# Patient Record
Sex: FEMALE | Race: WHITE | NOT HISPANIC OR LATINO | Employment: OTHER | URBAN - METROPOLITAN AREA
[De-identification: names, ages, dates, MRNs, and addresses within clinical notes are randomized per-mention and may not be internally consistent; named-entity substitution may affect disease eponyms.]

---

## 2023-08-26 LAB
ALANINE AMINOTRANSFERASE (SGPT) (U/L) IN SER/PLAS: 9 U/L (ref 7–45)
ALBUMIN (G/DL) IN SER/PLAS: 3.7 G/DL (ref 3.4–5)
ALKALINE PHOSPHATASE (U/L) IN SER/PLAS: 63 U/L (ref 33–136)
ANION GAP IN SER/PLAS: 14 MMOL/L (ref 10–20)
ASPARTATE AMINOTRANSFERASE (SGOT) (U/L) IN SER/PLAS: 11 U/L (ref 9–39)
BASOPHILS (10*3/UL) IN BLOOD BY AUTOMATED COUNT: 0.04 X10E9/L (ref 0–0.1)
BASOPHILS/100 LEUKOCYTES IN BLOOD BY AUTOMATED COUNT: 0.6 % (ref 0–2)
BILIRUBIN TOTAL (MG/DL) IN SER/PLAS: 0.4 MG/DL (ref 0–1.2)
CALCIUM (MG/DL) IN SER/PLAS: 9.1 MG/DL (ref 8.6–10.3)
CARBON DIOXIDE, TOTAL (MMOL/L) IN SER/PLAS: 26 MMOL/L (ref 21–32)
CHLORIDE (MMOL/L) IN SER/PLAS: 101 MMOL/L (ref 98–107)
CHOLESTEROL (MG/DL) IN SER/PLAS: 194 MG/DL (ref 0–199)
CHOLESTEROL IN HDL (MG/DL) IN SER/PLAS: 41.8 MG/DL
CHOLESTEROL/HDL RATIO: 4.6
CREATININE (MG/DL) IN SER/PLAS: 0.92 MG/DL (ref 0.5–1.05)
EOSINOPHILS (10*3/UL) IN BLOOD BY AUTOMATED COUNT: 0.11 X10E9/L (ref 0–0.4)
EOSINOPHILS/100 LEUKOCYTES IN BLOOD BY AUTOMATED COUNT: 1.5 % (ref 0–6)
ERYTHROCYTE DISTRIBUTION WIDTH (RATIO) BY AUTOMATED COUNT: 15.5 % (ref 11.5–14.5)
ERYTHROCYTE MEAN CORPUSCULAR HEMOGLOBIN CONCENTRATION (G/DL) BY AUTOMATED: 29.9 G/DL (ref 32–36)
ERYTHROCYTE MEAN CORPUSCULAR VOLUME (FL) BY AUTOMATED COUNT: 87 FL (ref 80–100)
ERYTHROCYTES (10*6/UL) IN BLOOD BY AUTOMATED COUNT: 4.28 X10E12/L (ref 4–5.2)
GFR FEMALE: 60 ML/MIN/1.73M2
GLUCOSE (MG/DL) IN SER/PLAS: 83 MG/DL (ref 74–99)
HEMATOCRIT (%) IN BLOOD BY AUTOMATED COUNT: 37.4 % (ref 36–46)
HEMOGLOBIN (G/DL) IN BLOOD: 11.2 G/DL (ref 12–16)
IMMATURE GRANULOCYTES/100 LEUKOCYTES IN BLOOD BY AUTOMATED COUNT: 0.6 % (ref 0–0.9)
LDL: 103 MG/DL (ref 0–99)
LEUKOCYTES (10*3/UL) IN BLOOD BY AUTOMATED COUNT: 7.2 X10E9/L (ref 4.4–11.3)
LYMPHOCYTES (10*3/UL) IN BLOOD BY AUTOMATED COUNT: 2.59 X10E9/L (ref 0.8–3)
LYMPHOCYTES/100 LEUKOCYTES IN BLOOD BY AUTOMATED COUNT: 36.1 % (ref 13–44)
MONOCYTES (10*3/UL) IN BLOOD BY AUTOMATED COUNT: 0.56 X10E9/L (ref 0.05–0.8)
MONOCYTES/100 LEUKOCYTES IN BLOOD BY AUTOMATED COUNT: 7.8 % (ref 2–10)
NEUTROPHILS (10*3/UL) IN BLOOD BY AUTOMATED COUNT: 3.84 X10E9/L (ref 1.6–5.5)
NEUTROPHILS/100 LEUKOCYTES IN BLOOD BY AUTOMATED COUNT: 53.4 % (ref 40–80)
NON HDL CHOLESTEROL: 152 MG/DL
NRBC (PER 100 WBCS) BY AUTOMATED COUNT: 0 /100 WBC (ref 0–0)
PLATELETS (10*3/UL) IN BLOOD AUTOMATED COUNT: 252 X10E9/L (ref 150–450)
POTASSIUM (MMOL/L) IN SER/PLAS: 3.7 MMOL/L (ref 3.5–5.3)
PROTEIN TOTAL: 6.9 G/DL (ref 6.4–8.2)
SODIUM (MMOL/L) IN SER/PLAS: 137 MMOL/L (ref 136–145)
THYROTROPIN (MIU/L) IN SER/PLAS BY DETECTION LIMIT <= 0.05 MIU/L: 2.47 MIU/L (ref 0.44–3.98)
TRIGLYCERIDE (MG/DL) IN SER/PLAS: 248 MG/DL (ref 0–149)
UREA NITROGEN (MG/DL) IN SER/PLAS: 27 MG/DL (ref 6–23)
VLDL: 50 MG/DL (ref 0–40)

## 2024-02-23 ENCOUNTER — TELEPHONE (OUTPATIENT)
Dept: ADMISSION | Facility: HOSPITAL | Age: 89
End: 2024-02-23
Payer: MEDICARE

## 2024-02-23 DIAGNOSIS — C50.919 MALIGNANT NEOPLASM OF FEMALE BREAST, UNSPECIFIED ESTROGEN RECEPTOR STATUS, UNSPECIFIED LATERALITY, UNSPECIFIED SITE OF BREAST (MULTI): ICD-10-CM

## 2024-02-23 NOTE — TELEPHONE ENCOUNTER
Patient calling in to report that she believes the anastrozole is causing her joint pain and fatigue. She states she discussed this with her PCP at a recent visit, and was told to call to change the prescription.   Patient does not have any appts or orders placed, and is unsure about follow up schedule.

## 2024-02-23 NOTE — TELEPHONE ENCOUNTER
Called patient and explained she will need an appt to discuss changes in her medication and plan of care. Patient states she prefers the [place] location and will make an appt with a provider there.   Patient transferred to the scheduling dept. No further questions or concerns at this time.

## 2024-04-22 ENCOUNTER — APPOINTMENT (OUTPATIENT)
Dept: HEMATOLOGY/ONCOLOGY | Facility: CLINIC | Age: 89
End: 2024-04-22
Payer: MEDICARE

## 2025-01-10 ENCOUNTER — APPOINTMENT (OUTPATIENT)
Dept: CARDIOLOGY | Facility: HOSPITAL | Age: 89
DRG: 374 | End: 2025-01-10
Payer: MEDICARE

## 2025-01-10 ENCOUNTER — HOSPITAL ENCOUNTER (INPATIENT)
Facility: HOSPITAL | Age: 89
End: 2025-01-10
Attending: EMERGENCY MEDICINE | Admitting: INTERNAL MEDICINE
Payer: MEDICARE

## 2025-01-10 DIAGNOSIS — D64.9 ANEMIA, UNSPECIFIED TYPE: ICD-10-CM

## 2025-01-10 DIAGNOSIS — R53.1 GENERALIZED WEAKNESS: Primary | ICD-10-CM

## 2025-01-10 DIAGNOSIS — E16.2 HYPOGLYCEMIA: ICD-10-CM

## 2025-01-10 LAB
ALBUMIN SERPL BCP-MCNC: 3.5 G/DL (ref 3.4–5)
ALP SERPL-CCNC: 55 U/L (ref 33–136)
ALT SERPL W P-5'-P-CCNC: 6 U/L (ref 7–45)
ANION GAP SERPL CALC-SCNC: 15 MMOL/L (ref 10–20)
APPEARANCE UR: CLEAR
AST SERPL W P-5'-P-CCNC: 8 U/L (ref 9–39)
BACTERIA #/AREA URNS AUTO: ABNORMAL /HPF
BASOPHILS # BLD AUTO: 0.03 X10*3/UL (ref 0–0.1)
BASOPHILS NFR BLD AUTO: 0.3 %
BILIRUB SERPL-MCNC: 0.4 MG/DL (ref 0–1.2)
BILIRUB UR STRIP.AUTO-MCNC: NEGATIVE MG/DL
BUN SERPL-MCNC: 23 MG/DL (ref 6–23)
CALCIUM SERPL-MCNC: 9.2 MG/DL (ref 8.6–10.3)
CARDIAC TROPONIN I PNL SERPL HS: 168 NG/L (ref 0–13)
CARDIAC TROPONIN I PNL SERPL HS: 214 NG/L (ref 0–13)
CARDIAC TROPONIN I PNL SERPL HS: 98 NG/L (ref 0–13)
CHLORIDE SERPL-SCNC: 102 MMOL/L (ref 98–107)
CK SERPL-CCNC: 68 U/L (ref 0–215)
CO2 SERPL-SCNC: 25 MMOL/L (ref 21–32)
COLOR UR: ABNORMAL
CREAT SERPL-MCNC: 0.67 MG/DL (ref 0.5–1.05)
EGFRCR SERPLBLD CKD-EPI 2021: 84 ML/MIN/1.73M*2
EOSINOPHIL # BLD AUTO: 0.02 X10*3/UL (ref 0–0.4)
EOSINOPHIL NFR BLD AUTO: 0.2 %
ERYTHROCYTE [DISTWIDTH] IN BLOOD BY AUTOMATED COUNT: 18.1 % (ref 11.5–14.5)
FLUAV RNA RESP QL NAA+PROBE: NOT DETECTED
FLUBV RNA RESP QL NAA+PROBE: NOT DETECTED
GLUCOSE BLD MANUAL STRIP-MCNC: 53 MG/DL (ref 74–99)
GLUCOSE BLD MANUAL STRIP-MCNC: 84 MG/DL (ref 74–99)
GLUCOSE SERPL-MCNC: 67 MG/DL (ref 74–99)
GLUCOSE UR STRIP.AUTO-MCNC: NORMAL MG/DL
HCT VFR BLD AUTO: 31.9 % (ref 36–46)
HGB BLD-MCNC: 9.5 G/DL (ref 12–16)
IMM GRANULOCYTES # BLD AUTO: 0.06 X10*3/UL (ref 0–0.5)
IMM GRANULOCYTES NFR BLD AUTO: 0.6 % (ref 0–0.9)
KETONES UR STRIP.AUTO-MCNC: NEGATIVE MG/DL
LACTATE SERPL-SCNC: 1.4 MMOL/L (ref 0.4–2)
LEUKOCYTE ESTERASE UR QL STRIP.AUTO: NEGATIVE
LYMPHOCYTES # BLD AUTO: 0.84 X10*3/UL (ref 0.8–3)
LYMPHOCYTES NFR BLD AUTO: 8.2 %
MCH RBC QN AUTO: 22.8 PG (ref 26–34)
MCHC RBC AUTO-ENTMCNC: 29.8 G/DL (ref 32–36)
MCV RBC AUTO: 77 FL (ref 80–100)
MONOCYTES # BLD AUTO: 0.44 X10*3/UL (ref 0.05–0.8)
MONOCYTES NFR BLD AUTO: 4.3 %
MUCOUS THREADS #/AREA URNS AUTO: ABNORMAL /LPF
NEUTROPHILS # BLD AUTO: 8.83 X10*3/UL (ref 1.6–5.5)
NEUTROPHILS NFR BLD AUTO: 86.4 %
NITRITE UR QL STRIP.AUTO: ABNORMAL
NRBC BLD-RTO: 0 /100 WBCS (ref 0–0)
PH UR STRIP.AUTO: 5.5 [PH]
PLATELET # BLD AUTO: 280 X10*3/UL (ref 150–450)
POTASSIUM SERPL-SCNC: 3.7 MMOL/L (ref 3.5–5.3)
PROT SERPL-MCNC: 7.2 G/DL (ref 6.4–8.2)
PROT UR STRIP.AUTO-MCNC: NEGATIVE MG/DL
RBC # BLD AUTO: 4.16 X10*6/UL (ref 4–5.2)
RBC # UR STRIP.AUTO: NEGATIVE /UL
RBC #/AREA URNS AUTO: ABNORMAL /HPF
SARS-COV-2 RNA RESP QL NAA+PROBE: NOT DETECTED
SODIUM SERPL-SCNC: 138 MMOL/L (ref 136–145)
SP GR UR STRIP.AUTO: 1.01
UROBILINOGEN UR STRIP.AUTO-MCNC: NORMAL MG/DL
WBC # BLD AUTO: 10.2 X10*3/UL (ref 4.4–11.3)
WBC #/AREA URNS AUTO: ABNORMAL /HPF

## 2025-01-10 PROCEDURE — 87086 URINE CULTURE/COLONY COUNT: CPT | Mod: PARLAB | Performed by: EMERGENCY MEDICINE

## 2025-01-10 PROCEDURE — G0378 HOSPITAL OBSERVATION PER HR: HCPCS

## 2025-01-10 PROCEDURE — 83036 HEMOGLOBIN GLYCOSYLATED A1C: CPT

## 2025-01-10 PROCEDURE — 83605 ASSAY OF LACTIC ACID: CPT | Performed by: EMERGENCY MEDICINE

## 2025-01-10 PROCEDURE — 96360 HYDRATION IV INFUSION INIT: CPT

## 2025-01-10 PROCEDURE — 84484 ASSAY OF TROPONIN QUANT: CPT

## 2025-01-10 PROCEDURE — 82947 ASSAY GLUCOSE BLOOD QUANT: CPT

## 2025-01-10 PROCEDURE — 84484 ASSAY OF TROPONIN QUANT: CPT | Performed by: EMERGENCY MEDICINE

## 2025-01-10 PROCEDURE — 82550 ASSAY OF CK (CPK): CPT

## 2025-01-10 PROCEDURE — 36415 COLL VENOUS BLD VENIPUNCTURE: CPT | Performed by: EMERGENCY MEDICINE

## 2025-01-10 PROCEDURE — 82550 ASSAY OF CK (CPK): CPT | Performed by: EMERGENCY MEDICINE

## 2025-01-10 PROCEDURE — 87636 SARSCOV2 & INF A&B AMP PRB: CPT | Performed by: EMERGENCY MEDICINE

## 2025-01-10 PROCEDURE — 80053 COMPREHEN METABOLIC PANEL: CPT | Performed by: EMERGENCY MEDICINE

## 2025-01-10 PROCEDURE — 85025 COMPLETE CBC W/AUTO DIFF WBC: CPT | Performed by: EMERGENCY MEDICINE

## 2025-01-10 PROCEDURE — 99285 EMERGENCY DEPT VISIT HI MDM: CPT | Performed by: EMERGENCY MEDICINE

## 2025-01-10 PROCEDURE — 99223 1ST HOSP IP/OBS HIGH 75: CPT

## 2025-01-10 PROCEDURE — 96361 HYDRATE IV INFUSION ADD-ON: CPT

## 2025-01-10 PROCEDURE — 2500000004 HC RX 250 GENERAL PHARMACY W/ HCPCS (ALT 636 FOR OP/ED): Performed by: EMERGENCY MEDICINE

## 2025-01-10 PROCEDURE — 81001 URINALYSIS AUTO W/SCOPE: CPT | Performed by: EMERGENCY MEDICINE

## 2025-01-10 PROCEDURE — 93005 ELECTROCARDIOGRAM TRACING: CPT

## 2025-01-10 RX ORDER — ACETAMINOPHEN 325 MG/1
650 TABLET ORAL EVERY 4 HOURS PRN
Status: DISPENSED | OUTPATIENT
Start: 2025-01-10

## 2025-01-10 RX ORDER — ONDANSETRON 4 MG/1
4 TABLET, FILM COATED ORAL EVERY 8 HOURS PRN
Status: ACTIVE | OUTPATIENT
Start: 2025-01-10

## 2025-01-10 RX ORDER — ENOXAPARIN SODIUM 100 MG/ML
40 INJECTION SUBCUTANEOUS EVERY 24 HOURS
Status: DISPENSED | OUTPATIENT
Start: 2025-01-10

## 2025-01-10 RX ORDER — ACETAMINOPHEN 160 MG/5ML
650 SOLUTION ORAL EVERY 4 HOURS PRN
Status: ACTIVE | OUTPATIENT
Start: 2025-01-10

## 2025-01-10 RX ORDER — ONDANSETRON HYDROCHLORIDE 2 MG/ML
4 INJECTION, SOLUTION INTRAVENOUS EVERY 8 HOURS PRN
Status: DISPENSED | OUTPATIENT
Start: 2025-01-10

## 2025-01-10 RX ORDER — ACETAMINOPHEN 650 MG/1
650 SUPPOSITORY RECTAL EVERY 4 HOURS PRN
Status: ACTIVE | OUTPATIENT
Start: 2025-01-10

## 2025-01-10 RX ORDER — DEXTROSE MONOHYDRATE 100 MG/ML
75 INJECTION, SOLUTION INTRAVENOUS CONTINUOUS
Status: ACTIVE | OUTPATIENT
Start: 2025-01-10 | End: 2025-01-11

## 2025-01-10 RX ADMIN — DEXTROSE MONOHYDRATE 75 ML/HR: 10 INJECTION, SOLUTION INTRAVENOUS at 21:02

## 2025-01-10 ASSESSMENT — LIFESTYLE VARIABLES
EVER FELT BAD OR GUILTY ABOUT YOUR DRINKING: NO
HAVE PEOPLE ANNOYED YOU BY CRITICIZING YOUR DRINKING: NO
EVER HAD A DRINK FIRST THING IN THE MORNING TO STEADY YOUR NERVES TO GET RID OF A HANGOVER: NO
TOTAL SCORE: 0
HAVE YOU EVER FELT YOU SHOULD CUT DOWN ON YOUR DRINKING: NO

## 2025-01-10 NOTE — ED TRIAGE NOTES
Pt from home when she had weakness in her legs that caused her to slide onto the ground. No LOC on ground for 5 hours till her son got home. No major injuries.

## 2025-01-10 NOTE — ED PROVIDER NOTES
"Limitations to History: None     HPI:      Melissa Tse is a 89 y.o. who presents to the ED with  A fall from standing.  The patient lowered herself to the ground making cereal around noon and then was on the floor until her son came home around 530.  She said her legs felt weak yesterday but her son was able to get her something to eat in the morning and she felt slightly better.  She then developed weakness again while making cereal this morning.  She said her legs just do not feel that they can hold her up.  She denies pain, chest pain shortness of breath syncope hitting her head or any pain after the fall.  She does think she soiled herself while in the ground..     Additional History Obtained from:Son, whom she lives with    ------------------------------------------------------------------------------------------------------------------------------------------    VS: /60 (BP Location: Right arm)   Pulse 95   Temp 36.5 °C (97.7 °F) (Temporal)   Resp 18   Ht 1.626 m (5' 4\")   Wt 83.9 kg (185 lb)   SpO2 95%   BMI 31.76 kg/m²     Physical Exam:  Gen: Alert, NAD  Head/Neck: NCAT, neck w/ FROM  Eyes: EOMI, PERRL, anicteric sclerae, noninjected conjunctivae  Mouth:  MMM, no OP lesions noted  Heart: RRR no MRG  Lungs: CTA b/l no RRW, no increased work of breathing  Abdomen: soft, NT, ND, no HSM, no palpable masses  Musculoskeletal: no joint swelling noted  Extremities: WWP, no c/c/e, cap refill <2sec  Neurologic: Alert, symmetrical facies, phonates clearly, moves all extremities equally, responsive to touch, 5/5 strength in BLE   Skin: no rashes noted  Psychological: calm, no SI/HI      ------------------------------------------------------------------------------------------------------------------------------------------    Medical Decision Making: Patient presents to the emergency department with concern for weakness.  She is unable to stand up at home.  She laid on the ground for 6 hours.  No " rhabdomyolysis, no traumatic injury from this fall.  The patient does have some right thigh pain but it is soft my concern for compartment syndrome given she weight on it for a few hours is very low.  She has good pulses in that lower extremity.  She is mildly hypoglycemic and after eating is persistently hypoglycemic so was placed on D10 will be admitted for her weakness and persistent hypoglycemia of unknown origin.  Could be because she did not eat today.    Diagnoses as of 01/10/25 2244   Generalized weakness   Hypoglycemia       Medications   dextrose 10 % in water (D10W) infusion (75 mL/hr intravenous New Bag 1/10/25 2102)   enoxaparin (Lovenox) syringe 40 mg (has no administration in time range)   psyllium (Metamucil) 3.4 gram packet 1 packet (has no administration in time range)   ondansetron (Zofran) tablet 4 mg (has no administration in time range)     Or   ondansetron (Zofran) injection 4 mg (has no administration in time range)   acetaminophen (Tylenol) tablet 650 mg (has no administration in time range)     Or   acetaminophen (Tylenol) oral liquid 650 mg (has no administration in time range)     Or   acetaminophen (Tylenol) suppository 650 mg (has no administration in time range)       Chronic Medical Conditions Significantly Affecting Care: DM    Discussion of Management with Other Providers:   I discussed the patient/results with: admitting team        Nahum Bello MD  01/10/25 2646

## 2025-01-11 LAB
ALBUMIN SERPL BCP-MCNC: 3.3 G/DL (ref 3.4–5)
ALP SERPL-CCNC: 53 U/L (ref 33–136)
ALT SERPL W P-5'-P-CCNC: 5 U/L (ref 7–45)
ANION GAP SERPL CALC-SCNC: 14 MMOL/L (ref 10–20)
AST SERPL W P-5'-P-CCNC: 10 U/L (ref 9–39)
BASOPHILS # BLD AUTO: 0.03 X10*3/UL (ref 0–0.1)
BASOPHILS NFR BLD AUTO: 0.3 %
BILIRUB SERPL-MCNC: 0.4 MG/DL (ref 0–1.2)
BUN SERPL-MCNC: 17 MG/DL (ref 6–23)
CALCIUM SERPL-MCNC: 8.7 MG/DL (ref 8.6–10.3)
CARDIAC TROPONIN I PNL SERPL HS: 222 NG/L (ref 0–13)
CHLORIDE SERPL-SCNC: 98 MMOL/L (ref 98–107)
CK SERPL-CCNC: 65 U/L (ref 0–215)
CO2 SERPL-SCNC: 27 MMOL/L (ref 21–32)
CREAT SERPL-MCNC: 0.76 MG/DL (ref 0.5–1.05)
EGFRCR SERPLBLD CKD-EPI 2021: 75 ML/MIN/1.73M*2
EOSINOPHIL # BLD AUTO: 0.03 X10*3/UL (ref 0–0.4)
EOSINOPHIL NFR BLD AUTO: 0.3 %
ERYTHROCYTE [DISTWIDTH] IN BLOOD BY AUTOMATED COUNT: 18.2 % (ref 11.5–14.5)
GLUCOSE BLD MANUAL STRIP-MCNC: 112 MG/DL (ref 74–99)
GLUCOSE BLD MANUAL STRIP-MCNC: 155 MG/DL (ref 74–99)
GLUCOSE BLD MANUAL STRIP-MCNC: 192 MG/DL (ref 74–99)
GLUCOSE BLD MANUAL STRIP-MCNC: 232 MG/DL (ref 74–99)
GLUCOSE BLD MANUAL STRIP-MCNC: 84 MG/DL (ref 74–99)
GLUCOSE SERPL-MCNC: 203 MG/DL (ref 74–99)
HCT VFR BLD AUTO: 30.4 % (ref 36–46)
HGB BLD-MCNC: 9 G/DL (ref 12–16)
HOLD SPECIMEN: NORMAL
IMM GRANULOCYTES # BLD AUTO: 0.05 X10*3/UL (ref 0–0.5)
IMM GRANULOCYTES NFR BLD AUTO: 0.5 % (ref 0–0.9)
IRON SATN MFR SERPL: 9 % (ref 25–45)
IRON SERPL-MCNC: 27 UG/DL (ref 35–150)
LYMPHOCYTES # BLD AUTO: 0.87 X10*3/UL (ref 0.8–3)
LYMPHOCYTES NFR BLD AUTO: 9.4 %
MCH RBC QN AUTO: 22.7 PG (ref 26–34)
MCHC RBC AUTO-ENTMCNC: 29.6 G/DL (ref 32–36)
MCV RBC AUTO: 77 FL (ref 80–100)
MONOCYTES # BLD AUTO: 0.52 X10*3/UL (ref 0.05–0.8)
MONOCYTES NFR BLD AUTO: 5.6 %
NEUTROPHILS # BLD AUTO: 7.77 X10*3/UL (ref 1.6–5.5)
NEUTROPHILS NFR BLD AUTO: 83.9 %
NRBC BLD-RTO: 0 /100 WBCS (ref 0–0)
PLATELET # BLD AUTO: 274 X10*3/UL (ref 150–450)
POTASSIUM SERPL-SCNC: 4.1 MMOL/L (ref 3.5–5.3)
PROT SERPL-MCNC: 6.7 G/DL (ref 6.4–8.2)
RBC # BLD AUTO: 3.97 X10*6/UL (ref 4–5.2)
SODIUM SERPL-SCNC: 135 MMOL/L (ref 136–145)
TIBC SERPL-MCNC: 303 UG/DL (ref 240–445)
UIBC SERPL-MCNC: 276 UG/DL (ref 110–370)
WBC # BLD AUTO: 9.3 X10*3/UL (ref 4.4–11.3)

## 2025-01-11 PROCEDURE — 96372 THER/PROPH/DIAG INJ SC/IM: CPT

## 2025-01-11 PROCEDURE — 83540 ASSAY OF IRON: CPT | Performed by: INTERNAL MEDICINE

## 2025-01-11 PROCEDURE — 2500000004 HC RX 250 GENERAL PHARMACY W/ HCPCS (ALT 636 FOR OP/ED): Performed by: INTERNAL MEDICINE

## 2025-01-11 PROCEDURE — 82947 ASSAY GLUCOSE BLOOD QUANT: CPT

## 2025-01-11 PROCEDURE — 83550 IRON BINDING TEST: CPT | Performed by: INTERNAL MEDICINE

## 2025-01-11 PROCEDURE — 36415 COLL VENOUS BLD VENIPUNCTURE: CPT

## 2025-01-11 PROCEDURE — G0378 HOSPITAL OBSERVATION PER HR: HCPCS

## 2025-01-11 PROCEDURE — 84484 ASSAY OF TROPONIN QUANT: CPT

## 2025-01-11 PROCEDURE — 2500000001 HC RX 250 WO HCPCS SELF ADMINISTERED DRUGS (ALT 637 FOR MEDICARE OP)

## 2025-01-11 PROCEDURE — 85025 COMPLETE CBC W/AUTO DIFF WBC: CPT | Performed by: INTERNAL MEDICINE

## 2025-01-11 PROCEDURE — 2500000004 HC RX 250 GENERAL PHARMACY W/ HCPCS (ALT 636 FOR OP/ED): Performed by: EMERGENCY MEDICINE

## 2025-01-11 PROCEDURE — 80053 COMPREHEN METABOLIC PANEL: CPT | Performed by: INTERNAL MEDICINE

## 2025-01-11 PROCEDURE — 36415 COLL VENOUS BLD VENIPUNCTURE: CPT | Performed by: INTERNAL MEDICINE

## 2025-01-11 PROCEDURE — 2500000004 HC RX 250 GENERAL PHARMACY W/ HCPCS (ALT 636 FOR OP/ED)

## 2025-01-11 PROCEDURE — 2500000002 HC RX 250 W HCPCS SELF ADMINISTERED DRUGS (ALT 637 FOR MEDICARE OP, ALT 636 FOR OP/ED): Performed by: PHYSICIAN ASSISTANT

## 2025-01-11 RX ORDER — LISINOPRIL 10 MG/1
10 TABLET ORAL DAILY
Status: DISPENSED | OUTPATIENT
Start: 2025-01-11

## 2025-01-11 RX ORDER — METFORMIN HYDROCHLORIDE 500 MG/1
500 TABLET ORAL
Status: ON HOLD | COMMUNITY

## 2025-01-11 RX ORDER — AMLODIPINE BESYLATE 5 MG/1
2.5 TABLET ORAL DAILY
Status: DISPENSED | OUTPATIENT
Start: 2025-01-11

## 2025-01-11 RX ORDER — INSULIN GLARGINE 100 [IU]/ML
68 INJECTION, SOLUTION SUBCUTANEOUS NIGHTLY
Status: ON HOLD | COMMUNITY

## 2025-01-11 RX ORDER — INSULIN LISPRO 100 [IU]/ML
0-5 INJECTION, SOLUTION INTRAVENOUS; SUBCUTANEOUS
Status: DISPENSED | OUTPATIENT
Start: 2025-01-11

## 2025-01-11 RX ORDER — LISINOPRIL 10 MG/1
10 TABLET ORAL DAILY
Status: ON HOLD | COMMUNITY

## 2025-01-11 RX ORDER — DEXTROSE 50 % IN WATER (D50W) INTRAVENOUS SYRINGE
25
Status: ACTIVE | OUTPATIENT
Start: 2025-01-11

## 2025-01-11 RX ORDER — CEFTRIAXONE 1 G/50ML
1 INJECTION, SOLUTION INTRAVENOUS EVERY 24 HOURS
Status: DISPENSED | OUTPATIENT
Start: 2025-01-11

## 2025-01-11 RX ORDER — DEXTROSE 50 % IN WATER (D50W) INTRAVENOUS SYRINGE
12.5
Status: ACTIVE | OUTPATIENT
Start: 2025-01-11

## 2025-01-11 RX ORDER — AMLODIPINE BESYLATE 2.5 MG/1
2.5 TABLET ORAL DAILY
Status: ON HOLD | COMMUNITY

## 2025-01-11 RX ADMIN — ONDANSETRON 4 MG: 2 INJECTION INTRAMUSCULAR; INTRAVENOUS at 13:23

## 2025-01-11 RX ADMIN — ENOXAPARIN SODIUM 40 MG: 40 INJECTION SUBCUTANEOUS at 22:03

## 2025-01-11 RX ADMIN — PSYLLIUM HUSK 1 PACKET: 3.4 POWDER ORAL at 11:38

## 2025-01-11 RX ADMIN — INSULIN LISPRO 1 UNITS: 100 INJECTION, SOLUTION INTRAVENOUS; SUBCUTANEOUS at 17:56

## 2025-01-11 RX ADMIN — ENOXAPARIN SODIUM 40 MG: 40 INJECTION SUBCUTANEOUS at 01:42

## 2025-01-11 RX ADMIN — DEXTROSE MONOHYDRATE 75 ML/HR: 10 INJECTION, SOLUTION INTRAVENOUS at 11:38

## 2025-01-11 RX ADMIN — CEFTRIAXONE SODIUM 1 G: 1 INJECTION, SOLUTION INTRAVENOUS at 11:38

## 2025-01-11 RX ADMIN — ACETAMINOPHEN 650 MG: 325 TABLET, FILM COATED ORAL at 22:07

## 2025-01-11 SDOH — SOCIAL STABILITY: SOCIAL INSECURITY: HAVE YOU HAD THOUGHTS OF HARMING ANYONE ELSE?: NO

## 2025-01-11 SDOH — ECONOMIC STABILITY: FOOD INSECURITY: WITHIN THE PAST 12 MONTHS, YOU WORRIED THAT YOUR FOOD WOULD RUN OUT BEFORE YOU GOT THE MONEY TO BUY MORE.: NEVER TRUE

## 2025-01-11 SDOH — SOCIAL STABILITY: SOCIAL INSECURITY: ABUSE: ADULT

## 2025-01-11 SDOH — SOCIAL STABILITY: SOCIAL INSECURITY: DO YOU FEEL UNSAFE GOING BACK TO THE PLACE WHERE YOU ARE LIVING?: NO

## 2025-01-11 SDOH — SOCIAL STABILITY: SOCIAL NETWORK
DO YOU BELONG TO ANY CLUBS OR ORGANIZATIONS SUCH AS CHURCH GROUPS, UNIONS, FRATERNAL OR ATHLETIC GROUPS, OR SCHOOL GROUPS?: NO

## 2025-01-11 SDOH — SOCIAL STABILITY: SOCIAL INSECURITY: WITHIN THE LAST YEAR, HAVE YOU BEEN HUMILIATED OR EMOTIONALLY ABUSED IN OTHER WAYS BY YOUR PARTNER OR EX-PARTNER?: NO

## 2025-01-11 SDOH — HEALTH STABILITY: PHYSICAL HEALTH
HOW OFTEN DO YOU NEED TO HAVE SOMEONE HELP YOU WHEN YOU READ INSTRUCTIONS, PAMPHLETS, OR OTHER WRITTEN MATERIAL FROM YOUR DOCTOR OR PHARMACY?: NEVER

## 2025-01-11 SDOH — SOCIAL STABILITY: SOCIAL INSECURITY: ARE YOU MARRIED, WIDOWED, DIVORCED, SEPARATED, NEVER MARRIED, OR LIVING WITH A PARTNER?: WIDOWED

## 2025-01-11 SDOH — SOCIAL STABILITY: SOCIAL INSECURITY: ARE THERE ANY APPARENT SIGNS OF INJURIES/BEHAVIORS THAT COULD BE RELATED TO ABUSE/NEGLECT?: NO

## 2025-01-11 SDOH — SOCIAL STABILITY: SOCIAL INSECURITY
WITHIN THE LAST YEAR, HAVE YOU BEEN RAPED OR FORCED TO HAVE ANY KIND OF SEXUAL ACTIVITY BY YOUR PARTNER OR EX-PARTNER?: NO

## 2025-01-11 SDOH — SOCIAL STABILITY: SOCIAL INSECURITY: DOES ANYONE TRY TO KEEP YOU FROM HAVING/CONTACTING OTHER FRIENDS OR DOING THINGS OUTSIDE YOUR HOME?: NO

## 2025-01-11 SDOH — ECONOMIC STABILITY: FOOD INSECURITY: WITHIN THE PAST 12 MONTHS, THE FOOD YOU BOUGHT JUST DIDN'T LAST AND YOU DIDN'T HAVE MONEY TO GET MORE.: NEVER TRUE

## 2025-01-11 SDOH — SOCIAL STABILITY: SOCIAL INSECURITY: WITHIN THE LAST YEAR, HAVE YOU BEEN AFRAID OF YOUR PARTNER OR EX-PARTNER?: NO

## 2025-01-11 SDOH — SOCIAL STABILITY: SOCIAL INSECURITY: HAVE YOU HAD ANY THOUGHTS OF HARMING ANYONE ELSE?: NO

## 2025-01-11 SDOH — ECONOMIC STABILITY: INCOME INSECURITY: IN THE PAST 12 MONTHS HAS THE ELECTRIC, GAS, OIL, OR WATER COMPANY THREATENED TO SHUT OFF SERVICES IN YOUR HOME?: NO

## 2025-01-11 SDOH — SOCIAL STABILITY: SOCIAL INSECURITY: ARE YOU OR HAVE YOU BEEN THREATENED OR ABUSED PHYSICALLY, EMOTIONALLY, OR SEXUALLY BY ANYONE?: NO

## 2025-01-11 SDOH — SOCIAL STABILITY: SOCIAL NETWORK
IN A TYPICAL WEEK, HOW MANY TIMES DO YOU TALK ON THE PHONE WITH FAMILY, FRIENDS, OR NEIGHBORS?: MORE THAN THREE TIMES A WEEK

## 2025-01-11 SDOH — SOCIAL STABILITY: SOCIAL INSECURITY: DO YOU FEEL ANYONE HAS EXPLOITED OR TAKEN ADVANTAGE OF YOU FINANCIALLY OR OF YOUR PERSONAL PROPERTY?: NO

## 2025-01-11 SDOH — SOCIAL STABILITY: SOCIAL INSECURITY
WITHIN THE LAST YEAR, HAVE YOU BEEN KICKED, HIT, SLAPPED, OR OTHERWISE PHYSICALLY HURT BY YOUR PARTNER OR EX-PARTNER?: NO

## 2025-01-11 SDOH — SOCIAL STABILITY: SOCIAL INSECURITY: HAS ANYONE EVER THREATENED TO HURT YOUR FAMILY OR YOUR PETS?: NO

## 2025-01-11 SDOH — SOCIAL STABILITY: SOCIAL NETWORK: HOW OFTEN DO YOU ATTEND MEETINGS OF THE CLUBS OR ORGANIZATIONS YOU BELONG TO?: NEVER

## 2025-01-11 SDOH — SOCIAL STABILITY: SOCIAL INSECURITY: WERE YOU ABLE TO COMPLETE ALL THE BEHAVIORAL HEALTH SCREENINGS?: YES

## 2025-01-11 SDOH — SOCIAL STABILITY: SOCIAL NETWORK: HOW OFTEN DO YOU GET TOGETHER WITH FRIENDS OR RELATIVES?: MORE THAN THREE TIMES A WEEK

## 2025-01-11 SDOH — SOCIAL STABILITY: SOCIAL NETWORK: HOW OFTEN DO YOU ATTEND CHURCH OR RELIGIOUS SERVICES?: 1 TO 4 TIMES PER YEAR

## 2025-01-11 ASSESSMENT — PAIN - FUNCTIONAL ASSESSMENT
PAIN_FUNCTIONAL_ASSESSMENT: 0-10

## 2025-01-11 ASSESSMENT — COGNITIVE AND FUNCTIONAL STATUS - GENERAL
WALKING IN HOSPITAL ROOM: A LOT
DRESSING REGULAR UPPER BODY CLOTHING: A LITTLE
MOBILITY SCORE: 14
STANDING UP FROM CHAIR USING ARMS: A LOT
EATING MEALS: A LITTLE
DRESSING REGULAR LOWER BODY CLOTHING: A LITTLE
TOILETING: A LITTLE
MOVING TO AND FROM BED TO CHAIR: A LOT
MOBILITY SCORE: 16
PERSONAL GROOMING: A LITTLE
MOVING FROM LYING ON BACK TO SITTING ON SIDE OF FLAT BED WITH BEDRAILS: A LITTLE
TOILETING: A LOT
HELP NEEDED FOR BATHING: A LITTLE
TURNING FROM BACK TO SIDE WHILE IN FLAT BAD: A LITTLE
STANDING UP FROM CHAIR USING ARMS: A LOT
PERSONAL GROOMING: A LITTLE
CLIMB 3 TO 5 STEPS WITH RAILING: A LOT
HELP NEEDED FOR BATHING: A LITTLE
MOVING FROM LYING ON BACK TO SITTING ON SIDE OF FLAT BED WITH BEDRAILS: A LITTLE
EATING MEALS: A LITTLE
DAILY ACTIVITIY SCORE: 18
WALKING IN HOSPITAL ROOM: A LOT
DRESSING REGULAR UPPER BODY CLOTHING: A LITTLE
PERSONAL GROOMING: A LITTLE
DAILY ACTIVITIY SCORE: 18
CLIMB 3 TO 5 STEPS WITH RAILING: A LOT
MOBILITY SCORE: 14
DRESSING REGULAR LOWER BODY CLOTHING: A LITTLE
CLIMB 3 TO 5 STEPS WITH RAILING: A LOT
MOVING TO AND FROM BED TO CHAIR: A LOT
DAILY ACTIVITIY SCORE: 18
WALKING IN HOSPITAL ROOM: A LOT
TURNING FROM BACK TO SIDE WHILE IN FLAT BAD: A LITTLE
DRESSING REGULAR LOWER BODY CLOTHING: A LITTLE
STANDING UP FROM CHAIR USING ARMS: A LOT
MOVING TO AND FROM BED TO CHAIR: A LOT
PATIENT BASELINE BEDBOUND: NO
TOILETING: A LITTLE
HELP NEEDED FOR BATHING: A LITTLE
DRESSING REGULAR UPPER BODY CLOTHING: A LITTLE

## 2025-01-11 ASSESSMENT — ACTIVITIES OF DAILY LIVING (ADL)
ADEQUATE_TO_COMPLETE_ADL: YES
TOILETING: NEEDS ASSISTANCE
WALKS IN HOME: NEEDS ASSISTANCE
PATIENT'S MEMORY ADEQUATE TO SAFELY COMPLETE DAILY ACTIVITIES?: YES
BATHING: NEEDS ASSISTANCE
LACK_OF_TRANSPORTATION: NO
DRESSING YOURSELF: NEEDS ASSISTANCE
ASSISTIVE_DEVICE: CANE;WALKER
JUDGMENT_ADEQUATE_SAFELY_COMPLETE_DAILY_ACTIVITIES: YES
GROOMING: NEEDS ASSISTANCE
FEEDING YOURSELF: INDEPENDENT
HEARING - RIGHT EAR: FUNCTIONAL
HEARING - LEFT EAR: FUNCTIONAL

## 2025-01-11 ASSESSMENT — PATIENT HEALTH QUESTIONNAIRE - PHQ9
1. LITTLE INTEREST OR PLEASURE IN DOING THINGS: NOT AT ALL
2. FEELING DOWN, DEPRESSED OR HOPELESS: NOT AT ALL
SUM OF ALL RESPONSES TO PHQ9 QUESTIONS 1 & 2: 0

## 2025-01-11 ASSESSMENT — LIFESTYLE VARIABLES
AUDIT-C TOTAL SCORE: 0
PRESCIPTION_ABUSE_PAST_12_MONTHS: NO
HOW OFTEN DO YOU HAVE 6 OR MORE DRINKS ON ONE OCCASION: NEVER
AUDIT-C TOTAL SCORE: 0
SKIP TO QUESTIONS 9-10: 1
HOW OFTEN DO YOU HAVE A DRINK CONTAINING ALCOHOL: NEVER
SUBSTANCE_ABUSE_PAST_12_MONTHS: NO
HOW MANY STANDARD DRINKS CONTAINING ALCOHOL DO YOU HAVE ON A TYPICAL DAY: PATIENT DOES NOT DRINK

## 2025-01-11 ASSESSMENT — PAIN SCALES - GENERAL
PAINLEVEL_OUTOF10: 0 - NO PAIN
PAINLEVEL_OUTOF10: 0 - NO PAIN
PAINLEVEL_OUTOF10: 3

## 2025-01-11 ASSESSMENT — PAIN DESCRIPTION - DESCRIPTORS: DESCRIPTORS: CRAMPING

## 2025-01-11 NOTE — CARE PLAN
The patient's goals for the shift include  sleep, comfort and safety    The clinical goals for the shift include Keep patient safe and free from injury or falls for entire shift.

## 2025-01-11 NOTE — CARE PLAN
The patient's goals for the shift include  safety and increased strength.    The clinical goals for the shift include patient will remain safe during shift.    Over the shift, the patient did not make progress toward the following goals. Barriers to progression include falls, weakness. Recommendations to address these barriers include PT OT and cardiology consult.

## 2025-01-11 NOTE — H&P
History Of Present Illness  Melissa Tse is a 89 y.o. female with medical history of T2DM on insulin, breast cancer on current estrogen therapy who presents to the ED with a fall from standing position.  The patient lowered herself to the ground making cereal around noon and then was on the floor until her son came home around 530.  She said her legs felt weak yesterday but her son was able to get her something to eat in the morning and she felt slightly better.  She then developed weakness again while making cereal this morning.  She said her legs just do not feel that they can hold her up.  She denies pain, chest discomfort, shortness of breath, syncope, head strike or any pain post fall.  States her glucose averages in the high 80s, admits to being a picky eater.  Denies upper respiratory/systemic viral symptoms.  Denies suprapubic pain, urinary frequency/urgency, discomfort with urination.  Denies history of low back pain/trauma, saddle anesthesia, peripheral neuropathy, incontinence.      ED course: Vitals stable.  Vitals notable for hypoglycemia of 67, depressed LFTs, elevated troponin of 98, hemoglobin of 9.5.  Patient treated with D10 for hypoglycemia.     Past Medical History  History reviewed. No pertinent past medical history.    Surgical History  Past Surgical History:   Procedure Laterality Date    OTHER SURGICAL HISTORY  02/25/2022    Hip replacement        Social History  She reports that she has never smoked. She has never been exposed to tobacco smoke. She has never used smokeless tobacco. She reports that she does not drink alcohol and does not use drugs.    Family History  No family history on file.     Allergies  Patient has no known allergies.    Review of Systems  10 point ROS negative except as specified in HPI    Physical Exam  HENT:      Head: Normocephalic and atraumatic.      Nose: Nose normal.   Eyes:      Conjunctiva/sclera: Conjunctivae normal.   Cardiovascular:      Rate and Rhythm:  "Normal rate and regular rhythm.      Pulses: Normal pulses.      Heart sounds: Normal heart sounds.   Pulmonary:      Effort: Pulmonary effort is normal.      Breath sounds: Normal breath sounds.   Abdominal:      General: Abdomen is flat. There is no distension.      Palpations: Abdomen is soft.   Musculoskeletal:         General: No swelling or deformity.   Skin:     General: Skin is warm and dry.   Neurological:      Mental Status: She is alert. Mental status is at baseline.   Psychiatric:         Behavior: Behavior normal.          Last Recorded Vitals  Blood pressure 124/60, pulse 96, temperature 36.2 °C (97.2 °F), temperature source Temporal, resp. rate 22, height 1.626 m (5' 4.02\"), weight 83.9 kg (185 lb), SpO2 94%.    Relevant Results    Results for orders placed or performed during the hospital encounter of 01/10/25 (from the past 24 hours)   CBC and Auto Differential   Result Value Ref Range    WBC 10.2 4.4 - 11.3 x10*3/uL    nRBC 0.0 0.0 - 0.0 /100 WBCs    RBC 4.16 4.00 - 5.20 x10*6/uL    Hemoglobin 9.5 (L) 12.0 - 16.0 g/dL    Hematocrit 31.9 (L) 36.0 - 46.0 %    MCV 77 (L) 80 - 100 fL    MCH 22.8 (L) 26.0 - 34.0 pg    MCHC 29.8 (L) 32.0 - 36.0 g/dL    RDW 18.1 (H) 11.5 - 14.5 %    Platelets 280 150 - 450 x10*3/uL    Neutrophils % 86.4 40.0 - 80.0 %    Immature Granulocytes %, Automated 0.6 0.0 - 0.9 %    Lymphocytes % 8.2 13.0 - 44.0 %    Monocytes % 4.3 2.0 - 10.0 %    Eosinophils % 0.2 0.0 - 6.0 %    Basophils % 0.3 0.0 - 2.0 %    Neutrophils Absolute 8.83 (H) 1.60 - 5.50 x10*3/uL    Immature Granulocytes Absolute, Automated 0.06 0.00 - 0.50 x10*3/uL    Lymphocytes Absolute 0.84 0.80 - 3.00 x10*3/uL    Monocytes Absolute 0.44 0.05 - 0.80 x10*3/uL    Eosinophils Absolute 0.02 0.00 - 0.40 x10*3/uL    Basophils Absolute 0.03 0.00 - 0.10 x10*3/uL   Comprehensive metabolic panel   Result Value Ref Range    Glucose 67 (L) 74 - 99 mg/dL    Sodium 138 136 - 145 mmol/L    Potassium 3.7 3.5 - 5.3 mmol/L    " Chloride 102 98 - 107 mmol/L    Bicarbonate 25 21 - 32 mmol/L    Anion Gap 15 10 - 20 mmol/L    Urea Nitrogen 23 6 - 23 mg/dL    Creatinine 0.67 0.50 - 1.05 mg/dL    eGFR 84 >60 mL/min/1.73m*2    Calcium 9.2 8.6 - 10.3 mg/dL    Albumin 3.5 3.4 - 5.0 g/dL    Alkaline Phosphatase 55 33 - 136 U/L    Total Protein 7.2 6.4 - 8.2 g/dL    AST 8 (L) 9 - 39 U/L    Bilirubin, Total 0.4 0.0 - 1.2 mg/dL    ALT 6 (L) 7 - 45 U/L   Creatine Kinase   Result Value Ref Range    Creatine Kinase 68 0 - 215 U/L   Troponin I, High Sensitivity   Result Value Ref Range    Troponin I, High Sensitivity 98 (HH) 0 - 13 ng/L   Lactate   Result Value Ref Range    Lactate 1.4 0.4 - 2.0 mmol/L   POCT GLUCOSE   Result Value Ref Range    POCT Glucose 53 (L) 74 - 99 mg/dL   Troponin I, High Sensitivity   Result Value Ref Range    Troponin I, High Sensitivity 168 (HH) 0 - 13 ng/L   Urinalysis with Reflex Culture and Microscopic   Result Value Ref Range    Color, Urine Light-Yellow Light-Yellow, Yellow, Dark-Yellow    Appearance, Urine Clear Clear    Specific Gravity, Urine 1.014 1.005 - 1.035    pH, Urine 5.5 5.0, 5.5, 6.0, 6.5, 7.0, 7.5, 8.0    Protein, Urine NEGATIVE NEGATIVE, 10 (TRACE), 20 (TRACE) mg/dL    Glucose, Urine Normal Normal mg/dL    Blood, Urine NEGATIVE NEGATIVE    Ketones, Urine NEGATIVE NEGATIVE mg/dL    Bilirubin, Urine NEGATIVE NEGATIVE    Urobilinogen, Urine Normal Normal mg/dL    Nitrite, Urine 2+ (A) NEGATIVE    Leukocyte Esterase, Urine NEGATIVE NEGATIVE   Extra Urine Gray Tube   Result Value Ref Range    Extra Tube Hold for add-ons.    Microscopic Only, Urine   Result Value Ref Range    WBC, Urine 1-5 1-5, NONE /HPF    RBC, Urine NONE NONE, 1-2, 3-5 /HPF    Bacteria, Urine 1+ (A) NONE SEEN /HPF    Mucus, Urine FEW Reference range not established. /LPF   Sars-CoV-2 and Influenza A/B PCR   Result Value Ref Range    Flu A Result Not Detected Not Detected    Flu B Result Not Detected Not Detected    Coronavirus 2019, PCR Not  Detected Not Detected   POCT GLUCOSE   Result Value Ref Range    POCT Glucose 84 74 - 99 mg/dL   Troponin I, High Sensitivity   Result Value Ref Range    Troponin I, High Sensitivity 214 (HH) 0 - 13 ng/L   Creatine Kinase   Result Value Ref Range    Creatine Kinase 65 0 - 215 U/L   POCT GLUCOSE   Result Value Ref Range    POCT Glucose 84 74 - 99 mg/dL   POCT GLUCOSE   Result Value Ref Range    POCT Glucose 112 (H) 74 - 99 mg/dL   Troponin I, High Sensitivity   Result Value Ref Range    Troponin I, High Sensitivity 222 (HH) 0 - 13 ng/L     No results found.       Assessment/Plan   Assessment & Plan  Generalized weakness    89-year-old female with history of T2DM on insulin presented to the ED with a fall from standing position, lowering himself to the ground and remaining on the floor until family got home.  Patient found to be hypoglycemic and treated with D10.  Patient will be admitted to observation under Dr. Lugo for further evaluation and care.    #Generalized weakness  #Hypoglycemia  #Elevated troponin, 98 with repeat of 168  - Continue D10 started in ED  - Defer consults to attending  - Vitals every 4  hours, telemetry  - Encourage oral hydration  - CK normal, check in a.m.  - Follow UA  - Follow A1c  - Follow COVID/flu test  - Trend troponin, elevation likely due to type II MI, follow a.m CK level. Denies chest pain, shortness of breath.  - Corrective insulin sliding scale, hypoglycemia protocol, carb consistent diet  - PT/OT/social work    Chronic conditions  #T2DM  #Hypertension  - Continue home meds when reconciled  - Carb consistent diet, corrective insulin sliding scale       Patient fully evaluated    for    Problem List Items Addressed This Visit       * (Principal) Generalized weakness - Primary     Other Visit Diagnoses       Hypoglycemia              Patient seen resting in bed with head of bed elevated, no s/s or c/o acute difficulties at this time.  Vital signs for last 24 hours Temp:  [35.5 °C  (95.9 °F)-37 °C (98.6 °F)] 36.2 °C (97.2 °F)  Heart Rate:  [85-98] 96  Resp:  [16-22] 22  BP: (115-171)/(56-76) 124/60    No intake/output data recorded.  Patient still requiring frequent cardiac and SPO2 monitoring. Continue aggressive pulmonary hygiene and oral hygiene. Off loading as tolerated for skin integrity. Medications and labs reviewed-   Results for orders placed or performed during the hospital encounter of 01/10/25 (from the past 24 hours)   CBC and Auto Differential   Result Value Ref Range    WBC 10.2 4.4 - 11.3 x10*3/uL    nRBC 0.0 0.0 - 0.0 /100 WBCs    RBC 4.16 4.00 - 5.20 x10*6/uL    Hemoglobin 9.5 (L) 12.0 - 16.0 g/dL    Hematocrit 31.9 (L) 36.0 - 46.0 %    MCV 77 (L) 80 - 100 fL    MCH 22.8 (L) 26.0 - 34.0 pg    MCHC 29.8 (L) 32.0 - 36.0 g/dL    RDW 18.1 (H) 11.5 - 14.5 %    Platelets 280 150 - 450 x10*3/uL    Neutrophils % 86.4 40.0 - 80.0 %    Immature Granulocytes %, Automated 0.6 0.0 - 0.9 %    Lymphocytes % 8.2 13.0 - 44.0 %    Monocytes % 4.3 2.0 - 10.0 %    Eosinophils % 0.2 0.0 - 6.0 %    Basophils % 0.3 0.0 - 2.0 %    Neutrophils Absolute 8.83 (H) 1.60 - 5.50 x10*3/uL    Immature Granulocytes Absolute, Automated 0.06 0.00 - 0.50 x10*3/uL    Lymphocytes Absolute 0.84 0.80 - 3.00 x10*3/uL    Monocytes Absolute 0.44 0.05 - 0.80 x10*3/uL    Eosinophils Absolute 0.02 0.00 - 0.40 x10*3/uL    Basophils Absolute 0.03 0.00 - 0.10 x10*3/uL   Comprehensive metabolic panel   Result Value Ref Range    Glucose 67 (L) 74 - 99 mg/dL    Sodium 138 136 - 145 mmol/L    Potassium 3.7 3.5 - 5.3 mmol/L    Chloride 102 98 - 107 mmol/L    Bicarbonate 25 21 - 32 mmol/L    Anion Gap 15 10 - 20 mmol/L    Urea Nitrogen 23 6 - 23 mg/dL    Creatinine 0.67 0.50 - 1.05 mg/dL    eGFR 84 >60 mL/min/1.73m*2    Calcium 9.2 8.6 - 10.3 mg/dL    Albumin 3.5 3.4 - 5.0 g/dL    Alkaline Phosphatase 55 33 - 136 U/L    Total Protein 7.2 6.4 - 8.2 g/dL    AST 8 (L) 9 - 39 U/L    Bilirubin, Total 0.4 0.0 - 1.2 mg/dL    ALT 6 (L) 7  - 45 U/L   Creatine Kinase   Result Value Ref Range    Creatine Kinase 68 0 - 215 U/L   Troponin I, High Sensitivity   Result Value Ref Range    Troponin I, High Sensitivity 98 (HH) 0 - 13 ng/L   Lactate   Result Value Ref Range    Lactate 1.4 0.4 - 2.0 mmol/L   POCT GLUCOSE   Result Value Ref Range    POCT Glucose 53 (L) 74 - 99 mg/dL   Troponin I, High Sensitivity   Result Value Ref Range    Troponin I, High Sensitivity 168 (HH) 0 - 13 ng/L   Urinalysis with Reflex Culture and Microscopic   Result Value Ref Range    Color, Urine Light-Yellow Light-Yellow, Yellow, Dark-Yellow    Appearance, Urine Clear Clear    Specific Gravity, Urine 1.014 1.005 - 1.035    pH, Urine 5.5 5.0, 5.5, 6.0, 6.5, 7.0, 7.5, 8.0    Protein, Urine NEGATIVE NEGATIVE, 10 (TRACE), 20 (TRACE) mg/dL    Glucose, Urine Normal Normal mg/dL    Blood, Urine NEGATIVE NEGATIVE    Ketones, Urine NEGATIVE NEGATIVE mg/dL    Bilirubin, Urine NEGATIVE NEGATIVE    Urobilinogen, Urine Normal Normal mg/dL    Nitrite, Urine 2+ (A) NEGATIVE    Leukocyte Esterase, Urine NEGATIVE NEGATIVE   Extra Urine Gray Tube   Result Value Ref Range    Extra Tube Hold for add-ons.    Microscopic Only, Urine   Result Value Ref Range    WBC, Urine 1-5 1-5, NONE /HPF    RBC, Urine NONE NONE, 1-2, 3-5 /HPF    Bacteria, Urine 1+ (A) NONE SEEN /HPF    Mucus, Urine FEW Reference range not established. /LPF   Sars-CoV-2 and Influenza A/B PCR   Result Value Ref Range    Flu A Result Not Detected Not Detected    Flu B Result Not Detected Not Detected    Coronavirus 2019, PCR Not Detected Not Detected   POCT GLUCOSE   Result Value Ref Range    POCT Glucose 84 74 - 99 mg/dL   Troponin I, High Sensitivity   Result Value Ref Range    Troponin I, High Sensitivity 214 (HH) 0 - 13 ng/L   Creatine Kinase   Result Value Ref Range    Creatine Kinase 65 0 - 215 U/L   POCT GLUCOSE   Result Value Ref Range    POCT Glucose 84 74 - 99 mg/dL   POCT GLUCOSE   Result Value Ref Range    POCT Glucose 112  (H) 74 - 99 mg/dL   Troponin I, High Sensitivity   Result Value Ref Range    Troponin I, High Sensitivity 222 (HH) 0 - 13 ng/L      Patient recently received an antibiotic (last 12 hours)       None           Plan discussed with interdisciplinary team, will continue Rocephin IV for urine positive for 2+ nitrites. WBC 10.2, will repeat labs. Hemoglobin 9.5, iron levels ordered. Strict intake and output. No acute events overnight, continue current and repeat labs in the AM. Therapy evaluations ordered. Home medication reconciliation needed.     Discharge planning discussed with patient and care team.  Anticipate C/SNF at discharge. Patient aware and agreeable to current plan, continue plan as above.     I spent a total of 50 minutes on the date of the service which included preparing to see the patient, face-to-face patient care, completing clinical documentation, obtaining and/or reviewing separately obtained history, performing a medically appropriate examination, counseling and educating the patient/family/caregiver, ordering medications, tests, or procedures, communicating with other HCPs (not separately reported), independently interpreting results (not separately reported), communicating results to the patient/family/caregiver, and care coordination (not separately reported).   I spent 60 minutes in the professional and overall care of this patient.

## 2025-01-11 NOTE — PROGRESS NOTES
01/11/25 1432   Discharge Planning   Living Arrangements Children  (Lives with son, who is , gone Sun-Wed.)   Support Systems Children;Family members;Friends/neighbors   Assistance Needed independent with self care, can't shower so does sponge bath with cloths.  Ambulates with walker in home and cane when out.  Able to do light cooking, grandson does shopping, also has help from granddtr.   Type of Residence Private residence   Number of Stairs to Enter Residence 6   Number of Stairs Within Residence   (keeps to first floor)   Do you have animals or pets at home? Yes   Type of Animals or Pets cat   Home or Post Acute Services Post acute facilities (Rehab/SNF/etc)  (pending therapy notes)   Expected Discharge Disposition SNF  (pending therapy notes)   Does the patient need discharge transport arranged? Yes   RoundTrip coordination needed? Yes   Has discharge transport been arranged? No     This TCC met with patient and grandson at bedside, introduced self and explained role.  Demographic information and insurance verified.  Patient is from home with adult son, who is .  Son is gone from Sun-Wed, but has help from daughter, grandchildren.  Ambulates with walker around home and cane when out.  No longer drives.  Denies SW needs at this time.  Patient anticipates needing therapy at discharge, and possible recommendations for HHC v SNF.  Disposition is pending hospital course and therapy notes.  Transportation needs pending disposition, does have transport home provided by family.  Care Transitions will continue to follow.

## 2025-01-11 NOTE — H&P
History Of Present Illness  Melissa Tse is a 89 y.o. female with medical history of T2DM on insulin, breast cancer on current estrogen therapy who presents to the ED with a fall from standing position.  The patient lowered herself to the ground making cereal around noon and then was on the floor until her son came home around 530.  She said her legs felt weak yesterday but her son was able to get her something to eat in the morning and she felt slightly better.  She then developed weakness again while making cereal this morning.  She said her legs just do not feel that they can hold her up.  She denies pain, chest discomfort, shortness of breath, syncope, head strike or any pain post fall.  States her glucose averages in the high 80s, admits to being a picky eater.  Denies upper respiratory/systemic viral symptoms.  Denies suprapubic pain, urinary frequency/urgency, discomfort with urination.  Denies history of low back pain/trauma, saddle anesthesia, peripheral neuropathy, incontinence.      ED course: Vitals stable.  Vitals notable for hypoglycemia of 67, depressed LFTs, elevated troponin of 98, hemoglobin of 9.5.  Patient treated with D10 for hypoglycemia.     Past Medical History  History reviewed. No pertinent past medical history.    Surgical History  Past Surgical History:   Procedure Laterality Date    OTHER SURGICAL HISTORY  02/25/2022    Hip replacement        Social History  She has no history on file for tobacco use, alcohol use, and drug use.    Family History  No family history on file.     Allergies  Patient has no known allergies.    Review of Systems  10 point ROS negative except as specified in HPI    Physical Exam  HENT:      Head: Normocephalic and atraumatic.      Nose: Nose normal.   Eyes:      Conjunctiva/sclera: Conjunctivae normal.   Cardiovascular:      Rate and Rhythm: Normal rate and regular rhythm.      Pulses: Normal pulses.      Heart sounds: Normal heart sounds.   Pulmonary:       Effort: Pulmonary effort is normal.      Breath sounds: Normal breath sounds.   Abdominal:      General: Abdomen is flat. There is no distension.      Palpations: Abdomen is soft.   Musculoskeletal:         General: No swelling or deformity.   Skin:     General: Skin is warm and dry.   Neurological:      Mental Status: She is alert. Mental status is at baseline.   Psychiatric:         Behavior: Behavior normal.          Last Recorded Vitals  Blood pressure 121/66, pulse 93, temperature 37 °C (98.6 °F), resp. rate 16, SpO2 94%.    Relevant Results    Results for orders placed or performed during the hospital encounter of 01/10/25 (from the past 24 hours)   CBC and Auto Differential   Result Value Ref Range    WBC 10.2 4.4 - 11.3 x10*3/uL    nRBC 0.0 0.0 - 0.0 /100 WBCs    RBC 4.16 4.00 - 5.20 x10*6/uL    Hemoglobin 9.5 (L) 12.0 - 16.0 g/dL    Hematocrit 31.9 (L) 36.0 - 46.0 %    MCV 77 (L) 80 - 100 fL    MCH 22.8 (L) 26.0 - 34.0 pg    MCHC 29.8 (L) 32.0 - 36.0 g/dL    RDW 18.1 (H) 11.5 - 14.5 %    Platelets 280 150 - 450 x10*3/uL    Neutrophils % 86.4 40.0 - 80.0 %    Immature Granulocytes %, Automated 0.6 0.0 - 0.9 %    Lymphocytes % 8.2 13.0 - 44.0 %    Monocytes % 4.3 2.0 - 10.0 %    Eosinophils % 0.2 0.0 - 6.0 %    Basophils % 0.3 0.0 - 2.0 %    Neutrophils Absolute 8.83 (H) 1.60 - 5.50 x10*3/uL    Immature Granulocytes Absolute, Automated 0.06 0.00 - 0.50 x10*3/uL    Lymphocytes Absolute 0.84 0.80 - 3.00 x10*3/uL    Monocytes Absolute 0.44 0.05 - 0.80 x10*3/uL    Eosinophils Absolute 0.02 0.00 - 0.40 x10*3/uL    Basophils Absolute 0.03 0.00 - 0.10 x10*3/uL   Comprehensive metabolic panel   Result Value Ref Range    Glucose 67 (L) 74 - 99 mg/dL    Sodium 138 136 - 145 mmol/L    Potassium 3.7 3.5 - 5.3 mmol/L    Chloride 102 98 - 107 mmol/L    Bicarbonate 25 21 - 32 mmol/L    Anion Gap 15 10 - 20 mmol/L    Urea Nitrogen 23 6 - 23 mg/dL    Creatinine 0.67 0.50 - 1.05 mg/dL    eGFR 84 >60 mL/min/1.73m*2    Calcium  9.2 8.6 - 10.3 mg/dL    Albumin 3.5 3.4 - 5.0 g/dL    Alkaline Phosphatase 55 33 - 136 U/L    Total Protein 7.2 6.4 - 8.2 g/dL    AST 8 (L) 9 - 39 U/L    Bilirubin, Total 0.4 0.0 - 1.2 mg/dL    ALT 6 (L) 7 - 45 U/L   Creatine Kinase   Result Value Ref Range    Creatine Kinase 68 0 - 215 U/L   Troponin I, High Sensitivity   Result Value Ref Range    Troponin I, High Sensitivity 98 (HH) 0 - 13 ng/L   Lactate   Result Value Ref Range    Lactate 1.4 0.4 - 2.0 mmol/L   POCT GLUCOSE   Result Value Ref Range    POCT Glucose 53 (L) 74 - 99 mg/dL   Troponin I, High Sensitivity   Result Value Ref Range    Troponin I, High Sensitivity 168 (HH) 0 - 13 ng/L   Urinalysis with Reflex Culture and Microscopic   Result Value Ref Range    Color, Urine Light-Yellow Light-Yellow, Yellow, Dark-Yellow    Appearance, Urine Clear Clear    Specific Gravity, Urine 1.014 1.005 - 1.035    pH, Urine 5.5 5.0, 5.5, 6.0, 6.5, 7.0, 7.5, 8.0    Protein, Urine NEGATIVE NEGATIVE, 10 (TRACE), 20 (TRACE) mg/dL    Glucose, Urine Normal Normal mg/dL    Blood, Urine NEGATIVE NEGATIVE    Ketones, Urine NEGATIVE NEGATIVE mg/dL    Bilirubin, Urine NEGATIVE NEGATIVE    Urobilinogen, Urine Normal Normal mg/dL    Nitrite, Urine 2+ (A) NEGATIVE    Leukocyte Esterase, Urine NEGATIVE NEGATIVE   Microscopic Only, Urine   Result Value Ref Range    WBC, Urine 1-5 1-5, NONE /HPF    RBC, Urine NONE NONE, 1-2, 3-5 /HPF    Bacteria, Urine 1+ (A) NONE SEEN /HPF    Mucus, Urine FEW Reference range not established. /LPF     No results found.       Assessment/Plan   Assessment & Plan  Generalized weakness    89-year-old female with history of T2DM on insulin presented to the ED with a fall from standing position, lowering himself to the ground and remaining on the floor until family got home.  Patient found to be hypoglycemic and treated with D10.  Patient will be admitted to observation under Dr. Lugo for further evaluation and care.    #Generalized  weakness  #Hypoglycemia  #Elevated troponin, 98 with repeat of 168  - Continue D10 started in ED  - Defer consults to attending  - Vitals every 4  hours, telemetry  - Encourage oral hydration  - CK normal, check in a.m.  - Follow UA  - Follow A1c  - Follow COVID/flu test  - Trend troponin, elevation likely due to type II MI, follow a.m CK level. Denies chest pain, shortness of breath.  - Corrective insulin sliding scale, hypoglycemia protocol, carb consistent diet  - PT/OT/social work    Chronic conditions  #T2DM  #Hypertension  - Continue home meds when reconciled  - Carb consistent diet, corrective insulin sliding scale       I spent 60 minutes in the professional and overall care of this patient.      ELLY MckeonC

## 2025-01-12 VITALS
SYSTOLIC BLOOD PRESSURE: 119 MMHG | DIASTOLIC BLOOD PRESSURE: 50 MMHG | TEMPERATURE: 100 F | BODY MASS INDEX: 31.58 KG/M2 | WEIGHT: 185 LBS | HEART RATE: 98 BPM | HEIGHT: 64 IN | RESPIRATION RATE: 20 BRPM | OXYGEN SATURATION: 92 %

## 2025-01-12 LAB
BACTERIA UR CULT: ABNORMAL
BASOPHILS # BLD AUTO: 0.05 X10*3/UL (ref 0–0.1)
BASOPHILS NFR BLD AUTO: 0.5 %
EOSINOPHIL # BLD AUTO: 0.08 X10*3/UL (ref 0–0.4)
EOSINOPHIL NFR BLD AUTO: 0.9 %
ERYTHROCYTE [DISTWIDTH] IN BLOOD BY AUTOMATED COUNT: 18.2 % (ref 11.5–14.5)
EST. AVERAGE GLUCOSE BLD GHB EST-MCNC: 123 MG/DL
GLUCOSE BLD MANUAL STRIP-MCNC: 131 MG/DL (ref 74–99)
GLUCOSE BLD MANUAL STRIP-MCNC: 197 MG/DL (ref 74–99)
GLUCOSE BLD MANUAL STRIP-MCNC: 202 MG/DL (ref 74–99)
GLUCOSE BLD MANUAL STRIP-MCNC: 250 MG/DL (ref 74–99)
HBA1C MFR BLD: 5.9 %
HCT VFR BLD AUTO: 30.8 % (ref 36–46)
HGB BLD-MCNC: 9 G/DL (ref 12–16)
HOLD SPECIMEN: NORMAL
IMM GRANULOCYTES # BLD AUTO: 0.05 X10*3/UL (ref 0–0.5)
IMM GRANULOCYTES NFR BLD AUTO: 0.5 % (ref 0–0.9)
LYMPHOCYTES # BLD AUTO: 1.22 X10*3/UL (ref 0.8–3)
LYMPHOCYTES NFR BLD AUTO: 13 %
MCH RBC QN AUTO: 22.6 PG (ref 26–34)
MCHC RBC AUTO-ENTMCNC: 29.2 G/DL (ref 32–36)
MCV RBC AUTO: 77 FL (ref 80–100)
MONOCYTES # BLD AUTO: 0.64 X10*3/UL (ref 0.05–0.8)
MONOCYTES NFR BLD AUTO: 6.8 %
NEUTROPHILS # BLD AUTO: 7.32 X10*3/UL (ref 1.6–5.5)
NEUTROPHILS NFR BLD AUTO: 78.3 %
NRBC BLD-RTO: 0 /100 WBCS (ref 0–0)
PLATELET # BLD AUTO: 295 X10*3/UL (ref 150–450)
RBC # BLD AUTO: 3.98 X10*6/UL (ref 4–5.2)
VIT B12 SERPL-MCNC: >2000 PG/ML (ref 211–911)
WBC # BLD AUTO: 9.4 X10*3/UL (ref 4.4–11.3)

## 2025-01-12 PROCEDURE — 82607 VITAMIN B-12: CPT | Mod: PARLAB | Performed by: INTERNAL MEDICINE

## 2025-01-12 PROCEDURE — 97165 OT EVAL LOW COMPLEX 30 MIN: CPT | Mod: GO

## 2025-01-12 PROCEDURE — 2500000001 HC RX 250 WO HCPCS SELF ADMINISTERED DRUGS (ALT 637 FOR MEDICARE OP): Performed by: PHYSICIAN ASSISTANT

## 2025-01-12 PROCEDURE — 82947 ASSAY GLUCOSE BLOOD QUANT: CPT

## 2025-01-12 PROCEDURE — 2500000001 HC RX 250 WO HCPCS SELF ADMINISTERED DRUGS (ALT 637 FOR MEDICARE OP): Performed by: INTERNAL MEDICINE

## 2025-01-12 PROCEDURE — 2500000002 HC RX 250 W HCPCS SELF ADMINISTERED DRUGS (ALT 637 FOR MEDICARE OP, ALT 636 FOR OP/ED): Performed by: INTERNAL MEDICINE

## 2025-01-12 PROCEDURE — 2500000004 HC RX 250 GENERAL PHARMACY W/ HCPCS (ALT 636 FOR OP/ED)

## 2025-01-12 PROCEDURE — 36415 COLL VENOUS BLD VENIPUNCTURE: CPT | Performed by: INTERNAL MEDICINE

## 2025-01-12 PROCEDURE — 85025 COMPLETE CBC W/AUTO DIFF WBC: CPT | Performed by: INTERNAL MEDICINE

## 2025-01-12 PROCEDURE — 2500000002 HC RX 250 W HCPCS SELF ADMINISTERED DRUGS (ALT 637 FOR MEDICARE OP, ALT 636 FOR OP/ED): Performed by: PHYSICIAN ASSISTANT

## 2025-01-12 PROCEDURE — 1200000002 HC GENERAL ROOM WITH TELEMETRY DAILY

## 2025-01-12 PROCEDURE — 97161 PT EVAL LOW COMPLEX 20 MIN: CPT | Mod: GP

## 2025-01-12 PROCEDURE — 2500000004 HC RX 250 GENERAL PHARMACY W/ HCPCS (ALT 636 FOR OP/ED): Performed by: INTERNAL MEDICINE

## 2025-01-12 RX ORDER — ESTRADIOL 1 MG/1
1 TABLET ORAL DAILY
Status: DISPENSED | OUTPATIENT
Start: 2025-01-12

## 2025-01-12 RX ORDER — INSULIN GLARGINE 100 [IU]/ML
68 INJECTION, SOLUTION SUBCUTANEOUS EVERY 24 HOURS
Status: DISPENSED | OUTPATIENT
Start: 2025-01-12

## 2025-01-12 RX ADMIN — AMLODIPINE BESYLATE 2.5 MG: 5 TABLET ORAL at 09:09

## 2025-01-12 RX ADMIN — INSULIN LISPRO 1 UNITS: 100 INJECTION, SOLUTION INTRAVENOUS; SUBCUTANEOUS at 17:47

## 2025-01-12 RX ADMIN — CEFTRIAXONE SODIUM 1 G: 1 INJECTION, SOLUTION INTRAVENOUS at 12:46

## 2025-01-12 RX ADMIN — ESTRADIOL 1 MG: 1 TABLET ORAL at 17:47

## 2025-01-12 RX ADMIN — INSULIN LISPRO 2 UNITS: 100 INJECTION, SOLUTION INTRAVENOUS; SUBCUTANEOUS at 12:46

## 2025-01-12 RX ADMIN — INSULIN GLARGINE 68 UNITS: 100 INJECTION, SOLUTION SUBCUTANEOUS at 21:02

## 2025-01-12 RX ADMIN — IRON SUCROSE 300 MG: 20 INJECTION, SOLUTION INTRAVENOUS at 14:20

## 2025-01-12 RX ADMIN — LISINOPRIL 10 MG: 10 TABLET ORAL at 09:10

## 2025-01-12 RX ADMIN — ENOXAPARIN SODIUM 40 MG: 40 INJECTION SUBCUTANEOUS at 21:02

## 2025-01-12 ASSESSMENT — COGNITIVE AND FUNCTIONAL STATUS - GENERAL
MOVING FROM LYING ON BACK TO SITTING ON SIDE OF FLAT BED WITH BEDRAILS: A LITTLE
EATING MEALS: A LITTLE
TOILETING: A LOT
MOVING TO AND FROM BED TO CHAIR: A LOT
DAILY ACTIVITIY SCORE: 16
HELP NEEDED FOR BATHING: A LOT
TOILETING: A LOT
CLIMB 3 TO 5 STEPS WITH RAILING: TOTAL
MOVING FROM LYING ON BACK TO SITTING ON SIDE OF FLAT BED WITH BEDRAILS: A LITTLE
DRESSING REGULAR LOWER BODY CLOTHING: TOTAL
DRESSING REGULAR LOWER BODY CLOTHING: A LOT
WALKING IN HOSPITAL ROOM: A LOT
STANDING UP FROM CHAIR USING ARMS: A LOT
DRESSING REGULAR UPPER BODY CLOTHING: A LOT
DRESSING REGULAR UPPER BODY CLOTHING: A LITTLE
PERSONAL GROOMING: A LITTLE
MOBILITY SCORE: 12
DRESSING REGULAR LOWER BODY CLOTHING: A LITTLE
DAILY ACTIVITIY SCORE: 15
TURNING FROM BACK TO SIDE WHILE IN FLAT BAD: A LOT
WALKING IN HOSPITAL ROOM: A LOT
MOBILITY SCORE: 10
STANDING UP FROM CHAIR USING ARMS: A LOT
MOVING FROM LYING ON BACK TO SITTING ON SIDE OF FLAT BED WITH BEDRAILS: A LOT
DAILY ACTIVITIY SCORE: 13
HELP NEEDED FOR BATHING: A LOT
EATING MEALS: A LITTLE
MOVING TO AND FROM BED TO CHAIR: A LOT
TURNING FROM BACK TO SIDE WHILE IN FLAT BAD: A LOT
PERSONAL GROOMING: A LOT
MOVING TO AND FROM BED TO CHAIR: A LOT
TOILETING: A LOT
STANDING UP FROM CHAIR USING ARMS: A LOT
TURNING FROM BACK TO SIDE WHILE IN FLAT BAD: A LOT
MOBILITY SCORE: 12
WALKING IN HOSPITAL ROOM: TOTAL
CLIMB 3 TO 5 STEPS WITH RAILING: TOTAL
DRESSING REGULAR UPPER BODY CLOTHING: A LITTLE
CLIMB 3 TO 5 STEPS WITH RAILING: TOTAL
HELP NEEDED FOR BATHING: A LOT
PERSONAL GROOMING: A LITTLE

## 2025-01-12 ASSESSMENT — PAIN SCALES - GENERAL
PAINLEVEL_OUTOF10: 0 - NO PAIN

## 2025-01-12 ASSESSMENT — PAIN - FUNCTIONAL ASSESSMENT
PAIN_FUNCTIONAL_ASSESSMENT: 0-10

## 2025-01-12 NOTE — CARE PLAN
The patient's goals for the shift include increased strength.     The clinical goals for the shift include patient will have decrased weakness during shift.    Over the shift, the patient did not make progress toward the following goals. Barriers to progression include fall and weakness. Recommendations to address these barriers include PT OT and cardiology.

## 2025-01-12 NOTE — PROGRESS NOTES
Melissa Tse is a 89 y.o. female on day 0 of admission presenting with Generalized weakness.      Subjective   Patient fully evaluated  01/12  for    Problem List Items Addressed This Visit       * (Principal) Generalized weakness - Primary     Other Visit Diagnoses       Hypoglycemia              Patient seen resting in bed with head of bed elevated, no s/s or c/o acute difficulties at this time.  Vital signs for last 24 hours Temp:  [35.9 °C (96.6 °F)-36.7 °C (98.1 °F)] 36.7 °C (98.1 °F)  Heart Rate:  [] 96  Resp:  [16-23] 22  BP: (106-122)/(51-58) 109/53    No intake/output data recorded.  Patient still requiring frequent cardiac and SPO2 monitoring. Continue aggressive pulmonary hygiene and oral hygiene. Off loading as tolerated for skin integrity. Medications and labs reviewed-   Results for orders placed or performed during the hospital encounter of 01/10/25 (from the past 24 hours)   POCT GLUCOSE   Result Value Ref Range    POCT Glucose 155 (H) 74 - 99 mg/dL   Iron and TIBC   Result Value Ref Range    Iron 27 (L) 35 - 150 ug/dL    UIBC 276 110 - 370 ug/dL    TIBC 303 240 - 445 ug/dL    % Saturation 9 (L) 25 - 45 %   CBC and Auto Differential   Result Value Ref Range    WBC 9.3 4.4 - 11.3 x10*3/uL    nRBC 0.0 0.0 - 0.0 /100 WBCs    RBC 3.97 (L) 4.00 - 5.20 x10*6/uL    Hemoglobin 9.0 (L) 12.0 - 16.0 g/dL    Hematocrit 30.4 (L) 36.0 - 46.0 %    MCV 77 (L) 80 - 100 fL    MCH 22.7 (L) 26.0 - 34.0 pg    MCHC 29.6 (L) 32.0 - 36.0 g/dL    RDW 18.2 (H) 11.5 - 14.5 %    Platelets 274 150 - 450 x10*3/uL    Neutrophils % 83.9 40.0 - 80.0 %    Immature Granulocytes %, Automated 0.5 0.0 - 0.9 %    Lymphocytes % 9.4 13.0 - 44.0 %    Monocytes % 5.6 2.0 - 10.0 %    Eosinophils % 0.3 0.0 - 6.0 %    Basophils % 0.3 0.0 - 2.0 %    Neutrophils Absolute 7.77 (H) 1.60 - 5.50 x10*3/uL    Immature Granulocytes Absolute, Automated 0.05 0.00 - 0.50 x10*3/uL    Lymphocytes Absolute 0.87 0.80 - 3.00 x10*3/uL    Monocytes  Absolute 0.52 0.05 - 0.80 x10*3/uL    Eosinophils Absolute 0.03 0.00 - 0.40 x10*3/uL    Basophils Absolute 0.03 0.00 - 0.10 x10*3/uL   Comprehensive Metabolic Panel   Result Value Ref Range    Glucose 203 (H) 74 - 99 mg/dL    Sodium 135 (L) 136 - 145 mmol/L    Potassium 4.1 3.5 - 5.3 mmol/L    Chloride 98 98 - 107 mmol/L    Bicarbonate 27 21 - 32 mmol/L    Anion Gap 14 10 - 20 mmol/L    Urea Nitrogen 17 6 - 23 mg/dL    Creatinine 0.76 0.50 - 1.05 mg/dL    eGFR 75 >60 mL/min/1.73m*2    Calcium 8.7 8.6 - 10.3 mg/dL    Albumin 3.3 (L) 3.4 - 5.0 g/dL    Alkaline Phosphatase 53 33 - 136 U/L    Total Protein 6.7 6.4 - 8.2 g/dL    AST 10 9 - 39 U/L    Bilirubin, Total 0.4 0.0 - 1.2 mg/dL    ALT 5 (L) 7 - 45 U/L   POCT GLUCOSE   Result Value Ref Range    POCT Glucose 192 (H) 74 - 99 mg/dL   POCT GLUCOSE   Result Value Ref Range    POCT Glucose 232 (H) 74 - 99 mg/dL   POCT GLUCOSE   Result Value Ref Range    POCT Glucose 131 (H) 74 - 99 mg/dL   CBC and Auto Differential   Result Value Ref Range    WBC 9.4 4.4 - 11.3 x10*3/uL    nRBC 0.0 0.0 - 0.0 /100 WBCs    RBC 3.98 (L) 4.00 - 5.20 x10*6/uL    Hemoglobin 9.0 (L) 12.0 - 16.0 g/dL    Hematocrit 30.8 (L) 36.0 - 46.0 %    MCV 77 (L) 80 - 100 fL    MCH 22.6 (L) 26.0 - 34.0 pg    MCHC 29.2 (L) 32.0 - 36.0 g/dL    RDW 18.2 (H) 11.5 - 14.5 %    Platelets 295 150 - 450 x10*3/uL    Neutrophils % 78.3 40.0 - 80.0 %    Immature Granulocytes %, Automated 0.5 0.0 - 0.9 %    Lymphocytes % 13.0 13.0 - 44.0 %    Monocytes % 6.8 2.0 - 10.0 %    Eosinophils % 0.9 0.0 - 6.0 %    Basophils % 0.5 0.0 - 2.0 %    Neutrophils Absolute 7.32 (H) 1.60 - 5.50 x10*3/uL    Immature Granulocytes Absolute, Automated 0.05 0.00 - 0.50 x10*3/uL    Lymphocytes Absolute 1.22 0.80 - 3.00 x10*3/uL    Monocytes Absolute 0.64 0.05 - 0.80 x10*3/uL    Eosinophils Absolute 0.08 0.00 - 0.40 x10*3/uL    Basophils Absolute 0.05 0.00 - 0.10 x10*3/uL   PST Top   Result Value Ref Range    Extra Tube Hold for add-ons.     POCT GLUCOSE   Result Value Ref Range    POCT Glucose 250 (H) 74 - 99 mg/dL      Patient recently received an antibiotic (last 12 hours)       None           Patient c/o of weakness,  b12 levels and nocturnal pulse oximetry ordered. Plan discussed with interdisciplinary team, iron level 27, venofer 300mg IV ordered, weaning oxygen as tolerated, repeat chest xray   No acute events overnight, patient denies chest pain, worsening SOB at this time. continue current and repeat labs in the AM.     Discharge planning discussed with patient and care team. Therapy evaluations ordered. [facility]-   , anticipate HHC/SNF at discharge. Patient aware and agreeable to current plan, continue plan as above.     I spent a total of 50 minutes on the date of the service which included preparing to see the patient, face-to-face patient care, completing clinical documentation, obtaining and/or reviewing separately obtained history, performing a medically appropriate examination, counseling and educating the patient/family/caregiver, ordering medications, tests, or procedures, communicating with other HCPs (not separately reported), independently interpreting results (not separately reported), communicating results to the patient/family/caregiver, and care coordination (not separately reported).        Objective     Last Recorded Vitals  /53   Pulse 96   Temp 36.7 °C (98.1 °F)   Resp 22   Wt 83.9 kg (185 lb)   SpO2 (!) 86%   Intake/Output last 3 Shifts:    Intake/Output Summary (Last 24 hours) at 1/12/2025 1155  Last data filed at 1/11/2025 2200  Gross per 24 hour   Intake 200 ml   Output --   Net 200 ml       Admission Weight  Weight: 83.9 kg (185 lb) (01/10/25 2242)    Daily Weight  01/11/25 : 83.9 kg (185 lb)    Image Results  US limited breast  MRN: [id number]  Patient Name: ZENIA TORRES     STUDY:  9/29/2022 9:53 am; 9/29/2022 10:42 am     ACCESSION NUMBER(S):  [id number]; [id number]     ORDERING CLINICIAN:  CARLOS DANGELO      INDICATION:  Recently diagnosed right breast carcinoma-declined surgery-on  anastrozole since March 2020     COMPARISON:  Right 03/07/2022, bilateral 02/19/2022, no other studies for  comparison.     FINDINGS:  The breast parenchyma is scattered in density.     Examination was performed using 2D digital and 3D digital  tomosynthesis imaging.     Patient has known biopsy proven right breast carcinoma. Patient  underwent ultrasound-guided core biopsy of a mass in the 11 o'clock  position right breast, 10 cm from the nipple. Pathology was invasive  lobular carcinoma. There is a twirl shaped clip at this site.  Focused ultrasound was performed. In the 11 o'clock position, 10 cm  from the nipple, there is a 2.3 x 1.8 x 1.2 cm spiculated mass.  Previously, this measured 2.1 x 1.7 x 1.2 cm.     Patient also underwent ultrasound-guided core biopsy of a mass in the  12 o'clock position of the right breast, 4 cm from the nipple.  Pathology was invasive lobular carcinoma. There is a [place] shaped  clip at this site.  Focused ultrasound was performed. In the 12 o'clock position of the  right breast, 4 cm from the nipple, there is a 1.3 x 1.3 x 0.9 cm  spiculated mass. Previously, this measured 2.1 x 1.4 x 1.3 cm.  Overall, there has been marked improvement. There is an overall  decreased density of this likely continuous area of tumor in the 11  o'clock/12 o'clock position of the right breast. This involves the  right breast anterior depth, middle depth and posterior depth. The  length of this is unchanged. Previously this measured 8.6 cm in AP  dimension and currently measures 8.2 cm in AP dimension.     Focused ultrasound of the right axilla was performed. There is a 0.7  x 0.7 x 0.7 cm lymph node with an echogenic center and asymmetrically  thickened cortex. There is an adjacent HydroMARK clip. This is known  pathologically proven metastatic carcinoma. Prior to treatment on the  study from 02/25/2022, this lymph node  measured 3.2 x 2.4 x 1.2 cm.  There has been a noticeable decrease in size indicating response to  treatment.     There are benign vascular calcifications.  There are benign secretory calcifications.  There are benign coarse calcifications.  There are scattered punctate benign-appearing microcalcifications.     This study was interpreted with CAD.     IMPRESSION:  1. Known biopsy proven right breast carcinoma. Patient had biopsy of  a mass in the 11 o'clock position of the right breast, 10 cm from the  nipple. Pathology was invasive lobular carcinoma. She also underwent  biopsy of a mass in the 12 o'clock position right breast 4 cm from  the nipple pathology was invasive lobular carcinoma.  2. Overall response to treatment with anastrozole with a decrease in  the reticular markings in the 11 o'clock position/12 o'clock position  of the right breast.  3. Sonographically, the spiculated mass in the 11 o'clock position of  the right breast, 10 cm from the nipple is unchanged.  Sonographically, the spiculated mass in the 12 o'clock position of  the right breast, 4 cm from the nipple is smaller.  4. Noticeable decrease in size of a right axillary lymph node. This  was biopsy proven positive metastatic carcinoma.  5. No malignancy left breast.     BI-RADS CATEGORY:     Category: 6 - Known Biopsy-Proven Malignancy.  Recommendation: Immediate Follow-up.  Patient letter sent [facility] digital DIAG mamm  MRN: [id number]  Patient Name: ZENIA TORRES     STUDY:  9/29/2022 9:53 am; 9/29/2022 10:42 am     ACCESSION NUMBER(S):  [id number]; [id number]     ORDERING CLINICIAN:  CARLOS DANGELO     INDICATION:  Recently diagnosed right breast carcinoma-declined surgery-on  anastrozole since March 2020     COMPARISON:  Right 03/07/2022, bilateral 02/19/2022, no other studies for  comparison.     FINDINGS:  The breast parenchyma is scattered in density.     Examination was performed using 2D digital and 3D digital  tomosynthesis imaging.      Patient has known biopsy proven right breast carcinoma. Patient  underwent ultrasound-guided core biopsy of a mass in the 11 o'clock  position right breast, 10 cm from the nipple. Pathology was invasive  lobular carcinoma. There is a twirl shaped clip at this site.  Focused ultrasound was performed. In the 11 o'clock position, 10 cm  from the nipple, there is a 2.3 x 1.8 x 1.2 cm spiculated mass.  Previously, this measured 2.1 x 1.7 x 1.2 cm.     Patient also underwent ultrasound-guided core biopsy of a mass in the  12 o'clock position of the right breast, 4 cm from the nipple.  Pathology was invasive lobular carcinoma. There is a [place] shaped  clip at this site.  Focused ultrasound was performed. In the 12 o'clock position of the  right breast, 4 cm from the nipple, there is a 1.3 x 1.3 x 0.9 cm  spiculated mass. Previously, this measured 2.1 x 1.4 x 1.3 cm.  Overall, there has been marked improvement. There is an overall  decreased density of this likely continuous area of tumor in the 11  o'clock/12 o'clock position of the right breast. This involves the  right breast anterior depth, middle depth and posterior depth. The  length of this is unchanged. Previously this measured 8.6 cm in AP  dimension and currently measures 8.2 cm in AP dimension.     Focused ultrasound of the right axilla was performed. There is a 0.7  x 0.7 x 0.7 cm lymph node with an echogenic center and asymmetrically  thickened cortex. There is an adjacent HydroMARK clip. This is known  pathologically proven metastatic carcinoma. Prior to treatment on the  study from 02/25/2022, this lymph node measured 3.2 x 2.4 x 1.2 cm.  There has been a noticeable decrease in size indicating response to  treatment.     There are benign vascular calcifications.  There are benign secretory calcifications.  There are benign coarse calcifications.  There are scattered punctate benign-appearing microcalcifications.     This study was interpreted with CAD.      IMPRESSION:  1. Known biopsy proven right breast carcinoma. Patient had biopsy of  a mass in the 11 o'clock position of the right breast, 10 cm from the  nipple. Pathology was invasive lobular carcinoma. She also underwent  biopsy of a mass in the 12 o'clock position right breast 4 cm from  the nipple pathology was invasive lobular carcinoma.  2. Overall response to treatment with anastrozole with a decrease in  the reticular markings in the 11 o'clock position/12 o'clock position  of the right breast.  3. Sonographically, the spiculated mass in the 11 o'clock position of  the right breast, 10 cm from the nipple is unchanged.  Sonographically, the spiculated mass in the 12 o'clock position of  the right breast, 4 cm from the nipple is smaller.  4. Noticeable decrease in size of a right axillary lymph node. This  was biopsy proven positive metastatic carcinoma.  5. No malignancy left breast.     BI-RADS CATEGORY:     Category: 6 - Known Biopsy-Proven Malignancy.  Recommendation: Immediate Follow-up.  Patient letter sent [facility]      Physical Exam    Relevant Results               Assessment/Plan                  Assessment & Plan  Generalized weakness          Tio Lugo MD   Physician  Internal Medicine     H&P      Signed     Date of Service: 1/11/2025 11:21 AM     Associated Documents  Telemetry Monitoring - Scan on 1/11/2025 1:49 AM   Signed       Expand All Collapse All    History Of Present Illness  Melissa Tse is a 89 y.o. female with medical history of T2DM on insulin, breast cancer on current estrogen therapy who presents to the ED with a fall from standing position.  The patient lowered herself to the ground making cereal around noon and then was on the floor until her son came home around 530.  She said her legs felt weak yesterday but her son was able to get her something to eat in the morning and she felt slightly better.  She then developed weakness again while making cereal this morning.   She said her legs just do not feel that they can hold her up.  She denies pain, chest discomfort, shortness of breath, syncope, head strike or any pain post fall.  States her glucose averages in the high 80s, admits to being a picky eater.  Denies upper respiratory/systemic viral symptoms.  Denies suprapubic pain, urinary frequency/urgency, discomfort with urination.  Denies history of low back pain/trauma, saddle anesthesia, peripheral neuropathy, incontinence.       ED course: Vitals stable.  Vitals notable for hypoglycemia of 67, depressed LFTs, elevated troponin of 98, hemoglobin of 9.5.  Patient treated with D10 for hypoglycemia.     Past Medical History  Medical History   History reviewed. No pertinent past medical history.        Surgical History  Surgical History         Past Surgical History:   Procedure Laterality Date    OTHER SURGICAL HISTORY   02/25/2022     Hip replacement            Social History  She reports that she has never smoked. She has never been exposed to tobacco smoke. She has never used smokeless tobacco. She reports that she does not drink alcohol and does not use drugs.     Family History  Family History   No family history on file.        Allergies  Patient has no known allergies.     Review of Systems  10 point ROS negative except as specified in HPI     Physical Exam  HENT:      Head: Normocephalic and atraumatic.      Nose: Nose normal.   Eyes:      Conjunctiva/sclera: Conjunctivae normal.   Cardiovascular:      Rate and Rhythm: Normal rate and regular rhythm.      Pulses: Normal pulses.      Heart sounds: Normal heart sounds.   Pulmonary:      Effort: Pulmonary effort is normal.      Breath sounds: Normal breath sounds.   Abdominal:      General: Abdomen is flat. There is no distension.      Palpations: Abdomen is soft.   Musculoskeletal:         General: No swelling or deformity.   Skin:     General: Skin is warm and dry.   Neurological:      Mental Status: She is alert. Mental  "status is at baseline.   Psychiatric:         Behavior: Behavior normal.            Last Recorded Vitals  Blood pressure 124/60, pulse 96, temperature 36.2 °C (97.2 °F), temperature source Temporal, resp. rate 22, height 1.626 m (5' 4.02\"), weight 83.9 kg (185 lb), SpO2 94%.     Relevant Results           Results for orders placed or performed during the hospital encounter of 01/10/25 (from the past 24 hours)   CBC and Auto Differential   Result Value Ref Range     WBC 10.2 4.4 - 11.3 x10*3/uL     nRBC 0.0 0.0 - 0.0 /100 WBCs     RBC 4.16 4.00 - 5.20 x10*6/uL     Hemoglobin 9.5 (L) 12.0 - 16.0 g/dL     Hematocrit 31.9 (L) 36.0 - 46.0 %     MCV 77 (L) 80 - 100 fL     MCH 22.8 (L) 26.0 - 34.0 pg     MCHC 29.8 (L) 32.0 - 36.0 g/dL     RDW 18.1 (H) 11.5 - 14.5 %     Platelets 280 150 - 450 x10*3/uL     Neutrophils % 86.4 40.0 - 80.0 %     Immature Granulocytes %, Automated 0.6 0.0 - 0.9 %     Lymphocytes % 8.2 13.0 - 44.0 %     Monocytes % 4.3 2.0 - 10.0 %     Eosinophils % 0.2 0.0 - 6.0 %     Basophils % 0.3 0.0 - 2.0 %     Neutrophils Absolute 8.83 (H) 1.60 - 5.50 x10*3/uL     Immature Granulocytes Absolute, Automated 0.06 0.00 - 0.50 x10*3/uL     Lymphocytes Absolute 0.84 0.80 - 3.00 x10*3/uL     Monocytes Absolute 0.44 0.05 - 0.80 x10*3/uL     Eosinophils Absolute 0.02 0.00 - 0.40 x10*3/uL     Basophils Absolute 0.03 0.00 - 0.10 x10*3/uL   Comprehensive metabolic panel   Result Value Ref Range     Glucose 67 (L) 74 - 99 mg/dL     Sodium 138 136 - 145 mmol/L     Potassium 3.7 3.5 - 5.3 mmol/L     Chloride 102 98 - 107 mmol/L     Bicarbonate 25 21 - 32 mmol/L     Anion Gap 15 10 - 20 mmol/L     Urea Nitrogen 23 6 - 23 mg/dL     Creatinine 0.67 0.50 - 1.05 mg/dL     eGFR 84 >60 mL/min/1.73m*2     Calcium 9.2 8.6 - 10.3 mg/dL     Albumin 3.5 3.4 - 5.0 g/dL     Alkaline Phosphatase 55 33 - 136 U/L     Total Protein 7.2 6.4 - 8.2 g/dL     AST 8 (L) 9 - 39 U/L     Bilirubin, Total 0.4 0.0 - 1.2 mg/dL     ALT 6 (L) 7 - 45 " U/L   Creatine Kinase   Result Value Ref Range     Creatine Kinase 68 0 - 215 U/L   Troponin I, High Sensitivity   Result Value Ref Range     Troponin I, High Sensitivity 98 (HH) 0 - 13 ng/L   Lactate   Result Value Ref Range     Lactate 1.4 0.4 - 2.0 mmol/L   POCT GLUCOSE   Result Value Ref Range     POCT Glucose 53 (L) 74 - 99 mg/dL   Troponin I, High Sensitivity   Result Value Ref Range     Troponin I, High Sensitivity 168 (HH) 0 - 13 ng/L   Urinalysis with Reflex Culture and Microscopic   Result Value Ref Range     Color, Urine Light-Yellow Light-Yellow, Yellow, Dark-Yellow     Appearance, Urine Clear Clear     Specific Gravity, Urine 1.014 1.005 - 1.035     pH, Urine 5.5 5.0, 5.5, 6.0, 6.5, 7.0, 7.5, 8.0     Protein, Urine NEGATIVE NEGATIVE, 10 (TRACE), 20 (TRACE) mg/dL     Glucose, Urine Normal Normal mg/dL     Blood, Urine NEGATIVE NEGATIVE     Ketones, Urine NEGATIVE NEGATIVE mg/dL     Bilirubin, Urine NEGATIVE NEGATIVE     Urobilinogen, Urine Normal Normal mg/dL     Nitrite, Urine 2+ (A) NEGATIVE     Leukocyte Esterase, Urine NEGATIVE NEGATIVE   Extra Urine Gray Tube   Result Value Ref Range     Extra Tube Hold for add-ons.     Microscopic Only, Urine   Result Value Ref Range     WBC, Urine 1-5 1-5, NONE /HPF     RBC, Urine NONE NONE, 1-2, 3-5 /HPF     Bacteria, Urine 1+ (A) NONE SEEN /HPF     Mucus, Urine FEW Reference range not established. /LPF   Sars-CoV-2 and Influenza A/B PCR   Result Value Ref Range     Flu A Result Not Detected Not Detected     Flu B Result Not Detected Not Detected     Coronavirus 2019, PCR Not Detected Not Detected   POCT GLUCOSE   Result Value Ref Range     POCT Glucose 84 74 - 99 mg/dL   Troponin I, High Sensitivity   Result Value Ref Range     Troponin I, High Sensitivity 214 (HH) 0 - 13 ng/L   Creatine Kinase   Result Value Ref Range     Creatine Kinase 65 0 - 215 U/L   POCT GLUCOSE   Result Value Ref Range     POCT Glucose 84 74 - 99 mg/dL   POCT GLUCOSE   Result Value Ref  Range     POCT Glucose 112 (H) 74 - 99 mg/dL   Troponin I, High Sensitivity   Result Value Ref Range     Troponin I, High Sensitivity 222 (HH) 0 - 13 ng/L      No results found.           Assessment/Plan     Assessment & Plan  Generalized weakness     89-year-old female with history of T2DM on insulin presented to the ED with a fall from standing position, lowering himself to the ground and remaining on the floor until family got home.  Patient found to be hypoglycemic and treated with D10.  Patient will be admitted to observation under Dr. Lugo for further evaluation and care.     #Generalized weakness  #Hypoglycemia  #Elevated troponin, 98 with repeat of 168  - Continue D10 started in ED  - Defer consults to attending  - Vitals every 4  hours, telemetry  - Encourage oral hydration  - CK normal, check in a.m.  - Follow UA  - Follow A1c  - Follow COVID/flu test  - Trend troponin, elevation likely due to type II MI, follow a.m CK level. Denies chest pain, shortness of breath.  - Corrective insulin sliding scale, hypoglycemia protocol, carb consistent diet  - PT/OT/social work     Chronic conditions  #T2DM  #Hypertension  - Continue home meds when reconciled  - Carb consistent diet, corrective insulin sliding scale     Patient fully evaluated    for    Problem List Items Addressed This Visit         * (Principal) Generalized weakness - Primary      Other Visit Diagnoses         Hypoglycemia                 Patient seen resting in bed with head of bed elevated, no s/s or c/o acute difficulties at this time.  Vital signs for last 24 hours Temp:  [35.5 °C (95.9 °F)-37 °C (98.6 °F)] 36.2 °C (97.2 °F)  Heart Rate:  [85-98] 96  Resp:  [16-22] 22  BP: (115-171)/(56-76) 124/60    No intake/output data recorded.  Patient still requiring frequent cardiac and SPO2 monitoring. Continue aggressive pulmonary hygiene and oral hygiene. Off loading as tolerated for skin integrity. Medications and labs reviewed-         Results for  orders placed or performed during the hospital encounter of 01/10/25 (from the past 24 hours)   CBC and Auto Differential   Result Value Ref Range     WBC 10.2 4.4 - 11.3 x10*3/uL     nRBC 0.0 0.0 - 0.0 /100 WBCs     RBC 4.16 4.00 - 5.20 x10*6/uL     Hemoglobin 9.5 (L) 12.0 - 16.0 g/dL     Hematocrit 31.9 (L) 36.0 - 46.0 %     MCV 77 (L) 80 - 100 fL     MCH 22.8 (L) 26.0 - 34.0 pg     MCHC 29.8 (L) 32.0 - 36.0 g/dL     RDW 18.1 (H) 11.5 - 14.5 %     Platelets 280 150 - 450 x10*3/uL     Neutrophils % 86.4 40.0 - 80.0 %     Immature Granulocytes %, Automated 0.6 0.0 - 0.9 %     Lymphocytes % 8.2 13.0 - 44.0 %     Monocytes % 4.3 2.0 - 10.0 %     Eosinophils % 0.2 0.0 - 6.0 %     Basophils % 0.3 0.0 - 2.0 %     Neutrophils Absolute 8.83 (H) 1.60 - 5.50 x10*3/uL     Immature Granulocytes Absolute, Automated 0.06 0.00 - 0.50 x10*3/uL     Lymphocytes Absolute 0.84 0.80 - 3.00 x10*3/uL     Monocytes Absolute 0.44 0.05 - 0.80 x10*3/uL     Eosinophils Absolute 0.02 0.00 - 0.40 x10*3/uL     Basophils Absolute 0.03 0.00 - 0.10 x10*3/uL   Comprehensive metabolic panel   Result Value Ref Range     Glucose 67 (L) 74 - 99 mg/dL     Sodium 138 136 - 145 mmol/L     Potassium 3.7 3.5 - 5.3 mmol/L     Chloride 102 98 - 107 mmol/L     Bicarbonate 25 21 - 32 mmol/L     Anion Gap 15 10 - 20 mmol/L     Urea Nitrogen 23 6 - 23 mg/dL     Creatinine 0.67 0.50 - 1.05 mg/dL     eGFR 84 >60 mL/min/1.73m*2     Calcium 9.2 8.6 - 10.3 mg/dL     Albumin 3.5 3.4 - 5.0 g/dL     Alkaline Phosphatase 55 33 - 136 U/L     Total Protein 7.2 6.4 - 8.2 g/dL     AST 8 (L) 9 - 39 U/L     Bilirubin, Total 0.4 0.0 - 1.2 mg/dL     ALT 6 (L) 7 - 45 U/L   Creatine Kinase   Result Value Ref Range     Creatine Kinase 68 0 - 215 U/L   Troponin I, High Sensitivity   Result Value Ref Range     Troponin I, High Sensitivity 98 (HH) 0 - 13 ng/L   Lactate   Result Value Ref Range     Lactate 1.4 0.4 - 2.0 mmol/L   POCT GLUCOSE   Result Value Ref Range     POCT Glucose 53  (L) 74 - 99 mg/dL   Troponin I, High Sensitivity   Result Value Ref Range     Troponin I, High Sensitivity 168 (HH) 0 - 13 ng/L   Urinalysis with Reflex Culture and Microscopic   Result Value Ref Range     Color, Urine Light-Yellow Light-Yellow, Yellow, Dark-Yellow     Appearance, Urine Clear Clear     Specific Gravity, Urine 1.014 1.005 - 1.035     pH, Urine 5.5 5.0, 5.5, 6.0, 6.5, 7.0, 7.5, 8.0     Protein, Urine NEGATIVE NEGATIVE, 10 (TRACE), 20 (TRACE) mg/dL     Glucose, Urine Normal Normal mg/dL     Blood, Urine NEGATIVE NEGATIVE     Ketones, Urine NEGATIVE NEGATIVE mg/dL     Bilirubin, Urine NEGATIVE NEGATIVE     Urobilinogen, Urine Normal Normal mg/dL     Nitrite, Urine 2+ (A) NEGATIVE     Leukocyte Esterase, Urine NEGATIVE NEGATIVE   Extra Urine Gray Tube   Result Value Ref Range     Extra Tube Hold for add-ons.     Microscopic Only, Urine   Result Value Ref Range     WBC, Urine 1-5 1-5, NONE /HPF     RBC, Urine NONE NONE, 1-2, 3-5 /HPF     Bacteria, Urine 1+ (A) NONE SEEN /HPF     Mucus, Urine FEW Reference range not established. /LPF   Sars-CoV-2 and Influenza A/B PCR   Result Value Ref Range     Flu A Result Not Detected Not Detected     Flu B Result Not Detected Not Detected     Coronavirus 2019, PCR Not Detected Not Detected   POCT GLUCOSE   Result Value Ref Range     POCT Glucose 84 74 - 99 mg/dL   Troponin I, High Sensitivity   Result Value Ref Range     Troponin I, High Sensitivity 214 (HH) 0 - 13 ng/L   Creatine Kinase   Result Value Ref Range     Creatine Kinase 65 0 - 215 U/L   POCT GLUCOSE   Result Value Ref Range     POCT Glucose 84 74 - 99 mg/dL   POCT GLUCOSE   Result Value Ref Range     POCT Glucose 112 (H) 74 - 99 mg/dL   Troponin I, High Sensitivity   Result Value Ref Range     Troponin I, High Sensitivity 222 (HH) 0 - 13 ng/L      Patient recently received an antibiotic (last 12 hours)         None             Plan discussed with interdisciplinary team, will continue Rocephin IV for urine  positive for 2+ nitrites. WBC 10.2, will repeat labs. Hemoglobin 9.5, iron levels ordered. Strict intake and output. No acute events overnight, continue current and repeat labs in the AM. Therapy evaluations ordered. Home medication reconciliation needed.      Discharge planning discussed with patient and care team.  Anticipate HHC/SNF at discharge. Patient aware and agreeable to current plan, continue plan as above.      I spent a total of 50 minutes on the date of the service which included preparing to see the patient, face-to-face patient care, completing clinical documentation, obtaining and/or reviewing separately obtained history, performing a medically appropriate examination, counseling and educating the patient/family/caregiver, ordering medications, tests, or procedures, communicating with other HCPs (not separately reported), independently interpreting results (not separately reported), communicating results to the patient/family/caregiver, and care coordination (not separately reported).   I spent 60 minutes in the professional and overall care of this patient.                          Revision History                   Jo Dominique

## 2025-01-12 NOTE — CARE PLAN
The patient's goals for the shift include      The clinical goals for the shift include pt will not fall for the remainder of the shift    Over the shift, the patient did not make progress toward the following goals. Barriers to progression include BLE weakness. Recommendations to address these barriers include bed alarm on.

## 2025-01-12 NOTE — PROGRESS NOTES
Occupational Therapy    Evaluation    Patient Name: Melissa Tse  MRN: [id number]  Department: [facility] 3  Room: [address] [place] [address]  Today's Date: 1/12/2025  Time Calculation  Start Time: 1121  Stop Time: 1149  Time Calculation (min): 28 min    Assessment  IP OT Assessment  OT Assessment: Pt demonstrates a decline in adl/functional mobility. recommend  mod intensity OT tx intervention/Good prognosis for goal attainment  End of Session Communication: Bedside nurse  End of Session Patient Position: Bed, 2 rail up  Plan:  Treatment Interventions: ADL retraining, Functional transfer training  OT Frequency: 3 times per week  OT Discharge Recommendations: Moderate intensity level of continued care  OT - OK to Discharge:  (okay to discharge to next level of care pending medical team clearance)    Subjective   Current Problem:  1. Generalized weakness        2. Hypoglycemia          General:  General  Reason for Referral: OT eval and treat  Referred By: Brittney  Past Medical History Relevant to Rehab: DM, breast Ca  Co-Treatment: PT  Co-Treatment Reason: possible two person assist, maximize functional mobility  Prior to Session Communication: Bedside nurse  Patient Position Received: Bed, 2 rail up  General Comment: Pt presents after a fall, was on the ground 6hrs until her son found her. Elevated troponins. Pt cleared for therapy by nursing. Pt supine, agreeable.  Precautions:  Precautions Comment: falls           Pain:  Pain Assessment  Pain Assessment: 0-10  0-10 (Numeric) Pain Score: 0 - No pain  Pain Interventions: Repositioned    Objective   Cognition:  Overall Cognitive Status: Within Functional Limits           Home Living:  Home Living Comments: Pt lives /c her son in 2 story home, but stays on main level. 6STE /c rail. Son is a  and is gone for days at a time. Also relies on granddtr and grandson for support.   Prior Function:  Prior Function Comments: Indep /c dressing and sponge bathing. Light IADLs, family for  others. Use of WW in the home, cane in community. H/o falls and decreased standing tolerance recently.  IADL History:   Pta indep  with simple home mgnt  ADL:   Indep PTA owns [employer]  Activity Tolerance:  Endurance: Decreased tolerance for upright activites  Bed Mobility/Transfers: Bed Mobility  Bed Mobility: Yes  Bed Mobility 1  Bed Mobility Comments 1: Supine<>Sit /c modAx2    Transfers  Transfer: Yes  Transfer 1  Trials/Comments 1: Sit<>Stand /c modAx2, bed elevated      Functional Mobility: functional mobility mod a x 2 with wheeled walker to hob  (commode distances)     wfl     IADL's:      Vision:    Sensation:  Sensation Comment: n/t BLE  Strength:  Strength Comments: strength below elbows wfl  Perception:   wfl  Coordination:    wfl  Hand Function:  Hand Function  Gross Grasp: Functional  Extremities: RUE   RUE : 90 deg shoulder flexion/below elbow wfl/ LUE 90 deg shoulder flexion/below elbow wfl      Outcome Measures: [facility] Daily Activity  Putting on and taking off regular lower body clothing: Total (leans heavility towards right)  Bathing (including washing, rinsing, drying): A lot  Putting on and taking off regular upper body clothing: A lot  Toileting, which includes using toilet, bedpan or urinal: A lot  Taking care of personal grooming such as brushing teeth: A lot (sink level)  Eating Meals: None  Daily Activity - Total Score: 13      Education Documentation  Mobility Training, taught by Brittnee Davila OT at 1/12/2025  4:08 PM.  Learner: Patient  Readiness: Acceptance  Method: Demonstration  Response: Demonstrated Understanding, Needs Reinforcement    Education Comments  No comments found.      Goals:   Encounter Problems       Encounter Problems (Active)       impaired functional daily living skills       Pt will increase Grooming to sba/cga   Upper Body Bathing to sba/cga    Lower Body Bathing  to cga/min a x 1-2    Increase Upper Body Dressing  to sba/cga     LE Dressing to cga/min a  x  1-2  with/ without adaptive equipment as needed       Start:  01/12/25    Expected End:  01/26/25            Pt will increase Functional Transfers Bed Mobility to cga/min a x 1-2  Sit to Stand  to cga/elaine  x 1-2  Functional Mobility  with a device to cga/min a x 1-2  chair/toliet/room to increase indep/safety in patients discharge environment       Start:  01/12/25    Expected End:  01/26/25               impaired functional daily living skills       Pt will increase  energy conservation /work simplification/diaphragmatic breathing performance to sba assistance to increase independence and safety  within  the patient's discharge environment        Start:  01/12/25    Expected End:  01/26/25

## 2025-01-12 NOTE — PROGRESS NOTES
Physical Therapy    Physical Therapy Evaluation    Patient Name: Melissa Tse  MRN: [id number]  Today's Date: 1/12/2025   Time Calculation  Start Time: 1122  Stop Time: 1147  Time Calculation (min): 25 min  314/314-A    Assessment/Plan   PT Assessment  PT Assessment Results: Decreased strength, Decreased endurance, Impaired balance, Decreased mobility, Pain, Impaired sensation  Rehab Prognosis: Good  Evaluation/Treatment Tolerance: Patient limited by fatigue, Patient limited by pain  Medical Staff Made Aware: Yes  End of Session Communication: Bedside nurse  Assessment Comment: Pt presents /c above impairments and decline from functional baseline 2nd acute medical conditions. Pt will benefit from continued PT services at mod intensity to address above and maximize functional mobility.  End of Session Patient Position: Bed, 2 rail up  IP OR SWING BED PT PLAN  Inpatient or Swing Bed: Inpatient  PT Plan  Treatment/Interventions: Bed mobility, Transfer training, Gait training, Balance training, Neuromuscular re-education, Strengthening, Endurance training, Therapeutic exercise, Therapeutic activity  PT Plan: Ongoing PT  PT Frequency: 4 times per week  PT Discharge Recommendations: Moderate intensity level of continued care  PT - OK to Discharge: Yes    Subjective     Current Problem:  1. Generalized weakness        2. Hypoglycemia          Patient Active Problem List   Diagnosis    Generalized weakness       General Visit Information:  General  Reason for Referral: PT eval and treat  Referred By: Brittney  Past Medical History Relevant to Rehab: DM, breast Ca  Co-Treatment: OT  Co-Treatment Reason: possible two person assist, maximize functional mobility  Prior to Session Communication: Bedside nurse  Patient Position Received: Bed, 2 rail up  General Comment: Pt presents after a fall, was on the ground 6hrs until her son found her. Elevated troponins, hypoglycemia. Pt cleared for therapy by nursing. Pt supine,  agreeable.    Home Living:  Home Living  Home Living Comments: Pt lives /c her son in 2 story home, but stays on main level. 6STE /c rail. Son is a  and is gone for days at a time. Also relies on granddtr and grandson for support.    Prior Level of Function:  Prior Function Per Pt/Caregiver Report  Prior Function Comments: Indep /c dressing and sponge bathing. Light IADLs, family for others. Use of WW in the home, cane in community. H/o falls and decreased standing tolerance recently.    Precautions:  Precautions  Precautions Comment: falls       Objective     Pain:  Pain Assessment  Pain Assessment: 0-10 (pt initially only c/o upset stomach, then reports intermittent BLE foot discomfort/n/t)  Pain Interventions: Repositioned, Emotional support    Cognition:  Cognition  Overall Cognitive Status: Within Functional Limits (A&Ox3)    General Assessments:  General Observation  General Observation: activity orders: OOB /c A lines: 2L NC O2, piv, purewick   Activity Tolerance  Endurance: Decreased tolerance for upright activites  Sensation  Sensation Comment: n/t BLE  Strength  Strength Comments: RLE 4-/5, LLE 3+/5, pain limited        Postural Control  Posture Comment: Tactile cues/Finn EOB for erect posture as pt /c R lateral lean as she fatigues  Dynamic Sitting Balance  Dynamic Sitting-Comments: F  Dynamic Standing Balance  Dynamic Standing-Comments: F-    Functional Assessments:     Bed Mobility  Bed Mobility: Yes  Bed Mobility 1  Bed Mobility Comments 1: Supine<>Sit /c modAx2  Transfers  Transfer: Yes  Transfer 1  Trials/Comments 1: Sit<>Stand /c modAx2, bed elevated  Ambulation/Gait Training  Ambulation/Gait Training Performed:  (Pt ambulates 3' bedside /c WW, modAx2. Flexed posture, decreased foot clearance jr, discontinuous steps.)          Outcome Measures:     [facility] Basic Mobility  Turning from your back to your side while in a flat bed without using bedrails: A lot  Moving from lying on your back  to sitting on the side of a flat bed without using bedrails: A lot  Moving to and from bed to chair (including a wheelchair): A lot  Standing up from a chair using your arms (e.g. wheelchair or bedside chair): A lot  To walk in hospital room: Total  Climbing 3-5 steps with railing: Total  Basic Mobility - Total Score: 10                   Goals:  Encounter Problems       Encounter Problems (Active)       PT Problem       STG - Pt will transition supine <> sitting with CGA (Progressing)       Start:  01/12/25    Expected End:  01/26/25            STG - Pt will transfer STS with CGA (Progressing)       Start:  01/12/25    Expected End:  01/26/25            STG - Pt will amb >/=40' using WW with CGA (Progressing)       Start:  01/12/25    Expected End:  01/26/25            STG - Pt will maintain F+ standing balance to decrease risk of falls  (Progressing)       Start:  01/12/25    Expected End:  01/26/25                       Education Documentation  Mobility Training, taught by Rivka Cordero, PT at 1/12/2025  3:01 PM.  Learner: Patient  Readiness: Acceptance  Method: Explanation  Response: Verbalizes Understanding, Needs Reinforcement    Education Comments  No comments found.

## 2025-01-12 NOTE — PROGRESS NOTES
1/12/2025  Waiting for therapy.  Ct Team will follow for needs upon discharge.   Doreen Dickey RN [facility]

## 2025-01-13 LAB
ABO GROUP (TYPE) IN BLOOD: NORMAL
ABO GROUP (TYPE) IN BLOOD: NORMAL
ALBUMIN SERPL BCP-MCNC: 3 G/DL (ref 3.4–5)
ALP SERPL-CCNC: 50 U/L (ref 33–136)
ALT SERPL W P-5'-P-CCNC: 5 U/L (ref 7–45)
ANION GAP SERPL CALC-SCNC: 10 MMOL/L (ref 10–20)
ANTIBODY SCREEN: NORMAL
AST SERPL W P-5'-P-CCNC: 8 U/L (ref 9–39)
ATRIAL RATE: 97 BPM
BACTERIA UR CULT: ABNORMAL
BASOPHILS # BLD AUTO: 0.03 X10*3/UL (ref 0–0.1)
BASOPHILS NFR BLD AUTO: 0.3 %
BILIRUB SERPL-MCNC: 0.4 MG/DL (ref 0–1.2)
BUN SERPL-MCNC: 18 MG/DL (ref 6–23)
CALCIUM SERPL-MCNC: 8.7 MG/DL (ref 8.6–10.3)
CHLORIDE SERPL-SCNC: 99 MMOL/L (ref 98–107)
CO2 SERPL-SCNC: 30 MMOL/L (ref 21–32)
CREAT SERPL-MCNC: 0.93 MG/DL (ref 0.5–1.05)
EGFRCR SERPLBLD CKD-EPI 2021: 59 ML/MIN/1.73M*2
EOSINOPHIL # BLD AUTO: 0.06 X10*3/UL (ref 0–0.4)
EOSINOPHIL NFR BLD AUTO: 0.7 %
ERYTHROCYTE [DISTWIDTH] IN BLOOD BY AUTOMATED COUNT: 18.2 % (ref 11.5–14.5)
GLUCOSE BLD MANUAL STRIP-MCNC: 101 MG/DL (ref 74–99)
GLUCOSE BLD MANUAL STRIP-MCNC: 115 MG/DL (ref 74–99)
GLUCOSE BLD MANUAL STRIP-MCNC: 152 MG/DL (ref 74–99)
GLUCOSE BLD MANUAL STRIP-MCNC: 68 MG/DL (ref 74–99)
GLUCOSE SERPL-MCNC: 65 MG/DL (ref 74–99)
HCT VFR BLD AUTO: 27.7 % (ref 36–46)
HGB BLD-MCNC: 8 G/DL (ref 12–16)
IMM GRANULOCYTES # BLD AUTO: 0.07 X10*3/UL (ref 0–0.5)
IMM GRANULOCYTES NFR BLD AUTO: 0.8 % (ref 0–0.9)
IRON SATN MFR SERPL: 12 % (ref 25–45)
IRON SERPL-MCNC: 28 UG/DL (ref 35–150)
LYMPHOCYTES # BLD AUTO: 1.14 X10*3/UL (ref 0.8–3)
LYMPHOCYTES NFR BLD AUTO: 12.9 %
MCH RBC QN AUTO: 22.5 PG (ref 26–34)
MCHC RBC AUTO-ENTMCNC: 28.9 G/DL (ref 32–36)
MCV RBC AUTO: 78 FL (ref 80–100)
MONOCYTES # BLD AUTO: 0.78 X10*3/UL (ref 0.05–0.8)
MONOCYTES NFR BLD AUTO: 8.8 %
NEUTROPHILS # BLD AUTO: 6.78 X10*3/UL (ref 1.6–5.5)
NEUTROPHILS NFR BLD AUTO: 76.5 %
NRBC BLD-RTO: 0 /100 WBCS (ref 0–0)
P AXIS: 23 DEGREES
P OFFSET: 187 MS
P ONSET: 128 MS
PLATELET # BLD AUTO: 257 X10*3/UL (ref 150–450)
POTASSIUM SERPL-SCNC: 3.8 MMOL/L (ref 3.5–5.3)
PR INTERVAL: 170 MS
PROT SERPL-MCNC: 6.3 G/DL (ref 6.4–8.2)
Q ONSET: 213 MS
QRS COUNT: 16 BEATS
QRS DURATION: 84 MS
QT INTERVAL: 372 MS
QTC CALCULATION(BAZETT): 472 MS
QTC FREDERICIA: 436 MS
R AXIS: 33 DEGREES
RBC # BLD AUTO: 3.55 X10*6/UL (ref 4–5.2)
RH FACTOR (ANTIGEN D): NORMAL
RH FACTOR (ANTIGEN D): NORMAL
SODIUM SERPL-SCNC: 135 MMOL/L (ref 136–145)
T AXIS: 115 DEGREES
T OFFSET: 399 MS
TIBC SERPL-MCNC: 240 UG/DL (ref 240–445)
UIBC SERPL-MCNC: 212 UG/DL (ref 110–370)
VENTRICULAR RATE: 97 BPM
WBC # BLD AUTO: 8.9 X10*3/UL (ref 4.4–11.3)

## 2025-01-13 PROCEDURE — 2500000001 HC RX 250 WO HCPCS SELF ADMINISTERED DRUGS (ALT 637 FOR MEDICARE OP)

## 2025-01-13 PROCEDURE — 2500000002 HC RX 250 W HCPCS SELF ADMINISTERED DRUGS (ALT 637 FOR MEDICARE OP, ALT 636 FOR OP/ED): Performed by: INTERNAL MEDICINE

## 2025-01-13 PROCEDURE — 2500000001 HC RX 250 WO HCPCS SELF ADMINISTERED DRUGS (ALT 637 FOR MEDICARE OP): Performed by: INTERNAL MEDICINE

## 2025-01-13 PROCEDURE — 82947 ASSAY GLUCOSE BLOOD QUANT: CPT

## 2025-01-13 PROCEDURE — 36415 COLL VENOUS BLD VENIPUNCTURE: CPT | Performed by: INTERNAL MEDICINE

## 2025-01-13 PROCEDURE — 83540 ASSAY OF IRON: CPT

## 2025-01-13 PROCEDURE — 36415 COLL VENOUS BLD VENIPUNCTURE: CPT

## 2025-01-13 PROCEDURE — 94762 N-INVAS EAR/PLS OXIMTRY CONT: CPT

## 2025-01-13 PROCEDURE — 2500000001 HC RX 250 WO HCPCS SELF ADMINISTERED DRUGS (ALT 637 FOR MEDICARE OP): Performed by: PHYSICIAN ASSISTANT

## 2025-01-13 PROCEDURE — 86850 RBC ANTIBODY SCREEN: CPT

## 2025-01-13 PROCEDURE — 99222 1ST HOSP IP/OBS MODERATE 55: CPT

## 2025-01-13 PROCEDURE — 1200000002 HC GENERAL ROOM WITH TELEMETRY DAILY

## 2025-01-13 PROCEDURE — 80053 COMPREHEN METABOLIC PANEL: CPT | Performed by: INTERNAL MEDICINE

## 2025-01-13 PROCEDURE — 2500000004 HC RX 250 GENERAL PHARMACY W/ HCPCS (ALT 636 FOR OP/ED): Performed by: INTERNAL MEDICINE

## 2025-01-13 PROCEDURE — 85025 COMPLETE CBC W/AUTO DIFF WBC: CPT | Performed by: INTERNAL MEDICINE

## 2025-01-13 PROCEDURE — 2500000004 HC RX 250 GENERAL PHARMACY W/ HCPCS (ALT 636 FOR OP/ED)

## 2025-01-13 RX ORDER — INSULIN GLARGINE 100 [IU]/ML
32 INJECTION, SOLUTION SUBCUTANEOUS EVERY 24 HOURS
Status: DISCONTINUED | OUTPATIENT
Start: 2025-01-13 | End: 2025-01-18 | Stop reason: HOSPADM

## 2025-01-13 RX ORDER — CEFUROXIME AXETIL 250 MG/1
250 TABLET ORAL 2 TIMES DAILY
Status: DISCONTINUED | OUTPATIENT
Start: 2025-01-13 | End: 2025-01-18 | Stop reason: HOSPADM

## 2025-01-13 RX ADMIN — ENOXAPARIN SODIUM 40 MG: 40 INJECTION SUBCUTANEOUS at 21:03

## 2025-01-13 RX ADMIN — ACETAMINOPHEN 650 MG: 325 TABLET, FILM COATED ORAL at 23:52

## 2025-01-13 RX ADMIN — INSULIN GLARGINE 32 UNITS: 100 INJECTION, SOLUTION SUBCUTANEOUS at 20:57

## 2025-01-13 RX ADMIN — AMLODIPINE BESYLATE 2.5 MG: 5 TABLET ORAL at 10:31

## 2025-01-13 RX ADMIN — CEFUROXIME AXETIL 250 MG: 250 TABLET, FILM COATED ORAL at 20:29

## 2025-01-13 RX ADMIN — ESTRADIOL 1 MG: 1 TABLET ORAL at 10:32

## 2025-01-13 RX ADMIN — IRON SUCROSE 300 MG: 20 INJECTION, SOLUTION INTRAVENOUS at 18:16

## 2025-01-13 RX ADMIN — LISINOPRIL 10 MG: 10 TABLET ORAL at 10:32

## 2025-01-13 ASSESSMENT — COGNITIVE AND FUNCTIONAL STATUS - GENERAL
MOVING FROM LYING ON BACK TO SITTING ON SIDE OF FLAT BED WITH BEDRAILS: A LITTLE
HELP NEEDED FOR BATHING: A LOT
DRESSING REGULAR UPPER BODY CLOTHING: A LITTLE
TURNING FROM BACK TO SIDE WHILE IN FLAT BAD: A LOT
DRESSING REGULAR LOWER BODY CLOTHING: A LITTLE
HELP NEEDED FOR BATHING: A LOT
MOVING FROM LYING ON BACK TO SITTING ON SIDE OF FLAT BED WITH BEDRAILS: A LITTLE
PERSONAL GROOMING: A LITTLE
MOBILITY SCORE: 12
CLIMB 3 TO 5 STEPS WITH RAILING: TOTAL
MOVING TO AND FROM BED TO CHAIR: A LOT
MOBILITY SCORE: 12
PERSONAL GROOMING: A LITTLE
EATING MEALS: A LITTLE
STANDING UP FROM CHAIR USING ARMS: A LOT
DRESSING REGULAR LOWER BODY CLOTHING: A LITTLE
DAILY ACTIVITIY SCORE: 16
DRESSING REGULAR UPPER BODY CLOTHING: A LITTLE
CLIMB 3 TO 5 STEPS WITH RAILING: TOTAL
TOILETING: A LOT
TURNING FROM BACK TO SIDE WHILE IN FLAT BAD: A LOT
MOVING TO AND FROM BED TO CHAIR: A LOT
DAILY ACTIVITIY SCORE: 16
WALKING IN HOSPITAL ROOM: A LOT
STANDING UP FROM CHAIR USING ARMS: A LOT
WALKING IN HOSPITAL ROOM: A LOT
EATING MEALS: A LITTLE
TOILETING: A LOT

## 2025-01-13 ASSESSMENT — PAIN DESCRIPTION - LOCATION: LOCATION: GENERALIZED

## 2025-01-13 ASSESSMENT — PAIN SCALES - GENERAL
PAINLEVEL_OUTOF10: 0 - NO PAIN
PAINLEVEL_OUTOF10: 3
PAINLEVEL_OUTOF10: 0 - NO PAIN

## 2025-01-13 ASSESSMENT — PAIN - FUNCTIONAL ASSESSMENT
PAIN_FUNCTIONAL_ASSESSMENT: 0-10
PAIN_FUNCTIONAL_ASSESSMENT: 0-10

## 2025-01-13 ASSESSMENT — PAIN SCALES - PAIN ASSESSMENT IN ADVANCED DEMENTIA (PAINAD): TOTALSCORE: MEDICATION (SEE MAR)

## 2025-01-13 NOTE — CONSULTS
"Nutrition Initial Assessment:   Nutrition Assessment    Reason for Assessment: Admission nursing screening    Patient is a 89 y.o. female presenting with fall      Nutrition History:  Food and Nutrient History: Pt is taking about 25% of her meals per records.  She was in the bathroom at time of visit.  She said that her appetite has been poor over the last few months.  Records indicate that her weight has been and remains in the 180's (lbs), since February of 2022.  Food Allergies/Intolerances:  None  GI Symptoms: None  Oral Problems: None       Anthropometrics:  Height: 162.6 cm (5' 4.02\")   Weight: 83.9 kg (185 lb)   BMI (Calculated): 31.74  IBW/kg (Dietitian Calculated): 54 kg  Percent of IBW: 154 %       Weight History:     Weight Change %:  Weight History / % Weight Change: Records indicate that the patient's wt has been in the 180's since February of 2022.  Significant Weight Loss: No    Nutrition Focused Physical Exam Findings:    Subcutaneous Fat Loss:   Orbital Fat Pads:  (pt was not available)  Muscle Wasting:     Edema:  Edema: +1 trace  Physical Findings:       Nutrition Significant Labs:  BMP Trend:   Results from last 7 days   Lab Units 01/13/25  0632 01/11/25  1358 01/10/25  1900   GLUCOSE mg/dL 65* 203* 67*   CALCIUM mg/dL 8.7 8.7 9.2   SODIUM mmol/L 135* 135* 138   POTASSIUM mmol/L 3.8 4.1 3.7   CO2 mmol/L 30 27 25   CHLORIDE mmol/L 99 98 102   BUN mg/dL 18 17 23   CREATININE mg/dL 0.93 0.76 0.67        Nutrition Specific Medications:  Norvasc; lovenox; lantus; metamucil; zofran    I/O:   Last BM Date: 01/11/25; Stool Appearance: Formed (01/12/25 0700)    Dietary Orders (From admission, onward)       Start     Ordered    01/14/25 0001  NPO Diet Except: Other (specify); Additional Details: Clear liquids until 0500. May have clears up to 2 hours prior to procedure if exact time is known.; Effective midnight  Diet effective midnight        Question Answer Comment   Except: Other (specify)    Additional " Details Clear liquids until 0500. May have clears up to 2 hours prior to procedure if exact time is known.        01/13/25 1307    01/11/25 0122  May Participate in Room Service With Assistance  ( ROOM SERVICE MAY PARTICIPATE WITH ASSISTANCE)  Once        Question:  .  Answer:  Yes    01/11/25 0121    01/10/25 2236  Adult diet Regular, Consistent Carb; CCD 60 gm/meal  Diet effective now        Question Answer Comment   Diet type Regular    Diet type Consistent Carb    Carb diet selection: CCD 60 gm/meal        01/10/25 2235                     Estimated Needs:      Method for Estimating Needs: 2543-7727  26-30 ham kg of IBW     Method for Estimating Needs: 54-65  1-1.2 gm kg  of IBW     Method for Estimating Needs: 5546-2181  20-30 ml kg of IBW as medically indicated        Nutrition Diagnosis        Nutrition Diagnosis  Patient has Nutrition Diagnosis: Yes  Diagnosis Status (1): New  Nutrition Diagnosis 1: Inadequate oral intake  Related to (1): limited food acceptance  As Evidenced by (1): Pt is taking 25% of her meals at this time       Nutrition Interventions/Recommendations         Nutrition Prescription:  Individualized Nutrition Prescription Provided for : Continue 60 gm carb consistent diet,  sending glucerna X 2 to help meet nutritional needs        Nutrition Interventions:   Interventions: Meals and snacks, Medical food supplement  Meals and Snacks: Carbohydrate-modified diet  Goal: >75% of meals  Medical Food Supplement: Commercial beverage  Goal: 100% of supplement    Collaboration and Referral of Nutrition Care: Collaboration by nutrition professional with other providers    Nutrition Education:      Not at this time (pt's A1C was 5.9)    Nutrition Monitoring and Evaluation   Food/Nutrient Related History Monitoring  Monitoring and Evaluation Plan: Energy intake, Fluid intake, Amount of food  Criteria: >75% of energy needs  Criteria: adequate fluid intake without fluid overload  Criteria: >75% of  meals    Body Composition/Growth/Weight History  Monitoring and Evaluation Plan: Weight    Biochemical Data, Medical Tests and Procedures  Monitoring and Evaluation Plan: Electrolyte/renal panel, Glucose/endocrine profile  Criteria: improved sodium and stable BMP  Criteria: glucose within desired rangre              Time Spent (min): 45 minutes

## 2025-01-13 NOTE — PROGRESS NOTES
Melissa Tse is a 89 y.o. female on day 1 of admission presenting with Generalized weakness.      Subjective   Patient fully evaluated  01/12  for    Problem List Items Addressed This Visit          Symptoms and Signs    * (Principal) Generalized weakness - Primary     Other Visit Diagnoses       Hypoglycemia        Anemia, unspecified type        Relevant Orders    Esophagogastroduodenoscopy (EGD)          Patient seen resting in bed with head of bed elevated, no s/s or c/o acute difficulties at this time.  Vital signs for last 24 hours Temp:  [36.4 °C (97.5 °F)-37.8 °C (100 °F)] 36.4 °C (97.5 °F)  Heart Rate:  [82-98] 88  Resp:  [18-20] 18  BP: (108-133)/(50-59) 118/57    No intake/output data recorded.  Patient still requiring frequent cardiac and SPO2 monitoring. Continue aggressive pulmonary hygiene and oral hygiene. Off loading as tolerated for skin integrity. Medications and labs reviewed-   Results for orders placed or performed during the hospital encounter of 01/10/25 (from the past 24 hours)   POCT GLUCOSE   Result Value Ref Range    POCT Glucose 197 (H) 74 - 99 mg/dL   POCT GLUCOSE   Result Value Ref Range    POCT Glucose 202 (H) 74 - 99 mg/dL   CBC and Auto Differential   Result Value Ref Range    WBC 8.9 4.4 - 11.3 x10*3/uL    nRBC 0.0 0.0 - 0.0 /100 WBCs    RBC 3.55 (L) 4.00 - 5.20 x10*6/uL    Hemoglobin 8.0 (L) 12.0 - 16.0 g/dL    Hematocrit 27.7 (L) 36.0 - 46.0 %    MCV 78 (L) 80 - 100 fL    MCH 22.5 (L) 26.0 - 34.0 pg    MCHC 28.9 (L) 32.0 - 36.0 g/dL    RDW 18.2 (H) 11.5 - 14.5 %    Platelets 257 150 - 450 x10*3/uL    Neutrophils % 76.5 40.0 - 80.0 %    Immature Granulocytes %, Automated 0.8 0.0 - 0.9 %    Lymphocytes % 12.9 13.0 - 44.0 %    Monocytes % 8.8 2.0 - 10.0 %    Eosinophils % 0.7 0.0 - 6.0 %    Basophils % 0.3 0.0 - 2.0 %    Neutrophils Absolute 6.78 (H) 1.60 - 5.50 x10*3/uL    Immature Granulocytes Absolute, Automated 0.07 0.00 - 0.50 x10*3/uL    Lymphocytes Absolute 1.14 0.80 - 3.00  x10*3/uL    Monocytes Absolute 0.78 0.05 - 0.80 x10*3/uL    Eosinophils Absolute 0.06 0.00 - 0.40 x10*3/uL    Basophils Absolute 0.03 0.00 - 0.10 x10*3/uL   Comprehensive Metabolic Panel   Result Value Ref Range    Glucose 65 (L) 74 - 99 mg/dL    Sodium 135 (L) 136 - 145 mmol/L    Potassium 3.8 3.5 - 5.3 mmol/L    Chloride 99 98 - 107 mmol/L    Bicarbonate 30 21 - 32 mmol/L    Anion Gap 10 10 - 20 mmol/L    Urea Nitrogen 18 6 - 23 mg/dL    Creatinine 0.93 0.50 - 1.05 mg/dL    eGFR 59 (L) >60 mL/min/1.73m*2    Calcium 8.7 8.6 - 10.3 mg/dL    Albumin 3.0 (L) 3.4 - 5.0 g/dL    Alkaline Phosphatase 50 33 - 136 U/L    Total Protein 6.3 (L) 6.4 - 8.2 g/dL    AST 8 (L) 9 - 39 U/L    Bilirubin, Total 0.4 0.0 - 1.2 mg/dL    ALT 5 (L) 7 - 45 U/L   POCT GLUCOSE   Result Value Ref Range    POCT Glucose 68 (L) 74 - 99 mg/dL   Iron and TIBC   Result Value Ref Range    Iron 28 (L) 35 - 150 ug/dL    UIBC 212 110 - 370 ug/dL    TIBC 240 240 - 445 ug/dL    % Saturation 12 (L) 25 - 45 %   Type and Screen   Result Value Ref Range    ABO TYPE A     Rh TYPE POS     ANTIBODY SCREEN NEG    VERIFY ABO/Rh Group Test   Result Value Ref Range    ABO TYPE A     Rh TYPE POS    POCT GLUCOSE   Result Value Ref Range    POCT Glucose 101 (H) 74 - 99 mg/dL      Patient recently received an antibiotic (last 12 hours)       None           Patient c/o of weakness,  b12 levels and nocturnal pulse oximetry ordered. Plan discussed with interdisciplinary team, iron level 27, venofer 300mg IV ordered, weaning oxygen as tolerated, repeat chest xray   No acute events overnight, patient denies chest pain, worsening SOB at this time. continue current and repeat labs in the AM.     Discharge planning discussed with patient and care team. Therapy evaluations ordered. [facility]- 12  , anticipate HHC/SNF at discharge. Patient aware and agreeable to current plan, continue plan as above.     I spent a total of 50 minutes on the date of the service which included preparing  to see the patient, face-to-face patient care, completing clinical documentation, obtaining and/or reviewing separately obtained history, performing a medically appropriate examination, counseling and educating the patient/family/caregiver, ordering medications, tests, or procedures, communicating with other HCPs (not separately reported), independently interpreting results (not separately reported), communicating results to the patient/family/caregiver, and care coordination (not separately reported).        Objective     Last Recorded Vitals  /57 (BP Location: Left arm, Patient Position: Lying)   Pulse 88   Temp 36.4 °C (97.5 °F) (Temporal)   Resp 18   Wt 83.9 kg (185 lb)   SpO2 98%   Intake/Output last 3 Shifts:    Intake/Output Summary (Last 24 hours) at 1/13/2025 1444  Last data filed at 1/13/2025 0553  Gross per 24 hour   Intake 812 ml   Output 350 ml   Net 462 ml       Admission Weight  Weight: 83.9 kg (185 lb) (01/10/25 2242)    Daily Weight  01/11/25 : 83.9 kg (185 lb)    Image Results  ECG 12 lead  Normal sinus rhythm  Low voltage QRS  Cannot rule out Inferior infarct , age undetermined  Possible Anterolateral infarct , age undetermined  Abnormal ECG  No previous ECGs available      Physical Exam    Relevant Results               Assessment/Plan                  Assessment & Plan  Generalized weakness          Tio Lugo MD   Physician  Internal Medicine     H&P      Signed     Date of Service: 1/11/2025 11:21 AM     Associated Documents  Telemetry Monitoring - Scan on 1/11/2025 1:49 AM   Signed       Expand All Collapse All    History Of Present Illness  Melissa Tse is a 89 y.o. female with medical history of T2DM on insulin, breast cancer on current estrogen therapy who presents to the ED with a fall from standing position.  The patient lowered herself to the ground making cereal around noon and then was on the floor until her son came home around 530.  She said her legs felt weak  yesterday but her son was able to get her something to eat in the morning and she felt slightly better.  She then developed weakness again while making cereal this morning.  She said her legs just do not feel that they can hold her up.  She denies pain, chest discomfort, shortness of breath, syncope, head strike or any pain post fall.  States her glucose averages in the high 80s, admits to being a picky eater.  Denies upper respiratory/systemic viral symptoms.  Denies suprapubic pain, urinary frequency/urgency, discomfort with urination.  Denies history of low back pain/trauma, saddle anesthesia, peripheral neuropathy, incontinence.       ED course: Vitals stable.  Vitals notable for hypoglycemia of 67, depressed LFTs, elevated troponin of 98, hemoglobin of 9.5.  Patient treated with D10 for hypoglycemia.     Past Medical History  Medical History   History reviewed. No pertinent past medical history.        Surgical History  Surgical History         Past Surgical History:   Procedure Laterality Date    OTHER SURGICAL HISTORY   02/25/2022     Hip replacement            Social History  She reports that she has never smoked. She has never been exposed to tobacco smoke. She has never used smokeless tobacco. She reports that she does not drink alcohol and does not use drugs.     Family History  Family History   No family history on file.        Allergies  Patient has no known allergies.     Review of Systems  10 point ROS negative except as specified in HPI     Physical Exam  HENT:      Head: Normocephalic and atraumatic.      Nose: Nose normal.   Eyes:      Conjunctiva/sclera: Conjunctivae normal.   Cardiovascular:      Rate and Rhythm: Normal rate and regular rhythm.      Pulses: Normal pulses.      Heart sounds: Normal heart sounds.   Pulmonary:      Effort: Pulmonary effort is normal.      Breath sounds: Normal breath sounds.   Abdominal:      General: Abdomen is flat. There is no distension.      Palpations: Abdomen  "is soft.   Musculoskeletal:         General: No swelling or deformity.   Skin:     General: Skin is warm and dry.   Neurological:      Mental Status: She is alert. Mental status is at baseline.   Psychiatric:         Behavior: Behavior normal.            Last Recorded Vitals  Blood pressure 124/60, pulse 96, temperature 36.2 °C (97.2 °F), temperature source Temporal, resp. rate 22, height 1.626 m (5' 4.02\"), weight 83.9 kg (185 lb), SpO2 94%.     Relevant Results           Results for orders placed or performed during the hospital encounter of 01/10/25 (from the past 24 hours)   CBC and Auto Differential   Result Value Ref Range     WBC 10.2 4.4 - 11.3 x10*3/uL     nRBC 0.0 0.0 - 0.0 /100 WBCs     RBC 4.16 4.00 - 5.20 x10*6/uL     Hemoglobin 9.5 (L) 12.0 - 16.0 g/dL     Hematocrit 31.9 (L) 36.0 - 46.0 %     MCV 77 (L) 80 - 100 fL     MCH 22.8 (L) 26.0 - 34.0 pg     MCHC 29.8 (L) 32.0 - 36.0 g/dL     RDW 18.1 (H) 11.5 - 14.5 %     Platelets 280 150 - 450 x10*3/uL     Neutrophils % 86.4 40.0 - 80.0 %     Immature Granulocytes %, Automated 0.6 0.0 - 0.9 %     Lymphocytes % 8.2 13.0 - 44.0 %     Monocytes % 4.3 2.0 - 10.0 %     Eosinophils % 0.2 0.0 - 6.0 %     Basophils % 0.3 0.0 - 2.0 %     Neutrophils Absolute 8.83 (H) 1.60 - 5.50 x10*3/uL     Immature Granulocytes Absolute, Automated 0.06 0.00 - 0.50 x10*3/uL     Lymphocytes Absolute 0.84 0.80 - 3.00 x10*3/uL     Monocytes Absolute 0.44 0.05 - 0.80 x10*3/uL     Eosinophils Absolute 0.02 0.00 - 0.40 x10*3/uL     Basophils Absolute 0.03 0.00 - 0.10 x10*3/uL   Comprehensive metabolic panel   Result Value Ref Range     Glucose 67 (L) 74 - 99 mg/dL     Sodium 138 136 - 145 mmol/L     Potassium 3.7 3.5 - 5.3 mmol/L     Chloride 102 98 - 107 mmol/L     Bicarbonate 25 21 - 32 mmol/L     Anion Gap 15 10 - 20 mmol/L     Urea Nitrogen 23 6 - 23 mg/dL     Creatinine 0.67 0.50 - 1.05 mg/dL     eGFR 84 >60 mL/min/1.73m*2     Calcium 9.2 8.6 - 10.3 mg/dL     Albumin 3.5 3.4 - 5.0 " g/dL     Alkaline Phosphatase 55 33 - 136 U/L     Total Protein 7.2 6.4 - 8.2 g/dL     AST 8 (L) 9 - 39 U/L     Bilirubin, Total 0.4 0.0 - 1.2 mg/dL     ALT 6 (L) 7 - 45 U/L   Creatine Kinase   Result Value Ref Range     Creatine Kinase 68 0 - 215 U/L   Troponin I, High Sensitivity   Result Value Ref Range     Troponin I, High Sensitivity 98 (HH) 0 - 13 ng/L   Lactate   Result Value Ref Range     Lactate 1.4 0.4 - 2.0 mmol/L   POCT GLUCOSE   Result Value Ref Range     POCT Glucose 53 (L) 74 - 99 mg/dL   Troponin I, High Sensitivity   Result Value Ref Range     Troponin I, High Sensitivity 168 (HH) 0 - 13 ng/L   Urinalysis with Reflex Culture and Microscopic   Result Value Ref Range     Color, Urine Light-Yellow Light-Yellow, Yellow, Dark-Yellow     Appearance, Urine Clear Clear     Specific Gravity, Urine 1.014 1.005 - 1.035     pH, Urine 5.5 5.0, 5.5, 6.0, 6.5, 7.0, 7.5, 8.0     Protein, Urine NEGATIVE NEGATIVE, 10 (TRACE), 20 (TRACE) mg/dL     Glucose, Urine Normal Normal mg/dL     Blood, Urine NEGATIVE NEGATIVE     Ketones, Urine NEGATIVE NEGATIVE mg/dL     Bilirubin, Urine NEGATIVE NEGATIVE     Urobilinogen, Urine Normal Normal mg/dL     Nitrite, Urine 2+ (A) NEGATIVE     Leukocyte Esterase, Urine NEGATIVE NEGATIVE   Extra Urine Gray Tube   Result Value Ref Range     Extra Tube Hold for add-ons.     Microscopic Only, Urine   Result Value Ref Range     WBC, Urine 1-5 1-5, NONE /HPF     RBC, Urine NONE NONE, 1-2, 3-5 /HPF     Bacteria, Urine 1+ (A) NONE SEEN /HPF     Mucus, Urine FEW Reference range not established. /LPF   Sars-CoV-2 and Influenza A/B PCR   Result Value Ref Range     Flu A Result Not Detected Not Detected     Flu B Result Not Detected Not Detected     Coronavirus 2019, PCR Not Detected Not Detected   POCT GLUCOSE   Result Value Ref Range     POCT Glucose 84 74 - 99 mg/dL   Troponin I, High Sensitivity   Result Value Ref Range     Troponin I, High Sensitivity 214 (HH) 0 - 13 ng/L   Creatine Kinase    Result Value Ref Range     Creatine Kinase 65 0 - 215 U/L   POCT GLUCOSE   Result Value Ref Range     POCT Glucose 84 74 - 99 mg/dL   POCT GLUCOSE   Result Value Ref Range     POCT Glucose 112 (H) 74 - 99 mg/dL   Troponin I, High Sensitivity   Result Value Ref Range     Troponin I, High Sensitivity 222 (HH) 0 - 13 ng/L      No results found.           Assessment/Plan     Assessment & Plan  Generalized weakness     89-year-old female with history of T2DM on insulin presented to the ED with a fall from standing position, lowering himself to the ground and remaining on the floor until family got home.  Patient found to be hypoglycemic and treated with D10.  Patient will be admitted to observation under Dr. Lugo for further evaluation and care.     #Generalized weakness  #Hypoglycemia  #Elevated troponin, 98 with repeat of 168  - Continue D10 started in ED  - Defer consults to attending  - Vitals every 4  hours, telemetry  - Encourage oral hydration  - CK normal, check in a.m.  - Follow UA  - Follow A1c  - Follow COVID/flu test  - Trend troponin, elevation likely due to type II MI, follow a.m CK level. Denies chest pain, shortness of breath.  - Corrective insulin sliding scale, hypoglycemia protocol, carb consistent diet  - PT/OT/social work     Chronic conditions  #T2DM  #Hypertension  - Continue home meds when reconciled  - Carb consistent diet, corrective insulin sliding scale     Patient fully evaluated    for    Problem List Items Addressed This Visit         * (Principal) Generalized weakness - Primary      Other Visit Diagnoses         Hypoglycemia                 Patient seen resting in bed with head of bed elevated, no s/s or c/o acute difficulties at this time.  Vital signs for last 24 hours Temp:  [35.5 °C (95.9 °F)-37 °C (98.6 °F)] 36.2 °C (97.2 °F)  Heart Rate:  [85-98] 96  Resp:  [16-22] 22  BP: (115-171)/(56-76) 124/60    No intake/output data recorded.  Patient still requiring frequent cardiac and  SPO2 monitoring. Continue aggressive pulmonary hygiene and oral hygiene. Off loading as tolerated for skin integrity. Medications and labs reviewed-         Results for orders placed or performed during the hospital encounter of 01/10/25 (from the past 24 hours)   CBC and Auto Differential   Result Value Ref Range     WBC 10.2 4.4 - 11.3 x10*3/uL     nRBC 0.0 0.0 - 0.0 /100 WBCs     RBC 4.16 4.00 - 5.20 x10*6/uL     Hemoglobin 9.5 (L) 12.0 - 16.0 g/dL     Hematocrit 31.9 (L) 36.0 - 46.0 %     MCV 77 (L) 80 - 100 fL     MCH 22.8 (L) 26.0 - 34.0 pg     MCHC 29.8 (L) 32.0 - 36.0 g/dL     RDW 18.1 (H) 11.5 - 14.5 %     Platelets 280 150 - 450 x10*3/uL     Neutrophils % 86.4 40.0 - 80.0 %     Immature Granulocytes %, Automated 0.6 0.0 - 0.9 %     Lymphocytes % 8.2 13.0 - 44.0 %     Monocytes % 4.3 2.0 - 10.0 %     Eosinophils % 0.2 0.0 - 6.0 %     Basophils % 0.3 0.0 - 2.0 %     Neutrophils Absolute 8.83 (H) 1.60 - 5.50 x10*3/uL     Immature Granulocytes Absolute, Automated 0.06 0.00 - 0.50 x10*3/uL     Lymphocytes Absolute 0.84 0.80 - 3.00 x10*3/uL     Monocytes Absolute 0.44 0.05 - 0.80 x10*3/uL     Eosinophils Absolute 0.02 0.00 - 0.40 x10*3/uL     Basophils Absolute 0.03 0.00 - 0.10 x10*3/uL   Comprehensive metabolic panel   Result Value Ref Range     Glucose 67 (L) 74 - 99 mg/dL     Sodium 138 136 - 145 mmol/L     Potassium 3.7 3.5 - 5.3 mmol/L     Chloride 102 98 - 107 mmol/L     Bicarbonate 25 21 - 32 mmol/L     Anion Gap 15 10 - 20 mmol/L     Urea Nitrogen 23 6 - 23 mg/dL     Creatinine 0.67 0.50 - 1.05 mg/dL     eGFR 84 >60 mL/min/1.73m*2     Calcium 9.2 8.6 - 10.3 mg/dL     Albumin 3.5 3.4 - 5.0 g/dL     Alkaline Phosphatase 55 33 - 136 U/L     Total Protein 7.2 6.4 - 8.2 g/dL     AST 8 (L) 9 - 39 U/L     Bilirubin, Total 0.4 0.0 - 1.2 mg/dL     ALT 6 (L) 7 - 45 U/L   Creatine Kinase   Result Value Ref Range     Creatine Kinase 68 0 - 215 U/L   Troponin I, High Sensitivity   Result Value Ref Range     Troponin  I, High Sensitivity 98 (HH) 0 - 13 ng/L   Lactate   Result Value Ref Range     Lactate 1.4 0.4 - 2.0 mmol/L   POCT GLUCOSE   Result Value Ref Range     POCT Glucose 53 (L) 74 - 99 mg/dL   Troponin I, High Sensitivity   Result Value Ref Range     Troponin I, High Sensitivity 168 (HH) 0 - 13 ng/L   Urinalysis with Reflex Culture and Microscopic   Result Value Ref Range     Color, Urine Light-Yellow Light-Yellow, Yellow, Dark-Yellow     Appearance, Urine Clear Clear     Specific Gravity, Urine 1.014 1.005 - 1.035     pH, Urine 5.5 5.0, 5.5, 6.0, 6.5, 7.0, 7.5, 8.0     Protein, Urine NEGATIVE NEGATIVE, 10 (TRACE), 20 (TRACE) mg/dL     Glucose, Urine Normal Normal mg/dL     Blood, Urine NEGATIVE NEGATIVE     Ketones, Urine NEGATIVE NEGATIVE mg/dL     Bilirubin, Urine NEGATIVE NEGATIVE     Urobilinogen, Urine Normal Normal mg/dL     Nitrite, Urine 2+ (A) NEGATIVE     Leukocyte Esterase, Urine NEGATIVE NEGATIVE   Extra Urine Gray Tube   Result Value Ref Range     Extra Tube Hold for add-ons.     Microscopic Only, Urine   Result Value Ref Range     WBC, Urine 1-5 1-5, NONE /HPF     RBC, Urine NONE NONE, 1-2, 3-5 /HPF     Bacteria, Urine 1+ (A) NONE SEEN /HPF     Mucus, Urine FEW Reference range not established. /LPF   Sars-CoV-2 and Influenza A/B PCR   Result Value Ref Range     Flu A Result Not Detected Not Detected     Flu B Result Not Detected Not Detected     Coronavirus 2019, PCR Not Detected Not Detected   POCT GLUCOSE   Result Value Ref Range     POCT Glucose 84 74 - 99 mg/dL   Troponin I, High Sensitivity   Result Value Ref Range     Troponin I, High Sensitivity 214 (HH) 0 - 13 ng/L   Creatine Kinase   Result Value Ref Range     Creatine Kinase 65 0 - 215 U/L   POCT GLUCOSE   Result Value Ref Range     POCT Glucose 84 74 - 99 mg/dL   POCT GLUCOSE   Result Value Ref Range     POCT Glucose 112 (H) 74 - 99 mg/dL   Troponin I, High Sensitivity   Result Value Ref Range     Troponin I, High Sensitivity 222 (HH) 0 - 13  ng/L      Patient recently received an antibiotic (last 12 hours)         None             Plan discussed with interdisciplinary team, will continue Rocephin IV for urine positive for 2+ nitrites. WBC 10.2, will repeat labs. Hemoglobin 9.5, iron levels ordered. Strict intake and output. No acute events overnight, continue current and repeat labs in the AM. Therapy evaluations ordered. Home medication reconciliation needed.      Discharge planning discussed with patient and care team.  Anticipate HHC/SNF at discharge. Patient aware and agreeable to current plan, continue plan as above.      I spent a total of 50 minutes on the date of the service which included preparing to see the patient, face-to-face patient care, completing clinical documentation, obtaining and/or reviewing separately obtained history, performing a medically appropriate examination, counseling and educating the patient/family/caregiver, ordering medications, tests, or procedures, communicating with other HCPs (not separately reported), independently interpreting results (not separately reported), communicating results to the patient/family/caregiver, and care coordination (not separately reported).   I spent 60 minutes in the professional and overall care of this patient.                          Revision History          Patient fully evaluated 1/13  for    Problem List Items Addressed This Visit          Symptoms and Signs    * (Principal) Generalized weakness - Primary     Other Visit Diagnoses       Hypoglycemia        Anemia, unspecified type        Relevant Orders    Esophagogastroduodenoscopy (EGD)          Patient seen resting in bed with head of bed elevated, no s/s or c/o acute difficulties at this time.  Vital signs for last 24 hours Temp:  [36.4 °C (97.5 °F)-37.8 °C (100 °F)] 36.4 °C (97.5 °F)  Heart Rate:  [82-98] 88  Resp:  [18-20] 18  BP: (108-133)/(50-59) 118/57    No intake/output data recorded.  Patient still requiring frequent  cardiac and SPO2 monitoring. Continue aggressive pulmonary hygiene and oral hygiene. Off loading as tolerated for skin integrity. Medications and labs reviewed-   Results for orders placed or performed during the hospital encounter of 01/10/25 (from the past 24 hours)   POCT GLUCOSE   Result Value Ref Range    POCT Glucose 197 (H) 74 - 99 mg/dL   POCT GLUCOSE   Result Value Ref Range    POCT Glucose 202 (H) 74 - 99 mg/dL   CBC and Auto Differential   Result Value Ref Range    WBC 8.9 4.4 - 11.3 x10*3/uL    nRBC 0.0 0.0 - 0.0 /100 WBCs    RBC 3.55 (L) 4.00 - 5.20 x10*6/uL    Hemoglobin 8.0 (L) 12.0 - 16.0 g/dL    Hematocrit 27.7 (L) 36.0 - 46.0 %    MCV 78 (L) 80 - 100 fL    MCH 22.5 (L) 26.0 - 34.0 pg    MCHC 28.9 (L) 32.0 - 36.0 g/dL    RDW 18.2 (H) 11.5 - 14.5 %    Platelets 257 150 - 450 x10*3/uL    Neutrophils % 76.5 40.0 - 80.0 %    Immature Granulocytes %, Automated 0.8 0.0 - 0.9 %    Lymphocytes % 12.9 13.0 - 44.0 %    Monocytes % 8.8 2.0 - 10.0 %    Eosinophils % 0.7 0.0 - 6.0 %    Basophils % 0.3 0.0 - 2.0 %    Neutrophils Absolute 6.78 (H) 1.60 - 5.50 x10*3/uL    Immature Granulocytes Absolute, Automated 0.07 0.00 - 0.50 x10*3/uL    Lymphocytes Absolute 1.14 0.80 - 3.00 x10*3/uL    Monocytes Absolute 0.78 0.05 - 0.80 x10*3/uL    Eosinophils Absolute 0.06 0.00 - 0.40 x10*3/uL    Basophils Absolute 0.03 0.00 - 0.10 x10*3/uL   Comprehensive Metabolic Panel   Result Value Ref Range    Glucose 65 (L) 74 - 99 mg/dL    Sodium 135 (L) 136 - 145 mmol/L    Potassium 3.8 3.5 - 5.3 mmol/L    Chloride 99 98 - 107 mmol/L    Bicarbonate 30 21 - 32 mmol/L    Anion Gap 10 10 - 20 mmol/L    Urea Nitrogen 18 6 - 23 mg/dL    Creatinine 0.93 0.50 - 1.05 mg/dL    eGFR 59 (L) >60 mL/min/1.73m*2    Calcium 8.7 8.6 - 10.3 mg/dL    Albumin 3.0 (L) 3.4 - 5.0 g/dL    Alkaline Phosphatase 50 33 - 136 U/L    Total Protein 6.3 (L) 6.4 - 8.2 g/dL    AST 8 (L) 9 - 39 U/L    Bilirubin, Total 0.4 0.0 - 1.2 mg/dL    ALT 5 (L) 7 - 45 U/L    POCT GLUCOSE   Result Value Ref Range    POCT Glucose 68 (L) 74 - 99 mg/dL   Iron and TIBC   Result Value Ref Range    Iron 28 (L) 35 - 150 ug/dL    UIBC 212 110 - 370 ug/dL    TIBC 240 240 - 445 ug/dL    % Saturation 12 (L) 25 - 45 %   Type and Screen   Result Value Ref Range    ABO TYPE A     Rh TYPE POS     ANTIBODY SCREEN NEG    VERIFY ABO/Rh Group Test   Result Value Ref Range    ABO TYPE A     Rh TYPE POS    POCT GLUCOSE   Result Value Ref Range    POCT Glucose 101 (H) 74 - 99 mg/dL      Patient recently received an antibiotic (last 12 hours)       None           Plan discussed with interdisciplinary team, per PT, patient experiencing decreased strength, endurance, mobility, sensation, impaired balance, and pain with good rehab prognosis. Per GI, plan is to scope patient tomorrow. Occult blood of the stool pending. Will give patient another dose of IV Venofer 300 mg for HARINI. Discontinued IV Rocephin and started patient on oral Ceftin for UTI. Will continue current and repeat labs in the AM, especially monitoring hemoglobin.    Discharge planning discussed with patient and care team. Therapy evaluations ordered. [facility]-12, anticipate HHC/SNF at discharge. Patient aware and agreeable to current plan, continue plan as above.     I spent a total of 50 minutes on the date of the service which included preparing to see the patient, face-to-face patient care, completing clinical documentation, obtaining and/or reviewing separately obtained history, performing a medically appropriate examination, counseling and educating the patient/family/caregiver, ordering medications, tests, or procedures, communicating with other HCPs (not separately reported), independently interpreting results (not separately reported), communicating results to the patient/family/caregiver, and care coordination (not separately reported).          ZACHARIAH ANDINO

## 2025-01-13 NOTE — CONSULTS
Melissa Tse is a 89 y.o. female on day 1 of admission presenting with Generalized weakness.      Assessment / Plan        Reason for consult: HARINI    Melissa Tse is a 89 y.o. female with medical history of T2DM on insulin, breast cancer on current estrogen therapy who presents to the ED on 1/10 with a fall from standing position.  The patient lowered herself to the ground making cereal around noon and then was on the floor until her son came home around 530.  She said her legs felt weak yesterday but her son was able to get her something to eat in the morning and she felt slightly better.  She then developed weakness again while making cereal this morning.  She said her legs just do not feel that they can hold her up.  She denies pain, chest discomfort, shortness of breath, syncope, head strike or any pain post fall.  States her glucose averages in the high 80s, admits to being a picky eater.  Denies upper respiratory/systemic viral symptoms.  Denies suprapubic pain, urinary frequency/urgency, discomfort with urination.  Denies history of low back pain/trauma, saddle anesthesia, peripheral neuropathy, incontinence.       ED course: Vitals stable.  Vitals notable for hypoglycemia of 67, depressed LFTs, elevated troponin of 98, hemoglobin of 9.5.  Patient treated with D10 for hypoglycemia.    #New iron deficiency anemia  #Poor appetite  -Patient had hemoglobin of 9.5 in ED. Hb at 8 this morning  -Prior hemoglobin levels were significantly higher at around 11-12  -low % saturation suggests HARINI  -Patient admits to poor appetite for the past several months.  Cannot say if she lost weight definitively as she has not been weighing herself  Plan:  -Plan for EGD tomorrow  -Will perform colonoscopy if EGD does not reveal etiology of HARINI  -N.p.o. at midnight  -Occult blood stool pending  -Active type and screen  -Transfuse if hemoglobin less than 7         Gian Alas DO   PGY-1, Internal Medicine  This is a  "preliminary note, please await attending attestation for final A/P    Subjective     Patient seen and examined. No acute overnight events.  Patient admits to poor appetite for the past several months.  She does not know whether she lost weight or not as she does not weigh herself.  Patient denies abdominal pain, nausea, vomiting.      Objective       Physical Exam:  General:  Pleasant and cooperative. No apparent distress.  HEENT:  Normocephalic, atraumatic  Chest:  Clear to auscultation bilaterally. No wheezes, rales, or rhonchi.  CV:  Regular rate and rhythm. No murmurs    Abdomen: Abdomen is soft, non-tender, non-distended. BS +   Extremities:  No lower extremity edema or cyanosis.   Neurological:  AAOx3. No focal deficits.  Skin:  Warm and dry.    Last Recorded Vitals  Blood pressure 110/53, pulse 82, temperature 36.4 °C (97.5 °F), temperature source Temporal, resp. rate 18, height 1.626 m (5' 4.02\"), weight 83.9 kg (185 lb), SpO2 96%.  Intake/Output last 3 Shifts:  I/O last 3 completed shifts:  In: 1012 (12.1 mL/kg) [P.O.:812; I.V.:200 (2.4 mL/kg)]  Out: 350 (4.2 mL/kg) [Urine:350 (0.1 mL/kg/hr)]  Weight: 83.9 kg     Last CBC & BMP  Lab Results   Component Value Date    GLUCOSE 65 (L) 01/13/2025    CALCIUM 8.7 01/13/2025     (L) 01/13/2025    K 3.8 01/13/2025    CO2 30 01/13/2025    CL 99 01/13/2025    BUN 18 01/13/2025    CREATININE 0.93 01/13/2025     Lab Results   Component Value Date    WBC 8.9 01/13/2025    HGB 8.0 (L) 01/13/2025    HCT 27.7 (L) 01/13/2025    MCV 78 (L) 01/13/2025     01/13/2025          "

## 2025-01-13 NOTE — CARE PLAN
The patient's goals for the shift include  safety and increased strength.    The clinical goals for the shift include patient will remain safe during shift.    Over the shift, the patient did not make progress toward the following goals. Barriers to progression include falls, confusion and forgetfulness, weakness. Recommendations to address these barriers include PT OT, consults.

## 2025-01-13 NOTE — PROGRESS NOTES
25 1239   Discharge Planning   Living Arrangements Children   Support Systems Children;Family members;Friends/neighbors   Assistance Needed Independent with ADLs, uses cleansing clothes for sponge bathing. Ambulates with walker or cane. Able to do light cooking, grandson does shopping. Family assists with  transportation.   Type of Residence Private residence   Number of Stairs to Enter Residence 6   Do you have animals or pets at home? Yes   Who is requesting discharge planning? Provider   Home or Post Acute Services Post acute facilities (Rehab/SNF/etc)   Expected Discharge Disposition SNF   Does the patient need discharge transport arranged? Yes   RoundTrip coordination needed? Yes     TCC Note: Met with pt. at bedside, introduced self and role on the Transition of Care Team.  Verified name, , demographics, insurance, PCP has retired and they want to ask Dr. Lugo if he will accept pt.  Emergency contact is daughterShania Phone: [phone] or SonJuan, phone: [phone]. Pt. lives with sonVikas who is a  and on the road most of the week.  Home has  with 6 KATHI and has a first floor bed/bath. Preferred pharmacy is  [facility] on [address].  No problems affording or obtaining medications. Pt. Will be going SNF at time of Discharge. Provided SNF list per [facility], which includes facilities that are within  Post- Acute Quality network as well as meeting patient's medical needs and are in-network for patient's insurance while also in discharge geographic are patient/family prefers. List identifies each facilities CMS star rating. Patient/family to reviewed and gave me choices of [facility] and [facility]. Will watch for facility acceptance and start precert.  Transitions of Care will continue to follow until discharge. America Fulton, MSN, RN, TCC.

## 2025-01-14 ENCOUNTER — ANESTHESIA (OUTPATIENT)
Dept: GASTROENTEROLOGY | Facility: HOSPITAL | Age: 89
End: 2025-01-14
Payer: MEDICARE

## 2025-01-14 ENCOUNTER — APPOINTMENT (OUTPATIENT)
Dept: GASTROENTEROLOGY | Facility: HOSPITAL | Age: 89
DRG: 374 | End: 2025-01-14
Payer: MEDICARE

## 2025-01-14 ENCOUNTER — ANESTHESIA EVENT (OUTPATIENT)
Dept: GASTROENTEROLOGY | Facility: HOSPITAL | Age: 89
End: 2025-01-14
Payer: MEDICARE

## 2025-01-14 LAB
ALBUMIN SERPL BCP-MCNC: 2.9 G/DL (ref 3.4–5)
ALP SERPL-CCNC: 54 U/L (ref 33–136)
ALT SERPL W P-5'-P-CCNC: 5 U/L (ref 7–45)
ANION GAP SERPL CALC-SCNC: 13 MMOL/L (ref 10–20)
AST SERPL W P-5'-P-CCNC: 8 U/L (ref 9–39)
BASOPHILS # BLD AUTO: 0.03 X10*3/UL (ref 0–0.1)
BASOPHILS NFR BLD AUTO: 0.4 %
BILIRUB SERPL-MCNC: 0.4 MG/DL (ref 0–1.2)
BUN SERPL-MCNC: 19 MG/DL (ref 6–23)
CALCIUM SERPL-MCNC: 8.5 MG/DL (ref 8.6–10.3)
CHLORIDE SERPL-SCNC: 102 MMOL/L (ref 98–107)
CO2 SERPL-SCNC: 26 MMOL/L (ref 21–32)
CREAT SERPL-MCNC: 0.76 MG/DL (ref 0.5–1.05)
EGFRCR SERPLBLD CKD-EPI 2021: 75 ML/MIN/1.73M*2
EOSINOPHIL # BLD AUTO: 0.11 X10*3/UL (ref 0–0.4)
EOSINOPHIL NFR BLD AUTO: 1.3 %
ERYTHROCYTE [DISTWIDTH] IN BLOOD BY AUTOMATED COUNT: 18.2 % (ref 11.5–14.5)
GLUCOSE BLD MANUAL STRIP-MCNC: 147 MG/DL (ref 74–99)
GLUCOSE BLD MANUAL STRIP-MCNC: 199 MG/DL (ref 74–99)
GLUCOSE BLD MANUAL STRIP-MCNC: 70 MG/DL (ref 74–99)
GLUCOSE BLD MANUAL STRIP-MCNC: 82 MG/DL (ref 74–99)
GLUCOSE SERPL-MCNC: 79 MG/DL (ref 74–99)
HCT VFR BLD AUTO: 26.8 % (ref 36–46)
HGB BLD-MCNC: 7.9 G/DL (ref 12–16)
IMM GRANULOCYTES # BLD AUTO: 0.05 X10*3/UL (ref 0–0.5)
IMM GRANULOCYTES NFR BLD AUTO: 0.6 % (ref 0–0.9)
LYMPHOCYTES # BLD AUTO: 1.22 X10*3/UL (ref 0.8–3)
LYMPHOCYTES NFR BLD AUTO: 14.7 %
MCH RBC QN AUTO: 23 PG (ref 26–34)
MCHC RBC AUTO-ENTMCNC: 29.5 G/DL (ref 32–36)
MCV RBC AUTO: 78 FL (ref 80–100)
MONOCYTES # BLD AUTO: 0.69 X10*3/UL (ref 0.05–0.8)
MONOCYTES NFR BLD AUTO: 8.3 %
NEUTROPHILS # BLD AUTO: 6.2 X10*3/UL (ref 1.6–5.5)
NEUTROPHILS NFR BLD AUTO: 74.7 %
NRBC BLD-RTO: 0 /100 WBCS (ref 0–0)
PLATELET # BLD AUTO: 240 X10*3/UL (ref 150–450)
POTASSIUM SERPL-SCNC: 4.3 MMOL/L (ref 3.5–5.3)
PROT SERPL-MCNC: 6.3 G/DL (ref 6.4–8.2)
RBC # BLD AUTO: 3.44 X10*6/UL (ref 4–5.2)
SODIUM SERPL-SCNC: 137 MMOL/L (ref 136–145)
WBC # BLD AUTO: 8.3 X10*3/UL (ref 4.4–11.3)

## 2025-01-14 PROCEDURE — 0753T DGTZ GLS MCRSCP SLD LEVEL IV: CPT | Mod: TC,PARLAB | Performed by: INTERNAL MEDICINE

## 2025-01-14 PROCEDURE — 7100000002 HC RECOVERY ROOM TIME - EACH INCREMENTAL 1 MINUTE

## 2025-01-14 PROCEDURE — 2500000004 HC RX 250 GENERAL PHARMACY W/ HCPCS (ALT 636 FOR OP/ED)

## 2025-01-14 PROCEDURE — 36415 COLL VENOUS BLD VENIPUNCTURE: CPT | Performed by: INTERNAL MEDICINE

## 2025-01-14 PROCEDURE — A43239 PR EDG TRANSORAL BIOPSY SINGLE/MULTIPLE: Performed by: ANESTHESIOLOGIST ASSISTANT

## 2025-01-14 PROCEDURE — 0DB78ZX EXCISION OF STOMACH, PYLORUS, VIA NATURAL OR ARTIFICIAL OPENING ENDOSCOPIC, DIAGNOSTIC: ICD-10-PCS | Performed by: NURSE PRACTITIONER

## 2025-01-14 PROCEDURE — 3700000001 HC GENERAL ANESTHESIA TIME - INITIAL BASE CHARGE

## 2025-01-14 PROCEDURE — 2500000002 HC RX 250 W HCPCS SELF ADMINISTERED DRUGS (ALT 637 FOR MEDICARE OP, ALT 636 FOR OP/ED): Performed by: INTERNAL MEDICINE

## 2025-01-14 PROCEDURE — 85025 COMPLETE CBC W/AUTO DIFF WBC: CPT | Performed by: INTERNAL MEDICINE

## 2025-01-14 PROCEDURE — A43239 PR EDG TRANSORAL BIOPSY SINGLE/MULTIPLE: Performed by: ANESTHESIOLOGY

## 2025-01-14 PROCEDURE — 82947 ASSAY GLUCOSE BLOOD QUANT: CPT

## 2025-01-14 PROCEDURE — 2500000004 HC RX 250 GENERAL PHARMACY W/ HCPCS (ALT 636 FOR OP/ED): Performed by: ANESTHESIOLOGIST ASSISTANT

## 2025-01-14 PROCEDURE — 80053 COMPREHEN METABOLIC PANEL: CPT | Performed by: INTERNAL MEDICINE

## 2025-01-14 PROCEDURE — 3700000002 HC GENERAL ANESTHESIA TIME - EACH INCREMENTAL 1 MINUTE

## 2025-01-14 PROCEDURE — 43239 EGD BIOPSY SINGLE/MULTIPLE: CPT | Performed by: INTERNAL MEDICINE

## 2025-01-14 PROCEDURE — 7100000001 HC RECOVERY ROOM TIME - INITIAL BASE CHARGE

## 2025-01-14 PROCEDURE — 99232 SBSQ HOSP IP/OBS MODERATE 35: CPT

## 2025-01-14 PROCEDURE — 2500000001 HC RX 250 WO HCPCS SELF ADMINISTERED DRUGS (ALT 637 FOR MEDICARE OP): Performed by: INTERNAL MEDICINE

## 2025-01-14 PROCEDURE — 99100 ANES PT EXTEME AGE<1 YR&>70: CPT | Performed by: ANESTHESIOLOGY

## 2025-01-14 PROCEDURE — 1200000002 HC GENERAL ROOM WITH TELEMETRY DAILY

## 2025-01-14 RX ORDER — PHENYLEPHRINE HCL IN 0.9% NACL 1 MG/10 ML
SYRINGE (ML) INTRAVENOUS AS NEEDED
Status: DISCONTINUED | OUTPATIENT
Start: 2025-01-14 | End: 2025-01-14

## 2025-01-14 RX ORDER — SODIUM CHLORIDE 0.9 % (FLUSH) 0.9 %
SYRINGE (ML) INJECTION AS NEEDED
Status: DISCONTINUED | OUTPATIENT
Start: 2025-01-14 | End: 2025-01-14

## 2025-01-14 RX ORDER — LIDOCAINE HCL/PF 100 MG/5ML
SYRINGE (ML) INTRAVENOUS AS NEEDED
Status: DISCONTINUED | OUTPATIENT
Start: 2025-01-14 | End: 2025-01-14

## 2025-01-14 RX ORDER — PROPOFOL 10 MG/ML
INJECTION, EMULSION INTRAVENOUS AS NEEDED
Status: DISCONTINUED | OUTPATIENT
Start: 2025-01-14 | End: 2025-01-14

## 2025-01-14 RX ADMIN — Medication 300 MCG: at 09:17

## 2025-01-14 RX ADMIN — ESTRADIOL 1 MG: 1 TABLET ORAL at 10:15

## 2025-01-14 RX ADMIN — INSULIN GLARGINE 32 UNITS: 100 INJECTION, SOLUTION SUBCUTANEOUS at 20:12

## 2025-01-14 RX ADMIN — ENOXAPARIN SODIUM 40 MG: 40 INJECTION SUBCUTANEOUS at 20:12

## 2025-01-14 RX ADMIN — Medication 10 ML: at 09:15

## 2025-01-14 RX ADMIN — CEFUROXIME AXETIL 250 MG: 250 TABLET, FILM COATED ORAL at 11:28

## 2025-01-14 RX ADMIN — PROPOFOL 20 MG: 10 INJECTION, EMULSION INTRAVENOUS at 09:13

## 2025-01-14 RX ADMIN — PROPOFOL 50 MG: 10 INJECTION, EMULSION INTRAVENOUS at 09:11

## 2025-01-14 RX ADMIN — PROPOFOL 30 MG: 10 INJECTION, EMULSION INTRAVENOUS at 09:15

## 2025-01-14 RX ADMIN — LIDOCAINE HYDROCHLORIDE 100 MG: 20 INJECTION, SOLUTION INTRAVENOUS at 09:11

## 2025-01-14 RX ADMIN — CEFUROXIME AXETIL 250 MG: 250 TABLET, FILM COATED ORAL at 20:12

## 2025-01-14 SDOH — HEALTH STABILITY: MENTAL HEALTH: CURRENT SMOKER: 0

## 2025-01-14 ASSESSMENT — PAIN SCALES - GENERAL
PAINLEVEL_OUTOF10: 0 - NO PAIN
PAIN_LEVEL: 0
PAINLEVEL_OUTOF10: 0 - NO PAIN

## 2025-01-14 ASSESSMENT — PAIN - FUNCTIONAL ASSESSMENT
PAIN_FUNCTIONAL_ASSESSMENT: 0-10
PAIN_FUNCTIONAL_ASSESSMENT: 0-10

## 2025-01-14 NOTE — CONSULTS
Melissa Tse is a 89 y.o. female on day 2 of admission presenting with Generalized weakness.      Assessment / Plan        Reason for consult: HARINI    Melissa Tse is a 89 y.o. female with medical history of T2DM on insulin, breast cancer on current estrogen therapy who presents to the ED on 1/10 with a fall from standing position.  The patient lowered herself to the ground making cereal around noon and then was on the floor until her son came home around 530.  She said her legs felt weak yesterday but her son was able to get her something to eat in the morning and she felt slightly better.  She then developed weakness again while making cereal this morning.  She said her legs just do not feel that they can hold her up.  She denies pain, chest discomfort, shortness of breath, syncope, head strike or any pain post fall.  States her glucose averages in the high 80s, admits to being a picky eater.  Denies upper respiratory/systemic viral symptoms.  Denies suprapubic pain, urinary frequency/urgency, discomfort with urination.  Denies history of low back pain/trauma, saddle anesthesia, peripheral neuropathy, incontinence.       ED course: Vitals stable.  Vitals notable for hypoglycemia of 67, depressed LFTs, elevated troponin of 98, hemoglobin of 9.5.  Patient treated with D10 for hypoglycemia.    #New iron deficiency anemia  #Poor appetite  -Patient had hemoglobin of 9.5 in ED. Hb at 8 this morning  -Prior hemoglobin levels were significantly higher at around 11-12  -low % saturation suggests HARINI  -Patient admits to poor appetite for the past several months.  Cannot say if she lost weight definitively as she has not been weighing herself  Plan:  -Patient underwent EGD today which was largely unrevealing  -Recommend outpatient colonoscopy for further evaluation of anemia  -Regular diet  -Occult blood stool pending  -Active type and screen  -Transfuse if hemoglobin less than 7         Gian Alas DO   PGY-1,  "Internal Medicine  This is a preliminary note, please await attending attestation for final A/P    Subjective     Patient seen and examined. No acute overnight events.  Patient has no new complaints today.  Patient is agreeable for colonoscopy at later time.      Objective       Physical Exam:  General:  Pleasant and cooperative. No apparent distress.  HEENT:  Normocephalic, atraumatic  Chest:  Clear to auscultation bilaterally. No wheezes, rales, or rhonchi.  CV:  Regular rate and rhythm. No murmurs    Abdomen: Abdomen is soft, non-tender, non-distended. BS +   Extremities:  No lower extremity edema or cyanosis.   Neurological:  AAOx3. No focal deficits.  Skin:  Warm and dry.    Last Recorded Vitals  Blood pressure 100/50, pulse 72, temperature 36 °C (96.8 °F), temperature source Temporal, resp. rate 18, height 1.626 m (5' 4.02\"), weight 83.9 kg (185 lb), SpO2 98%.  Intake/Output last 3 Shifts:  I/O last 3 completed shifts:  In: 812 (9.7 mL/kg) [P.O.:812]  Out: 1250 (14.9 mL/kg) [Urine:1250 (0.4 mL/kg/hr)]  Weight: 83.9 kg     Last CBC & BMP  Lab Results   Component Value Date    GLUCOSE 79 01/14/2025    CALCIUM 8.5 (L) 01/14/2025     01/14/2025    K 4.3 01/14/2025    CO2 26 01/14/2025     01/14/2025    BUN 19 01/14/2025    CREATININE 0.76 01/14/2025     Lab Results   Component Value Date    WBC 8.3 01/14/2025    HGB 7.9 (L) 01/14/2025    HCT 26.8 (L) 01/14/2025    MCV 78 (L) 01/14/2025     01/14/2025          "

## 2025-01-14 NOTE — CARE PLAN
Discussed with pt to change code status from DNR-comfort care to full code cade-procedurally. Pt agrees.     Valdez Valdivia MD

## 2025-01-14 NOTE — ANESTHESIA POSTPROCEDURE EVALUATION
Patient: Melissa Tse    Procedure Summary       Date: 01/14/25 Room / Location: [facility]    Anesthesia Start: 0908 Anesthesia Stop: 0922    Procedure: EGD Diagnosis: Anemia, unspecified type    Scheduled Providers: Valdez Valdivia MD; Enrique Monge MD Responsible Provider: Enrique Monge MD    Anesthesia Type: MAC ASA Status: 3            Anesthesia Type: MAC    Vitals Value Taken Time   /50 01/14/25 0934   Temp 36.1 01/14/25 0939   Pulse 66 01/14/25 0938   Resp 28 01/14/25 0938   SpO2 100 % 01/14/25 0938   Vitals shown include unfiled device data.    Anesthesia Post Evaluation    Patient location during evaluation: PACU  Patient participation: complete - patient cannot participate  Level of consciousness: sleepy but conscious  Pain score: 0  Pain management: adequate  Airway patency: patent  Cardiovascular status: acceptable and blood pressure returned to baseline  Respiratory status: acceptable, spontaneous ventilation and face mask  Hydration status: acceptable  Postoperative Nausea and Vomiting: none        No notable events documented.

## 2025-01-14 NOTE — ANESTHESIA PREPROCEDURE EVALUATION
Patient: Melissa Tse    Procedure Information       Date/Time: 01/14/25 0900    Scheduled providers: Valdez Valdivia MD; Enrique Monge MD    Procedure: EGD    Location: [facility]            Relevant Problems   Anesthesia (within normal limits)       Clinical information reviewed:   Tobacco  Allergies  Meds   Med Hx  Surg Hx  OB Status  Fam Hx  Soc   Hx        NPO Detail:  NPO/Void Status  Date of Last Liquid: 01/14/25  Time of Last Liquid: 0700 (sip of water)  Date of Last Solid: 01/13/25  Time of Last Solid: 1900         Physical Exam    Airway  Mallampati: II  TM distance: >3 FB  Neck ROM: full     Cardiovascular   Rhythm: regular  Rate: normal     Dental   (+) lower dentures, upper dentures     Pulmonary    Abdominal        Anesthesia Plan    History of general anesthesia?: yes  History of complications of general anesthesia?: no    ASA 3     MAC     The patient is not a current smoker.  Patient was not previously instructed to abstain from smoking on day of procedure.  Patient did not smoke on day of procedure.    intravenous induction   Anesthetic plan and risks discussed with patient.  Use of blood products discussed with patient who.    Plan discussed with CAA.

## 2025-01-14 NOTE — PROGRESS NOTES
Melissa Tse is a 89 y.o. female on day 2 of admission presenting with Generalized weakness.      Subjective   Patient fully evaluated  01/14  for    Problem List Items Addressed This Visit       * (Principal) Generalized weakness - Primary     Other Visit Diagnoses       Hypoglycemia        Anemia, unspecified type        Relevant Orders    Esophagogastroduodenoscopy (EGD)          Patient seen resting in bed with head of bed elevated, no s/s or c/o acute difficulties at this time.  Vital signs for last 24 hours Temp:  [36.3 °C (97.3 °F)-36.4 °C (97.5 °F)] 36.4 °C (97.5 °F)  Heart Rate:  [82-88] 87  Resp:  [16-18] 16  BP: ()/(51-57) 99/51    No intake/output data recorded.  Patient still requiring frequent cardiac and SPO2 monitoring. Continue aggressive pulmonary hygiene and oral hygiene. Off loading as tolerated for skin integrity. Medications and labs reviewed-   Results for orders placed or performed during the hospital encounter of 01/10/25 (from the past 24 hours)   CBC and Auto Differential   Result Value Ref Range    WBC 8.9 4.4 - 11.3 x10*3/uL    nRBC 0.0 0.0 - 0.0 /100 WBCs    RBC 3.55 (L) 4.00 - 5.20 x10*6/uL    Hemoglobin 8.0 (L) 12.0 - 16.0 g/dL    Hematocrit 27.7 (L) 36.0 - 46.0 %    MCV 78 (L) 80 - 100 fL    MCH 22.5 (L) 26.0 - 34.0 pg    MCHC 28.9 (L) 32.0 - 36.0 g/dL    RDW 18.2 (H) 11.5 - 14.5 %    Platelets 257 150 - 450 x10*3/uL    Neutrophils % 76.5 40.0 - 80.0 %    Immature Granulocytes %, Automated 0.8 0.0 - 0.9 %    Lymphocytes % 12.9 13.0 - 44.0 %    Monocytes % 8.8 2.0 - 10.0 %    Eosinophils % 0.7 0.0 - 6.0 %    Basophils % 0.3 0.0 - 2.0 %    Neutrophils Absolute 6.78 (H) 1.60 - 5.50 x10*3/uL    Immature Granulocytes Absolute, Automated 0.07 0.00 - 0.50 x10*3/uL    Lymphocytes Absolute 1.14 0.80 - 3.00 x10*3/uL    Monocytes Absolute 0.78 0.05 - 0.80 x10*3/uL    Eosinophils Absolute 0.06 0.00 - 0.40 x10*3/uL    Basophils Absolute 0.03 0.00 - 0.10 x10*3/uL   Comprehensive Metabolic  Panel   Result Value Ref Range    Glucose 65 (L) 74 - 99 mg/dL    Sodium 135 (L) 136 - 145 mmol/L    Potassium 3.8 3.5 - 5.3 mmol/L    Chloride 99 98 - 107 mmol/L    Bicarbonate 30 21 - 32 mmol/L    Anion Gap 10 10 - 20 mmol/L    Urea Nitrogen 18 6 - 23 mg/dL    Creatinine 0.93 0.50 - 1.05 mg/dL    eGFR 59 (L) >60 mL/min/1.73m*2    Calcium 8.7 8.6 - 10.3 mg/dL    Albumin 3.0 (L) 3.4 - 5.0 g/dL    Alkaline Phosphatase 50 33 - 136 U/L    Total Protein 6.3 (L) 6.4 - 8.2 g/dL    AST 8 (L) 9 - 39 U/L    Bilirubin, Total 0.4 0.0 - 1.2 mg/dL    ALT 5 (L) 7 - 45 U/L   POCT GLUCOSE   Result Value Ref Range    POCT Glucose 68 (L) 74 - 99 mg/dL   Iron and TIBC   Result Value Ref Range    Iron 28 (L) 35 - 150 ug/dL    UIBC 212 110 - 370 ug/dL    TIBC 240 240 - 445 ug/dL    % Saturation 12 (L) 25 - 45 %   Type and Screen   Result Value Ref Range    ABO TYPE A     Rh TYPE POS     ANTIBODY SCREEN NEG    VERIFY ABO/Rh Group Test   Result Value Ref Range    ABO TYPE A     Rh TYPE POS    POCT GLUCOSE   Result Value Ref Range    POCT Glucose 101 (H) 74 - 99 mg/dL   POCT GLUCOSE   Result Value Ref Range    POCT Glucose 115 (H) 74 - 99 mg/dL   POCT GLUCOSE   Result Value Ref Range    POCT Glucose 152 (H) 74 - 99 mg/dL      Patient recently received an antibiotic (last 12 hours)       Date/Time Action Medication Dose    01/13/25 2029 Given    cefuroxime (Ceftin) tablet 250 mg 250 mg           Plan discussed with interdisciplinary team, continue current and repeat labs in the AM.     Discharge planning discussed with patient and care team. [facility] at discharge. Patient aware and agreeable to current plan, continue plan as above.     I spent a total of 50 minutes on the date of the service which included preparing to see the patient, face-to-face patient care, completing clinical documentation, obtaining and/or reviewing separately obtained history, performing a medically appropriate  examination, counseling and educating the patient/family/caregiver, ordering medications, tests, or procedures, communicating with other HCPs (not separately reported), independently interpreting results (not separately reported), communicating results to the patient/family/caregiver, and care coordination (not separately reported).        Objective     Last Recorded Vitals  BP 99/51   Pulse 87   Temp 36.4 °C (97.5 °F)   Resp 16   Wt 83.9 kg (185 lb)   SpO2 92%   Intake/Output last 3 Shifts:    Intake/Output Summary (Last 24 hours) at 1/14/2025 0543  Last data filed at 1/13/2025 1700  Gross per 24 hour   Intake 812 ml   Output 850 ml   Net -38 ml       Admission Weight  Weight: 83.9 kg (185 lb) (01/10/25 2242)    Daily Weight  01/11/25 : 83.9 kg (185 lb)    Image Results  ECG 12 lead  Normal sinus rhythm  Low voltage QRS  Cannot rule out Inferior infarct , age undetermined  Possible Anterolateral infarct , age undetermined  Abnormal ECG  No previous ECGs available      Physical Exam    Relevant Results               Assessment/Plan                  Assessment & Plan  Generalized weakness            Tio Lugo MD   Physician  Internal Medicine     Progress Notes      Signed     Date of Service: 1/13/2025  2:44 PM     Signed       Expand All Collapse All    Melissa Tse is a 89 y.o. female on day 1 of admission presenting with Generalized weakness.           Subjective  Patient fully evaluated  01/12  for    Problem List Items Addressed This Visit                  Symptoms and Signs     * (Principal) Generalized weakness - Primary      Other Visit Diagnoses         Hypoglycemia         Anemia, unspecified type         Relevant Orders     Esophagogastroduodenoscopy (EGD)             Patient seen resting in bed with head of bed elevated, no s/s or c/o acute difficulties at this time.  Vital signs for last 24 hours Temp:  [36.4 °C (97.5 °F)-37.8 °C (100 °F)] 36.4 °C (97.5 °F)  Heart Rate:  [82-98] 88  Resp:   [18-20] 18  BP: (108-133)/(50-59) 118/57    No intake/output data recorded.  Patient still requiring frequent cardiac and SPO2 monitoring. Continue aggressive pulmonary hygiene and oral hygiene. Off loading as tolerated for skin integrity. Medications and labs reviewed-         Results for orders placed or performed during the hospital encounter of 01/10/25 (from the past 24 hours)   POCT GLUCOSE   Result Value Ref Range     POCT Glucose 197 (H) 74 - 99 mg/dL   POCT GLUCOSE   Result Value Ref Range     POCT Glucose 202 (H) 74 - 99 mg/dL   CBC and Auto Differential   Result Value Ref Range     WBC 8.9 4.4 - 11.3 x10*3/uL     nRBC 0.0 0.0 - 0.0 /100 WBCs     RBC 3.55 (L) 4.00 - 5.20 x10*6/uL     Hemoglobin 8.0 (L) 12.0 - 16.0 g/dL     Hematocrit 27.7 (L) 36.0 - 46.0 %     MCV 78 (L) 80 - 100 fL     MCH 22.5 (L) 26.0 - 34.0 pg     MCHC 28.9 (L) 32.0 - 36.0 g/dL     RDW 18.2 (H) 11.5 - 14.5 %     Platelets 257 150 - 450 x10*3/uL     Neutrophils % 76.5 40.0 - 80.0 %     Immature Granulocytes %, Automated 0.8 0.0 - 0.9 %     Lymphocytes % 12.9 13.0 - 44.0 %     Monocytes % 8.8 2.0 - 10.0 %     Eosinophils % 0.7 0.0 - 6.0 %     Basophils % 0.3 0.0 - 2.0 %     Neutrophils Absolute 6.78 (H) 1.60 - 5.50 x10*3/uL     Immature Granulocytes Absolute, Automated 0.07 0.00 - 0.50 x10*3/uL     Lymphocytes Absolute 1.14 0.80 - 3.00 x10*3/uL     Monocytes Absolute 0.78 0.05 - 0.80 x10*3/uL     Eosinophils Absolute 0.06 0.00 - 0.40 x10*3/uL     Basophils Absolute 0.03 0.00 - 0.10 x10*3/uL   Comprehensive Metabolic Panel   Result Value Ref Range     Glucose 65 (L) 74 - 99 mg/dL     Sodium 135 (L) 136 - 145 mmol/L     Potassium 3.8 3.5 - 5.3 mmol/L     Chloride 99 98 - 107 mmol/L     Bicarbonate 30 21 - 32 mmol/L     Anion Gap 10 10 - 20 mmol/L     Urea Nitrogen 18 6 - 23 mg/dL     Creatinine 0.93 0.50 - 1.05 mg/dL     eGFR 59 (L) >60 mL/min/1.73m*2     Calcium 8.7 8.6 - 10.3 mg/dL     Albumin 3.0 (L) 3.4 - 5.0 g/dL     Alkaline  Phosphatase 50 33 - 136 U/L     Total Protein 6.3 (L) 6.4 - 8.2 g/dL     AST 8 (L) 9 - 39 U/L     Bilirubin, Total 0.4 0.0 - 1.2 mg/dL     ALT 5 (L) 7 - 45 U/L   POCT GLUCOSE   Result Value Ref Range     POCT Glucose 68 (L) 74 - 99 mg/dL   Iron and TIBC   Result Value Ref Range     Iron 28 (L) 35 - 150 ug/dL     UIBC 212 110 - 370 ug/dL     TIBC 240 240 - 445 ug/dL     % Saturation 12 (L) 25 - 45 %   Type and Screen   Result Value Ref Range     ABO TYPE A       Rh TYPE POS       ANTIBODY SCREEN NEG     VERIFY ABO/Rh Group Test   Result Value Ref Range     ABO TYPE A       Rh TYPE POS     POCT GLUCOSE   Result Value Ref Range     POCT Glucose 101 (H) 74 - 99 mg/dL      Patient recently received an antibiotic (last 12 hours)         None             Patient c/o of weakness,  b12 levels and nocturnal pulse oximetry ordered. Plan discussed with interdisciplinary team, iron level 27, venofer 300mg IV ordered, weaning oxygen as tolerated, repeat chest xray   No acute events overnight, patient denies chest pain, worsening SOB at this time. continue current and repeat labs in the AM.      Discharge planning discussed with patient and care team. Therapy evaluations ordered. [facility]- 12  , anticipate HHC/SNF at discharge. Patient aware and agreeable to current plan, continue plan as above.      I spent a total of 50 minutes on the date of the service which included preparing to see the patient, face-to-face patient care, completing clinical documentation, obtaining and/or reviewing separately obtained history, performing a medically appropriate examination, counseling and educating the patient/family/caregiver, ordering medications, tests, or procedures, communicating with other HCPs (not separately reported), independently interpreting results (not separately reported), communicating results to the patient/family/caregiver, and care coordination (not separately reported).               Objective  Last Recorded Vitals  BP  118/57 (BP Location: Left arm, Patient Position: Lying)   Pulse 88   Temp 36.4 °C (97.5 °F) (Temporal)   Resp 18   Wt 83.9 kg (185 lb)   SpO2 98%   Intake/Output last 3 Shifts:     Intake/Output Summary (Last 24 hours) at 1/13/2025 1444  Last data filed at 1/13/2025 0553      Gross per 24 hour   Intake 812 ml   Output 350 ml   Net 462 ml         Admission Weight  Weight: 83.9 kg (185 lb) (01/10/25 2242)     Daily Weight  01/11/25 : 83.9 kg (185 lb)     Image Results  ECG 12 lead  Normal sinus rhythm  Low voltage QRS  Cannot rule out Inferior infarct , age undetermined  Possible Anterolateral infarct , age undetermined  Abnormal ECG  No previous ECGs available        Physical Exam     Relevant Results                       Assessment/Plan                       Assessment & Plan  Generalized weakness           Tio Lugo MD   Physician  Internal Medicine     H&P      Signed     Date of Service: 1/11/2025 11:21 AM      Associated Documents  Telemetry Monitoring - Scan on 1/11/2025 1:49 AM   Signed        Expand All Collapse All    History Of Present Illness  Melissa Tse is a 89 y.o. female with medical history of T2DM on insulin, breast cancer on current estrogen therapy who presents to the ED with a fall from standing position.  The patient lowered herself to the ground making cereal around noon and then was on the floor until her son came home around 530.  She said her legs felt weak yesterday but her son was able to get her something to eat in the morning and she felt slightly better.  She then developed weakness again while making cereal this morning.  She said her legs just do not feel that they can hold her up.  She denies pain, chest discomfort, shortness of breath, syncope, head strike or any pain post fall.  States her glucose averages in the high 80s, admits to being a picky eater.  Denies upper respiratory/systemic viral symptoms.  Denies suprapubic pain, urinary frequency/urgency, discomfort  "with urination.  Denies history of low back pain/trauma, saddle anesthesia, peripheral neuropathy, incontinence.       ED course: Vitals stable.  Vitals notable for hypoglycemia of 67, depressed LFTs, elevated troponin of 98, hemoglobin of 9.5.  Patient treated with D10 for hypoglycemia.     Past Medical History  Medical History   History reviewed. No pertinent past medical history.         Surgical History  Surgical History             Past Surgical History:   Procedure Laterality Date    OTHER SURGICAL HISTORY   02/25/2022     Hip replacement            Social History  She reports that she has never smoked. She has never been exposed to tobacco smoke. She has never used smokeless tobacco. She reports that she does not drink alcohol and does not use drugs.     Family History  Family History   No family history on file.         Allergies  Patient has no known allergies.     Review of Systems  10 point ROS negative except as specified in HPI     Physical Exam  HENT:      Head: Normocephalic and atraumatic.      Nose: Nose normal.   Eyes:      Conjunctiva/sclera: Conjunctivae normal.   Cardiovascular:      Rate and Rhythm: Normal rate and regular rhythm.      Pulses: Normal pulses.      Heart sounds: Normal heart sounds.   Pulmonary:      Effort: Pulmonary effort is normal.      Breath sounds: Normal breath sounds.   Abdominal:      General: Abdomen is flat. There is no distension.      Palpations: Abdomen is soft.   Musculoskeletal:         General: No swelling or deformity.   Skin:     General: Skin is warm and dry.   Neurological:      Mental Status: She is alert. Mental status is at baseline.   Psychiatric:         Behavior: Behavior normal.            Last Recorded Vitals  Blood pressure 124/60, pulse 96, temperature 36.2 °C (97.2 °F), temperature source Temporal, resp. rate 22, height 1.626 m (5' 4.02\"), weight 83.9 kg (185 lb), SpO2 94%.     Relevant Results               Results for orders placed or performed " during the hospital encounter of 01/10/25 (from the past 24 hours)   CBC and Auto Differential   Result Value Ref Range     WBC 10.2 4.4 - 11.3 x10*3/uL     nRBC 0.0 0.0 - 0.0 /100 WBCs     RBC 4.16 4.00 - 5.20 x10*6/uL     Hemoglobin 9.5 (L) 12.0 - 16.0 g/dL     Hematocrit 31.9 (L) 36.0 - 46.0 %     MCV 77 (L) 80 - 100 fL     MCH 22.8 (L) 26.0 - 34.0 pg     MCHC 29.8 (L) 32.0 - 36.0 g/dL     RDW 18.1 (H) 11.5 - 14.5 %     Platelets 280 150 - 450 x10*3/uL     Neutrophils % 86.4 40.0 - 80.0 %     Immature Granulocytes %, Automated 0.6 0.0 - 0.9 %     Lymphocytes % 8.2 13.0 - 44.0 %     Monocytes % 4.3 2.0 - 10.0 %     Eosinophils % 0.2 0.0 - 6.0 %     Basophils % 0.3 0.0 - 2.0 %     Neutrophils Absolute 8.83 (H) 1.60 - 5.50 x10*3/uL     Immature Granulocytes Absolute, Automated 0.06 0.00 - 0.50 x10*3/uL     Lymphocytes Absolute 0.84 0.80 - 3.00 x10*3/uL     Monocytes Absolute 0.44 0.05 - 0.80 x10*3/uL     Eosinophils Absolute 0.02 0.00 - 0.40 x10*3/uL     Basophils Absolute 0.03 0.00 - 0.10 x10*3/uL   Comprehensive metabolic panel   Result Value Ref Range     Glucose 67 (L) 74 - 99 mg/dL     Sodium 138 136 - 145 mmol/L     Potassium 3.7 3.5 - 5.3 mmol/L     Chloride 102 98 - 107 mmol/L     Bicarbonate 25 21 - 32 mmol/L     Anion Gap 15 10 - 20 mmol/L     Urea Nitrogen 23 6 - 23 mg/dL     Creatinine 0.67 0.50 - 1.05 mg/dL     eGFR 84 >60 mL/min/1.73m*2     Calcium 9.2 8.6 - 10.3 mg/dL     Albumin 3.5 3.4 - 5.0 g/dL     Alkaline Phosphatase 55 33 - 136 U/L     Total Protein 7.2 6.4 - 8.2 g/dL     AST 8 (L) 9 - 39 U/L     Bilirubin, Total 0.4 0.0 - 1.2 mg/dL     ALT 6 (L) 7 - 45 U/L   Creatine Kinase   Result Value Ref Range     Creatine Kinase 68 0 - 215 U/L   Troponin I, High Sensitivity   Result Value Ref Range     Troponin I, High Sensitivity 98 (HH) 0 - 13 ng/L   Lactate   Result Value Ref Range     Lactate 1.4 0.4 - 2.0 mmol/L   POCT GLUCOSE   Result Value Ref Range     POCT Glucose 53 (L) 74 - 99 mg/dL   Troponin  I, High Sensitivity   Result Value Ref Range     Troponin I, High Sensitivity 168 (HH) 0 - 13 ng/L   Urinalysis with Reflex Culture and Microscopic   Result Value Ref Range     Color, Urine Light-Yellow Light-Yellow, Yellow, Dark-Yellow     Appearance, Urine Clear Clear     Specific Gravity, Urine 1.014 1.005 - 1.035     pH, Urine 5.5 5.0, 5.5, 6.0, 6.5, 7.0, 7.5, 8.0     Protein, Urine NEGATIVE NEGATIVE, 10 (TRACE), 20 (TRACE) mg/dL     Glucose, Urine Normal Normal mg/dL     Blood, Urine NEGATIVE NEGATIVE     Ketones, Urine NEGATIVE NEGATIVE mg/dL     Bilirubin, Urine NEGATIVE NEGATIVE     Urobilinogen, Urine Normal Normal mg/dL     Nitrite, Urine 2+ (A) NEGATIVE     Leukocyte Esterase, Urine NEGATIVE NEGATIVE   Extra Urine Gray Tube   Result Value Ref Range     Extra Tube Hold for add-ons.     Microscopic Only, Urine   Result Value Ref Range     WBC, Urine 1-5 1-5, NONE /HPF     RBC, Urine NONE NONE, 1-2, 3-5 /HPF     Bacteria, Urine 1+ (A) NONE SEEN /HPF     Mucus, Urine FEW Reference range not established. /LPF   Sars-CoV-2 and Influenza A/B PCR   Result Value Ref Range     Flu A Result Not Detected Not Detected     Flu B Result Not Detected Not Detected     Coronavirus 2019, PCR Not Detected Not Detected   POCT GLUCOSE   Result Value Ref Range     POCT Glucose 84 74 - 99 mg/dL   Troponin I, High Sensitivity   Result Value Ref Range     Troponin I, High Sensitivity 214 (HH) 0 - 13 ng/L   Creatine Kinase   Result Value Ref Range     Creatine Kinase 65 0 - 215 U/L   POCT GLUCOSE   Result Value Ref Range     POCT Glucose 84 74 - 99 mg/dL   POCT GLUCOSE   Result Value Ref Range     POCT Glucose 112 (H) 74 - 99 mg/dL   Troponin I, High Sensitivity   Result Value Ref Range     Troponin I, High Sensitivity 222 (HH) 0 - 13 ng/L      No results found.           Assessment/Plan     Assessment & Plan  Generalized weakness     89-year-old female with history of T2DM on insulin presented to the ED with a fall from standing  position, lowering himself to the ground and remaining on the floor until family got home.  Patient found to be hypoglycemic and treated with D10.  Patient will be admitted to observation under Dr. Lugo for further evaluation and care.     #Generalized weakness  #Hypoglycemia  #Elevated troponin, 98 with repeat of 168  - Continue D10 started in ED  - Defer consults to attending  - Vitals every 4  hours, telemetry  - Encourage oral hydration  - CK normal, check in a.m.  - Follow UA  - Follow A1c  - Follow COVID/flu test  - Trend troponin, elevation likely due to type II MI, follow a.m CK level. Denies chest pain, shortness of breath.  - Corrective insulin sliding scale, hypoglycemia protocol, carb consistent diet  - PT/OT/social work     Chronic conditions  #T2DM  #Hypertension  - Continue home meds when reconciled  - Carb consistent diet, corrective insulin sliding scale     Patient fully evaluated    for    Problem List Items Addressed This Visit         * (Principal) Generalized weakness - Primary      Other Visit Diagnoses         Hypoglycemia                 Patient seen resting in bed with head of bed elevated, no s/s or c/o acute difficulties at this time.  Vital signs for last 24 hours Temp:  [35.5 °C (95.9 °F)-37 °C (98.6 °F)] 36.2 °C (97.2 °F)  Heart Rate:  [85-98] 96  Resp:  [16-22] 22  BP: (115-171)/(56-76) 124/60    No intake/output data recorded.  Patient still requiring frequent cardiac and SPO2 monitoring. Continue aggressive pulmonary hygiene and oral hygiene. Off loading as tolerated for skin integrity. Medications and labs reviewed-             Results for orders placed or performed during the hospital encounter of 01/10/25 (from the past 24 hours)   CBC and Auto Differential   Result Value Ref Range     WBC 10.2 4.4 - 11.3 x10*3/uL     nRBC 0.0 0.0 - 0.0 /100 WBCs     RBC 4.16 4.00 - 5.20 x10*6/uL     Hemoglobin 9.5 (L) 12.0 - 16.0 g/dL     Hematocrit 31.9 (L) 36.0 - 46.0 %     MCV 77 (L) 80 -  100 fL     MCH 22.8 (L) 26.0 - 34.0 pg     MCHC 29.8 (L) 32.0 - 36.0 g/dL     RDW 18.1 (H) 11.5 - 14.5 %     Platelets 280 150 - 450 x10*3/uL     Neutrophils % 86.4 40.0 - 80.0 %     Immature Granulocytes %, Automated 0.6 0.0 - 0.9 %     Lymphocytes % 8.2 13.0 - 44.0 %     Monocytes % 4.3 2.0 - 10.0 %     Eosinophils % 0.2 0.0 - 6.0 %     Basophils % 0.3 0.0 - 2.0 %     Neutrophils Absolute 8.83 (H) 1.60 - 5.50 x10*3/uL     Immature Granulocytes Absolute, Automated 0.06 0.00 - 0.50 x10*3/uL     Lymphocytes Absolute 0.84 0.80 - 3.00 x10*3/uL     Monocytes Absolute 0.44 0.05 - 0.80 x10*3/uL     Eosinophils Absolute 0.02 0.00 - 0.40 x10*3/uL     Basophils Absolute 0.03 0.00 - 0.10 x10*3/uL   Comprehensive metabolic panel   Result Value Ref Range     Glucose 67 (L) 74 - 99 mg/dL     Sodium 138 136 - 145 mmol/L     Potassium 3.7 3.5 - 5.3 mmol/L     Chloride 102 98 - 107 mmol/L     Bicarbonate 25 21 - 32 mmol/L     Anion Gap 15 10 - 20 mmol/L     Urea Nitrogen 23 6 - 23 mg/dL     Creatinine 0.67 0.50 - 1.05 mg/dL     eGFR 84 >60 mL/min/1.73m*2     Calcium 9.2 8.6 - 10.3 mg/dL     Albumin 3.5 3.4 - 5.0 g/dL     Alkaline Phosphatase 55 33 - 136 U/L     Total Protein 7.2 6.4 - 8.2 g/dL     AST 8 (L) 9 - 39 U/L     Bilirubin, Total 0.4 0.0 - 1.2 mg/dL     ALT 6 (L) 7 - 45 U/L   Creatine Kinase   Result Value Ref Range     Creatine Kinase 68 0 - 215 U/L   Troponin I, High Sensitivity   Result Value Ref Range     Troponin I, High Sensitivity 98 (HH) 0 - 13 ng/L   Lactate   Result Value Ref Range     Lactate 1.4 0.4 - 2.0 mmol/L   POCT GLUCOSE   Result Value Ref Range     POCT Glucose 53 (L) 74 - 99 mg/dL   Troponin I, High Sensitivity   Result Value Ref Range     Troponin I, High Sensitivity 168 (HH) 0 - 13 ng/L   Urinalysis with Reflex Culture and Microscopic   Result Value Ref Range     Color, Urine Light-Yellow Light-Yellow, Yellow, Dark-Yellow     Appearance, Urine Clear Clear     Specific Gravity, Urine 1.014 1.005 -  1.035     pH, Urine 5.5 5.0, 5.5, 6.0, 6.5, 7.0, 7.5, 8.0     Protein, Urine NEGATIVE NEGATIVE, 10 (TRACE), 20 (TRACE) mg/dL     Glucose, Urine Normal Normal mg/dL     Blood, Urine NEGATIVE NEGATIVE     Ketones, Urine NEGATIVE NEGATIVE mg/dL     Bilirubin, Urine NEGATIVE NEGATIVE     Urobilinogen, Urine Normal Normal mg/dL     Nitrite, Urine 2+ (A) NEGATIVE     Leukocyte Esterase, Urine NEGATIVE NEGATIVE   Extra Urine Gray Tube   Result Value Ref Range     Extra Tube Hold for add-ons.     Microscopic Only, Urine   Result Value Ref Range     WBC, Urine 1-5 1-5, NONE /HPF     RBC, Urine NONE NONE, 1-2, 3-5 /HPF     Bacteria, Urine 1+ (A) NONE SEEN /HPF     Mucus, Urine FEW Reference range not established. /LPF   Sars-CoV-2 and Influenza A/B PCR   Result Value Ref Range     Flu A Result Not Detected Not Detected     Flu B Result Not Detected Not Detected     Coronavirus 2019, PCR Not Detected Not Detected   POCT GLUCOSE   Result Value Ref Range     POCT Glucose 84 74 - 99 mg/dL   Troponin I, High Sensitivity   Result Value Ref Range     Troponin I, High Sensitivity 214 (HH) 0 - 13 ng/L   Creatine Kinase   Result Value Ref Range     Creatine Kinase 65 0 - 215 U/L   POCT GLUCOSE   Result Value Ref Range     POCT Glucose 84 74 - 99 mg/dL   POCT GLUCOSE   Result Value Ref Range     POCT Glucose 112 (H) 74 - 99 mg/dL   Troponin I, High Sensitivity   Result Value Ref Range     Troponin I, High Sensitivity 222 (HH) 0 - 13 ng/L      Patient recently received an antibiotic (last 12 hours)         None             Plan discussed with interdisciplinary team, will continue Rocephin IV for urine positive for 2+ nitrites. WBC 10.2, will repeat labs. Hemoglobin 9.5, iron levels ordered. Strict intake and output. No acute events overnight, continue current and repeat labs in the AM. Therapy evaluations ordered. Home medication reconciliation needed.      Discharge planning discussed with patient and care team.  Anticipate [facility]/SNF at  discharge. Patient aware and agreeable to current plan, continue plan as above.      I spent a total of 50 minutes on the date of the service which included preparing to see the patient, face-to-face patient care, completing clinical documentation, obtaining and/or reviewing separately obtained history, performing a medically appropriate examination, counseling and educating the patient/family/caregiver, ordering medications, tests, or procedures, communicating with other HCPs (not separately reported), independently interpreting results (not separately reported), communicating results to the patient/family/caregiver, and care coordination (not separately reported).   I spent 60 minutes in the professional and overall care of this patient.                           Revision History           Patient fully evaluated 1/13  for    Problem List Items Addressed This Visit                  Symptoms and Signs     * (Principal) Generalized weakness - Primary      Other Visit Diagnoses         Hypoglycemia         Anemia, unspecified type         Relevant Orders     Esophagogastroduodenoscopy (EGD)             Patient seen resting in bed with head of bed elevated, no s/s or c/o acute difficulties at this time.  Vital signs for last 24 hours Temp:  [36.4 °C (97.5 °F)-37.8 °C (100 °F)] 36.4 °C (97.5 °F)  Heart Rate:  [82-98] 88  Resp:  [18-20] 18  BP: (108-133)/(50-59) 118/57    No intake/output data recorded.  Patient still requiring frequent cardiac and SPO2 monitoring. Continue aggressive pulmonary hygiene and oral hygiene. Off loading as tolerated for skin integrity. Medications and labs reviewed-         Results for orders placed or performed during the hospital encounter of 01/10/25 (from the past 24 hours)   POCT GLUCOSE   Result Value Ref Range     POCT Glucose 197 (H) 74 - 99 mg/dL   POCT GLUCOSE   Result Value Ref Range     POCT Glucose 202 (H) 74 - 99 mg/dL   CBC and Auto Differential   Result Value Ref Range      WBC 8.9 4.4 - 11.3 x10*3/uL     nRBC 0.0 0.0 - 0.0 /100 WBCs     RBC 3.55 (L) 4.00 - 5.20 x10*6/uL     Hemoglobin 8.0 (L) 12.0 - 16.0 g/dL     Hematocrit 27.7 (L) 36.0 - 46.0 %     MCV 78 (L) 80 - 100 fL     MCH 22.5 (L) 26.0 - 34.0 pg     MCHC 28.9 (L) 32.0 - 36.0 g/dL     RDW 18.2 (H) 11.5 - 14.5 %     Platelets 257 150 - 450 x10*3/uL     Neutrophils % 76.5 40.0 - 80.0 %     Immature Granulocytes %, Automated 0.8 0.0 - 0.9 %     Lymphocytes % 12.9 13.0 - 44.0 %     Monocytes % 8.8 2.0 - 10.0 %     Eosinophils % 0.7 0.0 - 6.0 %     Basophils % 0.3 0.0 - 2.0 %     Neutrophils Absolute 6.78 (H) 1.60 - 5.50 x10*3/uL     Immature Granulocytes Absolute, Automated 0.07 0.00 - 0.50 x10*3/uL     Lymphocytes Absolute 1.14 0.80 - 3.00 x10*3/uL     Monocytes Absolute 0.78 0.05 - 0.80 x10*3/uL     Eosinophils Absolute 0.06 0.00 - 0.40 x10*3/uL     Basophils Absolute 0.03 0.00 - 0.10 x10*3/uL   Comprehensive Metabolic Panel   Result Value Ref Range     Glucose 65 (L) 74 - 99 mg/dL     Sodium 135 (L) 136 - 145 mmol/L     Potassium 3.8 3.5 - 5.3 mmol/L     Chloride 99 98 - 107 mmol/L     Bicarbonate 30 21 - 32 mmol/L     Anion Gap 10 10 - 20 mmol/L     Urea Nitrogen 18 6 - 23 mg/dL     Creatinine 0.93 0.50 - 1.05 mg/dL     eGFR 59 (L) >60 mL/min/1.73m*2     Calcium 8.7 8.6 - 10.3 mg/dL     Albumin 3.0 (L) 3.4 - 5.0 g/dL     Alkaline Phosphatase 50 33 - 136 U/L     Total Protein 6.3 (L) 6.4 - 8.2 g/dL     AST 8 (L) 9 - 39 U/L     Bilirubin, Total 0.4 0.0 - 1.2 mg/dL     ALT 5 (L) 7 - 45 U/L   POCT GLUCOSE   Result Value Ref Range     POCT Glucose 68 (L) 74 - 99 mg/dL   Iron and TIBC   Result Value Ref Range     Iron 28 (L) 35 - 150 ug/dL     UIBC 212 110 - 370 ug/dL     TIBC 240 240 - 445 ug/dL     % Saturation 12 (L) 25 - 45 %   Type and Screen   Result Value Ref Range     ABO TYPE A       Rh TYPE POS       ANTIBODY SCREEN NEG     VERIFY ABO/Rh Group Test   Result Value Ref Range     ABO TYPE A       Rh TYPE POS     POCT GLUCOSE    Result Value Ref Range     POCT Glucose 101 (H) 74 - 99 mg/dL      Patient recently received an antibiotic (last 12 hours)         None             Plan discussed with interdisciplinary team, per PT, patient experiencing decreased strength, endurance, mobility, sensation, impaired balance, and pain with good rehab prognosis. Per GI, plan is to scope patient tomorrow. Occult blood of the stool pending. Will give patient another dose of IV Venofer 300 mg for HARINI. Discontinued IV Rocephin and started patient on oral Ceftin for UTI. Will continue current and repeat labs in the AM, especially monitoring hemoglobin.     Discharge planning discussed with patient and care team. Therapy evaluations ordered. [facility]-12, anticipate HHC/SNF at discharge. Patient aware and agreeable to current plan, continue plan as above.      I spent a total of 50 minutes on the date of the service which included preparing to see the patient, face-to-face patient care, completing clinical documentation, obtaining and/or reviewing separately obtained history, performing a medically appropriate examination, counseling and educating the patient/family/caregiver, ordering medications, tests, or procedures, communicating with other HCPs (not separately reported), independently interpreting results (not separately reported), communicating results to the patient/family/caregiver, and care coordination (not separately reported).         ZACHARIAH ANDINO                      Revision History     Patient fully evaluated    for    Problem List Items Addressed This Visit       * (Principal) Generalized weakness - Primary     Other Visit Diagnoses       Hypoglycemia        Anemia, unspecified type        Relevant Orders    Esophagogastroduodenoscopy (EGD)          Patient seen resting in bed with head of bed elevated, no s/s or c/o acute difficulties at this time.  Vital signs for last 24 hours Temp:  [36.3 °C (97.3 °F)-36.4 °C (97.5 °F)] 36.4 °C (97.5  °F)  Heart Rate:  [82-88] 87  Resp:  [16-18] 16  BP: ()/(51-57) 99/51    No intake/output data recorded.  Patient still requiring frequent cardiac and SPO2 monitoring. Continue aggressive pulmonary hygiene and oral hygiene. Off loading as tolerated for skin integrity. Medications and labs reviewed-   Results for orders placed or performed during the hospital encounter of 01/10/25 (from the past 24 hours)   CBC and Auto Differential   Result Value Ref Range    WBC 8.9 4.4 - 11.3 x10*3/uL    nRBC 0.0 0.0 - 0.0 /100 WBCs    RBC 3.55 (L) 4.00 - 5.20 x10*6/uL    Hemoglobin 8.0 (L) 12.0 - 16.0 g/dL    Hematocrit 27.7 (L) 36.0 - 46.0 %    MCV 78 (L) 80 - 100 fL    MCH 22.5 (L) 26.0 - 34.0 pg    MCHC 28.9 (L) 32.0 - 36.0 g/dL    RDW 18.2 (H) 11.5 - 14.5 %    Platelets 257 150 - 450 x10*3/uL    Neutrophils % 76.5 40.0 - 80.0 %    Immature Granulocytes %, Automated 0.8 0.0 - 0.9 %    Lymphocytes % 12.9 13.0 - 44.0 %    Monocytes % 8.8 2.0 - 10.0 %    Eosinophils % 0.7 0.0 - 6.0 %    Basophils % 0.3 0.0 - 2.0 %    Neutrophils Absolute 6.78 (H) 1.60 - 5.50 x10*3/uL    Immature Granulocytes Absolute, Automated 0.07 0.00 - 0.50 x10*3/uL    Lymphocytes Absolute 1.14 0.80 - 3.00 x10*3/uL    Monocytes Absolute 0.78 0.05 - 0.80 x10*3/uL    Eosinophils Absolute 0.06 0.00 - 0.40 x10*3/uL    Basophils Absolute 0.03 0.00 - 0.10 x10*3/uL   Comprehensive Metabolic Panel   Result Value Ref Range    Glucose 65 (L) 74 - 99 mg/dL    Sodium 135 (L) 136 - 145 mmol/L    Potassium 3.8 3.5 - 5.3 mmol/L    Chloride 99 98 - 107 mmol/L    Bicarbonate 30 21 - 32 mmol/L    Anion Gap 10 10 - 20 mmol/L    Urea Nitrogen 18 6 - 23 mg/dL    Creatinine 0.93 0.50 - 1.05 mg/dL    eGFR 59 (L) >60 mL/min/1.73m*2    Calcium 8.7 8.6 - 10.3 mg/dL    Albumin 3.0 (L) 3.4 - 5.0 g/dL    Alkaline Phosphatase 50 33 - 136 U/L    Total Protein 6.3 (L) 6.4 - 8.2 g/dL    AST 8 (L) 9 - 39 U/L    Bilirubin, Total 0.4 0.0 - 1.2 mg/dL    ALT 5 (L) 7 - 45 U/L   POCT GLUCOSE    Result Value Ref Range    POCT Glucose 68 (L) 74 - 99 mg/dL   Iron and TIBC   Result Value Ref Range    Iron 28 (L) 35 - 150 ug/dL    UIBC 212 110 - 370 ug/dL    TIBC 240 240 - 445 ug/dL    % Saturation 12 (L) 25 - 45 %   Type and Screen   Result Value Ref Range    ABO TYPE A     Rh TYPE POS     ANTIBODY SCREEN NEG    VERIFY ABO/Rh Group Test   Result Value Ref Range    ABO TYPE A     Rh TYPE POS    POCT GLUCOSE   Result Value Ref Range    POCT Glucose 101 (H) 74 - 99 mg/dL   POCT GLUCOSE   Result Value Ref Range    POCT Glucose 115 (H) 74 - 99 mg/dL   POCT GLUCOSE   Result Value Ref Range    POCT Glucose 152 (H) 74 - 99 mg/dL      Patient recently received an antibiotic (last 12 hours)       Date/Time Action Medication Dose    01/13/25 2029 Given    cefuroxime (Ceftin) tablet 250 mg 250 mg           Plan discussed with interdisciplinary team, per [facility]. Will watch for facility acceptance and start precert. continue current and repeat labs in the AM.     Discharge planning discussed with patient and care team. Therapy evaluations ordered. [facility] -10  , anticipate HHC/SNF at discharge.  Per [facility]. Will watch for facility acceptance and start precert. Patient aware and agreeable to current plan, continue plan as above.     I spent a total of 50 minutes on the date of the service which included preparing to see the patient, face-to-face patient care, completing clinical documentation, obtaining and/or reviewing separately obtained history, performing a medically appropriate examination, counseling and educating the patient/family/caregiver, ordering medications, tests, or procedures, communicating with other HCPs (not separately reported), independently interpreting results (not separately reported), communicating results to the patient/family/caregiver, and care coordination (not separately reported).      No acute  events overnight, patient denie. Seen by jannet Ramey chest pain, worsening SOB at this time.       Jo Dominique

## 2025-01-14 NOTE — CONSULTS
Gastroenterology  Chart Update    Consult note completed by Dr Alas and Dr Mckenzie, 1/13/14, please see their note for details    Yecenia Marino PA-C    Inpatient consult to Gastroenterology  Consult performed by: Yecenia Marino PA-C  Consult ordered by: Tio Lugo MD

## 2025-01-14 NOTE — CARE PLAN
The patient's goals for the shift include Pt. Will remain safe.    The clinical goals for the shift include Pt. Will remain safe and free from injury during shift.

## 2025-01-15 LAB
ALBUMIN SERPL BCP-MCNC: 2.8 G/DL (ref 3.4–5)
ALP SERPL-CCNC: 52 U/L (ref 33–136)
ALT SERPL W P-5'-P-CCNC: 5 U/L (ref 7–45)
ANION GAP SERPL CALC-SCNC: 13 MMOL/L (ref 10–20)
AST SERPL W P-5'-P-CCNC: 8 U/L (ref 9–39)
BASOPHILS # BLD AUTO: 0.03 X10*3/UL (ref 0–0.1)
BASOPHILS NFR BLD AUTO: 0.4 %
BILIRUB SERPL-MCNC: 0.4 MG/DL (ref 0–1.2)
BUN SERPL-MCNC: 16 MG/DL (ref 6–23)
CALCIUM SERPL-MCNC: 8.3 MG/DL (ref 8.6–10.3)
CHLORIDE SERPL-SCNC: 101 MMOL/L (ref 98–107)
CO2 SERPL-SCNC: 25 MMOL/L (ref 21–32)
CREAT SERPL-MCNC: 0.67 MG/DL (ref 0.5–1.05)
EGFRCR SERPLBLD CKD-EPI 2021: 84 ML/MIN/1.73M*2
EOSINOPHIL # BLD AUTO: 0.14 X10*3/UL (ref 0–0.4)
EOSINOPHIL NFR BLD AUTO: 1.8 %
ERYTHROCYTE [DISTWIDTH] IN BLOOD BY AUTOMATED COUNT: 18.7 % (ref 11.5–14.5)
GLUCOSE BLD MANUAL STRIP-MCNC: 100 MG/DL (ref 74–99)
GLUCOSE BLD MANUAL STRIP-MCNC: 163 MG/DL (ref 74–99)
GLUCOSE BLD MANUAL STRIP-MCNC: 170 MG/DL (ref 74–99)
GLUCOSE BLD MANUAL STRIP-MCNC: 190 MG/DL (ref 74–99)
GLUCOSE BLD MANUAL STRIP-MCNC: 223 MG/DL (ref 74–99)
GLUCOSE BLD MANUAL STRIP-MCNC: 57 MG/DL (ref 74–99)
GLUCOSE SERPL-MCNC: 119 MG/DL (ref 74–99)
HCT VFR BLD AUTO: 27.5 % (ref 36–46)
HGB BLD-MCNC: 7.8 G/DL (ref 12–16)
IMM GRANULOCYTES # BLD AUTO: 0.05 X10*3/UL (ref 0–0.5)
IMM GRANULOCYTES NFR BLD AUTO: 0.6 % (ref 0–0.9)
LYMPHOCYTES # BLD AUTO: 0.94 X10*3/UL (ref 0.8–3)
LYMPHOCYTES NFR BLD AUTO: 12 %
MCH RBC QN AUTO: 22.4 PG (ref 26–34)
MCHC RBC AUTO-ENTMCNC: 28.4 G/DL (ref 32–36)
MCV RBC AUTO: 79 FL (ref 80–100)
MONOCYTES # BLD AUTO: 0.63 X10*3/UL (ref 0.05–0.8)
MONOCYTES NFR BLD AUTO: 8 %
NEUTROPHILS # BLD AUTO: 6.06 X10*3/UL (ref 1.6–5.5)
NEUTROPHILS NFR BLD AUTO: 77.2 %
NRBC BLD-RTO: 0 /100 WBCS (ref 0–0)
PLATELET # BLD AUTO: 273 X10*3/UL (ref 150–450)
POTASSIUM SERPL-SCNC: 4.2 MMOL/L (ref 3.5–5.3)
PROT SERPL-MCNC: 6 G/DL (ref 6.4–8.2)
RBC # BLD AUTO: 3.48 X10*6/UL (ref 4–5.2)
SODIUM SERPL-SCNC: 135 MMOL/L (ref 136–145)
WBC # BLD AUTO: 7.9 X10*3/UL (ref 4.4–11.3)

## 2025-01-15 PROCEDURE — 2500000004 HC RX 250 GENERAL PHARMACY W/ HCPCS (ALT 636 FOR OP/ED)

## 2025-01-15 PROCEDURE — 85025 COMPLETE CBC W/AUTO DIFF WBC: CPT | Performed by: INTERNAL MEDICINE

## 2025-01-15 PROCEDURE — 36415 COLL VENOUS BLD VENIPUNCTURE: CPT | Performed by: INTERNAL MEDICINE

## 2025-01-15 PROCEDURE — 1200000002 HC GENERAL ROOM WITH TELEMETRY DAILY

## 2025-01-15 PROCEDURE — 2500000005 HC RX 250 GENERAL PHARMACY W/O HCPCS: Performed by: PHYSICIAN ASSISTANT

## 2025-01-15 PROCEDURE — 2500000001 HC RX 250 WO HCPCS SELF ADMINISTERED DRUGS (ALT 637 FOR MEDICARE OP): Performed by: PHYSICIAN ASSISTANT

## 2025-01-15 PROCEDURE — 2500000001 HC RX 250 WO HCPCS SELF ADMINISTERED DRUGS (ALT 637 FOR MEDICARE OP): Performed by: NURSE PRACTITIONER

## 2025-01-15 PROCEDURE — 2500000001 HC RX 250 WO HCPCS SELF ADMINISTERED DRUGS (ALT 637 FOR MEDICARE OP): Performed by: INTERNAL MEDICINE

## 2025-01-15 PROCEDURE — 80053 COMPREHEN METABOLIC PANEL: CPT | Performed by: INTERNAL MEDICINE

## 2025-01-15 PROCEDURE — 82947 ASSAY GLUCOSE BLOOD QUANT: CPT

## 2025-01-15 PROCEDURE — 2500000002 HC RX 250 W HCPCS SELF ADMINISTERED DRUGS (ALT 637 FOR MEDICARE OP, ALT 636 FOR OP/ED): Performed by: PHYSICIAN ASSISTANT

## 2025-01-15 RX ORDER — POLYETHYLENE GLYCOL 3350, SODIUM CHLORIDE, SODIUM BICARBONATE, POTASSIUM CHLORIDE 420; 11.2; 5.72; 1.48 G/4L; G/4L; G/4L; G/4L
4000 POWDER, FOR SOLUTION ORAL ONCE
Status: COMPLETED | OUTPATIENT
Start: 2025-01-15 | End: 2025-01-15

## 2025-01-15 RX ADMIN — DEXTROSE MONOHYDRATE 12.5 G: 25 INJECTION, SOLUTION INTRAVENOUS at 06:39

## 2025-01-15 RX ADMIN — LISINOPRIL 10 MG: 10 TABLET ORAL at 09:04

## 2025-01-15 RX ADMIN — INSULIN LISPRO 1 UNITS: 100 INJECTION, SOLUTION INTRAVENOUS; SUBCUTANEOUS at 13:14

## 2025-01-15 RX ADMIN — ESTRADIOL 1 MG: 1 TABLET ORAL at 09:04

## 2025-01-15 RX ADMIN — ENOXAPARIN SODIUM 40 MG: 40 INJECTION SUBCUTANEOUS at 21:48

## 2025-01-15 RX ADMIN — CEFUROXIME AXETIL 250 MG: 250 TABLET, FILM COATED ORAL at 09:04

## 2025-01-15 RX ADMIN — CEFUROXIME AXETIL 250 MG: 250 TABLET, FILM COATED ORAL at 21:49

## 2025-01-15 RX ADMIN — AMLODIPINE BESYLATE 2.5 MG: 5 TABLET ORAL at 09:04

## 2025-01-15 RX ADMIN — INSULIN LISPRO 1 UNITS: 100 INJECTION, SOLUTION INTRAVENOUS; SUBCUTANEOUS at 18:53

## 2025-01-15 RX ADMIN — POLYETHYLENE GLYCOL-3350, SODIUM CHLORIDE, POTASSIUM CHLORIDE AND SODIUM BICARBONATE 4000 ML: 420; 11.2; 5.72; 1.48 POWDER, FOR SOLUTION ORAL at 18:53

## 2025-01-15 ASSESSMENT — COGNITIVE AND FUNCTIONAL STATUS - GENERAL
TURNING FROM BACK TO SIDE WHILE IN FLAT BAD: A LOT
MOVING TO AND FROM BED TO CHAIR: A LOT
EATING MEALS: A LITTLE
DRESSING REGULAR LOWER BODY CLOTHING: A LITTLE
HELP NEEDED FOR BATHING: A LOT
MOBILITY SCORE: 12
CLIMB 3 TO 5 STEPS WITH RAILING: TOTAL
DRESSING REGULAR LOWER BODY CLOTHING: A LITTLE
DRESSING REGULAR UPPER BODY CLOTHING: A LITTLE
WALKING IN HOSPITAL ROOM: A LOT
STANDING UP FROM CHAIR USING ARMS: A LOT
MOBILITY SCORE: 12
MOVING FROM LYING ON BACK TO SITTING ON SIDE OF FLAT BED WITH BEDRAILS: A LOT
MOBILITY SCORE: 12
DRESSING REGULAR UPPER BODY CLOTHING: A LITTLE
TOILETING: A LITTLE
TOILETING: A LITTLE
CLIMB 3 TO 5 STEPS WITH RAILING: A LOT
TURNING FROM BACK TO SIDE WHILE IN FLAT BAD: A LOT
CLIMB 3 TO 5 STEPS WITH RAILING: A LOT
MOVING FROM LYING ON BACK TO SITTING ON SIDE OF FLAT BED WITH BEDRAILS: A LOT
TOILETING: A LITTLE
DRESSING REGULAR LOWER BODY CLOTHING: A LITTLE
HELP NEEDED FOR BATHING: A LITTLE
WALKING IN HOSPITAL ROOM: A LOT
MOVING FROM LYING ON BACK TO SITTING ON SIDE OF FLAT BED WITH BEDRAILS: A LITTLE
MOVING TO AND FROM BED TO CHAIR: A LOT
PERSONAL GROOMING: A LITTLE
DAILY ACTIVITIY SCORE: 16
DAILY ACTIVITIY SCORE: 19
WALKING IN HOSPITAL ROOM: A LOT
HELP NEEDED FOR BATHING: A LITTLE
STANDING UP FROM CHAIR USING ARMS: A LOT
STANDING UP FROM CHAIR USING ARMS: A LOT
DAILY ACTIVITIY SCORE: 19
PERSONAL GROOMING: A LOT
DRESSING REGULAR UPPER BODY CLOTHING: A LITTLE
TURNING FROM BACK TO SIDE WHILE IN FLAT BAD: A LOT
PERSONAL GROOMING: A LITTLE
MOVING TO AND FROM BED TO CHAIR: A LOT

## 2025-01-15 ASSESSMENT — PAIN SCALES - GENERAL
PAINLEVEL_OUTOF10: 0 - NO PAIN

## 2025-01-15 NOTE — CARE PLAN
Problem: Fall/Injury  Goal: Not fall by end of shift  Outcome: Progressing  Goal: Be free from injury by end of the shift  Outcome: Progressing  Goal: Verbalize understanding of personal risk factors for fall in the hospital  Outcome: Progressing  Goal: Verbalize understanding of risk factor reduction measures to prevent injury from fall in the home  Outcome: Progressing  Goal: Use assistive devices by end of the shift  Outcome: Progressing  Goal: Pace activities to prevent fatigue by end of the shift  Outcome: Progressing     Problem: Skin  Goal: Participates in plan/prevention/treatment measures  Outcome: Progressing     Problem: Skin  Goal: Prevent/manage excess moisture  Outcome: Progressing     Problem: Pain - Adult  Goal: Verbalizes/displays adequate comfort level or baseline comfort level  Outcome: Progressing     Problem: Safety - Adult  Goal: Free from fall injury  Outcome: Progressing     Problem: Discharge Planning  Goal: Discharge to home or other facility with appropriate resources  Outcome: Progressing

## 2025-01-15 NOTE — CARE PLAN
The patient's goals for the shift include  sleep comfort and safety    The clinical goals for the shift include patient will remain safe and free from injury for entire shift.

## 2025-01-15 NOTE — PROGRESS NOTES
Melissa Tse is a 89 y.o. female on day 3 of admission presenting with Generalized weakness.      Subjective   Patient fully evaluated  01/14  for    Problem List Items Addressed This Visit          Symptoms and Signs    * (Principal) Generalized weakness - Primary    Relevant Orders    Surgical Pathology Exam     Other Visit Diagnoses       Hypoglycemia        Relevant Orders    Surgical Pathology Exam    Anemia, unspecified type        Relevant Orders    Esophagogastroduodenoscopy (EGD) (Completed)    Surgical Pathology Exam          Patient seen resting in bed with head of bed elevated, no s/s or c/o acute difficulties at this time.  Vital signs for last 24 hours Temp:  [36.1 °C (97 °F)-37 °C (98.6 °F)] 36.1 °C (97 °F)  Heart Rate:  [76-91] 84  Resp:  [16-18] 18  BP: (108-136)/(55-63) 120/60    No intake/output data recorded.  Patient still requiring frequent cardiac and SPO2 monitoring. Continue aggressive pulmonary hygiene and oral hygiene. Off loading as tolerated for skin integrity. Medications and labs reviewed-   Results for orders placed or performed during the hospital encounter of 01/10/25 (from the past 24 hours)   POCT GLUCOSE   Result Value Ref Range    POCT Glucose 147 (H) 74 - 99 mg/dL   POCT GLUCOSE   Result Value Ref Range    POCT Glucose 199 (H) 74 - 99 mg/dL   POCT GLUCOSE   Result Value Ref Range    POCT Glucose 100 (H) 74 - 99 mg/dL   POCT GLUCOSE   Result Value Ref Range    POCT Glucose 57 (L) 74 - 99 mg/dL   POCT GLUCOSE   Result Value Ref Range    POCT Glucose 223 (H) 74 - 99 mg/dL   CBC and Auto Differential   Result Value Ref Range    WBC 7.9 4.4 - 11.3 x10*3/uL    nRBC 0.0 0.0 - 0.0 /100 WBCs    RBC 3.48 (L) 4.00 - 5.20 x10*6/uL    Hemoglobin 7.8 (L) 12.0 - 16.0 g/dL    Hematocrit 27.5 (L) 36.0 - 46.0 %    MCV 79 (L) 80 - 100 fL    MCH 22.4 (L) 26.0 - 34.0 pg    MCHC 28.4 (L) 32.0 - 36.0 g/dL    RDW 18.7 (H) 11.5 - 14.5 %    Platelets 273 150 - 450 x10*3/uL    Neutrophils % 77.2 40.0 -  80.0 %    Immature Granulocytes %, Automated 0.6 0.0 - 0.9 %    Lymphocytes % 12.0 13.0 - 44.0 %    Monocytes % 8.0 2.0 - 10.0 %    Eosinophils % 1.8 0.0 - 6.0 %    Basophils % 0.4 0.0 - 2.0 %    Neutrophils Absolute 6.06 (H) 1.60 - 5.50 x10*3/uL    Immature Granulocytes Absolute, Automated 0.05 0.00 - 0.50 x10*3/uL    Lymphocytes Absolute 0.94 0.80 - 3.00 x10*3/uL    Monocytes Absolute 0.63 0.05 - 0.80 x10*3/uL    Eosinophils Absolute 0.14 0.00 - 0.40 x10*3/uL    Basophils Absolute 0.03 0.00 - 0.10 x10*3/uL   Comprehensive Metabolic Panel   Result Value Ref Range    Glucose 119 (H) 74 - 99 mg/dL    Sodium 135 (L) 136 - 145 mmol/L    Potassium 4.2 3.5 - 5.3 mmol/L    Chloride 101 98 - 107 mmol/L    Bicarbonate 25 21 - 32 mmol/L    Anion Gap 13 10 - 20 mmol/L    Urea Nitrogen 16 6 - 23 mg/dL    Creatinine 0.67 0.50 - 1.05 mg/dL    eGFR 84 >60 mL/min/1.73m*2    Calcium 8.3 (L) 8.6 - 10.3 mg/dL    Albumin 2.8 (L) 3.4 - 5.0 g/dL    Alkaline Phosphatase 52 33 - 136 U/L    Total Protein 6.0 (L) 6.4 - 8.2 g/dL    AST 8 (L) 9 - 39 U/L    Bilirubin, Total 0.4 0.0 - 1.2 mg/dL    ALT 5 (L) 7 - 45 U/L   POCT GLUCOSE   Result Value Ref Range    POCT Glucose 163 (H) 74 - 99 mg/dL      Patient recently received an antibiotic (last 12 hours)       Date/Time Action Medication Dose    01/13/25 2029 Given    cefuroxime (Ceftin) tablet 250 mg 250 mg           Plan discussed with interdisciplinary team, continue current and repeat labs in the AM.     Discharge planning discussed with patient and care team. Therapy evaluations ordered. [facility]-  , anticipate HHC/SNF at discharge. Patient aware and agreeable to current plan, continue plan as above.     I spent a total of 50 minutes on the date of the service which included preparing to see the patient, face-to-face patient care, completing clinical documentation, obtaining and/or reviewing separately obtained history, performing a medically appropriate examination, counseling and educating  the patient/family/caregiver, ordering medications, tests, or procedures, communicating with other HCPs (not separately reported), independently interpreting results (not separately reported), communicating results to the patient/family/caregiver, and care coordination (not separately reported).        Objective     Last Recorded Vitals  /60 (BP Location: Left arm, Patient Position: Sitting)   Pulse 84   Temp 36.1 °C (97 °F) (Temporal)   Resp 18   Wt 83.9 kg (185 lb)   SpO2 98%   Intake/Output last 3 Shifts:    Intake/Output Summary (Last 24 hours) at 1/15/2025 1555  Last data filed at 1/15/2025 0514  Gross per 24 hour   Intake 590 ml   Output 600 ml   Net -10 ml       Admission Weight  Weight: 83.9 kg (185 lb) (01/10/25 2242)    Daily Weight  01/14/25 : 83.9 kg (185 lb)    Image Results  Esophagogastroduodenoscopy (EGD)  Table formatting from the original result was not included.  Impression  The cricopharynx, upper third of the esophagus, middle third of the   esophagus, lower third of the esophagus, GE junction and Z-line appeared   normal. Electronic chromoendoscopy was used.  Mild erythematous mucosa in the body of the stomach; electronic   chromoendoscopy (NBI) was used; performed cold forceps biopsy to rule out   H. pylori  The cardia, fundus of the stomach, incisura, antrum, prepyloric region and   pylorus appeared normal.  The duodenal bulb, 1st part of the duodenum and 2nd part of the duodenum   appeared normal.    Findings  The cricopharynx, upper third of the esophagus, middle third of the   esophagus, lower third of the esophagus, GE junction and Z-line appeared   normal. Z-line is 40 cm from the incisors. Electronic chromoendoscopy was   used.  Mild, localized erythematous mucosa that did not appear granular in the   body of the stomach; electronic chromoendoscopy (NBI) was used; performed   cold forceps biopsy to rule out H. pylori  The cardia, fundus of the stomach, incisura, antrum,  prepyloric region and   pylorus appeared normal.  The duodenal bulb, 1st part of the duodenum and 2nd part of the duodenum   appeared normal.    Recommendation  Await pathology results   Follow with inpatient GI team        Indication  Anemia, unspecified type    Staff  Staff Role   Valdez Valdivia MD Proceduralist     Medications  See Anesthesia Record.     Preprocedure  A history and physical has been performed, and patient medication   allergies have been reviewed. The patient's tolerance of previous   anesthesia has been reviewed. The risks and benefits of the procedure and   the sedation options and risks were discussed with the patient. All   questions were answered and informed consent obtained.    Details of the Procedure  The patient underwent monitored anesthesia care, which was administered by   an anesthesia professional. The patient's blood pressure, ECG, ETCO2,   heart rate, level of consciousness, oxygen and respirations were monitored   throughout the procedure. The scope was introduced through the mouth and   advanced to the second part of the duodenum. Retroflexion was performed in   the cardia. The patient experienced no blood loss. The procedure was not   difficult. There were no apparent adverse events.     Events  Procedure Events   Event Event Time   ENDO SCOPE IN TIME 1/14/2025  9:14 AM   ENDO SCOPE OUT TIME 1/14/2025  9:20 AM     Specimens  ID Type Source Tests Collected by Time   1 : GASTRIC BX'S, COLD BX Tissue STOMACH ANTRUM BIOPSY SURGICAL PATHOLOGY   EXAM Valdez Valdivia MD 1/14/2025 0918     Procedure Location  [facility] [address]  [address] [facility] [address]  [phone]    Referring Provider  Johanna Penn, APRN-CNP    Procedure Provider  Valdez Valdivia MD      Physical Exam    Relevant Results               Assessment/Plan                  Assessment & Plan  Generalized weakness            Tio Lugo MD   Physician  Internal  Medicine     Progress Notes      Signed     Date of Service: 1/13/2025  2:44 PM     Signed       Expand All Collapse All    Melissa Tse is a 89 y.o. female on day 1 of admission presenting with Generalized weakness.           Subjective  Patient fully evaluated  01/12  for    Problem List Items Addressed This Visit                  Symptoms and Signs     * (Principal) Generalized weakness - Primary      Other Visit Diagnoses         Hypoglycemia         Anemia, unspecified type         Relevant Orders     Esophagogastroduodenoscopy (EGD)             Patient seen resting in bed with head of bed elevated, no s/s or c/o acute difficulties at this time.  Vital signs for last 24 hours Temp:  [36.4 °C (97.5 °F)-37.8 °C (100 °F)] 36.4 °C (97.5 °F)  Heart Rate:  [82-98] 88  Resp:  [18-20] 18  BP: (108-133)/(50-59) 118/57    No intake/output data recorded.  Patient still requiring frequent cardiac and SPO2 monitoring. Continue aggressive pulmonary hygiene and oral hygiene. Off loading as tolerated for skin integrity. Medications and labs reviewed-         Results for orders placed or performed during the hospital encounter of 01/10/25 (from the past 24 hours)   POCT GLUCOSE   Result Value Ref Range     POCT Glucose 197 (H) 74 - 99 mg/dL   POCT GLUCOSE   Result Value Ref Range     POCT Glucose 202 (H) 74 - 99 mg/dL   CBC and Auto Differential   Result Value Ref Range     WBC 8.9 4.4 - 11.3 x10*3/uL     nRBC 0.0 0.0 - 0.0 /100 WBCs     RBC 3.55 (L) 4.00 - 5.20 x10*6/uL     Hemoglobin 8.0 (L) 12.0 - 16.0 g/dL     Hematocrit 27.7 (L) 36.0 - 46.0 %     MCV 78 (L) 80 - 100 fL     MCH 22.5 (L) 26.0 - 34.0 pg     MCHC 28.9 (L) 32.0 - 36.0 g/dL     RDW 18.2 (H) 11.5 - 14.5 %     Platelets 257 150 - 450 x10*3/uL     Neutrophils % 76.5 40.0 - 80.0 %     Immature Granulocytes %, Automated 0.8 0.0 - 0.9 %     Lymphocytes % 12.9 13.0 - 44.0 %     Monocytes % 8.8 2.0 - 10.0 %     Eosinophils % 0.7 0.0 - 6.0 %     Basophils % 0.3 0.0 -  2.0 %     Neutrophils Absolute 6.78 (H) 1.60 - 5.50 x10*3/uL     Immature Granulocytes Absolute, Automated 0.07 0.00 - 0.50 x10*3/uL     Lymphocytes Absolute 1.14 0.80 - 3.00 x10*3/uL     Monocytes Absolute 0.78 0.05 - 0.80 x10*3/uL     Eosinophils Absolute 0.06 0.00 - 0.40 x10*3/uL     Basophils Absolute 0.03 0.00 - 0.10 x10*3/uL   Comprehensive Metabolic Panel   Result Value Ref Range     Glucose 65 (L) 74 - 99 mg/dL     Sodium 135 (L) 136 - 145 mmol/L     Potassium 3.8 3.5 - 5.3 mmol/L     Chloride 99 98 - 107 mmol/L     Bicarbonate 30 21 - 32 mmol/L     Anion Gap 10 10 - 20 mmol/L     Urea Nitrogen 18 6 - 23 mg/dL     Creatinine 0.93 0.50 - 1.05 mg/dL     eGFR 59 (L) >60 mL/min/1.73m*2     Calcium 8.7 8.6 - 10.3 mg/dL     Albumin 3.0 (L) 3.4 - 5.0 g/dL     Alkaline Phosphatase 50 33 - 136 U/L     Total Protein 6.3 (L) 6.4 - 8.2 g/dL     AST 8 (L) 9 - 39 U/L     Bilirubin, Total 0.4 0.0 - 1.2 mg/dL     ALT 5 (L) 7 - 45 U/L   POCT GLUCOSE   Result Value Ref Range     POCT Glucose 68 (L) 74 - 99 mg/dL   Iron and TIBC   Result Value Ref Range     Iron 28 (L) 35 - 150 ug/dL     UIBC 212 110 - 370 ug/dL     TIBC 240 240 - 445 ug/dL     % Saturation 12 (L) 25 - 45 %   Type and Screen   Result Value Ref Range     ABO TYPE A       Rh TYPE POS       ANTIBODY SCREEN NEG     VERIFY ABO/Rh Group Test   Result Value Ref Range     ABO TYPE A       Rh TYPE POS     POCT GLUCOSE   Result Value Ref Range     POCT Glucose 101 (H) 74 - 99 mg/dL      Patient recently received an antibiotic (last 12 hours)         None             Patient c/o of weakness,  b12 levels and nocturnal pulse oximetry ordered. Plan discussed with interdisciplinary team, iron level 27, venofer 300mg IV ordered, weaning oxygen as tolerated, repeat chest xray   No acute events overnight, patient denies chest pain, worsening SOB at this time. continue current and repeat labs in the AM.      Discharge planning discussed with patient and care team. Therapy  evaluations ordered. [facility]- 12  , anticipate HHC/SNF at discharge. Patient aware and agreeable to current plan, continue plan as above.      I spent a total of 50 minutes on the date of the service which included preparing to see the patient, face-to-face patient care, completing clinical documentation, obtaining and/or reviewing separately obtained history, performing a medically appropriate examination, counseling and educating the patient/family/caregiver, ordering medications, tests, or procedures, communicating with other HCPs (not separately reported), independently interpreting results (not separately reported), communicating results to the patient/family/caregiver, and care coordination (not separately reported).               Objective  Last Recorded Vitals  /57 (BP Location: Left arm, Patient Position: Lying)   Pulse 88   Temp 36.4 °C (97.5 °F) (Temporal)   Resp 18   Wt 83.9 kg (185 lb)   SpO2 98%   Intake/Output last 3 Shifts:     Intake/Output Summary (Last 24 hours) at 1/13/2025 1444  Last data filed at 1/13/2025 0553      Gross per 24 hour   Intake 812 ml   Output 350 ml   Net 462 ml         Admission Weight  Weight: 83.9 kg (185 lb) (01/10/25 2242)     Daily Weight  01/11/25 : 83.9 kg (185 lb)     Image Results  ECG 12 lead  Normal sinus rhythm  Low voltage QRS  Cannot rule out Inferior infarct , age undetermined  Possible Anterolateral infarct , age undetermined  Abnormal ECG  No previous ECGs available        Physical Exam     Relevant Results                       Assessment/Plan                       Assessment & Plan  Generalized weakness           Tio Lugo MD   Physician  Internal Medicine     H&P      Signed     Date of Service: 1/11/2025 11:21 AM      Associated Documents  Telemetry Monitoring - Scan on 1/11/2025 1:49 AM   Signed        Expand All Collapse All    History Of Present Illness  Melissa Tse is a 89 y.o. female with medical history of T2DM on insulin, breast  cancer on current estrogen therapy who presents to the ED with a fall from standing position.  The patient lowered herself to the ground making cereal around noon and then was on the floor until her son came home around 530.  She said her legs felt weak yesterday but her son was able to get her something to eat in the morning and she felt slightly better.  She then developed weakness again while making cereal this morning.  She said her legs just do not feel that they can hold her up.  She denies pain, chest discomfort, shortness of breath, syncope, head strike or any pain post fall.  States her glucose averages in the high 80s, admits to being a picky eater.  Denies upper respiratory/systemic viral symptoms.  Denies suprapubic pain, urinary frequency/urgency, discomfort with urination.  Denies history of low back pain/trauma, saddle anesthesia, peripheral neuropathy, incontinence.       ED course: Vitals stable.  Vitals notable for hypoglycemia of 67, depressed LFTs, elevated troponin of 98, hemoglobin of 9.5.  Patient treated with D10 for hypoglycemia.     Past Medical History  Medical History   History reviewed. No pertinent past medical history.         Surgical History  Surgical History             Past Surgical History:   Procedure Laterality Date    OTHER SURGICAL HISTORY   02/25/2022     Hip replacement            Social History  She reports that she has never smoked. She has never been exposed to tobacco smoke. She has never used smokeless tobacco. She reports that she does not drink alcohol and does not use drugs.     Family History  Family History   No family history on file.         Allergies  Patient has no known allergies.     Review of Systems  10 point ROS negative except as specified in HPI     Physical Exam  HENT:      Head: Normocephalic and atraumatic.      Nose: Nose normal.   Eyes:      Conjunctiva/sclera: Conjunctivae normal.   Cardiovascular:      Rate and Rhythm: Normal rate and regular  "rhythm.      Pulses: Normal pulses.      Heart sounds: Normal heart sounds.   Pulmonary:      Effort: Pulmonary effort is normal.      Breath sounds: Normal breath sounds.   Abdominal:      General: Abdomen is flat. There is no distension.      Palpations: Abdomen is soft.   Musculoskeletal:         General: No swelling or deformity.   Skin:     General: Skin is warm and dry.   Neurological:      Mental Status: She is alert. Mental status is at baseline.   Psychiatric:         Behavior: Behavior normal.            Last Recorded Vitals  Blood pressure 124/60, pulse 96, temperature 36.2 °C (97.2 °F), temperature source Temporal, resp. rate 22, height 1.626 m (5' 4.02\"), weight 83.9 kg (185 lb), SpO2 94%.     Relevant Results               Results for orders placed or performed during the hospital encounter of 01/10/25 (from the past 24 hours)   CBC and Auto Differential   Result Value Ref Range     WBC 10.2 4.4 - 11.3 x10*3/uL     nRBC 0.0 0.0 - 0.0 /100 WBCs     RBC 4.16 4.00 - 5.20 x10*6/uL     Hemoglobin 9.5 (L) 12.0 - 16.0 g/dL     Hematocrit 31.9 (L) 36.0 - 46.0 %     MCV 77 (L) 80 - 100 fL     MCH 22.8 (L) 26.0 - 34.0 pg     MCHC 29.8 (L) 32.0 - 36.0 g/dL     RDW 18.1 (H) 11.5 - 14.5 %     Platelets 280 150 - 450 x10*3/uL     Neutrophils % 86.4 40.0 - 80.0 %     Immature Granulocytes %, Automated 0.6 0.0 - 0.9 %     Lymphocytes % 8.2 13.0 - 44.0 %     Monocytes % 4.3 2.0 - 10.0 %     Eosinophils % 0.2 0.0 - 6.0 %     Basophils % 0.3 0.0 - 2.0 %     Neutrophils Absolute 8.83 (H) 1.60 - 5.50 x10*3/uL     Immature Granulocytes Absolute, Automated 0.06 0.00 - 0.50 x10*3/uL     Lymphocytes Absolute 0.84 0.80 - 3.00 x10*3/uL     Monocytes Absolute 0.44 0.05 - 0.80 x10*3/uL     Eosinophils Absolute 0.02 0.00 - 0.40 x10*3/uL     Basophils Absolute 0.03 0.00 - 0.10 x10*3/uL   Comprehensive metabolic panel   Result Value Ref Range     Glucose 67 (L) 74 - 99 mg/dL     Sodium 138 136 - 145 mmol/L     Potassium 3.7 3.5 - 5.3 " mmol/L     Chloride 102 98 - 107 mmol/L     Bicarbonate 25 21 - 32 mmol/L     Anion Gap 15 10 - 20 mmol/L     Urea Nitrogen 23 6 - 23 mg/dL     Creatinine 0.67 0.50 - 1.05 mg/dL     eGFR 84 >60 mL/min/1.73m*2     Calcium 9.2 8.6 - 10.3 mg/dL     Albumin 3.5 3.4 - 5.0 g/dL     Alkaline Phosphatase 55 33 - 136 U/L     Total Protein 7.2 6.4 - 8.2 g/dL     AST 8 (L) 9 - 39 U/L     Bilirubin, Total 0.4 0.0 - 1.2 mg/dL     ALT 6 (L) 7 - 45 U/L   Creatine Kinase   Result Value Ref Range     Creatine Kinase 68 0 - 215 U/L   Troponin I, High Sensitivity   Result Value Ref Range     Troponin I, High Sensitivity 98 (HH) 0 - 13 ng/L   Lactate   Result Value Ref Range     Lactate 1.4 0.4 - 2.0 mmol/L   POCT GLUCOSE   Result Value Ref Range     POCT Glucose 53 (L) 74 - 99 mg/dL   Troponin I, High Sensitivity   Result Value Ref Range     Troponin I, High Sensitivity 168 (HH) 0 - 13 ng/L   Urinalysis with Reflex Culture and Microscopic   Result Value Ref Range     Color, Urine Light-Yellow Light-Yellow, Yellow, Dark-Yellow     Appearance, Urine Clear Clear     Specific Gravity, Urine 1.014 1.005 - 1.035     pH, Urine 5.5 5.0, 5.5, 6.0, 6.5, 7.0, 7.5, 8.0     Protein, Urine NEGATIVE NEGATIVE, 10 (TRACE), 20 (TRACE) mg/dL     Glucose, Urine Normal Normal mg/dL     Blood, Urine NEGATIVE NEGATIVE     Ketones, Urine NEGATIVE NEGATIVE mg/dL     Bilirubin, Urine NEGATIVE NEGATIVE     Urobilinogen, Urine Normal Normal mg/dL     Nitrite, Urine 2+ (A) NEGATIVE     Leukocyte Esterase, Urine NEGATIVE NEGATIVE   Extra Urine Gray Tube   Result Value Ref Range     Extra Tube Hold for add-ons.     Microscopic Only, Urine   Result Value Ref Range     WBC, Urine 1-5 1-5, NONE /HPF     RBC, Urine NONE NONE, 1-2, 3-5 /HPF     Bacteria, Urine 1+ (A) NONE SEEN /HPF     Mucus, Urine FEW Reference range not established. /LPF   Sars-CoV-2 and Influenza A/B PCR   Result Value Ref Range     Flu A Result Not Detected Not Detected     Flu B Result Not Detected  Not Detected     Coronavirus 2019, PCR Not Detected Not Detected   POCT GLUCOSE   Result Value Ref Range     POCT Glucose 84 74 - 99 mg/dL   Troponin I, High Sensitivity   Result Value Ref Range     Troponin I, High Sensitivity 214 (HH) 0 - 13 ng/L   Creatine Kinase   Result Value Ref Range     Creatine Kinase 65 0 - 215 U/L   POCT GLUCOSE   Result Value Ref Range     POCT Glucose 84 74 - 99 mg/dL   POCT GLUCOSE   Result Value Ref Range     POCT Glucose 112 (H) 74 - 99 mg/dL   Troponin I, High Sensitivity   Result Value Ref Range     Troponin I, High Sensitivity 222 (HH) 0 - 13 ng/L      No results found.           Assessment/Plan     Assessment & Plan  Generalized weakness     89-year-old female with history of T2DM on insulin presented to the ED with a fall from standing position, lowering himself to the ground and remaining on the floor until family got home.  Patient found to be hypoglycemic and treated with D10.  Patient will be admitted to observation under Dr. Lugo for further evaluation and care.     #Generalized weakness  #Hypoglycemia  #Elevated troponin, 98 with repeat of 168  - Continue D10 started in ED  - Defer consults to attending  - Vitals every 4  hours, telemetry  - Encourage oral hydration  - CK normal, check in a.m.  - Follow UA  - Follow A1c  - Follow COVID/flu test  - Trend troponin, elevation likely due to type II MI, follow a.m CK level. Denies chest pain, shortness of breath.  - Corrective insulin sliding scale, hypoglycemia protocol, carb consistent diet  - PT/OT/social work     Chronic conditions  #T2DM  #Hypertension  - Continue home meds when reconciled  - Carb consistent diet, corrective insulin sliding scale     Patient fully evaluated    for    Problem List Items Addressed This Visit         * (Principal) Generalized weakness - Primary      Other Visit Diagnoses         Hypoglycemia                 Patient seen resting in bed with head of bed elevated, no s/s or c/o acute  difficulties at this time.  Vital signs for last 24 hours Temp:  [35.5 °C (95.9 °F)-37 °C (98.6 °F)] 36.2 °C (97.2 °F)  Heart Rate:  [85-98] 96  Resp:  [16-22] 22  BP: (115-171)/(56-76) 124/60    No intake/output data recorded.  Patient still requiring frequent cardiac and SPO2 monitoring. Continue aggressive pulmonary hygiene and oral hygiene. Off loading as tolerated for skin integrity. Medications and labs reviewed-             Results for orders placed or performed during the hospital encounter of 01/10/25 (from the past 24 hours)   CBC and Auto Differential   Result Value Ref Range     WBC 10.2 4.4 - 11.3 x10*3/uL     nRBC 0.0 0.0 - 0.0 /100 WBCs     RBC 4.16 4.00 - 5.20 x10*6/uL     Hemoglobin 9.5 (L) 12.0 - 16.0 g/dL     Hematocrit 31.9 (L) 36.0 - 46.0 %     MCV 77 (L) 80 - 100 fL     MCH 22.8 (L) 26.0 - 34.0 pg     MCHC 29.8 (L) 32.0 - 36.0 g/dL     RDW 18.1 (H) 11.5 - 14.5 %     Platelets 280 150 - 450 x10*3/uL     Neutrophils % 86.4 40.0 - 80.0 %     Immature Granulocytes %, Automated 0.6 0.0 - 0.9 %     Lymphocytes % 8.2 13.0 - 44.0 %     Monocytes % 4.3 2.0 - 10.0 %     Eosinophils % 0.2 0.0 - 6.0 %     Basophils % 0.3 0.0 - 2.0 %     Neutrophils Absolute 8.83 (H) 1.60 - 5.50 x10*3/uL     Immature Granulocytes Absolute, Automated 0.06 0.00 - 0.50 x10*3/uL     Lymphocytes Absolute 0.84 0.80 - 3.00 x10*3/uL     Monocytes Absolute 0.44 0.05 - 0.80 x10*3/uL     Eosinophils Absolute 0.02 0.00 - 0.40 x10*3/uL     Basophils Absolute 0.03 0.00 - 0.10 x10*3/uL   Comprehensive metabolic panel   Result Value Ref Range     Glucose 67 (L) 74 - 99 mg/dL     Sodium 138 136 - 145 mmol/L     Potassium 3.7 3.5 - 5.3 mmol/L     Chloride 102 98 - 107 mmol/L     Bicarbonate 25 21 - 32 mmol/L     Anion Gap 15 10 - 20 mmol/L     Urea Nitrogen 23 6 - 23 mg/dL     Creatinine 0.67 0.50 - 1.05 mg/dL     eGFR 84 >60 mL/min/1.73m*2     Calcium 9.2 8.6 - 10.3 mg/dL     Albumin 3.5 3.4 - 5.0 g/dL     Alkaline Phosphatase 55 33 - 136  U/L     Total Protein 7.2 6.4 - 8.2 g/dL     AST 8 (L) 9 - 39 U/L     Bilirubin, Total 0.4 0.0 - 1.2 mg/dL     ALT 6 (L) 7 - 45 U/L   Creatine Kinase   Result Value Ref Range     Creatine Kinase 68 0 - 215 U/L   Troponin I, High Sensitivity   Result Value Ref Range     Troponin I, High Sensitivity 98 (HH) 0 - 13 ng/L   Lactate   Result Value Ref Range     Lactate 1.4 0.4 - 2.0 mmol/L   POCT GLUCOSE   Result Value Ref Range     POCT Glucose 53 (L) 74 - 99 mg/dL   Troponin I, High Sensitivity   Result Value Ref Range     Troponin I, High Sensitivity 168 (HH) 0 - 13 ng/L   Urinalysis with Reflex Culture and Microscopic   Result Value Ref Range     Color, Urine Light-Yellow Light-Yellow, Yellow, Dark-Yellow     Appearance, Urine Clear Clear     Specific Gravity, Urine 1.014 1.005 - 1.035     pH, Urine 5.5 5.0, 5.5, 6.0, 6.5, 7.0, 7.5, 8.0     Protein, Urine NEGATIVE NEGATIVE, 10 (TRACE), 20 (TRACE) mg/dL     Glucose, Urine Normal Normal mg/dL     Blood, Urine NEGATIVE NEGATIVE     Ketones, Urine NEGATIVE NEGATIVE mg/dL     Bilirubin, Urine NEGATIVE NEGATIVE     Urobilinogen, Urine Normal Normal mg/dL     Nitrite, Urine 2+ (A) NEGATIVE     Leukocyte Esterase, Urine NEGATIVE NEGATIVE   Extra Urine Gray Tube   Result Value Ref Range     Extra Tube Hold for add-ons.     Microscopic Only, Urine   Result Value Ref Range     WBC, Urine 1-5 1-5, NONE /HPF     RBC, Urine NONE NONE, 1-2, 3-5 /HPF     Bacteria, Urine 1+ (A) NONE SEEN /HPF     Mucus, Urine FEW Reference range not established. /LPF   Sars-CoV-2 and Influenza A/B PCR   Result Value Ref Range     Flu A Result Not Detected Not Detected     Flu B Result Not Detected Not Detected     Coronavirus 2019, PCR Not Detected Not Detected   POCT GLUCOSE   Result Value Ref Range     POCT Glucose 84 74 - 99 mg/dL   Troponin I, High Sensitivity   Result Value Ref Range     Troponin I, High Sensitivity 214 (HH) 0 - 13 ng/L   Creatine Kinase   Result Value Ref Range     Creatine  Kinase 65 0 - 215 U/L   POCT GLUCOSE   Result Value Ref Range     POCT Glucose 84 74 - 99 mg/dL   POCT GLUCOSE   Result Value Ref Range     POCT Glucose 112 (H) 74 - 99 mg/dL   Troponin I, High Sensitivity   Result Value Ref Range     Troponin I, High Sensitivity 222 (HH) 0 - 13 ng/L      Patient recently received an antibiotic (last 12 hours)         None             Plan discussed with interdisciplinary team, will continue Rocephin IV for urine positive for 2+ nitrites. WBC 10.2, will repeat labs. Hemoglobin 9.5, iron levels ordered. Strict intake and output. No acute events overnight, continue current and repeat labs in the AM. Therapy evaluations ordered. Home medication reconciliation needed.      Discharge planning discussed with patient and care team.  Anticipate HHC/SNF at discharge. Patient aware and agreeable to current plan, continue plan as above.      I spent a total of 50 minutes on the date of the service which included preparing to see the patient, face-to-face patient care, completing clinical documentation, obtaining and/or reviewing separately obtained history, performing a medically appropriate examination, counseling and educating the patient/family/caregiver, ordering medications, tests, or procedures, communicating with other HCPs (not separately reported), independently interpreting results (not separately reported), communicating results to the patient/family/caregiver, and care coordination (not separately reported).   I spent 60 minutes in the professional and overall care of this patient.                           Revision History           Patient fully evaluated 1/13  for    Problem List Items Addressed This Visit                  Symptoms and Signs     * (Principal) Generalized weakness - Primary      Other Visit Diagnoses         Hypoglycemia         Anemia, unspecified type         Relevant Orders     Esophagogastroduodenoscopy (EGD)             Patient seen resting in bed with head  of bed elevated, no s/s or c/o acute difficulties at this time.  Vital signs for last 24 hours Temp:  [36.4 °C (97.5 °F)-37.8 °C (100 °F)] 36.4 °C (97.5 °F)  Heart Rate:  [82-98] 88  Resp:  [18-20] 18  BP: (108-133)/(50-59) 118/57    No intake/output data recorded.  Patient still requiring frequent cardiac and SPO2 monitoring. Continue aggressive pulmonary hygiene and oral hygiene. Off loading as tolerated for skin integrity. Medications and labs reviewed-         Results for orders placed or performed during the hospital encounter of 01/10/25 (from the past 24 hours)   POCT GLUCOSE   Result Value Ref Range     POCT Glucose 197 (H) 74 - 99 mg/dL   POCT GLUCOSE   Result Value Ref Range     POCT Glucose 202 (H) 74 - 99 mg/dL   CBC and Auto Differential   Result Value Ref Range     WBC 8.9 4.4 - 11.3 x10*3/uL     nRBC 0.0 0.0 - 0.0 /100 WBCs     RBC 3.55 (L) 4.00 - 5.20 x10*6/uL     Hemoglobin 8.0 (L) 12.0 - 16.0 g/dL     Hematocrit 27.7 (L) 36.0 - 46.0 %     MCV 78 (L) 80 - 100 fL     MCH 22.5 (L) 26.0 - 34.0 pg     MCHC 28.9 (L) 32.0 - 36.0 g/dL     RDW 18.2 (H) 11.5 - 14.5 %     Platelets 257 150 - 450 x10*3/uL     Neutrophils % 76.5 40.0 - 80.0 %     Immature Granulocytes %, Automated 0.8 0.0 - 0.9 %     Lymphocytes % 12.9 13.0 - 44.0 %     Monocytes % 8.8 2.0 - 10.0 %     Eosinophils % 0.7 0.0 - 6.0 %     Basophils % 0.3 0.0 - 2.0 %     Neutrophils Absolute 6.78 (H) 1.60 - 5.50 x10*3/uL     Immature Granulocytes Absolute, Automated 0.07 0.00 - 0.50 x10*3/uL     Lymphocytes Absolute 1.14 0.80 - 3.00 x10*3/uL     Monocytes Absolute 0.78 0.05 - 0.80 x10*3/uL     Eosinophils Absolute 0.06 0.00 - 0.40 x10*3/uL     Basophils Absolute 0.03 0.00 - 0.10 x10*3/uL   Comprehensive Metabolic Panel   Result Value Ref Range     Glucose 65 (L) 74 - 99 mg/dL     Sodium 135 (L) 136 - 145 mmol/L     Potassium 3.8 3.5 - 5.3 mmol/L     Chloride 99 98 - 107 mmol/L     Bicarbonate 30 21 - 32 mmol/L     Anion Gap 10 10 - 20 mmol/L      Urea Nitrogen 18 6 - 23 mg/dL     Creatinine 0.93 0.50 - 1.05 mg/dL     eGFR 59 (L) >60 mL/min/1.73m*2     Calcium 8.7 8.6 - 10.3 mg/dL     Albumin 3.0 (L) 3.4 - 5.0 g/dL     Alkaline Phosphatase 50 33 - 136 U/L     Total Protein 6.3 (L) 6.4 - 8.2 g/dL     AST 8 (L) 9 - 39 U/L     Bilirubin, Total 0.4 0.0 - 1.2 mg/dL     ALT 5 (L) 7 - 45 U/L   POCT GLUCOSE   Result Value Ref Range     POCT Glucose 68 (L) 74 - 99 mg/dL   Iron and TIBC   Result Value Ref Range     Iron 28 (L) 35 - 150 ug/dL     UIBC 212 110 - 370 ug/dL     TIBC 240 240 - 445 ug/dL     % Saturation 12 (L) 25 - 45 %   Type and Screen   Result Value Ref Range     ABO TYPE A       Rh TYPE POS       ANTIBODY SCREEN NEG     VERIFY ABO/Rh Group Test   Result Value Ref Range     ABO TYPE A       Rh TYPE POS     POCT GLUCOSE   Result Value Ref Range     POCT Glucose 101 (H) 74 - 99 mg/dL      Patient recently received an antibiotic (last 12 hours)         None             Plan discussed with interdisciplinary team, per PT, patient experiencing decreased strength, endurance, mobility, sensation, impaired balance, and pain with good rehab prognosis. Per GI, plan is to scope patient tomorrow. Occult blood of the stool pending. Will give patient another dose of IV Venofer 300 mg for HARINI. Discontinued IV Rocephin and started patient on oral Ceftin for UTI. Will continue current and repeat labs in the AM, especially monitoring hemoglobin.     Discharge planning discussed with patient and care team. Therapy evaluations ordered. [facility]-12, anticipate HHC/SNF at discharge. Patient aware and agreeable to current plan, continue plan as above.      I spent a total of 50 minutes on the date of the service which included preparing to see the patient, face-to-face patient care, completing clinical documentation, obtaining and/or reviewing separately obtained history, performing a medically appropriate examination, counseling and educating the patient/family/caregiver,  ordering medications, tests, or procedures, communicating with other HCPs (not separately reported), independently interpreting results (not separately reported), communicating results to the patient/family/caregiver, and care coordination (not separately reported).         ZACHARIAH SINA                      Revision History     Patient fully evaluated    for    Problem List Items Addressed This Visit          Symptoms and Signs    * (Principal) Generalized weakness - Primary    Relevant Orders    Surgical Pathology Exam     Other Visit Diagnoses       Hypoglycemia        Relevant Orders    Surgical Pathology Exam    Anemia, unspecified type        Relevant Orders    Esophagogastroduodenoscopy (EGD) (Completed)    Surgical Pathology Exam          Patient seen resting in bed with head of bed elevated, no s/s or c/o acute difficulties at this time.  Vital signs for last 24 hours Temp:  [36.1 °C (97 °F)-37 °C (98.6 °F)] 36.1 °C (97 °F)  Heart Rate:  [76-91] 84  Resp:  [16-18] 18  BP: (108-136)/(55-63) 120/60    No intake/output data recorded.  Patient still requiring frequent cardiac and SPO2 monitoring. Continue aggressive pulmonary hygiene and oral hygiene. Off loading as tolerated for skin integrity. Medications and labs reviewed-   Results for orders placed or performed during the hospital encounter of 01/10/25 (from the past 24 hours)   POCT GLUCOSE   Result Value Ref Range    POCT Glucose 147 (H) 74 - 99 mg/dL   POCT GLUCOSE   Result Value Ref Range    POCT Glucose 199 (H) 74 - 99 mg/dL   POCT GLUCOSE   Result Value Ref Range    POCT Glucose 100 (H) 74 - 99 mg/dL   POCT GLUCOSE   Result Value Ref Range    POCT Glucose 57 (L) 74 - 99 mg/dL   POCT GLUCOSE   Result Value Ref Range    POCT Glucose 223 (H) 74 - 99 mg/dL   CBC and Auto Differential   Result Value Ref Range    WBC 7.9 4.4 - 11.3 x10*3/uL    nRBC 0.0 0.0 - 0.0 /100 WBCs    RBC 3.48 (L) 4.00 - 5.20 x10*6/uL    Hemoglobin 7.8 (L) 12.0 - 16.0 g/dL     Hematocrit 27.5 (L) 36.0 - 46.0 %    MCV 79 (L) 80 - 100 fL    MCH 22.4 (L) 26.0 - 34.0 pg    MCHC 28.4 (L) 32.0 - 36.0 g/dL    RDW 18.7 (H) 11.5 - 14.5 %    Platelets 273 150 - 450 x10*3/uL    Neutrophils % 77.2 40.0 - 80.0 %    Immature Granulocytes %, Automated 0.6 0.0 - 0.9 %    Lymphocytes % 12.0 13.0 - 44.0 %    Monocytes % 8.0 2.0 - 10.0 %    Eosinophils % 1.8 0.0 - 6.0 %    Basophils % 0.4 0.0 - 2.0 %    Neutrophils Absolute 6.06 (H) 1.60 - 5.50 x10*3/uL    Immature Granulocytes Absolute, Automated 0.05 0.00 - 0.50 x10*3/uL    Lymphocytes Absolute 0.94 0.80 - 3.00 x10*3/uL    Monocytes Absolute 0.63 0.05 - 0.80 x10*3/uL    Eosinophils Absolute 0.14 0.00 - 0.40 x10*3/uL    Basophils Absolute 0.03 0.00 - 0.10 x10*3/uL   Comprehensive Metabolic Panel   Result Value Ref Range    Glucose 119 (H) 74 - 99 mg/dL    Sodium 135 (L) 136 - 145 mmol/L    Potassium 4.2 3.5 - 5.3 mmol/L    Chloride 101 98 - 107 mmol/L    Bicarbonate 25 21 - 32 mmol/L    Anion Gap 13 10 - 20 mmol/L    Urea Nitrogen 16 6 - 23 mg/dL    Creatinine 0.67 0.50 - 1.05 mg/dL    eGFR 84 >60 mL/min/1.73m*2    Calcium 8.3 (L) 8.6 - 10.3 mg/dL    Albumin 2.8 (L) 3.4 - 5.0 g/dL    Alkaline Phosphatase 52 33 - 136 U/L    Total Protein 6.0 (L) 6.4 - 8.2 g/dL    AST 8 (L) 9 - 39 U/L    Bilirubin, Total 0.4 0.0 - 1.2 mg/dL    ALT 5 (L) 7 - 45 U/L   POCT GLUCOSE   Result Value Ref Range    POCT Glucose 163 (H) 74 - 99 mg/dL      Patient recently received an antibiotic (last 12 hours)       Date/Time Action Medication Dose    01/13/25 2029 Given    cefuroxime (Ceftin) tablet 250 mg 250 mg           Plan discussed with interdisciplinary team, [facility]. Will watch for facility acceptance and start precert. continue current and repeat labs in the AM.     Discharge planning discussed with patient and care team. Therapy evaluations ordered. [facility] -10  , anticipate HHC/SNF at discharge.  Per [facility]. Will watch for facility acceptance and start precert. Patient aware and agreeable to current plan, continue plan as above.     I spent a total of 50 minutes on the date of the service which included preparing to see the patient, face-to-face patient care, completing clinical documentation, obtaining and/or reviewing separately obtained history, performing a medically appropriate examination, counseling and educating the patient/family/caregiver, ordering medications, tests, or procedures, communicating with other HCPs (not separately reported), independently interpreting results (not separately reported), communicating results to the patient/family/caregiver, and care coordination (not separately reported).      No acute events overnight, patient denie. Seen by jannet Ramey chest pain, worsening SOB at this time.       Patient fully evaluated 1/15  for    Problem List Items Addressed This Visit          Symptoms and Signs    * (Principal) Generalized weakness - Primary    Relevant Orders    Surgical Pathology Exam     Other Visit Diagnoses       Hypoglycemia        Relevant Orders    Surgical Pathology Exam    Anemia, unspecified type        Relevant Orders    Esophagogastroduodenoscopy (EGD) (Completed)    Surgical Pathology Exam          Patient seen resting in bed with head of bed elevated, no s/s or c/o acute difficulties at this time.  Vital signs for last 24 hours Temp:  [36.1 °C (97 °F)-37 °C (98.6 °F)] 36.1 °C (97 °F)  Heart Rate:  [76-91] 84  Resp:  [16-18] 18  BP: (108-136)/(55-63) 120/60    No intake/output data recorded.  Patient still requiring frequent cardiac and SPO2 monitoring. Continue aggressive pulmonary hygiene and oral hygiene. Off loading as tolerated for skin integrity. Medications and labs reviewed-   Results for orders placed or performed during the hospital encounter of 01/10/25 (from the past 24 hours)   POCT GLUCOSE   Result Value Ref Range    POCT  Glucose 147 (H) 74 - 99 mg/dL   POCT GLUCOSE   Result Value Ref Range    POCT Glucose 199 (H) 74 - 99 mg/dL   POCT GLUCOSE   Result Value Ref Range    POCT Glucose 100 (H) 74 - 99 mg/dL   POCT GLUCOSE   Result Value Ref Range    POCT Glucose 57 (L) 74 - 99 mg/dL   POCT GLUCOSE   Result Value Ref Range    POCT Glucose 223 (H) 74 - 99 mg/dL   CBC and Auto Differential   Result Value Ref Range    WBC 7.9 4.4 - 11.3 x10*3/uL    nRBC 0.0 0.0 - 0.0 /100 WBCs    RBC 3.48 (L) 4.00 - 5.20 x10*6/uL    Hemoglobin 7.8 (L) 12.0 - 16.0 g/dL    Hematocrit 27.5 (L) 36.0 - 46.0 %    MCV 79 (L) 80 - 100 fL    MCH 22.4 (L) 26.0 - 34.0 pg    MCHC 28.4 (L) 32.0 - 36.0 g/dL    RDW 18.7 (H) 11.5 - 14.5 %    Platelets 273 150 - 450 x10*3/uL    Neutrophils % 77.2 40.0 - 80.0 %    Immature Granulocytes %, Automated 0.6 0.0 - 0.9 %    Lymphocytes % 12.0 13.0 - 44.0 %    Monocytes % 8.0 2.0 - 10.0 %    Eosinophils % 1.8 0.0 - 6.0 %    Basophils % 0.4 0.0 - 2.0 %    Neutrophils Absolute 6.06 (H) 1.60 - 5.50 x10*3/uL    Immature Granulocytes Absolute, Automated 0.05 0.00 - 0.50 x10*3/uL    Lymphocytes Absolute 0.94 0.80 - 3.00 x10*3/uL    Monocytes Absolute 0.63 0.05 - 0.80 x10*3/uL    Eosinophils Absolute 0.14 0.00 - 0.40 x10*3/uL    Basophils Absolute 0.03 0.00 - 0.10 x10*3/uL   Comprehensive Metabolic Panel   Result Value Ref Range    Glucose 119 (H) 74 - 99 mg/dL    Sodium 135 (L) 136 - 145 mmol/L    Potassium 4.2 3.5 - 5.3 mmol/L    Chloride 101 98 - 107 mmol/L    Bicarbonate 25 21 - 32 mmol/L    Anion Gap 13 10 - 20 mmol/L    Urea Nitrogen 16 6 - 23 mg/dL    Creatinine 0.67 0.50 - 1.05 mg/dL    eGFR 84 >60 mL/min/1.73m*2    Calcium 8.3 (L) 8.6 - 10.3 mg/dL    Albumin 2.8 (L) 3.4 - 5.0 g/dL    Alkaline Phosphatase 52 33 - 136 U/L    Total Protein 6.0 (L) 6.4 - 8.2 g/dL    AST 8 (L) 9 - 39 U/L    Bilirubin, Total 0.4 0.0 - 1.2 mg/dL    ALT 5 (L) 7 - 45 U/L   POCT GLUCOSE   Result Value Ref Range    POCT Glucose 163 (H) 74 - 99 mg/dL       Patient recently received an antibiotic (last 12 hours)       Date/Time Action Medication Dose    01/15/25 0904 Given    cefuroxime (Ceftin) tablet 250 mg 250 mg           Plan discussed with interdisciplinary team, per Gastroenterology, EGD did not show any abnormalities to explain the anemia and colonoscopy is recommended. Discussed staying here for colonoscopy. Patient hesitant about using Golytely bowel prep but agrees to needing colonoscopy, would appreciate input from Gastroenterology on specific bowel prep. Patient will consider her options. Will continue current and repeat labs in the AM, especially monitoring her Iron deficiency anemia.     Discharge planning discussed with patient and care team. Therapy evaluations ordered. [facility]-12, anticipate C/SNF at discharge. Patient aware and agreeable to current plan, continue plan as above.     I spent a total of 50 minutes on the date of the service which included preparing to see the patient, face-to-face patient care, completing clinical documentation, obtaining and/or reviewing separately obtained history, performing a medically appropriate examination, counseling and educating the patient/family/caregiver, ordering medications, tests, or procedures, communicating with other HCPs (not separately reported), independently interpreting results (not separately reported), communicating results to the patient/family/caregiver, and care coordination (not separately reported).     ZACHARIAH ANDINO

## 2025-01-15 NOTE — PROGRESS NOTES
Melissa Tse is a 89 y.o. female on day 3 of admission presenting with Generalized weakness.      Assessment / Plan        Reason for consult: Anemia    Melissa Tse is a 89 y.o. female with medical history of T2DM on insulin, breast cancer on current estrogen therapy who presents to the ED on 1/10 with a fall from standing position.  The patient lowered herself to the ground making cereal around noon and then was on the floor until her son came home around 530.  She said her legs felt weak yesterday but her son was able to get her something to eat in the morning and she felt slightly better.  She then developed weakness again while making cereal this morning.  She said her legs just do not feel that they can hold her up.  She denies pain, chest discomfort, shortness of breath, syncope, head strike or any pain post fall.  States her glucose averages in the high 80s, admits to being a picky eater.  Denies upper respiratory/systemic viral symptoms.  Denies suprapubic pain, urinary frequency/urgency, discomfort with urination.  Denies history of low back pain/trauma, saddle anesthesia, peripheral neuropathy, incontinence.       ED course: Vitals stable.  Vitals notable for hypoglycemia of 67, depressed LFTs, elevated troponin of 98, hemoglobin of 9.5.  Patient treated with D10 for hypoglycemia.    #New microcytic anemia  #Poor appetite  -Patient had hemoglobin of 9.5 in ED  -Prior hemoglobin levels were significantly higher at around 11-12  -Patient admits to poor appetite for the past several months.  Cannot say if she lost weight definitively as she has not been weighing herself  Plan:  -Patient underwent EGD today which was largely unrevealing  -Patient willing to do inpatient colonoscopy for further evaluation of anemia, plan for colonoscopy tomorrow   -NPO midnight  -Active type and screen  -Transfuse if hemoglobin less than 7         Gian Alas DO   PGY-1, Internal Medicine  This is a preliminary note,  "please await attending attestation for final A/P    Subjective     Patient seen and examined. No acute overnight events.  Patient is agreeable for colonoscopy inpatient.      Objective       Physical Exam:  General:  Pleasant and cooperative. No apparent distress.  HEENT:  Normocephalic, atraumatic  Chest:  Clear to auscultation bilaterally. No wheezes, rales, or rhonchi.  CV:  Regular rate and rhythm. No murmurs    Abdomen: Abdomen is soft, non-tender, non-distended. BS +   Extremities:  No lower extremity edema or cyanosis.   Neurological:  AAOx3. No focal deficits.  Skin:  Warm and dry.    Last Recorded Vitals  Blood pressure 120/60, pulse 84, temperature 36.1 °C (97 °F), temperature source Temporal, resp. rate 18, height 1.626 m (5' 4.02\"), weight 83.9 kg (185 lb), SpO2 98%.  Intake/Output last 3 Shifts:  I/O last 3 completed shifts:  In: 590 (7 mL/kg) [P.O.:590]  Out: 1000 (11.9 mL/kg) [Urine:1000 (0.3 mL/kg/hr)]  Weight: 83.9 kg     Last CBC & BMP  Lab Results   Component Value Date    GLUCOSE 119 (H) 01/15/2025    CALCIUM 8.3 (L) 01/15/2025     (L) 01/15/2025    K 4.2 01/15/2025    CO2 25 01/15/2025     01/15/2025    BUN 16 01/15/2025    CREATININE 0.67 01/15/2025     Lab Results   Component Value Date    WBC 7.9 01/15/2025    HGB 7.8 (L) 01/15/2025    HCT 27.5 (L) 01/15/2025    MCV 79 (L) 01/15/2025     01/15/2025          "

## 2025-01-16 ENCOUNTER — ANESTHESIA EVENT (OUTPATIENT)
Dept: GASTROENTEROLOGY | Facility: HOSPITAL | Age: 89
End: 2025-01-16
Payer: MEDICARE

## 2025-01-16 ENCOUNTER — ANESTHESIA (OUTPATIENT)
Dept: GASTROENTEROLOGY | Facility: HOSPITAL | Age: 89
End: 2025-01-16
Payer: MEDICARE

## 2025-01-16 ENCOUNTER — APPOINTMENT (OUTPATIENT)
Dept: GASTROENTEROLOGY | Facility: HOSPITAL | Age: 89
DRG: 374 | End: 2025-01-16
Payer: MEDICARE

## 2025-01-16 PROBLEM — Z96.641 PRESENCE OF RIGHT ARTIFICIAL HIP JOINT: Status: ACTIVE | Noted: 2017-05-11

## 2025-01-16 PROBLEM — C50.911 INVASIVE LOBULAR CARCINOMA OF RIGHT BREAST IN FEMALE: Status: ACTIVE | Noted: 2025-01-16

## 2025-01-16 PROBLEM — E11.9 DIABETES MELLITUS (MULTI): Status: ACTIVE | Noted: 2025-01-16

## 2025-01-16 PROBLEM — D50.9 IRON DEFICIENCY ANEMIA, UNSPECIFIED: Status: ACTIVE | Noted: 2017-05-11

## 2025-01-16 PROBLEM — E11.42 TYPE 2 DIABETES MELLITUS WITH POLYNEUROPATHY: Status: ACTIVE | Noted: 2017-03-01

## 2025-01-16 LAB
ALBUMIN SERPL BCP-MCNC: 2.9 G/DL (ref 3.4–5)
ALP SERPL-CCNC: 53 U/L (ref 33–136)
ALT SERPL W P-5'-P-CCNC: 8 U/L (ref 7–45)
ANION GAP SERPL CALC-SCNC: 11 MMOL/L (ref 10–20)
AST SERPL W P-5'-P-CCNC: 9 U/L (ref 9–39)
BASOPHILS # BLD AUTO: 0.02 X10*3/UL (ref 0–0.1)
BASOPHILS NFR BLD AUTO: 0.3 %
BILIRUB SERPL-MCNC: 0.4 MG/DL (ref 0–1.2)
BUN SERPL-MCNC: 12 MG/DL (ref 6–23)
CALCIUM SERPL-MCNC: 8.3 MG/DL (ref 8.6–10.3)
CHLORIDE SERPL-SCNC: 101 MMOL/L (ref 98–107)
CO2 SERPL-SCNC: 27 MMOL/L (ref 21–32)
CREAT SERPL-MCNC: 0.67 MG/DL (ref 0.5–1.05)
EGFRCR SERPLBLD CKD-EPI 2021: 84 ML/MIN/1.73M*2
EOSINOPHIL # BLD AUTO: 0.13 X10*3/UL (ref 0–0.4)
EOSINOPHIL NFR BLD AUTO: 1.9 %
ERYTHROCYTE [DISTWIDTH] IN BLOOD BY AUTOMATED COUNT: 18.9 % (ref 11.5–14.5)
FERRITIN SERPL-MCNC: 570 NG/ML (ref 8–150)
GLUCOSE BLD MANUAL STRIP-MCNC: 111 MG/DL (ref 74–99)
GLUCOSE BLD MANUAL STRIP-MCNC: 129 MG/DL (ref 74–99)
GLUCOSE BLD MANUAL STRIP-MCNC: 143 MG/DL (ref 74–99)
GLUCOSE BLD MANUAL STRIP-MCNC: 145 MG/DL (ref 74–99)
GLUCOSE SERPL-MCNC: 132 MG/DL (ref 74–99)
HCT VFR BLD AUTO: 26.3 % (ref 36–46)
HGB BLD-MCNC: 7.5 G/DL (ref 12–16)
IMM GRANULOCYTES # BLD AUTO: 0.02 X10*3/UL (ref 0–0.5)
IMM GRANULOCYTES NFR BLD AUTO: 0.3 % (ref 0–0.9)
LYMPHOCYTES # BLD AUTO: 1.04 X10*3/UL (ref 0.8–3)
LYMPHOCYTES NFR BLD AUTO: 15.5 %
MCH RBC QN AUTO: 22.6 PG (ref 26–34)
MCHC RBC AUTO-ENTMCNC: 28.5 G/DL (ref 32–36)
MCV RBC AUTO: 79 FL (ref 80–100)
MONOCYTES # BLD AUTO: 0.54 X10*3/UL (ref 0.05–0.8)
MONOCYTES NFR BLD AUTO: 8 %
NEUTROPHILS # BLD AUTO: 4.98 X10*3/UL (ref 1.6–5.5)
NEUTROPHILS NFR BLD AUTO: 74 %
NRBC BLD-RTO: 0 /100 WBCS (ref 0–0)
PLATELET # BLD AUTO: 262 X10*3/UL (ref 150–450)
POTASSIUM SERPL-SCNC: 4.3 MMOL/L (ref 3.5–5.3)
PROT SERPL-MCNC: 6.1 G/DL (ref 6.4–8.2)
RBC # BLD AUTO: 3.32 X10*6/UL (ref 4–5.2)
SODIUM SERPL-SCNC: 135 MMOL/L (ref 136–145)
WBC # BLD AUTO: 6.7 X10*3/UL (ref 4.4–11.3)

## 2025-01-16 PROCEDURE — 0DBL8ZX EXCISION OF TRANSVERSE COLON, VIA NATURAL OR ARTIFICIAL OPENING ENDOSCOPIC, DIAGNOSTIC: ICD-10-PCS | Performed by: NURSE PRACTITIONER

## 2025-01-16 PROCEDURE — 82728 ASSAY OF FERRITIN: CPT | Mod: PARLAB | Performed by: STUDENT IN AN ORGANIZED HEALTH CARE EDUCATION/TRAINING PROGRAM

## 2025-01-16 PROCEDURE — 2720000007 HC OR 272 NO HCPCS

## 2025-01-16 PROCEDURE — 80051 ELECTROLYTE PANEL: CPT | Performed by: INTERNAL MEDICINE

## 2025-01-16 PROCEDURE — 2500000002 HC RX 250 W HCPCS SELF ADMINISTERED DRUGS (ALT 637 FOR MEDICARE OP, ALT 636 FOR OP/ED): Performed by: INTERNAL MEDICINE

## 2025-01-16 PROCEDURE — 2500000004 HC RX 250 GENERAL PHARMACY W/ HCPCS (ALT 636 FOR OP/ED): Performed by: NURSE ANESTHETIST, CERTIFIED REGISTERED

## 2025-01-16 PROCEDURE — 45381 COLONOSCOPY SUBMUCOUS NJX: CPT | Performed by: STUDENT IN AN ORGANIZED HEALTH CARE EDUCATION/TRAINING PROGRAM

## 2025-01-16 PROCEDURE — 0DBK8ZZ EXCISION OF ASCENDING COLON, VIA NATURAL OR ARTIFICIAL OPENING ENDOSCOPIC: ICD-10-PCS | Performed by: NURSE PRACTITIONER

## 2025-01-16 PROCEDURE — 3700000002 HC GENERAL ANESTHESIA TIME - EACH INCREMENTAL 1 MINUTE

## 2025-01-16 PROCEDURE — 7100000010 HC PHASE TWO TIME - EACH INCREMENTAL 1 MINUTE

## 2025-01-16 PROCEDURE — 3700000001 HC GENERAL ANESTHESIA TIME - INITIAL BASE CHARGE

## 2025-01-16 PROCEDURE — 2500000001 HC RX 250 WO HCPCS SELF ADMINISTERED DRUGS (ALT 637 FOR MEDICARE OP): Performed by: INTERNAL MEDICINE

## 2025-01-16 PROCEDURE — 2500000004 HC RX 250 GENERAL PHARMACY W/ HCPCS (ALT 636 FOR OP/ED): Performed by: STUDENT IN AN ORGANIZED HEALTH CARE EDUCATION/TRAINING PROGRAM

## 2025-01-16 PROCEDURE — 82947 ASSAY GLUCOSE BLOOD QUANT: CPT

## 2025-01-16 PROCEDURE — 45385 COLONOSCOPY W/LESION REMOVAL: CPT | Performed by: STUDENT IN AN ORGANIZED HEALTH CARE EDUCATION/TRAINING PROGRAM

## 2025-01-16 PROCEDURE — 36415 COLL VENOUS BLD VENIPUNCTURE: CPT | Performed by: INTERNAL MEDICINE

## 2025-01-16 PROCEDURE — 88305 TISSUE EXAM BY PATHOLOGIST: CPT | Mod: TC,PARLAB | Performed by: STUDENT IN AN ORGANIZED HEALTH CARE EDUCATION/TRAINING PROGRAM

## 2025-01-16 PROCEDURE — 0DBM8ZZ EXCISION OF DESCENDING COLON, VIA NATURAL OR ARTIFICIAL OPENING ENDOSCOPIC: ICD-10-PCS | Performed by: NURSE PRACTITIONER

## 2025-01-16 PROCEDURE — 1200000002 HC GENERAL ROOM WITH TELEMETRY DAILY

## 2025-01-16 PROCEDURE — 45380 COLONOSCOPY AND BIOPSY: CPT | Performed by: STUDENT IN AN ORGANIZED HEALTH CARE EDUCATION/TRAINING PROGRAM

## 2025-01-16 PROCEDURE — 0DBN8ZZ EXCISION OF SIGMOID COLON, VIA NATURAL OR ARTIFICIAL OPENING ENDOSCOPIC: ICD-10-PCS | Performed by: NURSE PRACTITIONER

## 2025-01-16 PROCEDURE — 85025 COMPLETE CBC W/AUTO DIFF WBC: CPT | Performed by: INTERNAL MEDICINE

## 2025-01-16 PROCEDURE — 7100000009 HC PHASE TWO TIME - INITIAL BASE CHARGE

## 2025-01-16 PROCEDURE — 2500000004 HC RX 250 GENERAL PHARMACY W/ HCPCS (ALT 636 FOR OP/ED)

## 2025-01-16 RX ORDER — PROPOFOL 10 MG/ML
INJECTION, EMULSION INTRAVENOUS AS NEEDED
Status: DISCONTINUED | OUTPATIENT
Start: 2025-01-16 | End: 2025-01-16

## 2025-01-16 RX ORDER — LIDOCAINE HYDROCHLORIDE 20 MG/ML
INJECTION, SOLUTION EPIDURAL; INFILTRATION; INTRACAUDAL; PERINEURAL AS NEEDED
Status: DISCONTINUED | OUTPATIENT
Start: 2025-01-16 | End: 2025-01-16

## 2025-01-16 RX ORDER — PHENYLEPHRINE HCL IN 0.9% NACL 1 MG/10 ML
SYRINGE (ML) INTRAVENOUS AS NEEDED
Status: DISCONTINUED | OUTPATIENT
Start: 2025-01-16 | End: 2025-01-16

## 2025-01-16 RX ADMIN — PROPOFOL 50 MG: 10 INJECTION, EMULSION INTRAVENOUS at 09:20

## 2025-01-16 RX ADMIN — PROPOFOL 50 MG: 10 INJECTION, EMULSION INTRAVENOUS at 09:28

## 2025-01-16 RX ADMIN — PROPOFOL 50 MG: 10 INJECTION, EMULSION INTRAVENOUS at 09:41

## 2025-01-16 RX ADMIN — Medication 200 MCG: at 09:50

## 2025-01-16 RX ADMIN — ENOXAPARIN SODIUM 40 MG: 40 INJECTION SUBCUTANEOUS at 22:25

## 2025-01-16 RX ADMIN — CEFUROXIME AXETIL 250 MG: 250 TABLET, FILM COATED ORAL at 20:53

## 2025-01-16 RX ADMIN — PROPOFOL 50 MG: 10 INJECTION, EMULSION INTRAVENOUS at 09:11

## 2025-01-16 RX ADMIN — IRON SUCROSE 300 MG: 20 INJECTION, SOLUTION INTRAVENOUS at 13:27

## 2025-01-16 RX ADMIN — CEFUROXIME AXETIL 250 MG: 250 TABLET, FILM COATED ORAL at 11:15

## 2025-01-16 RX ADMIN — PROPOFOL 50 MG: 10 INJECTION, EMULSION INTRAVENOUS at 09:36

## 2025-01-16 RX ADMIN — LIDOCAINE HYDROCHLORIDE 50 MG: 20 INJECTION, SOLUTION EPIDURAL; INFILTRATION; INTRACAUDAL; PERINEURAL at 09:11

## 2025-01-16 RX ADMIN — Medication 200 MCG: at 09:37

## 2025-01-16 RX ADMIN — INSULIN GLARGINE 32 UNITS: 100 INJECTION, SOLUTION SUBCUTANEOUS at 20:53

## 2025-01-16 RX ADMIN — ESTRADIOL 1 MG: 1 TABLET ORAL at 11:15

## 2025-01-16 RX ADMIN — PROPOFOL 50 MG: 10 INJECTION, EMULSION INTRAVENOUS at 09:49

## 2025-01-16 SDOH — HEALTH STABILITY: MENTAL HEALTH: CURRENT SMOKER: 0

## 2025-01-16 ASSESSMENT — COGNITIVE AND FUNCTIONAL STATUS - GENERAL
DAILY ACTIVITIY SCORE: 19
MOBILITY SCORE: 12
HELP NEEDED FOR BATHING: A LITTLE
TOILETING: A LITTLE
WALKING IN HOSPITAL ROOM: A LOT
CLIMB 3 TO 5 STEPS WITH RAILING: A LOT
DRESSING REGULAR LOWER BODY CLOTHING: A LITTLE
PERSONAL GROOMING: A LITTLE
TURNING FROM BACK TO SIDE WHILE IN FLAT BAD: A LOT
MOVING FROM LYING ON BACK TO SITTING ON SIDE OF FLAT BED WITH BEDRAILS: A LOT
MOVING TO AND FROM BED TO CHAIR: A LOT
STANDING UP FROM CHAIR USING ARMS: A LOT
DRESSING REGULAR UPPER BODY CLOTHING: A LITTLE

## 2025-01-16 ASSESSMENT — PAIN SCALES - GENERAL
PAINLEVEL_OUTOF10: 0 - NO PAIN
PAIN_LEVEL: 0
PAINLEVEL_OUTOF10: 0 - NO PAIN
PAINLEVEL_OUTOF10: 0 - NO PAIN

## 2025-01-16 ASSESSMENT — PAIN - FUNCTIONAL ASSESSMENT
PAIN_FUNCTIONAL_ASSESSMENT: 0-10
PAIN_FUNCTIONAL_ASSESSMENT: 0-10

## 2025-01-16 NOTE — PROGRESS NOTES
Melissa Tse is a 89 y.o. female on day 4 of admission presenting with Generalized weakness.      Subjective   Patient fully evaluated  01/14  for    Problem List Items Addressed This Visit          Symptoms and Signs    * (Principal) Generalized weakness - Primary    Relevant Orders    Surgical Pathology Exam    Surgical Pathology Exam     Other Visit Diagnoses       Hypoglycemia        Relevant Orders    Surgical Pathology Exam    Surgical Pathology Exam    Anemia, unspecified type        Relevant Orders    Esophagogastroduodenoscopy (EGD) (Completed)    Surgical Pathology Exam    Colonoscopy Diagnostic (Completed)    Surgical Pathology Exam          Patient seen resting in bed with head of bed elevated, no s/s or c/o acute difficulties at this time.  Vital signs for last 24 hours Temp:  [35.4 °C (95.7 °F)-37.3 °C (99.1 °F)] 36 °C (96.8 °F)  Heart Rate:  [61-93] 88  Resp:  [16-20] 18  BP: ()/(52-67) 164/67    No intake/output data recorded.  Patient still requiring frequent cardiac and SPO2 monitoring. Continue aggressive pulmonary hygiene and oral hygiene. Off loading as tolerated for skin integrity. Medications and labs reviewed-   Results for orders placed or performed during the hospital encounter of 01/10/25 (from the past 24 hours)   POCT GLUCOSE   Result Value Ref Range    POCT Glucose 170 (H) 74 - 99 mg/dL   POCT GLUCOSE   Result Value Ref Range    POCT Glucose 190 (H) 74 - 99 mg/dL   POCT GLUCOSE   Result Value Ref Range    POCT Glucose 129 (H) 74 - 99 mg/dL   CBC and Auto Differential   Result Value Ref Range    WBC 6.7 4.4 - 11.3 x10*3/uL    nRBC 0.0 0.0 - 0.0 /100 WBCs    RBC 3.32 (L) 4.00 - 5.20 x10*6/uL    Hemoglobin 7.5 (L) 12.0 - 16.0 g/dL    Hematocrit 26.3 (L) 36.0 - 46.0 %    MCV 79 (L) 80 - 100 fL    MCH 22.6 (L) 26.0 - 34.0 pg    MCHC 28.5 (L) 32.0 - 36.0 g/dL    RDW 18.9 (H) 11.5 - 14.5 %    Platelets 262 150 - 450 x10*3/uL    Neutrophils % 74.0 40.0 - 80.0 %    Immature Granulocytes  %, Automated 0.3 0.0 - 0.9 %    Lymphocytes % 15.5 13.0 - 44.0 %    Monocytes % 8.0 2.0 - 10.0 %    Eosinophils % 1.9 0.0 - 6.0 %    Basophils % 0.3 0.0 - 2.0 %    Neutrophils Absolute 4.98 1.60 - 5.50 x10*3/uL    Immature Granulocytes Absolute, Automated 0.02 0.00 - 0.50 x10*3/uL    Lymphocytes Absolute 1.04 0.80 - 3.00 x10*3/uL    Monocytes Absolute 0.54 0.05 - 0.80 x10*3/uL    Eosinophils Absolute 0.13 0.00 - 0.40 x10*3/uL    Basophils Absolute 0.02 0.00 - 0.10 x10*3/uL   Comprehensive Metabolic Panel   Result Value Ref Range    Glucose 132 (H) 74 - 99 mg/dL    Sodium 135 (L) 136 - 145 mmol/L    Potassium 4.3 3.5 - 5.3 mmol/L    Chloride 101 98 - 107 mmol/L    Bicarbonate 27 21 - 32 mmol/L    Anion Gap 11 10 - 20 mmol/L    Urea Nitrogen 12 6 - 23 mg/dL    Creatinine 0.67 0.50 - 1.05 mg/dL    eGFR 84 >60 mL/min/1.73m*2    Calcium 8.3 (L) 8.6 - 10.3 mg/dL    Albumin 2.9 (L) 3.4 - 5.0 g/dL    Alkaline Phosphatase 53 33 - 136 U/L    Total Protein 6.1 (L) 6.4 - 8.2 g/dL    AST 9 9 - 39 U/L    Bilirubin, Total 0.4 0.0 - 1.2 mg/dL    ALT 8 7 - 45 U/L   POCT GLUCOSE   Result Value Ref Range    POCT Glucose 111 (H) 74 - 99 mg/dL      Patient recently received an antibiotic (last 12 hours)       Date/Time Action Medication Dose    01/13/25 2029 Given    cefuroxime (Ceftin) tablet 250 mg 250 mg           Plan discussed with interdisciplinary team, continue current and repeat labs in the AM.     Discharge planning discussed with patient and care team. [facility] at discharge. Patient aware and agreeable to current plan, continue plan as above.     I spent a total of 50 minutes on the date of the service which included preparing to see the patient, face-to-face patient care, completing clinical documentation, obtaining and/or reviewing separately obtained history, performing a medically appropriate examination, counseling and educating the patient/family/caregiver, ordering  medications, tests, or procedures, communicating with other HCPs (not separately reported), independently interpreting results (not separately reported), communicating results to the patient/family/caregiver, and care coordination (not separately reported).        Objective     Last Recorded Vitals  /67 (BP Location: Right arm, Patient Position: Lying)   Pulse 88   Temp 36 °C (96.8 °F) (Temporal)   Resp 18   Wt 83.9 kg (185 lb)   SpO2 94%   Intake/Output last 3 Shifts:    Intake/Output Summary (Last 24 hours) at 1/16/2025 1419  Last data filed at 1/16/2025 0957  Gross per 24 hour   Intake 0 ml   Output 1700 ml   Net -1700 ml       Admission Weight  Weight: 83.9 kg (185 lb) (01/10/25 2242)    Daily Weight  01/16/25 : 83.9 kg (185 lb)    Image Results  Colonoscopy Diagnostic  Table formatting from the original result was not included.  Impression  Diverticulosis of mild severity in the sigmoid colon  Small hemorrhoids  3 subcentimeter polyps in the ascending colon, descending colon and   sigmoid colon were removed with cold snare  Single friable, invasive and ulcerated mass measuring 10 cm in the hepatic   flexure and transverse colon 55 cm from the anal verge; bleeding occurred   before intervention; performed cold forceps biopsy; tattooed distal to the   finding    Findings  Diverticula of mild severity with no inflammation in the sigmoid colon  External and internal small hemorrhoids  Three sessile polyps measuring from 4 mm up to 6 mm in the ascending   colon, descending colon and sigmoid colon; performed cold snare with   complete en bloc removal and retrieved specimen  Single friable, invasive and ulcerated mass measuring 10 cm in the hepatic   flexure and transverse colon 55 cm from the anal verge; bleeding occurred   before intervention; performed cold forceps biopsy; tattooed distal to the   finding with spot. Ulcerated friable mass extending from around the mid   transverse colon to the hepatic  flexure.  The mass extended continuously   from 55 cm to 65 cm from the anal canal.  The mass was circumferential   partially obstructing the bowel lumen.  The pediatric colonoscopy was able   to traverse the mass.       Recommendation  Await pathology results   Check CT chest abdomen and pelvis  Check CEA level       Indication  Anemia, unspecified type    Staff  Staff Role   Macario Vasquez MD Proceduralist     Medications  See Anesthesia Record.     Preprocedure  A history and physical has been performed, and patient medication   allergies have been reviewed. The patient's tolerance of previous   anesthesia has been reviewed. The risks and benefits of the procedure and   the sedation options and risks were discussed with the patient. All   questions were answered and informed consent obtained.    Details of the Procedure  The patient underwent monitored anesthesia care, which was administered by   an anesthesia professional. The patient's blood pressure, ECG, ETCO2,   heart rate, level of consciousness, oxygen and respirations were monitored   throughout the procedure. A digital rectal exam was performed. The scope   was introduced through the anus and advanced to the cecum. Retroflexion   was performed in the rectum. The quality of bowel preparation was   evaluated using the [place] Bowel Preparation Scale with scores of: right   colon = 3, transverse colon = 3, left colon = 3. The total BBPS score was   9. Bowel prep was adequate. The patient experienced no blood loss. The   procedure was not difficult. The patient tolerated the procedure well.   There were no apparent adverse events.     Events  Procedure Events   Event Event Time   ENDO SCOPE IN TIME 1/16/2025  9:15 AM   ENDO CECUM REACHED 1/16/2025  9:25 AM   ENDO SCOPE OUT TIME 1/16/2025  9:52 AM     Specimens  ID Type Source Tests Collected by Time   1 : recto-sigmoid/ descending colon polyps cold snared Tissue RECTUM   POLYPECTOMY SURGICAL PATHOLOGY EXAM Macario  MD Pedro 1/16/2025 0917   2 : cold snared Tissue ASCENDING COLON POLYP SURGICAL PATHOLOGY EXAM Macario Vasquez MD 1/16/2025 0928   3 : cold biopsy Tissue COLON - HEPATIC FLEXURE BIOPSY SURGICAL PATHOLOGY   EXAM Macario Vasquez MD 1/16/2025 0932     Procedure Location  [facility]  [address]  [facility] [address]  [phone]    Referring Provider  Johanna Penn, APRN-CNP    Procedure Provider  MD Johanna Montes      Physical Exam    Relevant Results               Assessment/Plan                  Assessment & Plan  Generalized weakness            Tio Lugo MD   Physician  Internal Medicine     Progress Notes      Signed     Date of Service: 1/13/2025  2:44 PM     Signed       Expand All Collapse All    Melissa Tse is a 89 y.o. female on day 1 of admission presenting with Generalized weakness.           Subjective  Patient fully evaluated  01/12  for    Problem List Items Addressed This Visit                  Symptoms and Signs     * (Principal) Generalized weakness - Primary      Other Visit Diagnoses         Hypoglycemia         Anemia, unspecified type         Relevant Orders     Esophagogastroduodenoscopy (EGD)             Patient seen resting in bed with head of bed elevated, no s/s or c/o acute difficulties at this time.  Vital signs for last 24 hours Temp:  [36.4 °C (97.5 °F)-37.8 °C (100 °F)] 36.4 °C (97.5 °F)  Heart Rate:  [82-98] 88  Resp:  [18-20] 18  BP: (108-133)/(50-59) 118/57    No intake/output data recorded.  Patient still requiring frequent cardiac and SPO2 monitoring. Continue aggressive pulmonary hygiene and oral hygiene. Off loading as tolerated for skin integrity. Medications and labs reviewed-         Results for orders placed or performed during the hospital encounter of 01/10/25 (from the past 24 hours)   POCT GLUCOSE   Result Value Ref Range     POCT Glucose 197 (H) 74 - 99 mg/dL   POCT GLUCOSE   Result Value Ref  Range     POCT Glucose 202 (H) 74 - 99 mg/dL   CBC and Auto Differential   Result Value Ref Range     WBC 8.9 4.4 - 11.3 x10*3/uL     nRBC 0.0 0.0 - 0.0 /100 WBCs     RBC 3.55 (L) 4.00 - 5.20 x10*6/uL     Hemoglobin 8.0 (L) 12.0 - 16.0 g/dL     Hematocrit 27.7 (L) 36.0 - 46.0 %     MCV 78 (L) 80 - 100 fL     MCH 22.5 (L) 26.0 - 34.0 pg     MCHC 28.9 (L) 32.0 - 36.0 g/dL     RDW 18.2 (H) 11.5 - 14.5 %     Platelets 257 150 - 450 x10*3/uL     Neutrophils % 76.5 40.0 - 80.0 %     Immature Granulocytes %, Automated 0.8 0.0 - 0.9 %     Lymphocytes % 12.9 13.0 - 44.0 %     Monocytes % 8.8 2.0 - 10.0 %     Eosinophils % 0.7 0.0 - 6.0 %     Basophils % 0.3 0.0 - 2.0 %     Neutrophils Absolute 6.78 (H) 1.60 - 5.50 x10*3/uL     Immature Granulocytes Absolute, Automated 0.07 0.00 - 0.50 x10*3/uL     Lymphocytes Absolute 1.14 0.80 - 3.00 x10*3/uL     Monocytes Absolute 0.78 0.05 - 0.80 x10*3/uL     Eosinophils Absolute 0.06 0.00 - 0.40 x10*3/uL     Basophils Absolute 0.03 0.00 - 0.10 x10*3/uL   Comprehensive Metabolic Panel   Result Value Ref Range     Glucose 65 (L) 74 - 99 mg/dL     Sodium 135 (L) 136 - 145 mmol/L     Potassium 3.8 3.5 - 5.3 mmol/L     Chloride 99 98 - 107 mmol/L     Bicarbonate 30 21 - 32 mmol/L     Anion Gap 10 10 - 20 mmol/L     Urea Nitrogen 18 6 - 23 mg/dL     Creatinine 0.93 0.50 - 1.05 mg/dL     eGFR 59 (L) >60 mL/min/1.73m*2     Calcium 8.7 8.6 - 10.3 mg/dL     Albumin 3.0 (L) 3.4 - 5.0 g/dL     Alkaline Phosphatase 50 33 - 136 U/L     Total Protein 6.3 (L) 6.4 - 8.2 g/dL     AST 8 (L) 9 - 39 U/L     Bilirubin, Total 0.4 0.0 - 1.2 mg/dL     ALT 5 (L) 7 - 45 U/L   POCT GLUCOSE   Result Value Ref Range     POCT Glucose 68 (L) 74 - 99 mg/dL   Iron and TIBC   Result Value Ref Range     Iron 28 (L) 35 - 150 ug/dL     UIBC 212 110 - 370 ug/dL     TIBC 240 240 - 445 ug/dL     % Saturation 12 (L) 25 - 45 %   Type and Screen   Result Value Ref Range     ABO TYPE A       Rh TYPE POS       ANTIBODY SCREEN NEG      VERIFY ABO/Rh Group Test   Result Value Ref Range     ABO TYPE A       Rh TYPE POS     POCT GLUCOSE   Result Value Ref Range     POCT Glucose 101 (H) 74 - 99 mg/dL      Patient recently received an antibiotic (last 12 hours)         None             Patient c/o of weakness,  b12 levels and nocturnal pulse oximetry ordered. Plan discussed with interdisciplinary team, iron level 27, venofer 300mg IV ordered, weaning oxygen as tolerated, repeat chest xray   No acute events overnight, patient denies chest pain, worsening SOB at this time. continue current and repeat labs in the AM.      Discharge planning discussed with patient and care team. Therapy evaluations ordered. [facility] 12  , anticipate C/SNF at discharge. Patient aware and agreeable to current plan, continue plan as above.      I spent a total of 50 minutes on the date of the service which included preparing to see the patient, face-to-face patient care, completing clinical documentation, obtaining and/or reviewing separately obtained history, performing a medically appropriate examination, counseling and educating the patient/family/caregiver, ordering medications, tests, or procedures, communicating with other HCPs (not separately reported), independently interpreting results (not separately reported), communicating results to the patient/family/caregiver, and care coordination (not separately reported).               Objective  Last Recorded Vitals  /57 (BP Location: Left arm, Patient Position: Lying)   Pulse 88   Temp 36.4 °C (97.5 °F) (Temporal)   Resp 18   Wt 83.9 kg (185 lb)   SpO2 98%   Intake/Output last 3 Shifts:     Intake/Output Summary (Last 24 hours) at 1/13/2025 1444  Last data filed at 1/13/2025 0553      Gross per 24 hour   Intake 812 ml   Output 350 ml   Net 462 ml         Admission Weight  Weight: 83.9 kg (185 lb) (01/10/25 2242)     Daily Weight  01/11/25 : 83.9 kg (185 lb)     Image Results  ECG 12 lead  Normal sinus  rhythm  Low voltage QRS  Cannot rule out Inferior infarct , age undetermined  Possible Anterolateral infarct , age undetermined  Abnormal ECG  No previous ECGs available        Physical Exam     Relevant Results                       Assessment/Plan                       Assessment & Plan  Generalized weakness           Tio Lugo MD   Physician  Internal Medicine     H&P      Signed     Date of Service: 1/11/2025 11:21 AM      Associated Documents  Telemetry Monitoring - Scan on 1/11/2025 1:49 AM   Signed        Expand All Collapse All    History Of Present Illness  Melissa Tse is a 89 y.o. female with medical history of T2DM on insulin, breast cancer on current estrogen therapy who presents to the ED with a fall from standing position.  The patient lowered herself to the ground making cereal around noon and then was on the floor until her son came home around 530.  She said her legs felt weak yesterday but her son was able to get her something to eat in the morning and she felt slightly better.  She then developed weakness again while making cereal this morning.  She said her legs just do not feel that they can hold her up.  She denies pain, chest discomfort, shortness of breath, syncope, head strike or any pain post fall.  States her glucose averages in the high 80s, admits to being a picky eater.  Denies upper respiratory/systemic viral symptoms.  Denies suprapubic pain, urinary frequency/urgency, discomfort with urination.  Denies history of low back pain/trauma, saddle anesthesia, peripheral neuropathy, incontinence.       ED course: Vitals stable.  Vitals notable for hypoglycemia of 67, depressed LFTs, elevated troponin of 98, hemoglobin of 9.5.  Patient treated with D10 for hypoglycemia.     Past Medical History  Medical History   History reviewed. No pertinent past medical history.         Surgical History  Surgical History             Past Surgical History:   Procedure Laterality Date    OTHER  "SURGICAL HISTORY   02/25/2022     Hip replacement            Social History  She reports that she has never smoked. She has never been exposed to tobacco smoke. She has never used smokeless tobacco. She reports that she does not drink alcohol and does not use drugs.     Family History  Family History   No family history on file.         Allergies  Patient has no known allergies.     Review of Systems  10 point ROS negative except as specified in HPI     Physical Exam  HENT:      Head: Normocephalic and atraumatic.      Nose: Nose normal.   Eyes:      Conjunctiva/sclera: Conjunctivae normal.   Cardiovascular:      Rate and Rhythm: Normal rate and regular rhythm.      Pulses: Normal pulses.      Heart sounds: Normal heart sounds.   Pulmonary:      Effort: Pulmonary effort is normal.      Breath sounds: Normal breath sounds.   Abdominal:      General: Abdomen is flat. There is no distension.      Palpations: Abdomen is soft.   Musculoskeletal:         General: No swelling or deformity.   Skin:     General: Skin is warm and dry.   Neurological:      Mental Status: She is alert. Mental status is at baseline.   Psychiatric:         Behavior: Behavior normal.            Last Recorded Vitals  Blood pressure 124/60, pulse 96, temperature 36.2 °C (97.2 °F), temperature source Temporal, resp. rate 22, height 1.626 m (5' 4.02\"), weight 83.9 kg (185 lb), SpO2 94%.     Relevant Results               Results for orders placed or performed during the hospital encounter of 01/10/25 (from the past 24 hours)   CBC and Auto Differential   Result Value Ref Range     WBC 10.2 4.4 - 11.3 x10*3/uL     nRBC 0.0 0.0 - 0.0 /100 WBCs     RBC 4.16 4.00 - 5.20 x10*6/uL     Hemoglobin 9.5 (L) 12.0 - 16.0 g/dL     Hematocrit 31.9 (L) 36.0 - 46.0 %     MCV 77 (L) 80 - 100 fL     MCH 22.8 (L) 26.0 - 34.0 pg     MCHC 29.8 (L) 32.0 - 36.0 g/dL     RDW 18.1 (H) 11.5 - 14.5 %     Platelets 280 150 - 450 x10*3/uL     Neutrophils % 86.4 40.0 - 80.0 %     " Immature Granulocytes %, Automated 0.6 0.0 - 0.9 %     Lymphocytes % 8.2 13.0 - 44.0 %     Monocytes % 4.3 2.0 - 10.0 %     Eosinophils % 0.2 0.0 - 6.0 %     Basophils % 0.3 0.0 - 2.0 %     Neutrophils Absolute 8.83 (H) 1.60 - 5.50 x10*3/uL     Immature Granulocytes Absolute, Automated 0.06 0.00 - 0.50 x10*3/uL     Lymphocytes Absolute 0.84 0.80 - 3.00 x10*3/uL     Monocytes Absolute 0.44 0.05 - 0.80 x10*3/uL     Eosinophils Absolute 0.02 0.00 - 0.40 x10*3/uL     Basophils Absolute 0.03 0.00 - 0.10 x10*3/uL   Comprehensive metabolic panel   Result Value Ref Range     Glucose 67 (L) 74 - 99 mg/dL     Sodium 138 136 - 145 mmol/L     Potassium 3.7 3.5 - 5.3 mmol/L     Chloride 102 98 - 107 mmol/L     Bicarbonate 25 21 - 32 mmol/L     Anion Gap 15 10 - 20 mmol/L     Urea Nitrogen 23 6 - 23 mg/dL     Creatinine 0.67 0.50 - 1.05 mg/dL     eGFR 84 >60 mL/min/1.73m*2     Calcium 9.2 8.6 - 10.3 mg/dL     Albumin 3.5 3.4 - 5.0 g/dL     Alkaline Phosphatase 55 33 - 136 U/L     Total Protein 7.2 6.4 - 8.2 g/dL     AST 8 (L) 9 - 39 U/L     Bilirubin, Total 0.4 0.0 - 1.2 mg/dL     ALT 6 (L) 7 - 45 U/L   Creatine Kinase   Result Value Ref Range     Creatine Kinase 68 0 - 215 U/L   Troponin I, High Sensitivity   Result Value Ref Range     Troponin I, High Sensitivity 98 (HH) 0 - 13 ng/L   Lactate   Result Value Ref Range     Lactate 1.4 0.4 - 2.0 mmol/L   POCT GLUCOSE   Result Value Ref Range     POCT Glucose 53 (L) 74 - 99 mg/dL   Troponin I, High Sensitivity   Result Value Ref Range     Troponin I, High Sensitivity 168 (HH) 0 - 13 ng/L   Urinalysis with Reflex Culture and Microscopic   Result Value Ref Range     Color, Urine Light-Yellow Light-Yellow, Yellow, Dark-Yellow     Appearance, Urine Clear Clear     Specific Gravity, Urine 1.014 1.005 - 1.035     pH, Urine 5.5 5.0, 5.5, 6.0, 6.5, 7.0, 7.5, 8.0     Protein, Urine NEGATIVE NEGATIVE, 10 (TRACE), 20 (TRACE) mg/dL     Glucose, Urine Normal Normal mg/dL     Blood, Urine  NEGATIVE NEGATIVE     Ketones, Urine NEGATIVE NEGATIVE mg/dL     Bilirubin, Urine NEGATIVE NEGATIVE     Urobilinogen, Urine Normal Normal mg/dL     Nitrite, Urine 2+ (A) NEGATIVE     Leukocyte Esterase, Urine NEGATIVE NEGATIVE   Extra Urine Gray Tube   Result Value Ref Range     Extra Tube Hold for add-ons.     Microscopic Only, Urine   Result Value Ref Range     WBC, Urine 1-5 1-5, NONE /HPF     RBC, Urine NONE NONE, 1-2, 3-5 /HPF     Bacteria, Urine 1+ (A) NONE SEEN /HPF     Mucus, Urine FEW Reference range not established. /LPF   Sars-CoV-2 and Influenza A/B PCR   Result Value Ref Range     Flu A Result Not Detected Not Detected     Flu B Result Not Detected Not Detected     Coronavirus 2019, PCR Not Detected Not Detected   POCT GLUCOSE   Result Value Ref Range     POCT Glucose 84 74 - 99 mg/dL   Troponin I, High Sensitivity   Result Value Ref Range     Troponin I, High Sensitivity 214 (HH) 0 - 13 ng/L   Creatine Kinase   Result Value Ref Range     Creatine Kinase 65 0 - 215 U/L   POCT GLUCOSE   Result Value Ref Range     POCT Glucose 84 74 - 99 mg/dL   POCT GLUCOSE   Result Value Ref Range     POCT Glucose 112 (H) 74 - 99 mg/dL   Troponin I, High Sensitivity   Result Value Ref Range     Troponin I, High Sensitivity 222 (HH) 0 - 13 ng/L      No results found.           Assessment/Plan     Assessment & Plan  Generalized weakness     89-year-old female with history of T2DM on insulin presented to the ED with a fall from standing position, lowering himself to the ground and remaining on the floor until family got home.  Patient found to be hypoglycemic and treated with D10.  Patient will be admitted to observation under Dr. Lugo for further evaluation and care.     #Generalized weakness  #Hypoglycemia  #Elevated troponin, 98 with repeat of 168  - Continue D10 started in ED  - Defer consults to attending  - Vitals every 4  hours, telemetry  - Encourage oral hydration  - CK normal, check in a.m.  - Follow UA  -  Follow A1c  - Follow COVID/flu test  - Trend troponin, elevation likely due to type II MI, follow a.m CK level. Denies chest pain, shortness of breath.  - Corrective insulin sliding scale, hypoglycemia protocol, carb consistent diet  - PT/OT/social work     Chronic conditions  #T2DM  #Hypertension  - Continue home meds when reconciled  - Carb consistent diet, corrective insulin sliding scale     Patient fully evaluated    for    Problem List Items Addressed This Visit         * (Principal) Generalized weakness - Primary      Other Visit Diagnoses         Hypoglycemia                 Patient seen resting in bed with head of bed elevated, no s/s or c/o acute difficulties at this time.  Vital signs for last 24 hours Temp:  [35.5 °C (95.9 °F)-37 °C (98.6 °F)] 36.2 °C (97.2 °F)  Heart Rate:  [85-98] 96  Resp:  [16-22] 22  BP: (115-171)/(56-76) 124/60    No intake/output data recorded.  Patient still requiring frequent cardiac and SPO2 monitoring. Continue aggressive pulmonary hygiene and oral hygiene. Off loading as tolerated for skin integrity. Medications and labs reviewed-             Results for orders placed or performed during the hospital encounter of 01/10/25 (from the past 24 hours)   CBC and Auto Differential   Result Value Ref Range     WBC 10.2 4.4 - 11.3 x10*3/uL     nRBC 0.0 0.0 - 0.0 /100 WBCs     RBC 4.16 4.00 - 5.20 x10*6/uL     Hemoglobin 9.5 (L) 12.0 - 16.0 g/dL     Hematocrit 31.9 (L) 36.0 - 46.0 %     MCV 77 (L) 80 - 100 fL     MCH 22.8 (L) 26.0 - 34.0 pg     MCHC 29.8 (L) 32.0 - 36.0 g/dL     RDW 18.1 (H) 11.5 - 14.5 %     Platelets 280 150 - 450 x10*3/uL     Neutrophils % 86.4 40.0 - 80.0 %     Immature Granulocytes %, Automated 0.6 0.0 - 0.9 %     Lymphocytes % 8.2 13.0 - 44.0 %     Monocytes % 4.3 2.0 - 10.0 %     Eosinophils % 0.2 0.0 - 6.0 %     Basophils % 0.3 0.0 - 2.0 %     Neutrophils Absolute 8.83 (H) 1.60 - 5.50 x10*3/uL     Immature Granulocytes Absolute, Automated 0.06 0.00 - 0.50  x10*3/uL     Lymphocytes Absolute 0.84 0.80 - 3.00 x10*3/uL     Monocytes Absolute 0.44 0.05 - 0.80 x10*3/uL     Eosinophils Absolute 0.02 0.00 - 0.40 x10*3/uL     Basophils Absolute 0.03 0.00 - 0.10 x10*3/uL   Comprehensive metabolic panel   Result Value Ref Range     Glucose 67 (L) 74 - 99 mg/dL     Sodium 138 136 - 145 mmol/L     Potassium 3.7 3.5 - 5.3 mmol/L     Chloride 102 98 - 107 mmol/L     Bicarbonate 25 21 - 32 mmol/L     Anion Gap 15 10 - 20 mmol/L     Urea Nitrogen 23 6 - 23 mg/dL     Creatinine 0.67 0.50 - 1.05 mg/dL     eGFR 84 >60 mL/min/1.73m*2     Calcium 9.2 8.6 - 10.3 mg/dL     Albumin 3.5 3.4 - 5.0 g/dL     Alkaline Phosphatase 55 33 - 136 U/L     Total Protein 7.2 6.4 - 8.2 g/dL     AST 8 (L) 9 - 39 U/L     Bilirubin, Total 0.4 0.0 - 1.2 mg/dL     ALT 6 (L) 7 - 45 U/L   Creatine Kinase   Result Value Ref Range     Creatine Kinase 68 0 - 215 U/L   Troponin I, High Sensitivity   Result Value Ref Range     Troponin I, High Sensitivity 98 (HH) 0 - 13 ng/L   Lactate   Result Value Ref Range     Lactate 1.4 0.4 - 2.0 mmol/L   POCT GLUCOSE   Result Value Ref Range     POCT Glucose 53 (L) 74 - 99 mg/dL   Troponin I, High Sensitivity   Result Value Ref Range     Troponin I, High Sensitivity 168 (HH) 0 - 13 ng/L   Urinalysis with Reflex Culture and Microscopic   Result Value Ref Range     Color, Urine Light-Yellow Light-Yellow, Yellow, Dark-Yellow     Appearance, Urine Clear Clear     Specific Gravity, Urine 1.014 1.005 - 1.035     pH, Urine 5.5 5.0, 5.5, 6.0, 6.5, 7.0, 7.5, 8.0     Protein, Urine NEGATIVE NEGATIVE, 10 (TRACE), 20 (TRACE) mg/dL     Glucose, Urine Normal Normal mg/dL     Blood, Urine NEGATIVE NEGATIVE     Ketones, Urine NEGATIVE NEGATIVE mg/dL     Bilirubin, Urine NEGATIVE NEGATIVE     Urobilinogen, Urine Normal Normal mg/dL     Nitrite, Urine 2+ (A) NEGATIVE     Leukocyte Esterase, Urine NEGATIVE NEGATIVE   Extra Urine Gray Tube   Result Value Ref Range     Extra Tube Hold for add-ons.      Microscopic Only, Urine   Result Value Ref Range     WBC, Urine 1-5 1-5, NONE /HPF     RBC, Urine NONE NONE, 1-2, 3-5 /HPF     Bacteria, Urine 1+ (A) NONE SEEN /HPF     Mucus, Urine FEW Reference range not established. /LPF   Sars-CoV-2 and Influenza A/B PCR   Result Value Ref Range     Flu A Result Not Detected Not Detected     Flu B Result Not Detected Not Detected     Coronavirus 2019, PCR Not Detected Not Detected   POCT GLUCOSE   Result Value Ref Range     POCT Glucose 84 74 - 99 mg/dL   Troponin I, High Sensitivity   Result Value Ref Range     Troponin I, High Sensitivity 214 (HH) 0 - 13 ng/L   Creatine Kinase   Result Value Ref Range     Creatine Kinase 65 0 - 215 U/L   POCT GLUCOSE   Result Value Ref Range     POCT Glucose 84 74 - 99 mg/dL   POCT GLUCOSE   Result Value Ref Range     POCT Glucose 112 (H) 74 - 99 mg/dL   Troponin I, High Sensitivity   Result Value Ref Range     Troponin I, High Sensitivity 222 (HH) 0 - 13 ng/L      Patient recently received an antibiotic (last 12 hours)         None             Plan discussed with interdisciplinary team, will continue Rocephin IV for urine positive for 2+ nitrites. WBC 10.2, will repeat labs. Hemoglobin 9.5, iron levels ordered. Strict intake and output. No acute events overnight, continue current and repeat labs in the AM. Therapy evaluations ordered. Home medication reconciliation needed.      Discharge planning discussed with patient and care team.  Anticipate HHC/SNF at discharge. Patient aware and agreeable to current plan, continue plan as above.      I spent a total of 50 minutes on the date of the service which included preparing to see the patient, face-to-face patient care, completing clinical documentation, obtaining and/or reviewing separately obtained history, performing a medically appropriate examination, counseling and educating the patient/family/caregiver, ordering medications, tests, or procedures, communicating with other HCPs (not  separately reported), independently interpreting results (not separately reported), communicating results to the patient/family/caregiver, and care coordination (not separately reported).   I spent 60 minutes in the professional and overall care of this patient.                           Revision History           Patient fully evaluated 1/13  for    Problem List Items Addressed This Visit                  Symptoms and Signs     * (Principal) Generalized weakness - Primary      Other Visit Diagnoses         Hypoglycemia         Anemia, unspecified type         Relevant Orders     Esophagogastroduodenoscopy (EGD)             Patient seen resting in bed with head of bed elevated, no s/s or c/o acute difficulties at this time.  Vital signs for last 24 hours Temp:  [36.4 °C (97.5 °F)-37.8 °C (100 °F)] 36.4 °C (97.5 °F)  Heart Rate:  [82-98] 88  Resp:  [18-20] 18  BP: (108-133)/(50-59) 118/57    No intake/output data recorded.  Patient still requiring frequent cardiac and SPO2 monitoring. Continue aggressive pulmonary hygiene and oral hygiene. Off loading as tolerated for skin integrity. Medications and labs reviewed-         Results for orders placed or performed during the hospital encounter of 01/10/25 (from the past 24 hours)   POCT GLUCOSE   Result Value Ref Range     POCT Glucose 197 (H) 74 - 99 mg/dL   POCT GLUCOSE   Result Value Ref Range     POCT Glucose 202 (H) 74 - 99 mg/dL   CBC and Auto Differential   Result Value Ref Range     WBC 8.9 4.4 - 11.3 x10*3/uL     nRBC 0.0 0.0 - 0.0 /100 WBCs     RBC 3.55 (L) 4.00 - 5.20 x10*6/uL     Hemoglobin 8.0 (L) 12.0 - 16.0 g/dL     Hematocrit 27.7 (L) 36.0 - 46.0 %     MCV 78 (L) 80 - 100 fL     MCH 22.5 (L) 26.0 - 34.0 pg     MCHC 28.9 (L) 32.0 - 36.0 g/dL     RDW 18.2 (H) 11.5 - 14.5 %     Platelets 257 150 - 450 x10*3/uL     Neutrophils % 76.5 40.0 - 80.0 %     Immature Granulocytes %, Automated 0.8 0.0 - 0.9 %     Lymphocytes % 12.9 13.0 - 44.0 %     Monocytes % 8.8  2.0 - 10.0 %     Eosinophils % 0.7 0.0 - 6.0 %     Basophils % 0.3 0.0 - 2.0 %     Neutrophils Absolute 6.78 (H) 1.60 - 5.50 x10*3/uL     Immature Granulocytes Absolute, Automated 0.07 0.00 - 0.50 x10*3/uL     Lymphocytes Absolute 1.14 0.80 - 3.00 x10*3/uL     Monocytes Absolute 0.78 0.05 - 0.80 x10*3/uL     Eosinophils Absolute 0.06 0.00 - 0.40 x10*3/uL     Basophils Absolute 0.03 0.00 - 0.10 x10*3/uL   Comprehensive Metabolic Panel   Result Value Ref Range     Glucose 65 (L) 74 - 99 mg/dL     Sodium 135 (L) 136 - 145 mmol/L     Potassium 3.8 3.5 - 5.3 mmol/L     Chloride 99 98 - 107 mmol/L     Bicarbonate 30 21 - 32 mmol/L     Anion Gap 10 10 - 20 mmol/L     Urea Nitrogen 18 6 - 23 mg/dL     Creatinine 0.93 0.50 - 1.05 mg/dL     eGFR 59 (L) >60 mL/min/1.73m*2     Calcium 8.7 8.6 - 10.3 mg/dL     Albumin 3.0 (L) 3.4 - 5.0 g/dL     Alkaline Phosphatase 50 33 - 136 U/L     Total Protein 6.3 (L) 6.4 - 8.2 g/dL     AST 8 (L) 9 - 39 U/L     Bilirubin, Total 0.4 0.0 - 1.2 mg/dL     ALT 5 (L) 7 - 45 U/L   POCT GLUCOSE   Result Value Ref Range     POCT Glucose 68 (L) 74 - 99 mg/dL   Iron and TIBC   Result Value Ref Range     Iron 28 (L) 35 - 150 ug/dL     UIBC 212 110 - 370 ug/dL     TIBC 240 240 - 445 ug/dL     % Saturation 12 (L) 25 - 45 %   Type and Screen   Result Value Ref Range     ABO TYPE A       Rh TYPE POS       ANTIBODY SCREEN NEG     VERIFY ABO/Rh Group Test   Result Value Ref Range     ABO TYPE A       Rh TYPE POS     POCT GLUCOSE   Result Value Ref Range     POCT Glucose 101 (H) 74 - 99 mg/dL      Patient recently received an antibiotic (last 12 hours)         None             Plan discussed with interdisciplinary team, per PT, patient experiencing decreased strength, endurance, mobility, sensation, impaired balance, and pain with good rehab prognosis. Per GI, plan is to scope patient tomorrow. Occult blood of the stool pending. Will give patient another dose of IV Venofer 300 mg for HARINI. Discontinued IV  Rocephin and started patient on oral Ceftin for UTI. Will continue current and repeat labs in the AM, especially monitoring hemoglobin.     Discharge planning discussed with patient and care team. Therapy evaluations ordered. [facility]-, anticipate HHC/SNF at discharge. Patient aware and agreeable to current plan, continue plan as above.      I spent a total of 50 minutes on the date of the service which included preparing to see the patient, face-to-face patient care, completing clinical documentation, obtaining and/or reviewing separately obtained history, performing a medically appropriate examination, counseling and educating the patient/family/caregiver, ordering medications, tests, or procedures, communicating with other HCPs (not separately reported), independently interpreting results (not separately reported), communicating results to the patient/family/caregiver, and care coordination (not separately reported).         ZACHARIAH ANDINO                      Revision History     Patient fully evaluated    for    Problem List Items Addressed This Visit          Symptoms and Signs    * (Principal) Generalized weakness - Primary    Relevant Orders    Surgical Pathology Exam    Surgical Pathology Exam     Other Visit Diagnoses       Hypoglycemia        Relevant Orders    Surgical Pathology Exam    Surgical Pathology Exam    Anemia, unspecified type        Relevant Orders    Esophagogastroduodenoscopy (EGD) (Completed)    Surgical Pathology Exam    Colonoscopy Diagnostic (Completed)    Surgical Pathology Exam          Patient seen resting in bed with head of bed elevated, no s/s or c/o acute difficulties at this time.  Vital signs for last 24 hours Temp:  [35.4 °C (95.7 °F)-37.3 °C (99.1 °F)] 36 °C (96.8 °F)  Heart Rate:  [61-93] 88  Resp:  [16-20] 18  BP: ()/(52-67) 164/67    No intake/output data recorded.  Patient still requiring frequent cardiac and SPO2 monitoring. Continue aggressive pulmonary hygiene  and oral hygiene. Off loading as tolerated for skin integrity. Medications and labs reviewed-   Results for orders placed or performed during the hospital encounter of 01/10/25 (from the past 24 hours)   POCT GLUCOSE   Result Value Ref Range    POCT Glucose 170 (H) 74 - 99 mg/dL   POCT GLUCOSE   Result Value Ref Range    POCT Glucose 190 (H) 74 - 99 mg/dL   POCT GLUCOSE   Result Value Ref Range    POCT Glucose 129 (H) 74 - 99 mg/dL   CBC and Auto Differential   Result Value Ref Range    WBC 6.7 4.4 - 11.3 x10*3/uL    nRBC 0.0 0.0 - 0.0 /100 WBCs    RBC 3.32 (L) 4.00 - 5.20 x10*6/uL    Hemoglobin 7.5 (L) 12.0 - 16.0 g/dL    Hematocrit 26.3 (L) 36.0 - 46.0 %    MCV 79 (L) 80 - 100 fL    MCH 22.6 (L) 26.0 - 34.0 pg    MCHC 28.5 (L) 32.0 - 36.0 g/dL    RDW 18.9 (H) 11.5 - 14.5 %    Platelets 262 150 - 450 x10*3/uL    Neutrophils % 74.0 40.0 - 80.0 %    Immature Granulocytes %, Automated 0.3 0.0 - 0.9 %    Lymphocytes % 15.5 13.0 - 44.0 %    Monocytes % 8.0 2.0 - 10.0 %    Eosinophils % 1.9 0.0 - 6.0 %    Basophils % 0.3 0.0 - 2.0 %    Neutrophils Absolute 4.98 1.60 - 5.50 x10*3/uL    Immature Granulocytes Absolute, Automated 0.02 0.00 - 0.50 x10*3/uL    Lymphocytes Absolute 1.04 0.80 - 3.00 x10*3/uL    Monocytes Absolute 0.54 0.05 - 0.80 x10*3/uL    Eosinophils Absolute 0.13 0.00 - 0.40 x10*3/uL    Basophils Absolute 0.02 0.00 - 0.10 x10*3/uL   Comprehensive Metabolic Panel   Result Value Ref Range    Glucose 132 (H) 74 - 99 mg/dL    Sodium 135 (L) 136 - 145 mmol/L    Potassium 4.3 3.5 - 5.3 mmol/L    Chloride 101 98 - 107 mmol/L    Bicarbonate 27 21 - 32 mmol/L    Anion Gap 11 10 - 20 mmol/L    Urea Nitrogen 12 6 - 23 mg/dL    Creatinine 0.67 0.50 - 1.05 mg/dL    eGFR 84 >60 mL/min/1.73m*2    Calcium 8.3 (L) 8.6 - 10.3 mg/dL    Albumin 2.9 (L) 3.4 - 5.0 g/dL    Alkaline Phosphatase 53 33 - 136 U/L    Total Protein 6.1 (L) 6.4 - 8.2 g/dL    AST 9 9 - 39 U/L    Bilirubin, Total 0.4 0.0 - 1.2 mg/dL    ALT 8 7 - 45 U/L    POCT GLUCOSE   Result Value Ref Range    POCT Glucose 111 (H) 74 - 99 mg/dL      Patient recently received an antibiotic (last 12 hours)       Date/Time Action Medication Dose    01/13/25 2029 Given    cefuroxime (Ceftin) tablet 250 mg 250 mg           Plan discussed with interdisciplinary team, per [facility]. Will watch for facility acceptance and start precert. continue current and repeat labs in the AM.     Discharge planning discussed with patient and care team. Therapy evaluations ordered. [facility] -10  , anticipate HHC/SNF at discharge.  Per [facility] and [facility]. Will watch for facility acceptance and start precert. Patient aware and agreeable to current plan, continue plan as above.     I spent a total of 50 minutes on the date of the service which included preparing to see the patient, face-to-face patient care, completing clinical documentation, obtaining and/or reviewing separately obtained history, performing a medically appropriate examination, counseling and educating the patient/family/caregiver, ordering medications, tests, or procedures, communicating with other HCPs (not separately reported), independently interpreting results (not separately reported), communicating results to the patient/family/caregiver, and care coordination (not separately reported).      No acute events overnight, patient denie. Seen by jannet Ramey chest pain, worsening SOB at this time.       Patient fully evaluated 1/15  for    Problem List Items Addressed This Visit          Symptoms and Signs    * (Principal) Generalized weakness - Primary    Relevant Orders    Surgical Pathology Exam    Surgical Pathology Exam     Other Visit Diagnoses       Hypoglycemia        Relevant Orders    Surgical Pathology Exam    Surgical Pathology Exam    Anemia, unspecified type        Relevant Orders    Esophagogastroduodenoscopy (EGD) (Completed)    Surgical  Pathology Exam    Colonoscopy Diagnostic (Completed)    Surgical Pathology Exam          Patient seen resting in bed with head of bed elevated, no s/s or c/o acute difficulties at this time.  Vital signs for last 24 hours Temp:  [35.4 °C (95.7 °F)-37.3 °C (99.1 °F)] 36 °C (96.8 °F)  Heart Rate:  [61-93] 88  Resp:  [16-20] 18  BP: ()/(52-67) 164/67    No intake/output data recorded.  Patient still requiring frequent cardiac and SPO2 monitoring. Continue aggressive pulmonary hygiene and oral hygiene. Off loading as tolerated for skin integrity. Medications and labs reviewed-   Results for orders placed or performed during the hospital encounter of 01/10/25 (from the past 24 hours)   POCT GLUCOSE   Result Value Ref Range    POCT Glucose 170 (H) 74 - 99 mg/dL   POCT GLUCOSE   Result Value Ref Range    POCT Glucose 190 (H) 74 - 99 mg/dL   POCT GLUCOSE   Result Value Ref Range    POCT Glucose 129 (H) 74 - 99 mg/dL   CBC and Auto Differential   Result Value Ref Range    WBC 6.7 4.4 - 11.3 x10*3/uL    nRBC 0.0 0.0 - 0.0 /100 WBCs    RBC 3.32 (L) 4.00 - 5.20 x10*6/uL    Hemoglobin 7.5 (L) 12.0 - 16.0 g/dL    Hematocrit 26.3 (L) 36.0 - 46.0 %    MCV 79 (L) 80 - 100 fL    MCH 22.6 (L) 26.0 - 34.0 pg    MCHC 28.5 (L) 32.0 - 36.0 g/dL    RDW 18.9 (H) 11.5 - 14.5 %    Platelets 262 150 - 450 x10*3/uL    Neutrophils % 74.0 40.0 - 80.0 %    Immature Granulocytes %, Automated 0.3 0.0 - 0.9 %    Lymphocytes % 15.5 13.0 - 44.0 %    Monocytes % 8.0 2.0 - 10.0 %    Eosinophils % 1.9 0.0 - 6.0 %    Basophils % 0.3 0.0 - 2.0 %    Neutrophils Absolute 4.98 1.60 - 5.50 x10*3/uL    Immature Granulocytes Absolute, Automated 0.02 0.00 - 0.50 x10*3/uL    Lymphocytes Absolute 1.04 0.80 - 3.00 x10*3/uL    Monocytes Absolute 0.54 0.05 - 0.80 x10*3/uL    Eosinophils Absolute 0.13 0.00 - 0.40 x10*3/uL    Basophils Absolute 0.02 0.00 - 0.10 x10*3/uL   Comprehensive Metabolic Panel   Result Value Ref Range    Glucose 132 (H) 74 - 99 mg/dL     Sodium 135 (L) 136 - 145 mmol/L    Potassium 4.3 3.5 - 5.3 mmol/L    Chloride 101 98 - 107 mmol/L    Bicarbonate 27 21 - 32 mmol/L    Anion Gap 11 10 - 20 mmol/L    Urea Nitrogen 12 6 - 23 mg/dL    Creatinine 0.67 0.50 - 1.05 mg/dL    eGFR 84 >60 mL/min/1.73m*2    Calcium 8.3 (L) 8.6 - 10.3 mg/dL    Albumin 2.9 (L) 3.4 - 5.0 g/dL    Alkaline Phosphatase 53 33 - 136 U/L    Total Protein 6.1 (L) 6.4 - 8.2 g/dL    AST 9 9 - 39 U/L    Bilirubin, Total 0.4 0.0 - 1.2 mg/dL    ALT 8 7 - 45 U/L   POCT GLUCOSE   Result Value Ref Range    POCT Glucose 111 (H) 74 - 99 mg/dL      Patient recently received an antibiotic (last 12 hours)       Date/Time Action Medication Dose    01/15/25 0904 Given    cefuroxime (Ceftin) tablet 250 mg 250 mg           Plan discussed with interdisciplinary team, per Gastroenterology, EGD did not show any abnormalities to explain the anemia and colonoscopy is recommended. Discussed staying here for colonoscopy. Patient hesitant about using Golytely bowel prep but agrees to needing colonoscopy, would appreciate input from Gastroenterology on specific bowel prep. Patient will consider her options. Will continue current and repeat labs in the AM, especially monitoring her Iron deficiency anemia.     Discharge planning discussed with patient and care team. Therapy evaluations ordered. [facility]-12, anticipate HHC/SNF at discharge. Patient aware and agreeable to current plan, continue plan as above.     I spent a total of 50 minutes on the date of the service which included preparing to see the patient, face-to-face patient care, completing clinical documentation, obtaining and/or reviewing separately obtained history, performing a medically appropriate examination, counseling and educating the patient/family/caregiver, ordering medications, tests, or procedures, communicating with other HCPs (not separately reported), independently interpreting results (not separately reported), communicating results to the  patient/family/caregiver, and care coordination (not separately reported).     Patient fully evaluated 1/16  for    Problem List Items Addressed This Visit          Symptoms and Signs    * (Principal) Generalized weakness - Primary    Relevant Orders    Surgical Pathology Exam    Surgical Pathology Exam     Other Visit Diagnoses       Hypoglycemia        Relevant Orders    Surgical Pathology Exam    Surgical Pathology Exam    Anemia, unspecified type        Relevant Orders    Esophagogastroduodenoscopy (EGD) (Completed)    Surgical Pathology Exam    Colonoscopy Diagnostic (Completed)    Surgical Pathology Exam          Patient seen resting in bed with head of bed elevated, no s/s or c/o acute difficulties at this time.  Vital signs for last 24 hours Temp:  [35.4 °C (95.7 °F)-37.3 °C (99.1 °F)] 36 °C (96.8 °F)  Heart Rate:  [61-93] 88  Resp:  [16-20] 18  BP: ()/(52-67) 164/67    No intake/output data recorded.  Patient still requiring frequent cardiac and SPO2 monitoring. Continue aggressive pulmonary hygiene and oral hygiene. Off loading as tolerated for skin integrity. Medications and labs reviewed-   Results for orders placed or performed during the hospital encounter of 01/10/25 (from the past 24 hours)   POCT GLUCOSE   Result Value Ref Range    POCT Glucose 170 (H) 74 - 99 mg/dL   POCT GLUCOSE   Result Value Ref Range    POCT Glucose 190 (H) 74 - 99 mg/dL   POCT GLUCOSE   Result Value Ref Range    POCT Glucose 129 (H) 74 - 99 mg/dL   CBC and Auto Differential   Result Value Ref Range    WBC 6.7 4.4 - 11.3 x10*3/uL    nRBC 0.0 0.0 - 0.0 /100 WBCs    RBC 3.32 (L) 4.00 - 5.20 x10*6/uL    Hemoglobin 7.5 (L) 12.0 - 16.0 g/dL    Hematocrit 26.3 (L) 36.0 - 46.0 %    MCV 79 (L) 80 - 100 fL    MCH 22.6 (L) 26.0 - 34.0 pg    MCHC 28.5 (L) 32.0 - 36.0 g/dL    RDW 18.9 (H) 11.5 - 14.5 %    Platelets 262 150 - 450 x10*3/uL    Neutrophils % 74.0 40.0 - 80.0 %    Immature Granulocytes %, Automated 0.3 0.0 - 0.9 %     Lymphocytes % 15.5 13.0 - 44.0 %    Monocytes % 8.0 2.0 - 10.0 %    Eosinophils % 1.9 0.0 - 6.0 %    Basophils % 0.3 0.0 - 2.0 %    Neutrophils Absolute 4.98 1.60 - 5.50 x10*3/uL    Immature Granulocytes Absolute, Automated 0.02 0.00 - 0.50 x10*3/uL    Lymphocytes Absolute 1.04 0.80 - 3.00 x10*3/uL    Monocytes Absolute 0.54 0.05 - 0.80 x10*3/uL    Eosinophils Absolute 0.13 0.00 - 0.40 x10*3/uL    Basophils Absolute 0.02 0.00 - 0.10 x10*3/uL   Comprehensive Metabolic Panel   Result Value Ref Range    Glucose 132 (H) 74 - 99 mg/dL    Sodium 135 (L) 136 - 145 mmol/L    Potassium 4.3 3.5 - 5.3 mmol/L    Chloride 101 98 - 107 mmol/L    Bicarbonate 27 21 - 32 mmol/L    Anion Gap 11 10 - 20 mmol/L    Urea Nitrogen 12 6 - 23 mg/dL    Creatinine 0.67 0.50 - 1.05 mg/dL    eGFR 84 >60 mL/min/1.73m*2    Calcium 8.3 (L) 8.6 - 10.3 mg/dL    Albumin 2.9 (L) 3.4 - 5.0 g/dL    Alkaline Phosphatase 53 33 - 136 U/L    Total Protein 6.1 (L) 6.4 - 8.2 g/dL    AST 9 9 - 39 U/L    Bilirubin, Total 0.4 0.0 - 1.2 mg/dL    ALT 8 7 - 45 U/L   POCT GLUCOSE   Result Value Ref Range    POCT Glucose 111 (H) 74 - 99 mg/dL      Patient recently received an antibiotic (last 12 hours)       Date/Time Action Medication Dose    01/16/25 1115 Given    cefuroxime (Ceftin) tablet 250 mg 250 mg           Plan discussed with interdisciplinary team, patient underwent colonoscopy today for anemia. Per gastroenterology, diverticula of mild severity with no inflammation in the sigmoid colon, external and internal hemorrhoids present, three sessile polyps 4 cm up to 6 cm in the ascending colon, descending colon and sigmoid colon; performed cold snare with complete en bloc removal and retrieved specimen. Ulcerated friable mass extending from around the mid transverse colon to the hepatic flexure. The mass extended continuously from 55 cm to 65 cm from the anal canal. The mass was circumferential partially obstructing the bowel lumen. The pediatric colonoscopy  was able to traverse the mass. A biopsy was performed on the mass, awaiting results. Will also review results of CEA.     Patient's hemoglobin today dropped from 7.8 to 7.5. Patient receiving IV Venofer 300 mg in sodium chloride 0.9% 282 mL. Will continue current and repeat labs in the AM.     Discharge planning discussed with patient and care team. Therapy evaluations ordered. [facility]-12, anticipate HHC/SNF at discharge. Patient aware and agreeable to current plan, continue plan as above.     I spent a total of 50 minutes on the date of the service which included preparing to see the patient, face-to-face patient care, completing clinical documentation, obtaining and/or reviewing separately obtained history, performing a medically appropriate examination, counseling and educating the patient/family/caregiver, ordering medications, tests, or procedures, communicating with other HCPs (not separately reported), independently interpreting results (not separately reported), communicating results to the patient/family/caregiver, and care coordination (not separately reported).     ZACHARIAH ANDINO

## 2025-01-16 NOTE — PROGRESS NOTES
1/16/2025  Per [facility] has pre-cert (received on 1/13 and good through 1/20).   Will notify MD. Doreen Dickey RN [facility]

## 2025-01-16 NOTE — ANESTHESIA POSTPROCEDURE EVALUATION
Patient: Melissa Tse    Procedure Summary       Date: 01/16/25 Room / Location: [facility]    Anesthesia Start: 0901 Anesthesia Stop: 1005    Procedure: COLONOSCOPY Diagnosis: Anemia, unspecified type    Scheduled Providers: Macario Vasquez MD Responsible Provider: David Medeiros MD    Anesthesia Type: MAC ASA Status: 3            Anesthesia Type: MAC    Vitals Value Taken Time   /53 01/16/25 1017   Temp 37.3 °C (99.1 °F) 01/16/25 0959   Pulse 69 01/16/25 1022   Resp 26 01/16/25 1022   SpO2 95 % 01/16/25 1022   Vitals shown include unfiled device data.    Anesthesia Post Evaluation    Patient location during evaluation: PACU  Patient participation: waiting for patient participation  Level of consciousness: awake  Pain score: 0  Pain management: adequate  Airway patency: patent  Cardiovascular status: acceptable and hemodynamically stable  Respiratory status: nasal cannula  Hydration status: acceptable  Postoperative Nausea and Vomiting: none        There were no known notable events for this encounter.

## 2025-01-16 NOTE — PROGRESS NOTES
Physical Therapy                 Therapy Communication Note    Patient Name: Melissa Tse  MRN: [id number]  Department: [facility] 3  Room: [address] [place] [address]  Today's Date: 1/16/2025     Discipline: Physical Therapy    PT Missed Visit: Yes     Missed Visit Reason: Missed Visit Reason:  (PT treatment attempted. Pt off of floor for colonscopy. Will continue to follow.)    Missed Time: Attempt

## 2025-01-16 NOTE — DISCHARGE INSTRUCTIONS

## 2025-01-16 NOTE — ANESTHESIA PREPROCEDURE EVALUATION
Patient: Melissa Tse    Procedure Information       Date/Time: 01/16/25 0850    Scheduled providers: Macario Vasquez MD    Procedure: COLONOSCOPY    Location: [facility]            Relevant Problems   Neuro   (+) Type 2 diabetes mellitus with polyneuropathy      Endocrine   (+) Type 2 diabetes mellitus with polyneuropathy      Hematology   (+) Iron deficiency anemia, unspecified      GYN   (+) Invasive lobular carcinoma of right breast in female       Clinical information reviewed:   Tobacco  Allergies  Meds  Problems  Med Hx  Surg Hx  OB Status    Fam Hx  Soc Hx        NPO Detail:  NPO/Void Status  Date of Last Liquid: 01/16/25  Time of Last Liquid: 0400 (prep)  Date of Last Solid: 01/14/25         Physical Exam    Airway  Mallampati: II  TM distance: >3 FB  Neck ROM: full     Cardiovascular - normal exam  Rhythm: regular  Rate: normal     Dental - normal exam  (+) upper dentures, lower dentures     Pulmonary   Breath sounds clear to auscultation  (+) decreased breath sounds     Abdominal      Other findings: Patient on 3L nc          Anesthesia Plan    History of general anesthesia?: yes  History of complications of general anesthesia?: no    ASA 3     MAC     The patient is not a current smoker.    intravenous induction   Postoperative administration of opioids is intended.  Trial extubation is planned.  Anesthetic plan and risks discussed with patient.  Use of blood products discussed with patient who consented to blood products.    Plan discussed with CRNA.

## 2025-01-16 NOTE — PROGRESS NOTES
1/16/2025  Followed up with pt and daughter in room, introduced self and explained role.  Verified discharge plan-- [facility] [place].    Spoke with Dr Lugo- he was updated on plan and informed we have auth. He stated possible discharge for tomorrow.   CT team will continue to follow.   Doreen Dickey Rn [facility]

## 2025-01-16 NOTE — CARE PLAN
The patient's goals for the shift include      The clinical goals for the shift include pt will remain safe and free from falls and injury    Over the shift, the patient did make progress toward the following goals.

## 2025-01-17 ENCOUNTER — APPOINTMENT (OUTPATIENT)
Dept: RADIOLOGY | Facility: HOSPITAL | Age: 89
DRG: 374 | End: 2025-01-17
Payer: MEDICARE

## 2025-01-17 LAB
ALBUMIN SERPL BCP-MCNC: 2.9 G/DL (ref 3.4–5)
ALP SERPL-CCNC: 55 U/L (ref 33–136)
ALT SERPL W P-5'-P-CCNC: 6 U/L (ref 7–45)
ANION GAP SERPL CALC-SCNC: 11 MMOL/L (ref 10–20)
AST SERPL W P-5'-P-CCNC: 7 U/L (ref 9–39)
BASOPHILS # BLD AUTO: 0.02 X10*3/UL (ref 0–0.1)
BASOPHILS NFR BLD AUTO: 0.3 %
BILIRUB SERPL-MCNC: 0.3 MG/DL (ref 0–1.2)
BUN SERPL-MCNC: 12 MG/DL (ref 6–23)
CALCIUM SERPL-MCNC: 8.5 MG/DL (ref 8.6–10.3)
CEA SERPL-MCNC: 61.1 UG/L
CHLORIDE SERPL-SCNC: 102 MMOL/L (ref 98–107)
CO2 SERPL-SCNC: 27 MMOL/L (ref 21–32)
CREAT SERPL-MCNC: 0.66 MG/DL (ref 0.5–1.05)
EGFRCR SERPLBLD CKD-EPI 2021: 84 ML/MIN/1.73M*2
EOSINOPHIL # BLD AUTO: 0.11 X10*3/UL (ref 0–0.4)
EOSINOPHIL NFR BLD AUTO: 1.5 %
ERYTHROCYTE [DISTWIDTH] IN BLOOD BY AUTOMATED COUNT: 19.2 % (ref 11.5–14.5)
GLUCOSE BLD MANUAL STRIP-MCNC: 103 MG/DL (ref 74–99)
GLUCOSE BLD MANUAL STRIP-MCNC: 110 MG/DL (ref 74–99)
GLUCOSE BLD MANUAL STRIP-MCNC: 124 MG/DL (ref 74–99)
GLUCOSE BLD MANUAL STRIP-MCNC: 155 MG/DL (ref 74–99)
GLUCOSE BLD MANUAL STRIP-MCNC: 258 MG/DL (ref 74–99)
GLUCOSE BLD MANUAL STRIP-MCNC: 66 MG/DL (ref 74–99)
GLUCOSE SERPL-MCNC: 57 MG/DL (ref 74–99)
HCT VFR BLD AUTO: 27.6 % (ref 36–46)
HGB BLD-MCNC: 7.9 G/DL (ref 12–16)
IMM GRANULOCYTES # BLD AUTO: 0.04 X10*3/UL (ref 0–0.5)
IMM GRANULOCYTES NFR BLD AUTO: 0.5 % (ref 0–0.9)
LAB AP ASR DISCLAIMER: NORMAL
LABORATORY COMMENT REPORT: NORMAL
LYMPHOCYTES # BLD AUTO: 1.18 X10*3/UL (ref 0.8–3)
LYMPHOCYTES NFR BLD AUTO: 15.7 %
MCH RBC QN AUTO: 22.6 PG (ref 26–34)
MCHC RBC AUTO-ENTMCNC: 28.6 G/DL (ref 32–36)
MCV RBC AUTO: 79 FL (ref 80–100)
MONOCYTES # BLD AUTO: 0.57 X10*3/UL (ref 0.05–0.8)
MONOCYTES NFR BLD AUTO: 7.6 %
NEUTROPHILS # BLD AUTO: 5.6 X10*3/UL (ref 1.6–5.5)
NEUTROPHILS NFR BLD AUTO: 74.4 %
NRBC BLD-RTO: 0 /100 WBCS (ref 0–0)
PATH REPORT.COMMENTS IMP SPEC: NORMAL
PATH REPORT.FINAL DX SPEC: NORMAL
PATH REPORT.GROSS SPEC: NORMAL
PATH REPORT.RELEVANT HX SPEC: NORMAL
PATH REPORT.TOTAL CANCER: NORMAL
PLATELET # BLD AUTO: 266 X10*3/UL (ref 150–450)
POTASSIUM SERPL-SCNC: 3.9 MMOL/L (ref 3.5–5.3)
PROT SERPL-MCNC: 6.3 G/DL (ref 6.4–8.2)
RBC # BLD AUTO: 3.5 X10*6/UL (ref 4–5.2)
SODIUM SERPL-SCNC: 136 MMOL/L (ref 136–145)
URATE SERPL-MCNC: 7.1 MG/DL (ref 2.3–6.7)
WBC # BLD AUTO: 7.5 X10*3/UL (ref 4.4–11.3)

## 2025-01-17 PROCEDURE — 84550 ASSAY OF BLOOD/URIC ACID: CPT | Performed by: INTERNAL MEDICINE

## 2025-01-17 PROCEDURE — 2500000004 HC RX 250 GENERAL PHARMACY W/ HCPCS (ALT 636 FOR OP/ED): Performed by: STUDENT IN AN ORGANIZED HEALTH CARE EDUCATION/TRAINING PROGRAM

## 2025-01-17 PROCEDURE — 74177 CT ABD & PELVIS W/CONTRAST: CPT | Performed by: RADIOLOGY

## 2025-01-17 PROCEDURE — 36415 COLL VENOUS BLD VENIPUNCTURE: CPT | Performed by: STUDENT IN AN ORGANIZED HEALTH CARE EDUCATION/TRAINING PROGRAM

## 2025-01-17 PROCEDURE — 2500000001 HC RX 250 WO HCPCS SELF ADMINISTERED DRUGS (ALT 637 FOR MEDICARE OP): Performed by: INTERNAL MEDICINE

## 2025-01-17 PROCEDURE — 82947 ASSAY GLUCOSE BLOOD QUANT: CPT

## 2025-01-17 PROCEDURE — 85025 COMPLETE CBC W/AUTO DIFF WBC: CPT | Performed by: INTERNAL MEDICINE

## 2025-01-17 PROCEDURE — 97535 SELF CARE MNGMENT TRAINING: CPT | Mod: GO

## 2025-01-17 PROCEDURE — 97530 THERAPEUTIC ACTIVITIES: CPT | Mod: GP,CQ

## 2025-01-17 PROCEDURE — 2500000001 HC RX 250 WO HCPCS SELF ADMINISTERED DRUGS (ALT 637 FOR MEDICARE OP)

## 2025-01-17 PROCEDURE — 80053 COMPREHEN METABOLIC PANEL: CPT | Performed by: INTERNAL MEDICINE

## 2025-01-17 PROCEDURE — 82378 CARCINOEMBRYONIC ANTIGEN: CPT | Mod: PARLAB | Performed by: STUDENT IN AN ORGANIZED HEALTH CARE EDUCATION/TRAINING PROGRAM

## 2025-01-17 PROCEDURE — 2500000004 HC RX 250 GENERAL PHARMACY W/ HCPCS (ALT 636 FOR OP/ED)

## 2025-01-17 PROCEDURE — 1200000002 HC GENERAL ROOM WITH TELEMETRY DAILY

## 2025-01-17 PROCEDURE — 2500000004 HC RX 250 GENERAL PHARMACY W/ HCPCS (ALT 636 FOR OP/ED): Performed by: INTERNAL MEDICINE

## 2025-01-17 PROCEDURE — 71260 CT THORAX DX C+: CPT

## 2025-01-17 PROCEDURE — 2550000001 HC RX 255 CONTRASTS: Performed by: INTERNAL MEDICINE

## 2025-01-17 PROCEDURE — 99222 1ST HOSP IP/OBS MODERATE 55: CPT | Performed by: SURGERY

## 2025-01-17 PROCEDURE — 71260 CT THORAX DX C+: CPT | Performed by: RADIOLOGY

## 2025-01-17 PROCEDURE — 2500000002 HC RX 250 W HCPCS SELF ADMINISTERED DRUGS (ALT 637 FOR MEDICARE OP, ALT 636 FOR OP/ED): Performed by: INTERNAL MEDICINE

## 2025-01-17 PROCEDURE — 97530 THERAPEUTIC ACTIVITIES: CPT | Mod: GO

## 2025-01-17 RX ORDER — AMOXICILLIN 250 MG
1 CAPSULE ORAL NIGHTLY
Status: DISCONTINUED | OUTPATIENT
Start: 2025-01-17 | End: 2025-01-18 | Stop reason: HOSPADM

## 2025-01-17 RX ORDER — POLYETHYLENE GLYCOL 3350 17 G/17G
17 POWDER, FOR SOLUTION ORAL DAILY
Status: DISCONTINUED | OUTPATIENT
Start: 2025-01-17 | End: 2025-01-18 | Stop reason: HOSPADM

## 2025-01-17 RX ADMIN — INSULIN GLARGINE 32 UNITS: 100 INJECTION, SOLUTION SUBCUTANEOUS at 21:25

## 2025-01-17 RX ADMIN — SENNOSIDES AND DOCUSATE SODIUM 1 TABLET: 50; 8.6 TABLET ORAL at 21:25

## 2025-01-17 RX ADMIN — IRON SUCROSE 300 MG: 20 INJECTION, SOLUTION INTRAVENOUS at 14:01

## 2025-01-17 RX ADMIN — CEFUROXIME AXETIL 250 MG: 250 TABLET, FILM COATED ORAL at 21:25

## 2025-01-17 RX ADMIN — ACETAMINOPHEN 650 MG: 325 TABLET, FILM COATED ORAL at 05:38

## 2025-01-17 RX ADMIN — AMLODIPINE BESYLATE 2.5 MG: 5 TABLET ORAL at 09:39

## 2025-01-17 RX ADMIN — LISINOPRIL 10 MG: 10 TABLET ORAL at 09:39

## 2025-01-17 RX ADMIN — ENOXAPARIN SODIUM 40 MG: 40 INJECTION SUBCUTANEOUS at 21:25

## 2025-01-17 RX ADMIN — IOHEXOL 75 ML: 350 INJECTION, SOLUTION INTRAVENOUS at 13:55

## 2025-01-17 RX ADMIN — CEFUROXIME AXETIL 250 MG: 250 TABLET, FILM COATED ORAL at 09:39

## 2025-01-17 RX ADMIN — ESTRADIOL 1 MG: 1 TABLET ORAL at 09:39

## 2025-01-17 RX ADMIN — POLYETHYLENE GLYCOL 3350 17 G: 17 POWDER, FOR SOLUTION ORAL at 14:01

## 2025-01-17 ASSESSMENT — PAIN DESCRIPTION - LOCATION: LOCATION: OTHER (COMMENT)

## 2025-01-17 ASSESSMENT — COGNITIVE AND FUNCTIONAL STATUS - GENERAL
TOILETING: A LITTLE
PERSONAL GROOMING: A LITTLE
STANDING UP FROM CHAIR USING ARMS: A LOT
HELP NEEDED FOR BATHING: A LITTLE
MOBILITY SCORE: 10
MOVING FROM LYING ON BACK TO SITTING ON SIDE OF FLAT BED WITH BEDRAILS: A LOT
DRESSING REGULAR LOWER BODY CLOTHING: TOTAL
CLIMB 3 TO 5 STEPS WITH RAILING: A LOT
WALKING IN HOSPITAL ROOM: TOTAL
TOILETING: TOTAL
WALKING IN HOSPITAL ROOM: A LOT
MOVING FROM LYING ON BACK TO SITTING ON SIDE OF FLAT BED WITH BEDRAILS: A LOT
PERSONAL GROOMING: A LITTLE
CLIMB 3 TO 5 STEPS WITH RAILING: A LOT
TURNING FROM BACK TO SIDE WHILE IN FLAT BAD: A LOT
DAILY ACTIVITIY SCORE: 19
DRESSING REGULAR UPPER BODY CLOTHING: A LITTLE
DAILY ACTIVITIY SCORE: 19
DAILY ACTIVITIY SCORE: 12
TOILETING: A LITTLE
MOBILITY SCORE: 12
TURNING FROM BACK TO SIDE WHILE IN FLAT BAD: A LOT
DRESSING REGULAR LOWER BODY CLOTHING: A LITTLE
MOVING TO AND FROM BED TO CHAIR: A LOT
DRESSING REGULAR UPPER BODY CLOTHING: A LITTLE
STANDING UP FROM CHAIR USING ARMS: A LOT
CLIMB 3 TO 5 STEPS WITH RAILING: TOTAL
HELP NEEDED FOR BATHING: A LITTLE
WALKING IN HOSPITAL ROOM: A LOT
MOVING TO AND FROM BED TO CHAIR: A LOT
MOVING TO AND FROM BED TO CHAIR: A LOT
HELP NEEDED FOR BATHING: A LOT
TURNING FROM BACK TO SIDE WHILE IN FLAT BAD: A LOT
MOVING FROM LYING ON BACK TO SITTING ON SIDE OF FLAT BED WITH BEDRAILS: A LOT
STANDING UP FROM CHAIR USING ARMS: A LOT
PERSONAL GROOMING: A LITTLE
MOBILITY SCORE: 12
DRESSING REGULAR LOWER BODY CLOTHING: A LITTLE
DRESSING REGULAR UPPER BODY CLOTHING: A LOT
EATING MEALS: A LITTLE

## 2025-01-17 ASSESSMENT — PAIN DESCRIPTION - ORIENTATION: ORIENTATION: RIGHT;LEFT

## 2025-01-17 ASSESSMENT — PAIN - FUNCTIONAL ASSESSMENT
PAIN_FUNCTIONAL_ASSESSMENT: 0-10
PAIN_FUNCTIONAL_ASSESSMENT: WONG-BAKER FACES
PAIN_FUNCTIONAL_ASSESSMENT: 0-10

## 2025-01-17 ASSESSMENT — PAIN SCALES - GENERAL
PAINLEVEL_OUTOF10: 8
PAINLEVEL_OUTOF10: 0 - NO PAIN
PAINLEVEL_OUTOF10: 8
PAINLEVEL_OUTOF10: 3
PAINLEVEL_OUTOF10: 0 - NO PAIN
PAINLEVEL_OUTOF10: 8

## 2025-01-17 ASSESSMENT — ACTIVITIES OF DAILY LIVING (ADL): HOME_MANAGEMENT_TIME_ENTRY: 11

## 2025-01-17 NOTE — CONSULTS
Reason For Consult transverse colon mass with anemia      History Of Present Illness  Melissa Tse is a 89 y.o. female presenting with generalized weakness to [place] emergency room on January 10.  She was found to to be anemic with low MCV.  She underwent an EGD yesterday which demonstrated a large ulcerated mass in the transverse colon approximately 6 cm's in length extending to the hepatic flexure.  Patient was doing well was a community well her until she developed generalized weakness.  She has had constipation for the last several weeks.  She denies any blood or mucus per rectum.  She had a CT about 10 years ago.  She has a history of diabetes and hypertension.  She was diagnosed with a right breast cancer approximately 2 years ago and is being treated with hormonal therapy only.     Past Medical History  She has no past medical history on file.    Surgical History  She has a past surgical history that includes Other surgical history (02/25/2022).     Social History  She reports that she has never smoked. She has never been exposed to tobacco smoke. She has never used smokeless tobacco. She reports that she does not drink alcohol and does not use drugs.    Family History  No family history on file.     Allergies  Penicillin and Statins-hmg-coa reductase inhibitors    Review of Systems  10 review of systems otherwise negative     Physical Exam  GENERAL  MENTAL STATUS - Alert. GENERAL APPEARANCE - Cooperative and well groomed. ORIENTATION - Oriented X4. BUILD & NUTRITION -appears stated age somewhat pale HYDRATION - Well hydrated.    INTEGUMENTARY  GENERAL CHARACTERISTICS - Overall examination of the patient's skin reveals no rashes. COLOR - not icteric. SKIN MOISTURE - normal skin moisture. TEMPERATURE - normal worth is noted.    HEAD AND NECK  HEAD  HEAD SHAPE - Atraumatic and normocephalic.  TRACHEA - midline.  THYROID  GLAND CHARACTERISTICS - normal size and consistency and no palpable  "nodules.    EYE  SCLERA/CONJUNCTIVA - BILATERAL - Anicteric.    CHEST AND LUNG EXAM  AUSCULTATION -  BREATH SOUNDS - Clear.    BREAST - Did not examine.    CARDIOVASCULAR  AUSCULTATION: RHYTHM - Regular. HEART SOUNDS - Normal heart sounds.  MURMURS & OTHER HEART SOUNDS - Auscultation of the heart reveals regular rate and no murmurs.    ABDOMEN  PALPATION/PERCUSSION - Palpation and percussion of the abdomen reveal soft, non tender, and no hepatosplenomegaly.  6 to 8 cm nontender epigastric mass    LYMPHATIC  GENERAL LYMPHATICS  BREAST LYMPHATIC EXAM - No cervical adenopathy, supraclavicular adenopathy or axilliary adenopathy.   Extremities without edema     Last Recorded Vitals  Blood pressure 133/63, pulse 77, temperature 35.8 °C (96.4 °F), temperature source Temporal, resp. rate 18, height 1.626 m (5' 4.02\"), weight 83.9 kg (185 lb), SpO2 99%.    Relevant Results  I reviewed the CT imaging both personally and with the radiologist.  There is a large mass in the transverse colon to the right.  It does not appear to invade the abdominal wall but abuts it.  It does appear to about also the mid small bowel.  There are findings suggestive of at least a solitary liver metastasis.  There enlarged mediastinal nodes and bilateral small pleural effusions laboratory studies reviewed current BUN and creatinine are within normal limits at 11 and 0.6.  Albumin is 2.9.     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  Additional imaging of the liver has been ordered with MRI.  I discussed the findings with Dr. Calderon.  I have informed the patient that there is findings suggestive of colon cancer with additional findings within the abdominal cavity.  Will meet with the family and patient tomorrow to discuss complete findings.  At this point the patient is not a surgical candidate secondary to the extent of the disease and stage IV disease.  Most likely course will be " small bowel ostomy when the patient completely obstructs will consider oncology evaluation.    I spent 45 minutes in the professional and overall care of this patient.      Sanket Ramos MD

## 2025-01-17 NOTE — PROGRESS NOTES
Melissa Tse is a 89 y.o. female on day 5 of admission presenting with Generalized weakness.      Subjective   Patient fully evaluated  01/14  for    Problem List Items Addressed This Visit          Symptoms and Signs    * (Principal) Generalized weakness - Primary    Relevant Orders    Surgical Pathology Exam    Surgical Pathology Exam     Other Visit Diagnoses       Hypoglycemia        Relevant Orders    Surgical Pathology Exam    Surgical Pathology Exam    Anemia, unspecified type        Relevant Orders    Esophagogastroduodenoscopy (EGD) (Completed)    Surgical Pathology Exam    Colonoscopy Diagnostic (Completed)    Surgical Pathology Exam          Patient seen resting in bed with head of bed elevated, no s/s or c/o acute difficulties at this time.  Vital signs for last 24 hours Temp:  [35.8 °C (96.4 °F)-36.5 °C (97.7 °F)] 35.8 °C (96.4 °F)  Heart Rate:  [72-88] 77  Resp:  [18] 18  BP: (121-164)/(56-80) 133/63    I/O this shift:  In: 237 [P.O.:237]  Out: -   Patient still requiring frequent cardiac and SPO2 monitoring. Continue aggressive pulmonary hygiene and oral hygiene. Off loading as tolerated for skin integrity. Medications and labs reviewed-   Results for orders placed or performed during the hospital encounter of 01/10/25 (from the past 24 hours)   POCT GLUCOSE   Result Value Ref Range    POCT Glucose 143 (H) 74 - 99 mg/dL   POCT GLUCOSE   Result Value Ref Range    POCT Glucose 145 (H) 74 - 99 mg/dL   POCT GLUCOSE   Result Value Ref Range    POCT Glucose 110 (H) 74 - 99 mg/dL   POCT GLUCOSE   Result Value Ref Range    POCT Glucose 66 (L) 74 - 99 mg/dL   CBC and Auto Differential   Result Value Ref Range    WBC 7.5 4.4 - 11.3 x10*3/uL    nRBC 0.0 0.0 - 0.0 /100 WBCs    RBC 3.50 (L) 4.00 - 5.20 x10*6/uL    Hemoglobin 7.9 (L) 12.0 - 16.0 g/dL    Hematocrit 27.6 (L) 36.0 - 46.0 %    MCV 79 (L) 80 - 100 fL    MCH 22.6 (L) 26.0 - 34.0 pg    MCHC 28.6 (L) 32.0 - 36.0 g/dL    RDW 19.2 (H) 11.5 - 14.5 %     Platelets 266 150 - 450 x10*3/uL    Neutrophils % 74.4 40.0 - 80.0 %    Immature Granulocytes %, Automated 0.5 0.0 - 0.9 %    Lymphocytes % 15.7 13.0 - 44.0 %    Monocytes % 7.6 2.0 - 10.0 %    Eosinophils % 1.5 0.0 - 6.0 %    Basophils % 0.3 0.0 - 2.0 %    Neutrophils Absolute 5.60 (H) 1.60 - 5.50 x10*3/uL    Immature Granulocytes Absolute, Automated 0.04 0.00 - 0.50 x10*3/uL    Lymphocytes Absolute 1.18 0.80 - 3.00 x10*3/uL    Monocytes Absolute 0.57 0.05 - 0.80 x10*3/uL    Eosinophils Absolute 0.11 0.00 - 0.40 x10*3/uL    Basophils Absolute 0.02 0.00 - 0.10 x10*3/uL   Comprehensive Metabolic Panel   Result Value Ref Range    Glucose 57 (L) 74 - 99 mg/dL    Sodium 136 136 - 145 mmol/L    Potassium 3.9 3.5 - 5.3 mmol/L    Chloride 102 98 - 107 mmol/L    Bicarbonate 27 21 - 32 mmol/L    Anion Gap 11 10 - 20 mmol/L    Urea Nitrogen 12 6 - 23 mg/dL    Creatinine 0.66 0.50 - 1.05 mg/dL    eGFR 84 >60 mL/min/1.73m*2    Calcium 8.5 (L) 8.6 - 10.3 mg/dL    Albumin 2.9 (L) 3.4 - 5.0 g/dL    Alkaline Phosphatase 55 33 - 136 U/L    Total Protein 6.3 (L) 6.4 - 8.2 g/dL    AST 7 (L) 9 - 39 U/L    Bilirubin, Total 0.3 0.0 - 1.2 mg/dL    ALT 6 (L) 7 - 45 U/L   CEA   Result Value Ref Range    Carcinoembryonic AG 61.1 ug/L   POCT GLUCOSE   Result Value Ref Range    POCT Glucose 103 (H) 74 - 99 mg/dL   POCT GLUCOSE   Result Value Ref Range    POCT Glucose 124 (H) 74 - 99 mg/dL      Patient recently received an antibiotic (last 12 hours)       Date/Time Action Medication Dose    01/13/25 2029 Given    cefuroxime (Ceftin) tablet 250 mg 250 mg           Plan discussed with interdisciplinary team, continue current and repeat labs in the AM.     Discharge planning discussed with patient and care team. Therapy evaluations ordered. [facility]-  , anticipate HHC/SNF at discharge. Patient aware and agreeable to current plan, continue plan as above.     I spent a total of 50 minutes on the date of the service which included preparing to see the  patient, face-to-face patient care, completing clinical documentation, obtaining and/or reviewing separately obtained history, performing a medically appropriate examination, counseling and educating the patient/family/caregiver, ordering medications, tests, or procedures, communicating with other HCPs (not separately reported), independently interpreting results (not separately reported), communicating results to the patient/family/caregiver, and care coordination (not separately reported).        Objective     Last Recorded Vitals  /63 (BP Location: Right arm, Patient Position: Lying)   Pulse 77   Temp 35.8 °C (96.4 °F) (Temporal)   Resp 18   Wt 83.9 kg (185 lb)   SpO2 99%   Intake/Output last 3 Shifts:    Intake/Output Summary (Last 24 hours) at 1/17/2025 1329  Last data filed at 1/17/2025 0801  Gross per 24 hour   Intake 519 ml   Output 300 ml   Net 219 ml       Admission Weight  Weight: 83.9 kg (185 lb) (01/10/25 2242)    Daily Weight  01/16/25 : 83.9 kg (185 lb)    Image Results  Colonoscopy Diagnostic  Table formatting from the original result was not included.  Impression  Diverticulosis of mild severity in the sigmoid colon  Small hemorrhoids  3 subcentimeter polyps in the ascending colon, descending colon and   sigmoid colon were removed with cold snare  Single friable, invasive and ulcerated mass measuring 10 cm in the hepatic   flexure and transverse colon 55 cm from the anal verge; bleeding occurred   before intervention; performed cold forceps biopsy; tattooed distal to the   finding    Findings  Diverticula of mild severity with no inflammation in the sigmoid colon  External and internal small hemorrhoids  Three sessile polyps measuring from 4 mm up to 6 mm in the ascending   colon, descending colon and sigmoid colon; performed cold snare with   complete en bloc removal and retrieved specimen  Single friable, invasive and ulcerated mass measuring 10 cm in the hepatic   flexure and  transverse colon 55 cm from the anal verge; bleeding occurred   before intervention; performed cold forceps biopsy; tattooed distal to the   finding with spot. Ulcerated friable mass extending from around the mid   transverse colon to the hepatic flexure.  The mass extended continuously   from 55 cm to 65 cm from the anal canal.  The mass was circumferential   partially obstructing the bowel lumen.  The pediatric colonoscopy was able   to traverse the mass.       Recommendation  Await pathology results   Check CT chest abdomen and pelvis  Check CEA level       Indication  Anemia, unspecified type    Staff  Staff Role   Macario Vasquez MD Proceduralist     Medications  See Anesthesia Record.     Preprocedure  A history and physical has been performed, and patient medication   allergies have been reviewed. The patient's tolerance of previous   anesthesia has been reviewed. The risks and benefits of the procedure and   the sedation options and risks were discussed with the patient. All   questions were answered and informed consent obtained.    Details of the Procedure  The patient underwent monitored anesthesia care, which was administered by   an anesthesia professional. The patient's blood pressure, ECG, ETCO2,   heart rate, level of consciousness, oxygen and respirations were monitored   throughout the procedure. A digital rectal exam was performed. The scope   was introduced through the anus and advanced to the cecum. Retroflexion   was performed in the rectum. The quality of bowel preparation was   evaluated using the [place] Bowel Preparation Scale with scores of: right   colon = 3, transverse colon = 3, left colon = 3. The total BBPS score was   9. Bowel prep was adequate. The patient experienced no blood loss. The   procedure was not difficult. The patient tolerated the procedure well.   There were no apparent adverse events.     Events  Procedure Events   Event Event Time   ENDO SCOPE IN TIME 1/16/2025  9:15 AM    ENDO CECUM REACHED 1/16/2025  9:25 AM   ENDO SCOPE OUT TIME 1/16/2025  9:52 AM     Specimens  ID Type Source Tests Collected by Time   1 : recto-sigmoid/ descending colon polyps cold snared Tissue RECTUM   POLYPECTOMY SURGICAL PATHOLOGY EXAM Macario Vasquez MD 1/16/2025 0917   2 : cold snared Tissue ASCENDING COLON POLYP SURGICAL PATHOLOGY EXAM Macario Vasquez MD 1/16/2025 0928   3 : cold biopsy Tissue COLON - HEPATIC FLEXURE BIOPSY SURGICAL PATHOLOGY   EXAM Macario Vasquez MD 1/16/2025 0932     Procedure Location  [facility] [address]  [address]  [facility] [address]  [phone]    Referring Provider  Johanna Penn, APRN-CNP    Procedure Provider  MD Johanna Montes      Physical Exam    Relevant Results               Assessment/Plan                  Assessment & Plan  Generalized weakness            Tio Lugo MD   Physician  Internal Medicine     Progress Notes      Signed     Date of Service: 1/13/2025  2:44 PM     Signed       Expand All Collapse All    Melissa Tse is a 89 y.o. female on day 1 of admission presenting with Generalized weakness.           Subjective  Patient fully evaluated  01/12  for    Problem List Items Addressed This Visit                  Symptoms and Signs     * (Principal) Generalized weakness - Primary      Other Visit Diagnoses         Hypoglycemia         Anemia, unspecified type         Relevant Orders     Esophagogastroduodenoscopy (EGD)             Patient seen resting in bed with head of bed elevated, no s/s or c/o acute difficulties at this time.  Vital signs for last 24 hours Temp:  [36.4 °C (97.5 °F)-37.8 °C (100 °F)] 36.4 °C (97.5 °F)  Heart Rate:  [82-98] 88  Resp:  [18-20] 18  BP: (108-133)/(50-59) 118/57    No intake/output data recorded.  Patient still requiring frequent cardiac and SPO2 monitoring. Continue aggressive pulmonary hygiene and oral hygiene. Off loading as tolerated for skin integrity. Medications  and labs reviewed-         Results for orders placed or performed during the hospital encounter of 01/10/25 (from the past 24 hours)   POCT GLUCOSE   Result Value Ref Range     POCT Glucose 197 (H) 74 - 99 mg/dL   POCT GLUCOSE   Result Value Ref Range     POCT Glucose 202 (H) 74 - 99 mg/dL   CBC and Auto Differential   Result Value Ref Range     WBC 8.9 4.4 - 11.3 x10*3/uL     nRBC 0.0 0.0 - 0.0 /100 WBCs     RBC 3.55 (L) 4.00 - 5.20 x10*6/uL     Hemoglobin 8.0 (L) 12.0 - 16.0 g/dL     Hematocrit 27.7 (L) 36.0 - 46.0 %     MCV 78 (L) 80 - 100 fL     MCH 22.5 (L) 26.0 - 34.0 pg     MCHC 28.9 (L) 32.0 - 36.0 g/dL     RDW 18.2 (H) 11.5 - 14.5 %     Platelets 257 150 - 450 x10*3/uL     Neutrophils % 76.5 40.0 - 80.0 %     Immature Granulocytes %, Automated 0.8 0.0 - 0.9 %     Lymphocytes % 12.9 13.0 - 44.0 %     Monocytes % 8.8 2.0 - 10.0 %     Eosinophils % 0.7 0.0 - 6.0 %     Basophils % 0.3 0.0 - 2.0 %     Neutrophils Absolute 6.78 (H) 1.60 - 5.50 x10*3/uL     Immature Granulocytes Absolute, Automated 0.07 0.00 - 0.50 x10*3/uL     Lymphocytes Absolute 1.14 0.80 - 3.00 x10*3/uL     Monocytes Absolute 0.78 0.05 - 0.80 x10*3/uL     Eosinophils Absolute 0.06 0.00 - 0.40 x10*3/uL     Basophils Absolute 0.03 0.00 - 0.10 x10*3/uL   Comprehensive Metabolic Panel   Result Value Ref Range     Glucose 65 (L) 74 - 99 mg/dL     Sodium 135 (L) 136 - 145 mmol/L     Potassium 3.8 3.5 - 5.3 mmol/L     Chloride 99 98 - 107 mmol/L     Bicarbonate 30 21 - 32 mmol/L     Anion Gap 10 10 - 20 mmol/L     Urea Nitrogen 18 6 - 23 mg/dL     Creatinine 0.93 0.50 - 1.05 mg/dL     eGFR 59 (L) >60 mL/min/1.73m*2     Calcium 8.7 8.6 - 10.3 mg/dL     Albumin 3.0 (L) 3.4 - 5.0 g/dL     Alkaline Phosphatase 50 33 - 136 U/L     Total Protein 6.3 (L) 6.4 - 8.2 g/dL     AST 8 (L) 9 - 39 U/L     Bilirubin, Total 0.4 0.0 - 1.2 mg/dL     ALT 5 (L) 7 - 45 U/L   POCT GLUCOSE   Result Value Ref Range     POCT Glucose 68 (L) 74 - 99 mg/dL   Iron and TIBC    Result Value Ref Range     Iron 28 (L) 35 - 150 ug/dL     UIBC 212 110 - 370 ug/dL     TIBC 240 240 - 445 ug/dL     % Saturation 12 (L) 25 - 45 %   Type and Screen   Result Value Ref Range     ABO TYPE A       Rh TYPE POS       ANTIBODY SCREEN NEG     VERIFY ABO/Rh Group Test   Result Value Ref Range     ABO TYPE A       Rh TYPE POS     POCT GLUCOSE   Result Value Ref Range     POCT Glucose 101 (H) 74 - 99 mg/dL      Patient recently received an antibiotic (last 12 hours)         None             Patient c/o of weakness,  b12 levels and nocturnal pulse oximetry ordered. Plan discussed with interdisciplinary team, iron level 27, venofer 300mg IV ordered, weaning oxygen as tolerated, repeat chest xray   No acute events overnight, patient denies chest pain, worsening SOB at this time. continue current and repeat labs in the AM.      Discharge planning discussed with patient and care team. Therapy evaluations ordered. [facility]- 12  , anticipate HHC/SNF at discharge. Patient aware and agreeable to current plan, continue plan as above.      I spent a total of 50 minutes on the date of the service which included preparing to see the patient, face-to-face patient care, completing clinical documentation, obtaining and/or reviewing separately obtained history, performing a medically appropriate examination, counseling and educating the patient/family/caregiver, ordering medications, tests, or procedures, communicating with other HCPs (not separately reported), independently interpreting results (not separately reported), communicating results to the patient/family/caregiver, and care coordination (not separately reported).               Objective  Last Recorded Vitals  /57 (BP Location: Left arm, Patient Position: Lying)   Pulse 88   Temp 36.4 °C (97.5 °F) (Temporal)   Resp 18   Wt 83.9 kg (185 lb)   SpO2 98%   Intake/Output last 3 Shifts:     Intake/Output Summary (Last 24 hours) at 1/13/2025 0973  Last data filed  at 1/13/2025 0553      Gross per 24 hour   Intake 812 ml   Output 350 ml   Net 462 ml         Admission Weight  Weight: 83.9 kg (185 lb) (01/10/25 2242)     Daily Weight  01/11/25 : 83.9 kg (185 lb)     Image Results  ECG 12 lead  Normal sinus rhythm  Low voltage QRS  Cannot rule out Inferior infarct , age undetermined  Possible Anterolateral infarct , age undetermined  Abnormal ECG  No previous ECGs available        Physical Exam     Relevant Results                       Assessment/Plan                       Assessment & Plan  Generalized weakness           Tio Lugo MD   Physician  Internal Medicine     H&P      Signed     Date of Service: 1/11/2025 11:21 AM      Associated Documents  Telemetry Monitoring - Scan on 1/11/2025 1:49 AM   Signed        Expand All Collapse All    History Of Present Illness  Melissa Tse is a 89 y.o. female with medical history of T2DM on insulin, breast cancer on current estrogen therapy who presents to the ED with a fall from standing position.  The patient lowered herself to the ground making cereal around noon and then was on the floor until her son came home around 530.  She said her legs felt weak yesterday but her son was able to get her something to eat in the morning and she felt slightly better.  She then developed weakness again while making cereal this morning.  She said her legs just do not feel that they can hold her up.  She denies pain, chest discomfort, shortness of breath, syncope, head strike or any pain post fall.  States her glucose averages in the high 80s, admits to being a picky eater.  Denies upper respiratory/systemic viral symptoms.  Denies suprapubic pain, urinary frequency/urgency, discomfort with urination.  Denies history of low back pain/trauma, saddle anesthesia, peripheral neuropathy, incontinence.       ED course: Vitals stable.  Vitals notable for hypoglycemia of 67, depressed LFTs, elevated troponin of 98, hemoglobin of 9.5.  Patient  "treated with D10 for hypoglycemia.     Past Medical History  Medical History   History reviewed. No pertinent past medical history.         Surgical History  Surgical History             Past Surgical History:   Procedure Laterality Date    OTHER SURGICAL HISTORY   02/25/2022     Hip replacement            Social History  She reports that she has never smoked. She has never been exposed to tobacco smoke. She has never used smokeless tobacco. She reports that she does not drink alcohol and does not use drugs.     Family History  Family History   No family history on file.         Allergies  Patient has no known allergies.     Review of Systems  10 point ROS negative except as specified in HPI     Physical Exam  HENT:      Head: Normocephalic and atraumatic.      Nose: Nose normal.   Eyes:      Conjunctiva/sclera: Conjunctivae normal.   Cardiovascular:      Rate and Rhythm: Normal rate and regular rhythm.      Pulses: Normal pulses.      Heart sounds: Normal heart sounds.   Pulmonary:      Effort: Pulmonary effort is normal.      Breath sounds: Normal breath sounds.   Abdominal:      General: Abdomen is flat. There is no distension.      Palpations: Abdomen is soft.   Musculoskeletal:         General: No swelling or deformity.   Skin:     General: Skin is warm and dry.   Neurological:      Mental Status: She is alert. Mental status is at baseline.   Psychiatric:         Behavior: Behavior normal.            Last Recorded Vitals  Blood pressure 124/60, pulse 96, temperature 36.2 °C (97.2 °F), temperature source Temporal, resp. rate 22, height 1.626 m (5' 4.02\"), weight 83.9 kg (185 lb), SpO2 94%.     Relevant Results               Results for orders placed or performed during the hospital encounter of 01/10/25 (from the past 24 hours)   CBC and Auto Differential   Result Value Ref Range     WBC 10.2 4.4 - 11.3 x10*3/uL     nRBC 0.0 0.0 - 0.0 /100 WBCs     RBC 4.16 4.00 - 5.20 x10*6/uL     Hemoglobin 9.5 (L) 12.0 - 16.0 " g/dL     Hematocrit 31.9 (L) 36.0 - 46.0 %     MCV 77 (L) 80 - 100 fL     MCH 22.8 (L) 26.0 - 34.0 pg     MCHC 29.8 (L) 32.0 - 36.0 g/dL     RDW 18.1 (H) 11.5 - 14.5 %     Platelets 280 150 - 450 x10*3/uL     Neutrophils % 86.4 40.0 - 80.0 %     Immature Granulocytes %, Automated 0.6 0.0 - 0.9 %     Lymphocytes % 8.2 13.0 - 44.0 %     Monocytes % 4.3 2.0 - 10.0 %     Eosinophils % 0.2 0.0 - 6.0 %     Basophils % 0.3 0.0 - 2.0 %     Neutrophils Absolute 8.83 (H) 1.60 - 5.50 x10*3/uL     Immature Granulocytes Absolute, Automated 0.06 0.00 - 0.50 x10*3/uL     Lymphocytes Absolute 0.84 0.80 - 3.00 x10*3/uL     Monocytes Absolute 0.44 0.05 - 0.80 x10*3/uL     Eosinophils Absolute 0.02 0.00 - 0.40 x10*3/uL     Basophils Absolute 0.03 0.00 - 0.10 x10*3/uL   Comprehensive metabolic panel   Result Value Ref Range     Glucose 67 (L) 74 - 99 mg/dL     Sodium 138 136 - 145 mmol/L     Potassium 3.7 3.5 - 5.3 mmol/L     Chloride 102 98 - 107 mmol/L     Bicarbonate 25 21 - 32 mmol/L     Anion Gap 15 10 - 20 mmol/L     Urea Nitrogen 23 6 - 23 mg/dL     Creatinine 0.67 0.50 - 1.05 mg/dL     eGFR 84 >60 mL/min/1.73m*2     Calcium 9.2 8.6 - 10.3 mg/dL     Albumin 3.5 3.4 - 5.0 g/dL     Alkaline Phosphatase 55 33 - 136 U/L     Total Protein 7.2 6.4 - 8.2 g/dL     AST 8 (L) 9 - 39 U/L     Bilirubin, Total 0.4 0.0 - 1.2 mg/dL     ALT 6 (L) 7 - 45 U/L   Creatine Kinase   Result Value Ref Range     Creatine Kinase 68 0 - 215 U/L   Troponin I, High Sensitivity   Result Value Ref Range     Troponin I, High Sensitivity 98 (HH) 0 - 13 ng/L   Lactate   Result Value Ref Range     Lactate 1.4 0.4 - 2.0 mmol/L   POCT GLUCOSE   Result Value Ref Range     POCT Glucose 53 (L) 74 - 99 mg/dL   Troponin I, High Sensitivity   Result Value Ref Range     Troponin I, High Sensitivity 168 (HH) 0 - 13 ng/L   Urinalysis with Reflex Culture and Microscopic   Result Value Ref Range     Color, Urine Light-Yellow Light-Yellow, Yellow, Dark-Yellow     Appearance,  Urine Clear Clear     Specific Gravity, Urine 1.014 1.005 - 1.035     pH, Urine 5.5 5.0, 5.5, 6.0, 6.5, 7.0, 7.5, 8.0     Protein, Urine NEGATIVE NEGATIVE, 10 (TRACE), 20 (TRACE) mg/dL     Glucose, Urine Normal Normal mg/dL     Blood, Urine NEGATIVE NEGATIVE     Ketones, Urine NEGATIVE NEGATIVE mg/dL     Bilirubin, Urine NEGATIVE NEGATIVE     Urobilinogen, Urine Normal Normal mg/dL     Nitrite, Urine 2+ (A) NEGATIVE     Leukocyte Esterase, Urine NEGATIVE NEGATIVE   Extra Urine Gray Tube   Result Value Ref Range     Extra Tube Hold for add-ons.     Microscopic Only, Urine   Result Value Ref Range     WBC, Urine 1-5 1-5, NONE /HPF     RBC, Urine NONE NONE, 1-2, 3-5 /HPF     Bacteria, Urine 1+ (A) NONE SEEN /HPF     Mucus, Urine FEW Reference range not established. /LPF   Sars-CoV-2 and Influenza A/B PCR   Result Value Ref Range     Flu A Result Not Detected Not Detected     Flu B Result Not Detected Not Detected     Coronavirus 2019, PCR Not Detected Not Detected   POCT GLUCOSE   Result Value Ref Range     POCT Glucose 84 74 - 99 mg/dL   Troponin I, High Sensitivity   Result Value Ref Range     Troponin I, High Sensitivity 214 (HH) 0 - 13 ng/L   Creatine Kinase   Result Value Ref Range     Creatine Kinase 65 0 - 215 U/L   POCT GLUCOSE   Result Value Ref Range     POCT Glucose 84 74 - 99 mg/dL   POCT GLUCOSE   Result Value Ref Range     POCT Glucose 112 (H) 74 - 99 mg/dL   Troponin I, High Sensitivity   Result Value Ref Range     Troponin I, High Sensitivity 222 (HH) 0 - 13 ng/L      No results found.           Assessment/Plan     Assessment & Plan  Generalized weakness     89-year-old female with history of T2DM on insulin presented to the ED with a fall from standing position, lowering himself to the ground and remaining on the floor until family got home.  Patient found to be hypoglycemic and treated with D10.  Patient will be admitted to observation under Dr. Lugo for further evaluation and care.     #Generalized  weakness  #Hypoglycemia  #Elevated troponin, 98 with repeat of 168  - Continue D10 started in ED  - Defer consults to attending  - Vitals every 4  hours, telemetry  - Encourage oral hydration  - CK normal, check in a.m.  - Follow UA  - Follow A1c  - Follow COVID/flu test  - Trend troponin, elevation likely due to type II MI, follow a.m CK level. Denies chest pain, shortness of breath.  - Corrective insulin sliding scale, hypoglycemia protocol, carb consistent diet  - PT/OT/social work     Chronic conditions  #T2DM  #Hypertension  - Continue home meds when reconciled  - Carb consistent diet, corrective insulin sliding scale     Patient fully evaluated    for    Problem List Items Addressed This Visit         * (Principal) Generalized weakness - Primary      Other Visit Diagnoses         Hypoglycemia                 Patient seen resting in bed with head of bed elevated, no s/s or c/o acute difficulties at this time.  Vital signs for last 24 hours Temp:  [35.5 °C (95.9 °F)-37 °C (98.6 °F)] 36.2 °C (97.2 °F)  Heart Rate:  [85-98] 96  Resp:  [16-22] 22  BP: (115-171)/(56-76) 124/60    No intake/output data recorded.  Patient still requiring frequent cardiac and SPO2 monitoring. Continue aggressive pulmonary hygiene and oral hygiene. Off loading as tolerated for skin integrity. Medications and labs reviewed-             Results for orders placed or performed during the hospital encounter of 01/10/25 (from the past 24 hours)   CBC and Auto Differential   Result Value Ref Range     WBC 10.2 4.4 - 11.3 x10*3/uL     nRBC 0.0 0.0 - 0.0 /100 WBCs     RBC 4.16 4.00 - 5.20 x10*6/uL     Hemoglobin 9.5 (L) 12.0 - 16.0 g/dL     Hematocrit 31.9 (L) 36.0 - 46.0 %     MCV 77 (L) 80 - 100 fL     MCH 22.8 (L) 26.0 - 34.0 pg     MCHC 29.8 (L) 32.0 - 36.0 g/dL     RDW 18.1 (H) 11.5 - 14.5 %     Platelets 280 150 - 450 x10*3/uL     Neutrophils % 86.4 40.0 - 80.0 %     Immature Granulocytes %, Automated 0.6 0.0 - 0.9 %     Lymphocytes % 8.2  13.0 - 44.0 %     Monocytes % 4.3 2.0 - 10.0 %     Eosinophils % 0.2 0.0 - 6.0 %     Basophils % 0.3 0.0 - 2.0 %     Neutrophils Absolute 8.83 (H) 1.60 - 5.50 x10*3/uL     Immature Granulocytes Absolute, Automated 0.06 0.00 - 0.50 x10*3/uL     Lymphocytes Absolute 0.84 0.80 - 3.00 x10*3/uL     Monocytes Absolute 0.44 0.05 - 0.80 x10*3/uL     Eosinophils Absolute 0.02 0.00 - 0.40 x10*3/uL     Basophils Absolute 0.03 0.00 - 0.10 x10*3/uL   Comprehensive metabolic panel   Result Value Ref Range     Glucose 67 (L) 74 - 99 mg/dL     Sodium 138 136 - 145 mmol/L     Potassium 3.7 3.5 - 5.3 mmol/L     Chloride 102 98 - 107 mmol/L     Bicarbonate 25 21 - 32 mmol/L     Anion Gap 15 10 - 20 mmol/L     Urea Nitrogen 23 6 - 23 mg/dL     Creatinine 0.67 0.50 - 1.05 mg/dL     eGFR 84 >60 mL/min/1.73m*2     Calcium 9.2 8.6 - 10.3 mg/dL     Albumin 3.5 3.4 - 5.0 g/dL     Alkaline Phosphatase 55 33 - 136 U/L     Total Protein 7.2 6.4 - 8.2 g/dL     AST 8 (L) 9 - 39 U/L     Bilirubin, Total 0.4 0.0 - 1.2 mg/dL     ALT 6 (L) 7 - 45 U/L   Creatine Kinase   Result Value Ref Range     Creatine Kinase 68 0 - 215 U/L   Troponin I, High Sensitivity   Result Value Ref Range     Troponin I, High Sensitivity 98 (HH) 0 - 13 ng/L   Lactate   Result Value Ref Range     Lactate 1.4 0.4 - 2.0 mmol/L   POCT GLUCOSE   Result Value Ref Range     POCT Glucose 53 (L) 74 - 99 mg/dL   Troponin I, High Sensitivity   Result Value Ref Range     Troponin I, High Sensitivity 168 (HH) 0 - 13 ng/L   Urinalysis with Reflex Culture and Microscopic   Result Value Ref Range     Color, Urine Light-Yellow Light-Yellow, Yellow, Dark-Yellow     Appearance, Urine Clear Clear     Specific Gravity, Urine 1.014 1.005 - 1.035     pH, Urine 5.5 5.0, 5.5, 6.0, 6.5, 7.0, 7.5, 8.0     Protein, Urine NEGATIVE NEGATIVE, 10 (TRACE), 20 (TRACE) mg/dL     Glucose, Urine Normal Normal mg/dL     Blood, Urine NEGATIVE NEGATIVE     Ketones, Urine NEGATIVE NEGATIVE mg/dL     Bilirubin,  Urine NEGATIVE NEGATIVE     Urobilinogen, Urine Normal Normal mg/dL     Nitrite, Urine 2+ (A) NEGATIVE     Leukocyte Esterase, Urine NEGATIVE NEGATIVE   Extra Urine Gray Tube   Result Value Ref Range     Extra Tube Hold for add-ons.     Microscopic Only, Urine   Result Value Ref Range     WBC, Urine 1-5 1-5, NONE /HPF     RBC, Urine NONE NONE, 1-2, 3-5 /HPF     Bacteria, Urine 1+ (A) NONE SEEN /HPF     Mucus, Urine FEW Reference range not established. /LPF   Sars-CoV-2 and Influenza A/B PCR   Result Value Ref Range     Flu A Result Not Detected Not Detected     Flu B Result Not Detected Not Detected     Coronavirus 2019, PCR Not Detected Not Detected   POCT GLUCOSE   Result Value Ref Range     POCT Glucose 84 74 - 99 mg/dL   Troponin I, High Sensitivity   Result Value Ref Range     Troponin I, High Sensitivity 214 (HH) 0 - 13 ng/L   Creatine Kinase   Result Value Ref Range     Creatine Kinase 65 0 - 215 U/L   POCT GLUCOSE   Result Value Ref Range     POCT Glucose 84 74 - 99 mg/dL   POCT GLUCOSE   Result Value Ref Range     POCT Glucose 112 (H) 74 - 99 mg/dL   Troponin I, High Sensitivity   Result Value Ref Range     Troponin I, High Sensitivity 222 (HH) 0 - 13 ng/L      Patient recently received an antibiotic (last 12 hours)         None             Plan discussed with interdisciplinary team, will continue Rocephin IV for urine positive for 2+ nitrites. WBC 10.2, will repeat labs. Hemoglobin 9.5, iron levels ordered. Strict intake and output. No acute events overnight, continue current and repeat labs in the AM. Therapy evaluations ordered. Home medication reconciliation needed.      Discharge planning discussed with patient and care team.  Anticipate C/SNF at discharge. Patient aware and agreeable to current plan, continue plan as above.      I spent a total of 50 minutes on the date of the service which included preparing to see the patient, face-to-face patient care, completing clinical documentation, obtaining  and/or reviewing separately obtained history, performing a medically appropriate examination, counseling and educating the patient/family/caregiver, ordering medications, tests, or procedures, communicating with other HCPs (not separately reported), independently interpreting results (not separately reported), communicating results to the patient/family/caregiver, and care coordination (not separately reported).   I spent 60 minutes in the professional and overall care of this patient.                           Revision History           Patient fully evaluated 1/13  for    Problem List Items Addressed This Visit                  Symptoms and Signs     * (Principal) Generalized weakness - Primary      Other Visit Diagnoses         Hypoglycemia         Anemia, unspecified type         Relevant Orders     Esophagogastroduodenoscopy (EGD)             Patient seen resting in bed with head of bed elevated, no s/s or c/o acute difficulties at this time.  Vital signs for last 24 hours Temp:  [36.4 °C (97.5 °F)-37.8 °C (100 °F)] 36.4 °C (97.5 °F)  Heart Rate:  [82-98] 88  Resp:  [18-20] 18  BP: (108-133)/(50-59) 118/57    No intake/output data recorded.  Patient still requiring frequent cardiac and SPO2 monitoring. Continue aggressive pulmonary hygiene and oral hygiene. Off loading as tolerated for skin integrity. Medications and labs reviewed-         Results for orders placed or performed during the hospital encounter of 01/10/25 (from the past 24 hours)   POCT GLUCOSE   Result Value Ref Range     POCT Glucose 197 (H) 74 - 99 mg/dL   POCT GLUCOSE   Result Value Ref Range     POCT Glucose 202 (H) 74 - 99 mg/dL   CBC and Auto Differential   Result Value Ref Range     WBC 8.9 4.4 - 11.3 x10*3/uL     nRBC 0.0 0.0 - 0.0 /100 WBCs     RBC 3.55 (L) 4.00 - 5.20 x10*6/uL     Hemoglobin 8.0 (L) 12.0 - 16.0 g/dL     Hematocrit 27.7 (L) 36.0 - 46.0 %     MCV 78 (L) 80 - 100 fL     MCH 22.5 (L) 26.0 - 34.0 pg     MCHC 28.9 (L) 32.0 -  36.0 g/dL     RDW 18.2 (H) 11.5 - 14.5 %     Platelets 257 150 - 450 x10*3/uL     Neutrophils % 76.5 40.0 - 80.0 %     Immature Granulocytes %, Automated 0.8 0.0 - 0.9 %     Lymphocytes % 12.9 13.0 - 44.0 %     Monocytes % 8.8 2.0 - 10.0 %     Eosinophils % 0.7 0.0 - 6.0 %     Basophils % 0.3 0.0 - 2.0 %     Neutrophils Absolute 6.78 (H) 1.60 - 5.50 x10*3/uL     Immature Granulocytes Absolute, Automated 0.07 0.00 - 0.50 x10*3/uL     Lymphocytes Absolute 1.14 0.80 - 3.00 x10*3/uL     Monocytes Absolute 0.78 0.05 - 0.80 x10*3/uL     Eosinophils Absolute 0.06 0.00 - 0.40 x10*3/uL     Basophils Absolute 0.03 0.00 - 0.10 x10*3/uL   Comprehensive Metabolic Panel   Result Value Ref Range     Glucose 65 (L) 74 - 99 mg/dL     Sodium 135 (L) 136 - 145 mmol/L     Potassium 3.8 3.5 - 5.3 mmol/L     Chloride 99 98 - 107 mmol/L     Bicarbonate 30 21 - 32 mmol/L     Anion Gap 10 10 - 20 mmol/L     Urea Nitrogen 18 6 - 23 mg/dL     Creatinine 0.93 0.50 - 1.05 mg/dL     eGFR 59 (L) >60 mL/min/1.73m*2     Calcium 8.7 8.6 - 10.3 mg/dL     Albumin 3.0 (L) 3.4 - 5.0 g/dL     Alkaline Phosphatase 50 33 - 136 U/L     Total Protein 6.3 (L) 6.4 - 8.2 g/dL     AST 8 (L) 9 - 39 U/L     Bilirubin, Total 0.4 0.0 - 1.2 mg/dL     ALT 5 (L) 7 - 45 U/L   POCT GLUCOSE   Result Value Ref Range     POCT Glucose 68 (L) 74 - 99 mg/dL   Iron and TIBC   Result Value Ref Range     Iron 28 (L) 35 - 150 ug/dL     UIBC 212 110 - 370 ug/dL     TIBC 240 240 - 445 ug/dL     % Saturation 12 (L) 25 - 45 %   Type and Screen   Result Value Ref Range     ABO TYPE A       Rh TYPE POS       ANTIBODY SCREEN NEG     VERIFY ABO/Rh Group Test   Result Value Ref Range     ABO TYPE A       Rh TYPE POS     POCT GLUCOSE   Result Value Ref Range     POCT Glucose 101 (H) 74 - 99 mg/dL      Patient recently received an antibiotic (last 12 hours)         None             Plan discussed with interdisciplinary team, per PT, patient experiencing decreased strength, endurance,  mobility, sensation, impaired balance, and pain with good rehab prognosis. Per GI, plan is to scope patient tomorrow. Occult blood of the stool pending. Will give patient another dose of IV Venofer 300 mg for HARINI. Discontinued IV Rocephin and started patient on oral Ceftin for UTI. Will continue current and repeat labs in the AM, especially monitoring hemoglobin.     Discharge planning discussed with patient and care team. Therapy evaluations ordered. [facility]-, anticipate HHC/SNF at discharge. Patient aware and agreeable to current plan, continue plan as above.      I spent a total of 50 minutes on the date of the service which included preparing to see the patient, face-to-face patient care, completing clinical documentation, obtaining and/or reviewing separately obtained history, performing a medically appropriate examination, counseling and educating the patient/family/caregiver, ordering medications, tests, or procedures, communicating with other HCPs (not separately reported), independently interpreting results (not separately reported), communicating results to the patient/family/caregiver, and care coordination (not separately reported).         ZACHARIAH ANDINO                      Revision History     Patient fully evaluated    for    Problem List Items Addressed This Visit          Symptoms and Signs    * (Principal) Generalized weakness - Primary    Relevant Orders    Surgical Pathology Exam    Surgical Pathology Exam     Other Visit Diagnoses       Hypoglycemia        Relevant Orders    Surgical Pathology Exam    Surgical Pathology Exam    Anemia, unspecified type        Relevant Orders    Esophagogastroduodenoscopy (EGD) (Completed)    Surgical Pathology Exam    Colonoscopy Diagnostic (Completed)    Surgical Pathology Exam          Patient seen resting in bed with head of bed elevated, no s/s or c/o acute difficulties at this time.  Vital signs for last 24 hours Temp:  [35.8 °C (96.4 °F)-36.5 °C (97.7  °F)] 35.8 °C (96.4 °F)  Heart Rate:  [72-88] 77  Resp:  [18] 18  BP: (121-164)/(56-80) 133/63    I/O this shift:  In: 237 [P.O.:237]  Out: -   Patient still requiring frequent cardiac and SPO2 monitoring. Continue aggressive pulmonary hygiene and oral hygiene. Off loading as tolerated for skin integrity. Medications and labs reviewed-   Results for orders placed or performed during the hospital encounter of 01/10/25 (from the past 24 hours)   POCT GLUCOSE   Result Value Ref Range    POCT Glucose 143 (H) 74 - 99 mg/dL   POCT GLUCOSE   Result Value Ref Range    POCT Glucose 145 (H) 74 - 99 mg/dL   POCT GLUCOSE   Result Value Ref Range    POCT Glucose 110 (H) 74 - 99 mg/dL   POCT GLUCOSE   Result Value Ref Range    POCT Glucose 66 (L) 74 - 99 mg/dL   CBC and Auto Differential   Result Value Ref Range    WBC 7.5 4.4 - 11.3 x10*3/uL    nRBC 0.0 0.0 - 0.0 /100 WBCs    RBC 3.50 (L) 4.00 - 5.20 x10*6/uL    Hemoglobin 7.9 (L) 12.0 - 16.0 g/dL    Hematocrit 27.6 (L) 36.0 - 46.0 %    MCV 79 (L) 80 - 100 fL    MCH 22.6 (L) 26.0 - 34.0 pg    MCHC 28.6 (L) 32.0 - 36.0 g/dL    RDW 19.2 (H) 11.5 - 14.5 %    Platelets 266 150 - 450 x10*3/uL    Neutrophils % 74.4 40.0 - 80.0 %    Immature Granulocytes %, Automated 0.5 0.0 - 0.9 %    Lymphocytes % 15.7 13.0 - 44.0 %    Monocytes % 7.6 2.0 - 10.0 %    Eosinophils % 1.5 0.0 - 6.0 %    Basophils % 0.3 0.0 - 2.0 %    Neutrophils Absolute 5.60 (H) 1.60 - 5.50 x10*3/uL    Immature Granulocytes Absolute, Automated 0.04 0.00 - 0.50 x10*3/uL    Lymphocytes Absolute 1.18 0.80 - 3.00 x10*3/uL    Monocytes Absolute 0.57 0.05 - 0.80 x10*3/uL    Eosinophils Absolute 0.11 0.00 - 0.40 x10*3/uL    Basophils Absolute 0.02 0.00 - 0.10 x10*3/uL   Comprehensive Metabolic Panel   Result Value Ref Range    Glucose 57 (L) 74 - 99 mg/dL    Sodium 136 136 - 145 mmol/L    Potassium 3.9 3.5 - 5.3 mmol/L    Chloride 102 98 - 107 mmol/L    Bicarbonate 27 21 - 32 mmol/L    Anion Gap 11 10 - 20 mmol/L    Urea  Nitrogen 12 6 - 23 mg/dL    Creatinine 0.66 0.50 - 1.05 mg/dL    eGFR 84 >60 mL/min/1.73m*2    Calcium 8.5 (L) 8.6 - 10.3 mg/dL    Albumin 2.9 (L) 3.4 - 5.0 g/dL    Alkaline Phosphatase 55 33 - 136 U/L    Total Protein 6.3 (L) 6.4 - 8.2 g/dL    AST 7 (L) 9 - 39 U/L    Bilirubin, Total 0.3 0.0 - 1.2 mg/dL    ALT 6 (L) 7 - 45 U/L   CEA   Result Value Ref Range    Carcinoembryonic AG 61.1 ug/L   POCT GLUCOSE   Result Value Ref Range    POCT Glucose 103 (H) 74 - 99 mg/dL   POCT GLUCOSE   Result Value Ref Range    POCT Glucose 124 (H) 74 - 99 mg/dL      Patient recently received an antibiotic (last 12 hours)       Date/Time Action Medication Dose    01/13/25 2029 Given    cefuroxime (Ceftin) tablet 250 mg 250 mg           Plan discussed with interdisciplinary team, per [facility]. Will watch for facility acceptance and start precert. continue current and repeat labs in the AM.     Discharge planning discussed with patient and care team. Therapy evaluations ordered. [facility] -10  , anticipate HHC/SNF at discharge.  Per [facility]. Will watch for facility acceptance and start precert. Patient aware and agreeable to current plan, continue plan as above.     I spent a total of 50 minutes on the date of the service which included preparing to see the patient, face-to-face patient care, completing clinical documentation, obtaining and/or reviewing separately obtained history, performing a medically appropriate examination, counseling and educating the patient/family/caregiver, ordering medications, tests, or procedures, communicating with other HCPs (not separately reported), independently interpreting results (not separately reported), communicating results to the patient/family/caregiver, and care coordination (not separately reported).      No acute events overnight, patient denie. Seen by jannet Ramey chest pain, worsening SOB at this  time.       Patient fully evaluated 1/15  for    Problem List Items Addressed This Visit          Symptoms and Signs    * (Principal) Generalized weakness - Primary    Relevant Orders    Surgical Pathology Exam    Surgical Pathology Exam     Other Visit Diagnoses       Hypoglycemia        Relevant Orders    Surgical Pathology Exam    Surgical Pathology Exam    Anemia, unspecified type        Relevant Orders    Esophagogastroduodenoscopy (EGD) (Completed)    Surgical Pathology Exam    Colonoscopy Diagnostic (Completed)    Surgical Pathology Exam          Patient seen resting in bed with head of bed elevated, no s/s or c/o acute difficulties at this time.  Vital signs for last 24 hours Temp:  [35.8 °C (96.4 °F)-36.5 °C (97.7 °F)] 35.8 °C (96.4 °F)  Heart Rate:  [72-88] 77  Resp:  [18] 18  BP: (121-164)/(56-80) 133/63    I/O this shift:  In: 237 [P.O.:237]  Out: -   Patient still requiring frequent cardiac and SPO2 monitoring. Continue aggressive pulmonary hygiene and oral hygiene. Off loading as tolerated for skin integrity. Medications and labs reviewed-   Results for orders placed or performed during the hospital encounter of 01/10/25 (from the past 24 hours)   POCT GLUCOSE   Result Value Ref Range    POCT Glucose 143 (H) 74 - 99 mg/dL   POCT GLUCOSE   Result Value Ref Range    POCT Glucose 145 (H) 74 - 99 mg/dL   POCT GLUCOSE   Result Value Ref Range    POCT Glucose 110 (H) 74 - 99 mg/dL   POCT GLUCOSE   Result Value Ref Range    POCT Glucose 66 (L) 74 - 99 mg/dL   CBC and Auto Differential   Result Value Ref Range    WBC 7.5 4.4 - 11.3 x10*3/uL    nRBC 0.0 0.0 - 0.0 /100 WBCs    RBC 3.50 (L) 4.00 - 5.20 x10*6/uL    Hemoglobin 7.9 (L) 12.0 - 16.0 g/dL    Hematocrit 27.6 (L) 36.0 - 46.0 %    MCV 79 (L) 80 - 100 fL    MCH 22.6 (L) 26.0 - 34.0 pg    MCHC 28.6 (L) 32.0 - 36.0 g/dL    RDW 19.2 (H) 11.5 - 14.5 %    Platelets 266 150 - 450 x10*3/uL    Neutrophils % 74.4 40.0 - 80.0 %    Immature Granulocytes %, Automated  0.5 0.0 - 0.9 %    Lymphocytes % 15.7 13.0 - 44.0 %    Monocytes % 7.6 2.0 - 10.0 %    Eosinophils % 1.5 0.0 - 6.0 %    Basophils % 0.3 0.0 - 2.0 %    Neutrophils Absolute 5.60 (H) 1.60 - 5.50 x10*3/uL    Immature Granulocytes Absolute, Automated 0.04 0.00 - 0.50 x10*3/uL    Lymphocytes Absolute 1.18 0.80 - 3.00 x10*3/uL    Monocytes Absolute 0.57 0.05 - 0.80 x10*3/uL    Eosinophils Absolute 0.11 0.00 - 0.40 x10*3/uL    Basophils Absolute 0.02 0.00 - 0.10 x10*3/uL   Comprehensive Metabolic Panel   Result Value Ref Range    Glucose 57 (L) 74 - 99 mg/dL    Sodium 136 136 - 145 mmol/L    Potassium 3.9 3.5 - 5.3 mmol/L    Chloride 102 98 - 107 mmol/L    Bicarbonate 27 21 - 32 mmol/L    Anion Gap 11 10 - 20 mmol/L    Urea Nitrogen 12 6 - 23 mg/dL    Creatinine 0.66 0.50 - 1.05 mg/dL    eGFR 84 >60 mL/min/1.73m*2    Calcium 8.5 (L) 8.6 - 10.3 mg/dL    Albumin 2.9 (L) 3.4 - 5.0 g/dL    Alkaline Phosphatase 55 33 - 136 U/L    Total Protein 6.3 (L) 6.4 - 8.2 g/dL    AST 7 (L) 9 - 39 U/L    Bilirubin, Total 0.3 0.0 - 1.2 mg/dL    ALT 6 (L) 7 - 45 U/L   CEA   Result Value Ref Range    Carcinoembryonic AG 61.1 ug/L   POCT GLUCOSE   Result Value Ref Range    POCT Glucose 103 (H) 74 - 99 mg/dL   POCT GLUCOSE   Result Value Ref Range    POCT Glucose 124 (H) 74 - 99 mg/dL      Patient recently received an antibiotic (last 12 hours)       Date/Time Action Medication Dose    01/15/25 0904 Given    cefuroxime (Ceftin) tablet 250 mg 250 mg           Plan discussed with interdisciplinary team, per Gastroenterology, EGD did not show any abnormalities to explain the anemia and colonoscopy is recommended. Discussed staying here for colonoscopy. Patient hesitant about using Golytely bowel prep but agrees to needing colonoscopy, would appreciate input from Gastroenterology on specific bowel prep. Patient will consider her options. Will continue current and repeat labs in the AM, especially monitoring her Iron deficiency anemia.     Discharge  planning discussed with patient and care team. Therapy evaluations ordered. [facility]-12, anticipate HHC/SNF at discharge. Patient aware and agreeable to current plan, continue plan as above.     I spent a total of 50 minutes on the date of the service which included preparing to see the patient, face-to-face patient care, completing clinical documentation, obtaining and/or reviewing separately obtained history, performing a medically appropriate examination, counseling and educating the patient/family/caregiver, ordering medications, tests, or procedures, communicating with other HCPs (not separately reported), independently interpreting results (not separately reported), communicating results to the patient/family/caregiver, and care coordination (not separately reported).     Patient fully evaluated 1/16  for    Problem List Items Addressed This Visit          Symptoms and Signs    * (Principal) Generalized weakness - Primary    Relevant Orders    Surgical Pathology Exam    Surgical Pathology Exam     Other Visit Diagnoses       Hypoglycemia        Relevant Orders    Surgical Pathology Exam    Surgical Pathology Exam    Anemia, unspecified type        Relevant Orders    Esophagogastroduodenoscopy (EGD) (Completed)    Surgical Pathology Exam    Colonoscopy Diagnostic (Completed)    Surgical Pathology Exam          Patient seen resting in bed with head of bed elevated, no s/s or c/o acute difficulties at this time.  Vital signs for last 24 hours Temp:  [35.8 °C (96.4 °F)-36.5 °C (97.7 °F)] 35.8 °C (96.4 °F)  Heart Rate:  [72-88] 77  Resp:  [18] 18  BP: (121-164)/(56-80) 133/63    I/O this shift:  In: 237 [P.O.:237]  Out: -   Patient still requiring frequent cardiac and SPO2 monitoring. Continue aggressive pulmonary hygiene and oral hygiene. Off loading as tolerated for skin integrity. Medications and labs reviewed-   Results for orders placed or performed during the hospital encounter of 01/10/25 (from the past 24  hours)   POCT GLUCOSE   Result Value Ref Range    POCT Glucose 143 (H) 74 - 99 mg/dL   POCT GLUCOSE   Result Value Ref Range    POCT Glucose 145 (H) 74 - 99 mg/dL   POCT GLUCOSE   Result Value Ref Range    POCT Glucose 110 (H) 74 - 99 mg/dL   POCT GLUCOSE   Result Value Ref Range    POCT Glucose 66 (L) 74 - 99 mg/dL   CBC and Auto Differential   Result Value Ref Range    WBC 7.5 4.4 - 11.3 x10*3/uL    nRBC 0.0 0.0 - 0.0 /100 WBCs    RBC 3.50 (L) 4.00 - 5.20 x10*6/uL    Hemoglobin 7.9 (L) 12.0 - 16.0 g/dL    Hematocrit 27.6 (L) 36.0 - 46.0 %    MCV 79 (L) 80 - 100 fL    MCH 22.6 (L) 26.0 - 34.0 pg    MCHC 28.6 (L) 32.0 - 36.0 g/dL    RDW 19.2 (H) 11.5 - 14.5 %    Platelets 266 150 - 450 x10*3/uL    Neutrophils % 74.4 40.0 - 80.0 %    Immature Granulocytes %, Automated 0.5 0.0 - 0.9 %    Lymphocytes % 15.7 13.0 - 44.0 %    Monocytes % 7.6 2.0 - 10.0 %    Eosinophils % 1.5 0.0 - 6.0 %    Basophils % 0.3 0.0 - 2.0 %    Neutrophils Absolute 5.60 (H) 1.60 - 5.50 x10*3/uL    Immature Granulocytes Absolute, Automated 0.04 0.00 - 0.50 x10*3/uL    Lymphocytes Absolute 1.18 0.80 - 3.00 x10*3/uL    Monocytes Absolute 0.57 0.05 - 0.80 x10*3/uL    Eosinophils Absolute 0.11 0.00 - 0.40 x10*3/uL    Basophils Absolute 0.02 0.00 - 0.10 x10*3/uL   Comprehensive Metabolic Panel   Result Value Ref Range    Glucose 57 (L) 74 - 99 mg/dL    Sodium 136 136 - 145 mmol/L    Potassium 3.9 3.5 - 5.3 mmol/L    Chloride 102 98 - 107 mmol/L    Bicarbonate 27 21 - 32 mmol/L    Anion Gap 11 10 - 20 mmol/L    Urea Nitrogen 12 6 - 23 mg/dL    Creatinine 0.66 0.50 - 1.05 mg/dL    eGFR 84 >60 mL/min/1.73m*2    Calcium 8.5 (L) 8.6 - 10.3 mg/dL    Albumin 2.9 (L) 3.4 - 5.0 g/dL    Alkaline Phosphatase 55 33 - 136 U/L    Total Protein 6.3 (L) 6.4 - 8.2 g/dL    AST 7 (L) 9 - 39 U/L    Bilirubin, Total 0.3 0.0 - 1.2 mg/dL    ALT 6 (L) 7 - 45 U/L   CEA   Result Value Ref Range    Carcinoembryonic AG 61.1 ug/L   POCT GLUCOSE   Result Value Ref Range    POCT  Glucose 103 (H) 74 - 99 mg/dL   POCT GLUCOSE   Result Value Ref Range    POCT Glucose 124 (H) 74 - 99 mg/dL      Patient recently received an antibiotic (last 12 hours)       Date/Time Action Medication Dose    01/16/25 1115 Given    cefuroxime (Ceftin) tablet 250 mg 250 mg           Plan discussed with interdisciplinary team, patient underwent colonoscopy today for anemia. Per gastroenterology, diverticula of mild severity with no inflammation in the sigmoid colon, external and internal hemorrhoids present, three sessile polyps 4 cm up to 6 cm in the ascending colon, descending colon and sigmoid colon; performed cold snare with complete en bloc removal and retrieved specimen. Ulcerated friable mass extending from around the mid transverse colon to the hepatic flexure. The mass extended continuously from 55 cm to 65 cm from the anal canal. The mass was circumferential partially obstructing the bowel lumen. The pediatric colonoscopy was able to traverse the mass. A biopsy was performed on the mass, awaiting results. Will also review results of CEA.     Patient's hemoglobin today dropped from 7.8 to 7.5. Patient receiving IV Venofer 300 mg in sodium chloride 0.9% 282 mL. Will continue current and repeat labs in the AM.     Discharge planning discussed with patient and care team. Therapy evaluations ordered. [facility]-12, anticipate HHC/SNF at discharge. Patient aware and agreeable to current plan, continue plan as above.     I spent a total of 50 minutes on the date of the service which included preparing to see the patient, face-to-face patient care, completing clinical documentation, obtaining and/or reviewing separately obtained history, performing a medically appropriate examination, counseling and educating the patient/family/caregiver, ordering medications, tests, or procedures, communicating with other HCPs (not separately reported), independently interpreting results (not separately reported), communicating  results to the patient/family/caregiver, and care coordination (not separately reported).       Patient fully evaluated 1/17  for    Problem List Items Addressed This Visit          Symptoms and Signs    * (Principal) Generalized weakness - Primary    Relevant Orders    Surgical Pathology Exam    Surgical Pathology Exam     Other Visit Diagnoses       Hypoglycemia        Relevant Orders    Surgical Pathology Exam    Surgical Pathology Exam    Anemia, unspecified type        Relevant Orders    Esophagogastroduodenoscopy (EGD) (Completed)    Surgical Pathology Exam    Colonoscopy Diagnostic (Completed)    Surgical Pathology Exam          Patient seen resting in bed with head of bed elevated, no s/s or c/o acute difficulties at this time.  Vital signs for last 24 hours Temp:  [35.8 °C (96.4 °F)-36.5 °C (97.7 °F)] 35.8 °C (96.4 °F)  Heart Rate:  [72-88] 77  Resp:  [18] 18  BP: (121-164)/(56-80) 133/63    I/O this shift:  In: 237 [P.O.:237]  Out: -   Patient still requiring frequent cardiac and SPO2 monitoring. Continue aggressive pulmonary hygiene and oral hygiene. Off loading as tolerated for skin integrity. Medications and labs reviewed-   Results for orders placed or performed during the hospital encounter of 01/10/25 (from the past 24 hours)   POCT GLUCOSE   Result Value Ref Range    POCT Glucose 143 (H) 74 - 99 mg/dL   POCT GLUCOSE   Result Value Ref Range    POCT Glucose 145 (H) 74 - 99 mg/dL   POCT GLUCOSE   Result Value Ref Range    POCT Glucose 110 (H) 74 - 99 mg/dL   POCT GLUCOSE   Result Value Ref Range    POCT Glucose 66 (L) 74 - 99 mg/dL   CBC and Auto Differential   Result Value Ref Range    WBC 7.5 4.4 - 11.3 x10*3/uL    nRBC 0.0 0.0 - 0.0 /100 WBCs    RBC 3.50 (L) 4.00 - 5.20 x10*6/uL    Hemoglobin 7.9 (L) 12.0 - 16.0 g/dL    Hematocrit 27.6 (L) 36.0 - 46.0 %    MCV 79 (L) 80 - 100 fL    MCH 22.6 (L) 26.0 - 34.0 pg    MCHC 28.6 (L) 32.0 - 36.0 g/dL    RDW 19.2 (H) 11.5 - 14.5 %    Platelets 266 150 - 450  x10*3/uL    Neutrophils % 74.4 40.0 - 80.0 %    Immature Granulocytes %, Automated 0.5 0.0 - 0.9 %    Lymphocytes % 15.7 13.0 - 44.0 %    Monocytes % 7.6 2.0 - 10.0 %    Eosinophils % 1.5 0.0 - 6.0 %    Basophils % 0.3 0.0 - 2.0 %    Neutrophils Absolute 5.60 (H) 1.60 - 5.50 x10*3/uL    Immature Granulocytes Absolute, Automated 0.04 0.00 - 0.50 x10*3/uL    Lymphocytes Absolute 1.18 0.80 - 3.00 x10*3/uL    Monocytes Absolute 0.57 0.05 - 0.80 x10*3/uL    Eosinophils Absolute 0.11 0.00 - 0.40 x10*3/uL    Basophils Absolute 0.02 0.00 - 0.10 x10*3/uL   Comprehensive Metabolic Panel   Result Value Ref Range    Glucose 57 (L) 74 - 99 mg/dL    Sodium 136 136 - 145 mmol/L    Potassium 3.9 3.5 - 5.3 mmol/L    Chloride 102 98 - 107 mmol/L    Bicarbonate 27 21 - 32 mmol/L    Anion Gap 11 10 - 20 mmol/L    Urea Nitrogen 12 6 - 23 mg/dL    Creatinine 0.66 0.50 - 1.05 mg/dL    eGFR 84 >60 mL/min/1.73m*2    Calcium 8.5 (L) 8.6 - 10.3 mg/dL    Albumin 2.9 (L) 3.4 - 5.0 g/dL    Alkaline Phosphatase 55 33 - 136 U/L    Total Protein 6.3 (L) 6.4 - 8.2 g/dL    AST 7 (L) 9 - 39 U/L    Bilirubin, Total 0.3 0.0 - 1.2 mg/dL    ALT 6 (L) 7 - 45 U/L   CEA   Result Value Ref Range    Carcinoembryonic AG 61.1 ug/L   POCT GLUCOSE   Result Value Ref Range    POCT Glucose 103 (H) 74 - 99 mg/dL   POCT GLUCOSE   Result Value Ref Range    POCT Glucose 124 (H) 74 - 99 mg/dL      Patient recently received an antibiotic (last 12 hours)       Date/Time Action Medication Dose    01/17/25 0939 Given    cefuroxime (Ceftin) tablet 250 mg 250 mg     No acute events overnight. Hemoglobin improved at 7.9 from yesterday at 7.5.       Results of colonoscopy indicating colon cancer was discussed with the patient. Plan discussed with interdisciplinary team, surgery was also in the room and we discussed removal of the tumor with the patient. Patient is undergoing CT scan today to determine presence of metastasis or characterization of mass. Informed patient we are  waiting for results of biopsy of the tumor. Patient still receiving Ceftin for UTI. Will continue current and repeat labs in the Am, monitoring anemia.     Discharge planning discussed with patient and care team. Therapy evaluations ordered. [facility] anticipate C/SNF at discharge. Patient aware and agreeable to current plan, continue plan as above.     I spent a total of 50 minutes on the date of the service which included preparing to see the patient, face-to-face patient care, completing clinical documentation, obtaining and/or reviewing separately obtained history, performing a medically appropriate examination, counseling and educating the patient/family/caregiver, ordering medications, tests, or procedures, communicating with other HCPs (not separately reported), independently interpreting results (not separately reported), communicating results to the patient/family/caregiver, and care coordination (not separately reported).     ZACHARIAH ANDINO

## 2025-01-17 NOTE — CONSULTS
Patient ID: : Melissa Tse            Primary Care Provider: [facility] EXTERNAL DATA PROVIDER    REFERRING PHYSICIAN:  Dr. Tio Lugo    REASON FOR CONSULT:  1.  Newly diagnosed obstructing colon cancer.  2.  History of metastatic breast cancer    HISTORY OF PRESENT ILLNESS:  Melissa Tse is a 89 y.o. female with known history of metastatic breast cancer currently being treated with hormonal therapy with anastrozole.  She presented to [facility] after an episode of weakness and fatigue leading to fall.  Additional workup revealed microcytic anemia with iron deficiency leading to further GI evaluation.  Colonoscopy showed a obstructing transverse colon lesion at hepatic flexure.  Staging CT scan from earlier today showed large lobulated mass involving the mid transverse colon consistent with malignancy.  Local regional lymphadenopathy was noted along with a 1.1 cm mass in the right lobe of liver suspicious for metastatic disease.      INTERVAL HISTORY:  Patient denies any new complaints. No history of nausea, vomiting, fever, night sweats, diarrhea, rash, anorexia or weight loss. No recent changes in medications.    PAST MEDICAL HISTORY:  1.  History of breast cancer.  2.  Type 2 diabetes  3.  Hypertension  4.  Vitamin D deficiency.    SOCIAL HISTORY:  She lives with her son and grandson in [place].  Non-smoker and nonalcoholic.  She is retired and used to work as a .  Born and raised in [place].    FAMILY HISTORY:  Mother  at age 78 from diabetes and sepsis related complication.  She does not know the medical detail of her biological father.  2 brothers.  1  from motorcycle accident related complications and other  because of alcoholism.  3 children.  Her daughter has history of breast cancer in remission and son has a history of prostate cancer treated with radiation.  7 grandkids 1  from leukemia.  3 great grandkids all alive and well.  No other family history of  "bleeding, clotting or malignant disorders in the family history.    REVIEW OF SYSTEMS:   Pertinent finding as per the history above.  All other systems have been reviewed and generally negative and noncontributory.    VITALS:  BSA: 1.95 meters squared  /63 (BP Location: Right arm, Patient Position: Lying)   Pulse 77   Temp 35.8 °C (96.4 °F) (Temporal)   Resp 18   Ht 1.626 m (5' 4.02\")   Wt 83.9 kg (185 lb)   SpO2 99%   BMI 31.74 kg/m²     PHYSICAL EXAMINATION:  Detailed examination not done.    RELEVANT RESULTS:  Latest CBC showed a hemoglobin of 7.5 with MCV of 79 with normal WBC and platelets.  Metabolic profile was unremarkable from yesterday other than albumin of 2.9.    ASSESSMENT / PLAN:  1.  Most probable metastatic colon cancer.  Based on the initial colonoscopy and CT scan results there is concern regarding metastatic disease with liver and local regional lymph kandi involvement.  Patient and family members will be meeting with the surgery team to decide about the role for surgical resection versus palliative care type approach.  In my opinion, the liver lesion is confirmed to be metastatic then there will be less role for upfront surgery.  Role for chemo or other systemic therapy will be mainly palliative.  Patient does not seem to be having significant bleeding or pain or other obstructive symptoms currently.    2.  History of breast cancer with known lytic lesion.  In the past she has declined breast surgery and has been maintained on anastrozole from last 2 years with good overall results.    A total of 60 minutes were spent in evaluation - performing physical examination, history taking, review of the previous extensive records/information and formulating current and future management plan. Majority of the time was spend in consultation and discussion.    This note has been transcribed using Dragon voice recognition system and there is a possibility of unintentional typing misprints.      "

## 2025-01-17 NOTE — PROGRESS NOTES
"Occupational Therapy    OT Treatment    Patient Name: Melissa Tse  MRN: [id number]  Department: [facility] 3  Room: [address] [place] [address]  Today's Date: 1/17/2025  Time Calculation  Start Time: 0840  Stop Time: 0920  Time Calculation (min): 40 min        Assessment:  End of Session Patient Position: Bed, 2 rail up, Alarm off, not on at start of session     Plan:  Treatment Interventions: ADL retraining, Functional transfer training  OT Frequency: 3 times per week  OT Discharge Recommendations: Moderate intensity level of continued care  OT - OK to Discharge:  (okay to discharge to next level of care pending medical team clearance)  Treatment Interventions: ADL retraining, Functional transfer training    Subjective   Previous Visit Info:  OT Last Visit  OT Received On: 01/17/25  General:  General  Co-Treatment: PT, OT  Co-Treatment Reason: to maximize function and safety  Prior to Session Communication: Bedside nurse (therapy ok'd by RN)  Patient Position Received: Bed, 2 rail up, Alarm off, not on at start of session  General Comment: pt needed encouragement to participate, reporting \"just fell asleep\"  Precautions:  Medical Precautions: Fall precautions, Oxygen therapy device and L/min (2 liters)  Precautions Comment: purewick        Vital Signs Comment: 97% spo2/ 85 HR at rest on 2 liters; on room air 88-89%, able to maintain 98% on 1 liter     Pain:  Pain Assessment  Pain Assessment: 0-10  0-10 (Numeric) Pain Score: 8  Pain Orientation:  (left heel)  Pain Interventions: Repositioned, Distraction (reports Tylenol this morning)    Objective    Cognition:  Cognition  Orientation Level: Oriented X4 (grossly)  Coordination:  Coordination Comment: tremors noted  Activities of Daily Living: Feeding  Feeding Level of Assistance: Setup (and encouragement to eat d/t decreased appetite)    Grooming  Grooming Level of Assistance:  (min a brushing hair, edge of bed; washing face sba)         LE Dressing  Sock Level of Assistance: Dependent  LE " Dressing Where Assessed: Bed level, Edge of bed       Functional Standing Tolerance:  Time: 2 min at walker, min a x2  Bed Mobility/Transfers: Bed Mobility  Bed Mobility:  (mod a for supine to/from sit, effortful)    Transfers  Transfer:  (sit to stand min a x2, pivot transfer bed to bedside chair to right side mod a x2)      Functional Mobility:  Functional Mobility  Functional Mobility Performed:  (first attempt for mobility at walker, however, unable d/t weakness and fatigue; 2nd atttempt after rest, pt able to complete side steps along bed min a x2 at [facility])           Outcome Measures:[facility] Daily Activity  Putting on and taking off regular lower body clothing: Total  Bathing (including washing, rinsing, drying): A lot  Putting on and taking off regular upper body clothing: A lot  Toileting, which includes using toilet, bedpan or urinal: Total  Taking care of personal grooming such as brushing teeth: A little  Eating Meals: A little  Daily Activity - Total Score: 12        Education Documentation  No documentation found.  Education Comments  No comments found.        OP EDUCATION:  Education  Individual(s) Educated: Patient  Education Provided:  (goals of session, transfer safety and hand placement techs; pacing techs/breathing techs; adl techs; safety for home going regarding dm and foot protection d/t pt reports not wearing socks/footwear at home)  Patient Response to Education: Patient/Caregiver Verbalized Understanding of Information  Education Comment: pt could benefit from continued education    Goals:  Encounter Problems       Encounter Problems (Active)       impaired functional daily living skills       Pt will increase Grooming to sba/cga Upper Body Bathing to sba/cga  and Lower Body Bathing  to cga/min a x 1-2  increase Upper Body Dressing  to sba/cga  and LE Dressing to cga/min a x  1-2  with/ without adaptive equipment as needed       Start:  01/12/25    Expected End:  01/26/25            Pt will  increase Functional Transfers Bed Mobility to cga/min a x 1-2  Sit to Stand  to cga/elaine  x 1-2  Functional Mobility  with a device to cga/min a x 1-2  chair/toliet/room to increase indep/safety in patients discharge environment       Start:  01/12/25    Expected End:  01/26/25               impaired functional daily living skills       Pt will increase  energy conservation /work simplification/diaphragmatic breathing performance to sba assistance to increase independence and safety  within  the patient's discharge environment        Start:  01/12/25    Expected End:  01/26/25

## 2025-01-17 NOTE — CONSULTS
Reason For Consult  Colon mass    History Of Present Illness  Melissa Tse is a 89 y.o. female with PMH significant for HTN, DMT2 and Rt breast cancer who presented to [facility] ED on 1/10 for weakness. She was found to be anemic and underwent EGD on 1/14 and colonoscopy on 1/16. Colonoscopy revealed large invasive and ulcerated mass in the hepatic flexure to the transverse colon, partially obstructing; pediatric colonoscope was able to transverse. CT C/A/P ordered by GI, has yet to be performed. Pt denies any melena or hematochezia, but endorses constipation for several weeks. She also notes progressive weakness for a few weeks.     Of note, pt was initially a DNRCC, but was changed to full code for the procedure. After discussion with pt, her wishes more closely align with DNR/DNI, but she is okay revoking this for surgery/procedures.      Past Medical History  She has no past medical history on file.    Surgical History  She has a past surgical history that includes Other surgical history (02/25/2022).     Social History  She reports that she has never smoked. She has never been exposed to tobacco smoke. She has never used smokeless tobacco. She reports that she does not drink alcohol and does not use drugs.    Family History  No family history on file.     Allergies  Penicillin and Statins-hmg-coa reductase inhibitors    Physical Exam  Physical Exam  Constitutional:       General: She is not in acute distress.     Appearance: She is not ill-appearing or toxic-appearing.   HENT:      Mouth/Throat:      Mouth: Mucous membranes are moist.   Cardiovascular:      Rate and Rhythm: Normal rate and regular rhythm.      Heart sounds: Normal heart sounds.   Pulmonary:      Effort: Pulmonary effort is normal. No respiratory distress.      Breath sounds: Normal breath sounds.      Comments: 2L NC  Abdominal:      General: Bowel sounds are normal. There is no distension.      Palpations: Abdomen is soft.      Tenderness:  "There is no abdominal tenderness.   Skin:     General: Skin is warm and dry.      Coloration: Skin is pale.   Neurological:      Mental Status: She is alert and oriented to person, place, and time.   Psychiatric:         Mood and Affect: Mood normal.         Behavior: Behavior normal.            Last Recorded Vitals  Blood pressure 133/63, pulse 77, temperature 35.8 °C (96.4 °F), temperature source Temporal, resp. rate 18, height 1.626 m (5' 4.02\"), weight 83.9 kg (185 lb), SpO2 99%.    Relevant Results  Results for orders placed or performed during the hospital encounter of 01/10/25 (from the past 24 hours)   POCT GLUCOSE   Result Value Ref Range    POCT Glucose 143 (H) 74 - 99 mg/dL   POCT GLUCOSE   Result Value Ref Range    POCT Glucose 145 (H) 74 - 99 mg/dL   POCT GLUCOSE   Result Value Ref Range    POCT Glucose 110 (H) 74 - 99 mg/dL   POCT GLUCOSE   Result Value Ref Range    POCT Glucose 66 (L) 74 - 99 mg/dL   CBC and Auto Differential   Result Value Ref Range    WBC 7.5 4.4 - 11.3 x10*3/uL    nRBC 0.0 0.0 - 0.0 /100 WBCs    RBC 3.50 (L) 4.00 - 5.20 x10*6/uL    Hemoglobin 7.9 (L) 12.0 - 16.0 g/dL    Hematocrit 27.6 (L) 36.0 - 46.0 %    MCV 79 (L) 80 - 100 fL    MCH 22.6 (L) 26.0 - 34.0 pg    MCHC 28.6 (L) 32.0 - 36.0 g/dL    RDW 19.2 (H) 11.5 - 14.5 %    Platelets 266 150 - 450 x10*3/uL    Neutrophils % 74.4 40.0 - 80.0 %    Immature Granulocytes %, Automated 0.5 0.0 - 0.9 %    Lymphocytes % 15.7 13.0 - 44.0 %    Monocytes % 7.6 2.0 - 10.0 %    Eosinophils % 1.5 0.0 - 6.0 %    Basophils % 0.3 0.0 - 2.0 %    Neutrophils Absolute 5.60 (H) 1.60 - 5.50 x10*3/uL    Immature Granulocytes Absolute, Automated 0.04 0.00 - 0.50 x10*3/uL    Lymphocytes Absolute 1.18 0.80 - 3.00 x10*3/uL    Monocytes Absolute 0.57 0.05 - 0.80 x10*3/uL    Eosinophils Absolute 0.11 0.00 - 0.40 x10*3/uL    Basophils Absolute 0.02 0.00 - 0.10 x10*3/uL   Comprehensive Metabolic Panel   Result Value Ref Range    Glucose 57 (L) 74 - 99 mg/dL    " Sodium 136 136 - 145 mmol/L    Potassium 3.9 3.5 - 5.3 mmol/L    Chloride 102 98 - 107 mmol/L    Bicarbonate 27 21 - 32 mmol/L    Anion Gap 11 10 - 20 mmol/L    Urea Nitrogen 12 6 - 23 mg/dL    Creatinine 0.66 0.50 - 1.05 mg/dL    eGFR 84 >60 mL/min/1.73m*2    Calcium 8.5 (L) 8.6 - 10.3 mg/dL    Albumin 2.9 (L) 3.4 - 5.0 g/dL    Alkaline Phosphatase 55 33 - 136 U/L    Total Protein 6.3 (L) 6.4 - 8.2 g/dL    AST 7 (L) 9 - 39 U/L    Bilirubin, Total 0.3 0.0 - 1.2 mg/dL    ALT 6 (L) 7 - 45 U/L   CEA   Result Value Ref Range    Carcinoembryonic AG 61.1 ug/L   POCT GLUCOSE   Result Value Ref Range    POCT Glucose 103 (H) 74 - 99 mg/dL     Colonoscopy Diagnostic    Result Date: 1/16/2025  Table formatting from the original result was not included. Impression Diverticulosis of mild severity in the sigmoid colon Small hemorrhoids 3 subcentimeter polyps in the ascending colon, descending colon and sigmoid colon were removed with cold snare Single friable, invasive and ulcerated mass measuring 10 cm in the hepatic flexure and transverse colon 55 cm from the anal verge; bleeding occurred before intervention; performed cold forceps biopsy; tattooed distal to the finding Findings Diverticula of mild severity with no inflammation in the sigmoid colon External and internal small hemorrhoids Three sessile polyps measuring from 4 mm up to 6 mm in the ascending colon, descending colon and sigmoid colon; performed cold snare with complete en bloc removal and retrieved specimen Single friable, invasive and ulcerated mass measuring 10 cm in the hepatic flexure and transverse colon 55 cm from the anal verge; bleeding occurred before intervention; performed cold forceps biopsy; tattooed distal to the finding with spot. Ulcerated friable mass extending from around the mid transverse colon to the hepatic flexure.  The mass extended continuously from 55 cm to 65 cm from the anal canal.  The mass was circumferential partially obstructing the  bowel lumen.  The pediatric colonoscopy was able to traverse the mass.  Recommendation Await pathology results Check CT chest abdomen and pelvis Check CEA level  Indication Anemia, unspecified type Staff Staff Role Macario Vasquez MD Proceduralist Medications See Anesthesia Record. Preprocedure A history and physical has been performed, and patient medication allergies have been reviewed. The patient's tolerance of previous anesthesia has been reviewed. The risks and benefits of the procedure and the sedation options and risks were discussed with the patient. All questions were answered and informed consent obtained. Details of the Procedure The patient underwent monitored anesthesia care, which was administered by an anesthesia professional. The patient's blood pressure, ECG, ETCO2, heart rate, level of consciousness, oxygen and respirations were monitored throughout the procedure. A digital rectal exam was performed. The scope was introduced through the anus and advanced to the cecum. Retroflexion was performed in the rectum. The quality of bowel preparation was evaluated using the [place] Bowel Preparation Scale with scores of: right colon = 3, transverse colon = 3, left colon = 3. The total BBPS score was 9. Bowel prep was adequate. The patient experienced no blood loss. The procedure was not difficult. The patient tolerated the procedure well. There were no apparent adverse events. Events Procedure Events Event Event Time ENDO SCOPE IN TIME 1/16/2025  9:15 AM ENDO CECUM REACHED 1/16/2025  9:25 AM ENDO SCOPE OUT TIME 1/16/2025  9:52 AM Specimens ID Type Source Tests Collected by Time 1 : recto-sigmoid/ descending colon polyps cold snared Tissue RECTUM POLYPECTOMY SURGICAL PATHOLOGY EXAM Macario Vasquez MD 1/16/2025 0917 2 : cold snared Tissue ASCENDING COLON POLYP SURGICAL PATHOLOGY EXAM Macario Vasquez MD 1/16/2025 0928 3 : cold biopsy Tissue COLON - HEPATIC FLEXURE BIOPSY SURGICAL PATHOLOGY EXAM Macario Vasquez MD  1/16/2025 0932 Procedure Location [facility] 3 [address] [facility] [id number] [phone] Referring Provider VERENICE Lunsford-CNP Procedure Provider MD Johanna Montes     Esophagogastroduodenoscopy (EGD)    Result Date: 1/14/2025  Table formatting from the original result was not included. Impression The cricopharynx, upper third of the esophagus, middle third of the esophagus, lower third of the esophagus, GE junction and Z-line appeared normal. Electronic chromoendoscopy was used. Mild erythematous mucosa in the body of the stomach; electronic chromoendoscopy (NBI) was used; performed cold forceps biopsy to rule out H. pylori The cardia, fundus of the stomach, incisura, antrum, prepyloric region and pylorus appeared normal. The duodenal bulb, 1st part of the duodenum and 2nd part of the duodenum appeared normal. Findings The cricopharynx, upper third of the esophagus, middle third of the esophagus, lower third of the esophagus, GE junction and Z-line appeared normal. Z-line is 40 cm from the incisors. Electronic chromoendoscopy was used. Mild, localized erythematous mucosa that did not appear granular in the body of the stomach; electronic chromoendoscopy (NBI) was used; performed cold forceps biopsy to rule out H. pylori The cardia, fundus of the stomach, incisura, antrum, prepyloric region and pylorus appeared normal. The duodenal bulb, 1st part of the duodenum and 2nd part of the duodenum appeared normal. Recommendation Await pathology results Follow with inpatient GI team  Indication Anemia, unspecified type Staff Staff Role Valdez Valdivia MD Proceduralist Medications See Anesthesia Record. Preprocedure A history and physical has been performed, and patient medication allergies have been reviewed. The patient's tolerance of previous anesthesia has been reviewed. The risks and benefits of the procedure and the sedation options and risks were  discussed with the patient. All questions were answered and informed consent obtained. Details of the Procedure The patient underwent monitored anesthesia care, which was administered by an anesthesia professional. The patient's blood pressure, ECG, ETCO2, heart rate, level of consciousness, oxygen and respirations were monitored throughout the procedure. The scope was introduced through the mouth and advanced to the second part of the duodenum. Retroflexion was performed in the cardia. The patient experienced no blood loss. The procedure was not difficult. There were no apparent adverse events. Events Procedure Events Event Event Time ENDO SCOPE IN TIME 1/14/2025  9:14 AM ENDO SCOPE OUT TIME 1/14/2025  9:20 AM Specimens ID Type Source Tests Collected by Time 1 : GASTRIC BX'S, COLD BX Tissue STOMACH ANTRUM BIOPSY SURGICAL PATHOLOGY EXAM Valdez Valdivia MD 1/14/2025 0918 Procedure Location [facility] [address] [facility] [address] [phone] Referring Provider Johanna Penn, VERENICE-CNP Procedure Provider Valdez Valdivia MD     ECG 12 lead    Result Date: 1/13/2025  Normal sinus rhythm Low voltage QRS Cannot rule out Inferior infarct , age undetermined Possible Anterolateral infarct , age undetermined Abnormal ECG No previous ECGs available        Assessment/Plan   Melissa Tse is a 89 y.o. female with PMH significant for HTN, DMT2 and Rt breast cancer who presented to [facility] ED on 1/10 for weakness. She was found to be anemic and underwent EGD on 1/14 and colonoscopy on 1/16. Colonoscopy revealed large invasive and ulcerated mass in the hepatic flexure to the transverse colon, partially obstructing; pediatric colonoscope was able to transverse. CT C/A/P ordered by GI, has yet to be performed.     Of note, pt was initially a DNRCC, but was changed to full code for the procedure.     Impression:  Rt colon mass, likely malignant  Anemia d/t above   > Initial drop, Hgb  now stabilized in mid-upper 7s    Recommendations:  - Will need to discuss timing of surgery     > Optimize nutrition and anemia pre-op if able  - Pending CT C/A/P for staging and further characterization of mass  - Pending colonoscopy pathology results  - Monitor Hgb, transfusion per primary team  - Okay for diet as tolerated  - Maintain bowel regiment to prevent constipation in light of partially obstructing colon mass    Patient will be seen and discussed with attending surgeon, Dr. Ramos.    ADDENDUM 1640  - CT reviewed with radiology - evidence of liver mets and concern for invasion of abdominal wall with possible involvement of small bowel  - D/t the above findings, pt is not a surgical candidate - will discuss with patient and daughter tomorrow AM  - Would only be a surgical candidate if she develops complete obstruction, then would have small bowel ostomy for diversion/decompression  - Oncology consult for possible palliation      Rita Toney PA-C

## 2025-01-17 NOTE — CARE PLAN
The patient's goals for the shift include      The clinical goals for the shift include Patient will remain free from falls by the end of shift    Over the shift, the patient did not make progress toward the following goals. Barriers to progression include acute illness. Recommendations to address these barriers include communication.

## 2025-01-17 NOTE — PROGRESS NOTES
Physical Therapy    Physical Therapy Treatment    Patient Name: Melissa Tse  MRN: [id number]  Department: [facility]  Room: [address] [place] [address]  Today's Date: 1/17/2025  Time Calculation  Start Time: 0838  Stop Time: 0918  Time Calculation (min): 40 min         Assessment/Plan   PT Assessment  End of Session Communication: Bedside nurse  End of Session Patient Position: Bed, 2 rail up, Alarm on (call light and needs within reach.)     PT Plan  Treatment/Interventions: Bed mobility, Transfer training, Gait training, Balance training, Neuromuscular re-education, Strengthening, Endurance training, Therapeutic exercise, Therapeutic activity  PT Plan: Ongoing PT  PT Frequency: 4 times per week  PT Discharge Recommendations: Moderate intensity level of continued care  PT - OK to Discharge: Yes      General Visit Information:   PT  Visit  PT Received On: 01/17/25  General  Co-Treatment: OT  Co-Treatment Reason: two person assist, maximize functional mobility  Prior to Session Communication: Bedside nurse  Patient Position Received: Bed, 2 rail up, Alarm on  General Comment: Patient sleeping on arrival, able to arouse. Encouragement to participate. Pleasant and participatory in therapy.    Subjective   Precautions:  Precautions  Precautions Comment: falls    Vital Signs    Patient on 2L O2 on arrival at 98%  Attempted trial RA, patient desat to 88% with sitting  Patient placed on 1L O2 maintaining 95-98% throughout.       Objective   Pain:  Pain Assessment  Pain Assessment: 0-10  0-10 (Numeric) Pain Score: 8  Pain Location: Other (Comment) (heel)  Pain Orientation: Left  Pain Interventions: Distraction, Emotional support  Cognition:  Cognition  Overall Cognitive Status: Within Functional Limits  Treatments:  Therapeutic Activity  Therapeutic Activity Performed: Yes  Therapeutic Activity 1: Patient tolerated static standing ~2mins with Haley x2 and FWW for support. Cues for posture and PLB.    Bed Mobility  Bed Mobility: Yes  Bed Mobility  1  Bed Mobility 1: Supine to sitting, Sitting to supine  Level of Assistance 1: Moderate assistance  Bed Mobility Comments 1: Assist at trunk. HOB elevated, bedrails    Ambulation/Gait Training  Ambulation/Gait Training Performed: Yes  Ambulation/Gait Training 1  Comments/Distance (ft) 1: Patient performed side stepping at EOB towards R with Haley x2 and FWW for support. Cues for sequencing and walker management. Attempted fwd ambulation forward however, patient unable/declining ambulation d/t fatigue and weakness.  Transfers  Transfer: Yes  Transfer 1  Trials/Comments 1: Patient performed sit<>stand transfer with Haley x2. Cues for hand placment and sequencing. Decreased eccentric control with stand>sit.  Transfers 2  Trials/Comments 2: Patient performed stand pivot EOB<>chair with ModA x2 and FWW for support. Assist with walker management and sequencing.    Outcome Measures:  [facility] Basic Mobility  Turning from your back to your side while in a flat bed without using bedrails: A lot  Moving from lying on your back to sitting on the side of a flat bed without using bedrails: A lot  Moving to and from bed to chair (including a wheelchair): A lot  Standing up from a chair using your arms (e.g. wheelchair or bedside chair): A lot  To walk in hospital room: Total  Climbing 3-5 steps with railing: Total  Basic Mobility - Total Score: 10    Education Documentation  Mobility Training, taught by Josette Baldwin PTA at 1/17/2025 11:25 AM.  Learner: Patient  Readiness: Acceptance  Method: Explanation  Response: Verbalizes Understanding  Comment: Educated patient on proper transfer training techniques to maximize safety and independence.    Education Comments  No comments found.        OP EDUCATION:       Encounter Problems       Encounter Problems (Active)       PT Problem       STG - Pt will transition supine <> sitting with CGA (Progressing)       Start:  01/12/25    Expected End:  01/26/25            STG - Pt will transfer  STS with CGA (Progressing)       Start:  01/12/25    Expected End:  01/26/25            STG - Pt will amb >/=40' using WW with CGA (Progressing)       Start:  01/12/25    Expected End:  01/26/25            STG - Pt will maintain F+ standing balance to decrease risk of falls  (Progressing)       Start:  01/12/25    Expected End:  01/26/25               Pain - Adult          Safety       LTG - Patient will adhere to hip precautions during ADL's and transfers       Start:  01/11/25            LTG - Patient will demonstrate safety requirements appropriate to situation/environment       Start:  01/11/25            LTG - Patient will utilize safety techniques       Start:  01/11/25            STG - Patient locks brakes on wheelchair       Start:  01/11/25            STG - Patient uses call light consistently to request assistance with transfers       Start:  01/11/25            STG - Patient uses gait belt during all transfers       Start:  01/11/25            Goal 1       Start:  01/11/25            Goal 2       Start:  01/11/25            Goal 3       Start:  01/11/25

## 2025-01-18 VITALS
WEIGHT: 185 LBS | OXYGEN SATURATION: 93 % | TEMPERATURE: 96.6 F | RESPIRATION RATE: 16 BRPM | BODY MASS INDEX: 31.58 KG/M2 | SYSTOLIC BLOOD PRESSURE: 141 MMHG | DIASTOLIC BLOOD PRESSURE: 62 MMHG | HEART RATE: 83 BPM | HEIGHT: 64 IN

## 2025-01-18 LAB
ALBUMIN SERPL BCP-MCNC: 2.8 G/DL (ref 3.4–5)
ALP SERPL-CCNC: 47 U/L (ref 33–136)
ALT SERPL W P-5'-P-CCNC: 6 U/L (ref 7–45)
ANION GAP SERPL CALC-SCNC: 15 MMOL/L (ref 10–20)
AST SERPL W P-5'-P-CCNC: 7 U/L (ref 9–39)
BASOPHILS # BLD AUTO: 0.02 X10*3/UL (ref 0–0.1)
BASOPHILS NFR BLD AUTO: 0.3 %
BILIRUB SERPL-MCNC: 0.3 MG/DL (ref 0–1.2)
BUN SERPL-MCNC: 11 MG/DL (ref 6–23)
CALCIUM SERPL-MCNC: 8.4 MG/DL (ref 8.6–10.3)
CHLORIDE SERPL-SCNC: 101 MMOL/L (ref 98–107)
CO2 SERPL-SCNC: 24 MMOL/L (ref 21–32)
CREAT SERPL-MCNC: 0.68 MG/DL (ref 0.5–1.05)
EGFRCR SERPLBLD CKD-EPI 2021: 84 ML/MIN/1.73M*2
EOSINOPHIL # BLD AUTO: 0.15 X10*3/UL (ref 0–0.4)
EOSINOPHIL NFR BLD AUTO: 2 %
ERYTHROCYTE [DISTWIDTH] IN BLOOD BY AUTOMATED COUNT: 19.9 % (ref 11.5–14.5)
GLUCOSE BLD MANUAL STRIP-MCNC: 106 MG/DL (ref 74–99)
GLUCOSE BLD MANUAL STRIP-MCNC: 147 MG/DL (ref 74–99)
GLUCOSE BLD MANUAL STRIP-MCNC: 160 MG/DL (ref 74–99)
GLUCOSE BLD MANUAL STRIP-MCNC: 60 MG/DL (ref 74–99)
GLUCOSE SERPL-MCNC: 79 MG/DL (ref 74–99)
HCT VFR BLD AUTO: 26.2 % (ref 36–46)
HGB BLD-MCNC: 7.6 G/DL (ref 12–16)
IMM GRANULOCYTES # BLD AUTO: 0.06 X10*3/UL (ref 0–0.5)
IMM GRANULOCYTES NFR BLD AUTO: 0.8 % (ref 0–0.9)
LYMPHOCYTES # BLD AUTO: 1.15 X10*3/UL (ref 0.8–3)
LYMPHOCYTES NFR BLD AUTO: 15.2 %
MCH RBC QN AUTO: 23.1 PG (ref 26–34)
MCHC RBC AUTO-ENTMCNC: 29 G/DL (ref 32–36)
MCV RBC AUTO: 80 FL (ref 80–100)
MONOCYTES # BLD AUTO: 0.63 X10*3/UL (ref 0.05–0.8)
MONOCYTES NFR BLD AUTO: 8.3 %
NEUTROPHILS # BLD AUTO: 5.55 X10*3/UL (ref 1.6–5.5)
NEUTROPHILS NFR BLD AUTO: 73.4 %
NRBC BLD-RTO: 0 /100 WBCS (ref 0–0)
PLATELET # BLD AUTO: 256 X10*3/UL (ref 150–450)
POTASSIUM SERPL-SCNC: 4.4 MMOL/L (ref 3.5–5.3)
PROT SERPL-MCNC: 6.1 G/DL (ref 6.4–8.2)
RBC # BLD AUTO: 3.29 X10*6/UL (ref 4–5.2)
SODIUM SERPL-SCNC: 136 MMOL/L (ref 136–145)
WBC # BLD AUTO: 7.6 X10*3/UL (ref 4.4–11.3)

## 2025-01-18 PROCEDURE — 2500000004 HC RX 250 GENERAL PHARMACY W/ HCPCS (ALT 636 FOR OP/ED): Performed by: STUDENT IN AN ORGANIZED HEALTH CARE EDUCATION/TRAINING PROGRAM

## 2025-01-18 PROCEDURE — 2500000001 HC RX 250 WO HCPCS SELF ADMINISTERED DRUGS (ALT 637 FOR MEDICARE OP): Performed by: INTERNAL MEDICINE

## 2025-01-18 PROCEDURE — 80053 COMPREHEN METABOLIC PANEL: CPT | Performed by: INTERNAL MEDICINE

## 2025-01-18 PROCEDURE — 36415 COLL VENOUS BLD VENIPUNCTURE: CPT | Performed by: INTERNAL MEDICINE

## 2025-01-18 PROCEDURE — 99233 SBSQ HOSP IP/OBS HIGH 50: CPT | Performed by: SURGERY

## 2025-01-18 PROCEDURE — 2500000002 HC RX 250 W HCPCS SELF ADMINISTERED DRUGS (ALT 637 FOR MEDICARE OP, ALT 636 FOR OP/ED): Performed by: INTERNAL MEDICINE

## 2025-01-18 PROCEDURE — 85025 COMPLETE CBC W/AUTO DIFF WBC: CPT | Performed by: INTERNAL MEDICINE

## 2025-01-18 PROCEDURE — 82947 ASSAY GLUCOSE BLOOD QUANT: CPT

## 2025-01-18 RX ORDER — CEFUROXIME AXETIL 250 MG/1
250 TABLET ORAL 2 TIMES DAILY
Start: 2025-01-18 | End: 2025-01-25

## 2025-01-18 RX ORDER — ACETAMINOPHEN 325 MG/1
650 TABLET ORAL EVERY 4 HOURS PRN
Qty: 30 TABLET | Refills: 0 | Status: SHIPPED | OUTPATIENT
Start: 2025-01-18

## 2025-01-18 RX ORDER — INSULIN LISPRO 100 [IU]/ML
0-5 INJECTION, SOLUTION INTRAVENOUS; SUBCUTANEOUS
Start: 2025-01-18

## 2025-01-18 RX ORDER — POLYETHYLENE GLYCOL 3350 17 G/17G
17 POWDER, FOR SOLUTION ORAL DAILY
Start: 2025-01-19

## 2025-01-18 RX ORDER — AMOXICILLIN 250 MG
1 CAPSULE ORAL NIGHTLY
Start: 2025-01-18

## 2025-01-18 RX ORDER — ESTRADIOL 1 MG/1
1 TABLET ORAL DAILY
Start: 2025-01-19

## 2025-01-18 RX ORDER — ENOXAPARIN SODIUM 100 MG/ML
40 INJECTION SUBCUTANEOUS EVERY 24 HOURS
Start: 2025-01-18

## 2025-01-18 RX ADMIN — IRON SUCROSE 300 MG: 20 INJECTION, SOLUTION INTRAVENOUS at 13:45

## 2025-01-18 RX ADMIN — INSULIN LISPRO 1 UNITS: 100 INJECTION, SOLUTION INTRAVENOUS; SUBCUTANEOUS at 17:52

## 2025-01-18 RX ADMIN — LISINOPRIL 10 MG: 10 TABLET ORAL at 08:29

## 2025-01-18 RX ADMIN — ESTRADIOL 1 MG: 1 TABLET ORAL at 08:30

## 2025-01-18 RX ADMIN — AMLODIPINE BESYLATE 2.5 MG: 5 TABLET ORAL at 08:29

## 2025-01-18 RX ADMIN — CEFUROXIME AXETIL 250 MG: 250 TABLET, FILM COATED ORAL at 08:30

## 2025-01-18 ASSESSMENT — COGNITIVE AND FUNCTIONAL STATUS - GENERAL
EATING MEALS: A LITTLE
TOILETING: A LITTLE
DRESSING REGULAR UPPER BODY CLOTHING: A LITTLE
WALKING IN HOSPITAL ROOM: A LOT
MOVING FROM LYING ON BACK TO SITTING ON SIDE OF FLAT BED WITH BEDRAILS: A LOT
PERSONAL GROOMING: A LITTLE
HELP NEEDED FOR BATHING: A LITTLE
MOVING TO AND FROM BED TO CHAIR: A LOT
DRESSING REGULAR LOWER BODY CLOTHING: A LITTLE
MOBILITY SCORE: 12
DAILY ACTIVITIY SCORE: 18
STANDING UP FROM CHAIR USING ARMS: A LOT
TURNING FROM BACK TO SIDE WHILE IN FLAT BAD: A LOT
CLIMB 3 TO 5 STEPS WITH RAILING: A LOT

## 2025-01-18 ASSESSMENT — PAIN SCALES - GENERAL: PAINLEVEL_OUTOF10: 0 - NO PAIN

## 2025-01-18 NOTE — DISCHARGE SUMMARY
Discharge Diagnosis  Generalized weakness    Issues Requiring Follow-Up  Patient fully evaluated on January 18.  Significant weakness is noted and patient advised to go to skilled nursing today.  If mobility improves patient might be able to consider palliative chemotherapy or immunotherapy.  Surgical intervention if obstruction occurs.    Discharge Meds     Medication List      START taking these medications     acetaminophen 325 mg tablet; Commonly known as: Tylenol; Take 2 tablets   (650 mg) by mouth every 4 hours if needed for mild pain (1 - 3).   cefuroxime 250 mg tablet; Commonly known as: Ceftin; Take 1 tablet (250   mg) by mouth 2 times a day for 7 days.   enoxaparin 40 mg/0.4 mL syringe; Commonly known as: Lovenox; Inject 0.4   mL (40 mg) under the skin once every 24 hours.   estradiol 1 mg tablet; Commonly known as: Estrace; Take 1 tablet (1 mg)   by mouth once daily.; Start taking on: January 19, 2025   insulin lispro 100 unit/mL injection; Inject 0-5 Units under the skin 3   times a day before meals. Take as directed per insulin instructions.   polyethylene glycol 17 gram packet; Commonly known as: Glycolax,   Miralax; Take 17 g by mouth once daily.; Start taking on: January 19, 2025   sennosides-docusate sodium 8.6-50 mg tablet; Commonly known as:   Johana-Colace; Take 1 tablet by mouth once daily at bedtime.     CONTINUE taking these medications     amLODIPine 2.5 mg tablet; Commonly known as: Norvasc   lisinopril 10 mg tablet     STOP taking these medications     Lantus U-100 Insulin 100 unit/mL injection; Generic drug: insulin   glargine   metFORMIN 500 mg tablet; Commonly known as: Glucophage       Test Results Pending At Discharge  Pending Labs       Order Current Status    Surgical Pathology Exam In process            Hospital Course         Tio Lugo MD  Physician  Internal Medicine     Progress Notes      Signed     Date of Service: 1/17/2025  1:29 PM     Signed       Expand All Collapse  Kraig Tse is a 89 y.o. female on day 5 of admission presenting with Generalized weakness.           Subjective  Patient fully evaluated  01/14  for    Problem List Items Addressed This Visit                  Symptoms and Signs     * (Principal) Generalized weakness - Primary     Relevant Orders     Surgical Pathology Exam     Surgical Pathology Exam      Other Visit Diagnoses         Hypoglycemia         Relevant Orders     Surgical Pathology Exam     Surgical Pathology Exam     Anemia, unspecified type         Relevant Orders     Esophagogastroduodenoscopy (EGD) (Completed)     Surgical Pathology Exam     Colonoscopy Diagnostic (Completed)     Surgical Pathology Exam             Patient seen resting in bed with head of bed elevated, no s/s or c/o acute difficulties at this time.  Vital signs for last 24 hours Temp:  [35.8 °C (96.4 °F)-36.5 °C (97.7 °F)] 35.8 °C (96.4 °F)  Heart Rate:  [72-88] 77  Resp:  [18] 18  BP: (121-164)/(56-80) 133/63    I/O this shift:  In: 237 [P.O.:237]  Out: -   Patient still requiring frequent cardiac and SPO2 monitoring. Continue aggressive pulmonary hygiene and oral hygiene. Off loading as tolerated for skin integrity. Medications and labs reviewed-         Results for orders placed or performed during the hospital encounter of 01/10/25 (from the past 24 hours)   POCT GLUCOSE   Result Value Ref Range     POCT Glucose 143 (H) 74 - 99 mg/dL   POCT GLUCOSE   Result Value Ref Range     POCT Glucose 145 (H) 74 - 99 mg/dL   POCT GLUCOSE   Result Value Ref Range     POCT Glucose 110 (H) 74 - 99 mg/dL   POCT GLUCOSE   Result Value Ref Range     POCT Glucose 66 (L) 74 - 99 mg/dL   CBC and Auto Differential   Result Value Ref Range     WBC 7.5 4.4 - 11.3 x10*3/uL     nRBC 0.0 0.0 - 0.0 /100 WBCs     RBC 3.50 (L) 4.00 - 5.20 x10*6/uL     Hemoglobin 7.9 (L) 12.0 - 16.0 g/dL     Hematocrit 27.6 (L) 36.0 - 46.0 %     MCV 79 (L) 80 - 100 fL     MCH 22.6 (L) 26.0 - 34.0 pg     MCHC 28.6  (L) 32.0 - 36.0 g/dL     RDW 19.2 (H) 11.5 - 14.5 %     Platelets 266 150 - 450 x10*3/uL     Neutrophils % 74.4 40.0 - 80.0 %     Immature Granulocytes %, Automated 0.5 0.0 - 0.9 %     Lymphocytes % 15.7 13.0 - 44.0 %     Monocytes % 7.6 2.0 - 10.0 %     Eosinophils % 1.5 0.0 - 6.0 %     Basophils % 0.3 0.0 - 2.0 %     Neutrophils Absolute 5.60 (H) 1.60 - 5.50 x10*3/uL     Immature Granulocytes Absolute, Automated 0.04 0.00 - 0.50 x10*3/uL     Lymphocytes Absolute 1.18 0.80 - 3.00 x10*3/uL     Monocytes Absolute 0.57 0.05 - 0.80 x10*3/uL     Eosinophils Absolute 0.11 0.00 - 0.40 x10*3/uL     Basophils Absolute 0.02 0.00 - 0.10 x10*3/uL   Comprehensive Metabolic Panel   Result Value Ref Range     Glucose 57 (L) 74 - 99 mg/dL     Sodium 136 136 - 145 mmol/L     Potassium 3.9 3.5 - 5.3 mmol/L     Chloride 102 98 - 107 mmol/L     Bicarbonate 27 21 - 32 mmol/L     Anion Gap 11 10 - 20 mmol/L     Urea Nitrogen 12 6 - 23 mg/dL     Creatinine 0.66 0.50 - 1.05 mg/dL     eGFR 84 >60 mL/min/1.73m*2     Calcium 8.5 (L) 8.6 - 10.3 mg/dL     Albumin 2.9 (L) 3.4 - 5.0 g/dL     Alkaline Phosphatase 55 33 - 136 U/L     Total Protein 6.3 (L) 6.4 - 8.2 g/dL     AST 7 (L) 9 - 39 U/L     Bilirubin, Total 0.3 0.0 - 1.2 mg/dL     ALT 6 (L) 7 - 45 U/L   CEA   Result Value Ref Range     Carcinoembryonic AG 61.1 ug/L   POCT GLUCOSE   Result Value Ref Range     POCT Glucose 103 (H) 74 - 99 mg/dL   POCT GLUCOSE   Result Value Ref Range     POCT Glucose 124 (H) 74 - 99 mg/dL      Patient recently received an antibiotic (last 12 hours)         Date/Time Action Medication Dose     01/13/25 2029 Given    cefuroxime (Ceftin) tablet 250 mg 250 mg             Plan discussed with interdisciplinary team, continue current and repeat labs in the AM.      Discharge planning discussed with patient and care team. Therapy evaluations ordered. [facility]-  , anticipate HHC/SNF at discharge. Patient aware and agreeable to current plan, continue plan as above.       I spent a total of 50 minutes on the date of the service which included preparing to see the patient, face-to-face patient care, completing clinical documentation, obtaining and/or reviewing separately obtained history, performing a medically appropriate examination, counseling and educating the patient/family/caregiver, ordering medications, tests, or procedures, communicating with other HCPs (not separately reported), independently interpreting results (not separately reported), communicating results to the patient/family/caregiver, and care coordination (not separately reported).               Objective  Last Recorded Vitals  /63 (BP Location: Right arm, Patient Position: Lying)   Pulse 77   Temp 35.8 °C (96.4 °F) (Temporal)   Resp 18   Wt 83.9 kg (185 lb)   SpO2 99%   Intake/Output last 3 Shifts:     Intake/Output Summary (Last 24 hours) at 1/17/2025 1329  Last data filed at 1/17/2025 0801      Gross per 24 hour   Intake 519 ml   Output 300 ml   Net 219 ml         Admission Weight  Weight: 83.9 kg (185 lb) (01/10/25 2242)     Daily Weight  01/16/25 : 83.9 kg (185 lb)     Image Results  Colonoscopy Diagnostic  Table formatting from the original result was not included.  Impression  Diverticulosis of mild severity in the sigmoid colon  Small hemorrhoids  3 subcentimeter polyps in the ascending colon, descending colon and   sigmoid colon were removed with cold snare  Single friable, invasive and ulcerated mass measuring 10 cm in the hepatic   flexure and transverse colon 55 cm from the anal verge; bleeding occurred   before intervention; performed cold forceps biopsy; tattooed distal to the   finding     Findings  Diverticula of mild severity with no inflammation in the sigmoid colon  External and internal small hemorrhoids  Three sessile polyps measuring from 4 mm up to 6 mm in the ascending   colon, descending colon and sigmoid colon; performed cold snare with   complete en bloc removal and retrieved  specimen  Single friable, invasive and ulcerated mass measuring 10 cm in the hepatic   flexure and transverse colon 55 cm from the anal verge; bleeding occurred   before intervention; performed cold forceps biopsy; tattooed distal to the   finding with spot. Ulcerated friable mass extending from around the mid   transverse colon to the hepatic flexure.  The mass extended continuously   from 55 cm to 65 cm from the anal canal.  The mass was circumferential   partially obstructing the bowel lumen.  The pediatric colonoscopy was able   to traverse the mass.        Recommendation  Await pathology results   Check CT chest abdomen and pelvis  Check CEA level        Indication  Anemia, unspecified type     Staff  Staff Role   Macario Vasquez MD Proceduralist      Medications  See Anesthesia Record.      Preprocedure  A history and physical has been performed, and patient medication   allergies have been reviewed. The patient's tolerance of previous   anesthesia has been reviewed. The risks and benefits of the procedure and   the sedation options and risks were discussed with the patient. All   questions were answered and informed consent obtained.     Details of the Procedure  The patient underwent monitored anesthesia care, which was administered by   an anesthesia professional. The patient's blood pressure, ECG, ETCO2,   heart rate, level of consciousness, oxygen and respirations were monitored   throughout the procedure. A digital rectal exam was performed. The scope   was introduced through the anus and advanced to the cecum. Retroflexion   was performed in the rectum. The quality of bowel preparation was   evaluated using the [place] Bowel Preparation Scale with scores of: right   colon = 3, transverse colon = 3, left colon = 3. The total BBPS score was   9. Bowel prep was adequate. The patient experienced no blood loss. The   procedure was not difficult. The patient tolerated the procedure well.   There were no apparent  adverse events.      Events  Procedure Events   Event Event Time   ENDO SCOPE IN TIME 1/16/2025  9:15 AM   ENDO CECUM REACHED 1/16/2025  9:25 AM   ENDO SCOPE OUT TIME 1/16/2025  9:52 AM      Specimens  ID Type Source Tests Collected by Time   1 : recto-sigmoid/ descending colon polyps cold snared Tissue RECTUM   POLYPECTOMY SURGICAL PATHOLOGY EXAM Macario Vasquez MD 1/16/2025 0917   2 : cold snared Tissue ASCENDING COLON POLYP SURGICAL PATHOLOGY EXAM Macario Vasquez MD 1/16/2025 0928   3 : cold biopsy Tissue COLON - HEPATIC FLEXURE BIOPSY SURGICAL PATHOLOGY   EXAM Macario Vasquez MD 1/16/2025 0932      Procedure Location  [facility] [address]  [address]  [facility] [address]  [phone]     Referring Provider  Johanna Penn, APRN-CNP     Procedure Provider  MD Johanna Montes        Physical Exam     Relevant Results                       Assessment/Plan                       Assessment & Plan  Generalized weakness              Tio Lugo MD   Physician  Internal Medicine     Progress Notes      Signed     Date of Service: 1/13/2025  2:44 PM      Signed        Expand All Collapse All    Melissa Tse is a 89 y.o. female on day 1 of admission presenting with Generalized weakness.           Subjective  Patient fully evaluated  01/12  for    Problem List Items Addressed This Visit                     Symptoms and Signs     * (Principal) Generalized weakness - Primary      Other Visit Diagnoses         Hypoglycemia         Anemia, unspecified type         Relevant Orders     Esophagogastroduodenoscopy (EGD)             Patient seen resting in bed with head of bed elevated, no s/s or c/o acute difficulties at this time.  Vital signs for last 24 hours Temp:  [36.4 °C (97.5 °F)-37.8 °C (100 °F)] 36.4 °C (97.5 °F)  Heart Rate:  [82-98] 88  Resp:  [18-20] 18  BP: (108-133)/(50-59) 118/57    No intake/output data recorded.  Patient still requiring frequent cardiac  and SPO2 monitoring. Continue aggressive pulmonary hygiene and oral hygiene. Off loading as tolerated for skin integrity. Medications and labs reviewed-             Results for orders placed or performed during the hospital encounter of 01/10/25 (from the past 24 hours)   POCT GLUCOSE   Result Value Ref Range     POCT Glucose 197 (H) 74 - 99 mg/dL   POCT GLUCOSE   Result Value Ref Range     POCT Glucose 202 (H) 74 - 99 mg/dL   CBC and Auto Differential   Result Value Ref Range     WBC 8.9 4.4 - 11.3 x10*3/uL     nRBC 0.0 0.0 - 0.0 /100 WBCs     RBC 3.55 (L) 4.00 - 5.20 x10*6/uL     Hemoglobin 8.0 (L) 12.0 - 16.0 g/dL     Hematocrit 27.7 (L) 36.0 - 46.0 %     MCV 78 (L) 80 - 100 fL     MCH 22.5 (L) 26.0 - 34.0 pg     MCHC 28.9 (L) 32.0 - 36.0 g/dL     RDW 18.2 (H) 11.5 - 14.5 %     Platelets 257 150 - 450 x10*3/uL     Neutrophils % 76.5 40.0 - 80.0 %     Immature Granulocytes %, Automated 0.8 0.0 - 0.9 %     Lymphocytes % 12.9 13.0 - 44.0 %     Monocytes % 8.8 2.0 - 10.0 %     Eosinophils % 0.7 0.0 - 6.0 %     Basophils % 0.3 0.0 - 2.0 %     Neutrophils Absolute 6.78 (H) 1.60 - 5.50 x10*3/uL     Immature Granulocytes Absolute, Automated 0.07 0.00 - 0.50 x10*3/uL     Lymphocytes Absolute 1.14 0.80 - 3.00 x10*3/uL     Monocytes Absolute 0.78 0.05 - 0.80 x10*3/uL     Eosinophils Absolute 0.06 0.00 - 0.40 x10*3/uL     Basophils Absolute 0.03 0.00 - 0.10 x10*3/uL   Comprehensive Metabolic Panel   Result Value Ref Range     Glucose 65 (L) 74 - 99 mg/dL     Sodium 135 (L) 136 - 145 mmol/L     Potassium 3.8 3.5 - 5.3 mmol/L     Chloride 99 98 - 107 mmol/L     Bicarbonate 30 21 - 32 mmol/L     Anion Gap 10 10 - 20 mmol/L     Urea Nitrogen 18 6 - 23 mg/dL     Creatinine 0.93 0.50 - 1.05 mg/dL     eGFR 59 (L) >60 mL/min/1.73m*2     Calcium 8.7 8.6 - 10.3 mg/dL     Albumin 3.0 (L) 3.4 - 5.0 g/dL     Alkaline Phosphatase 50 33 - 136 U/L     Total Protein 6.3 (L) 6.4 - 8.2 g/dL     AST 8 (L) 9 - 39 U/L     Bilirubin, Total 0.4 0.0  - 1.2 mg/dL     ALT 5 (L) 7 - 45 U/L   POCT GLUCOSE   Result Value Ref Range     POCT Glucose 68 (L) 74 - 99 mg/dL   Iron and TIBC   Result Value Ref Range     Iron 28 (L) 35 - 150 ug/dL     UIBC 212 110 - 370 ug/dL     TIBC 240 240 - 445 ug/dL     % Saturation 12 (L) 25 - 45 %   Type and Screen   Result Value Ref Range     ABO TYPE A       Rh TYPE POS       ANTIBODY SCREEN NEG     VERIFY ABO/Rh Group Test   Result Value Ref Range     ABO TYPE A       Rh TYPE POS     POCT GLUCOSE   Result Value Ref Range     POCT Glucose 101 (H) 74 - 99 mg/dL      Patient recently received an antibiotic (last 12 hours)         None             Patient c/o of weakness,  b12 levels and nocturnal pulse oximetry ordered. Plan discussed with interdisciplinary team, iron level 27, venofer 300mg IV ordered, weaning oxygen as tolerated, repeat chest xray   No acute events overnight, patient denies chest pain, worsening SOB at this time. continue current and repeat labs in the AM.      Discharge planning discussed with patient and care team. Therapy evaluations ordered. [facility]-   , anticipate HHC/SNF at discharge. Patient aware and agreeable to current plan, continue plan as above.      I spent a total of 50 minutes on the date of the service which included preparing to see the patient, face-to-face patient care, completing clinical documentation, obtaining and/or reviewing separately obtained history, performing a medically appropriate examination, counseling and educating the patient/family/caregiver, ordering medications, tests, or procedures, communicating with other HCPs (not separately reported), independently interpreting results (not separately reported), communicating results to the patient/family/caregiver, and care coordination (not separately reported).               Objective  Last Recorded Vitals  /57 (BP Location: Left arm, Patient Position: Lying)   Pulse 88   Temp 36.4 °C (97.5 °F) (Temporal)   Resp 18   Wt 83.9  kg (185 lb)   SpO2 98%   Intake/Output last 3 Shifts:     Intake/Output Summary (Last 24 hours) at 1/13/2025 1444  Last data filed at 1/13/2025 0553        Gross per 24 hour   Intake 812 ml   Output 350 ml   Net 462 ml         Admission Weight  Weight: 83.9 kg (185 lb) (01/10/25 2242)     Daily Weight  01/11/25 : 83.9 kg (185 lb)     Image Results  ECG 12 lead  Normal sinus rhythm  Low voltage QRS  Cannot rule out Inferior infarct , age undetermined  Possible Anterolateral infarct , age undetermined  Abnormal ECG  No previous ECGs available        Physical Exam     Relevant Results                       Assessment/Plan                       Assessment & Plan  Generalized weakness           Tio Lugo MD   Physician  Internal Medicine     H&P      Signed     Date of Service: 1/11/2025 11:21 AM      Associated Documents  Telemetry Monitoring - Scan on 1/11/2025 1:49 AM   Signed        Expand All Collapse All    History Of Present Illness  Melissa Tse is a 89 y.o. female with medical history of T2DM on insulin, breast cancer on current estrogen therapy who presents to the ED with a fall from standing position.  The patient lowered herself to the ground making cereal around noon and then was on the floor until her son came home around 530.  She said her legs felt weak yesterday but her son was able to get her something to eat in the morning and she felt slightly better.  She then developed weakness again while making cereal this morning.  She said her legs just do not feel that they can hold her up.  She denies pain, chest discomfort, shortness of breath, syncope, head strike or any pain post fall.  States her glucose averages in the high 80s, admits to being a picky eater.  Denies upper respiratory/systemic viral symptoms.  Denies suprapubic pain, urinary frequency/urgency, discomfort with urination.  Denies history of low back pain/trauma, saddle anesthesia, peripheral neuropathy, incontinence.       ED  "course: Vitals stable.  Vitals notable for hypoglycemia of 67, depressed LFTs, elevated troponin of 98, hemoglobin of 9.5.  Patient treated with D10 for hypoglycemia.     Past Medical History  Medical History   History reviewed. No pertinent past medical history.         Surgical History  Surgical History             Past Surgical History:   Procedure Laterality Date    OTHER SURGICAL HISTORY   02/25/2022     Hip replacement            Social History  She reports that she has never smoked. She has never been exposed to tobacco smoke. She has never used smokeless tobacco. She reports that she does not drink alcohol and does not use drugs.     Family History  Family History   No family history on file.         Allergies  Patient has no known allergies.     Review of Systems  10 point ROS negative except as specified in HPI     Physical Exam  HENT:      Head: Normocephalic and atraumatic.      Nose: Nose normal.   Eyes:      Conjunctiva/sclera: Conjunctivae normal.   Cardiovascular:      Rate and Rhythm: Normal rate and regular rhythm.      Pulses: Normal pulses.      Heart sounds: Normal heart sounds.   Pulmonary:      Effort: Pulmonary effort is normal.      Breath sounds: Normal breath sounds.   Abdominal:      General: Abdomen is flat. There is no distension.      Palpations: Abdomen is soft.   Musculoskeletal:         General: No swelling or deformity.   Skin:     General: Skin is warm and dry.   Neurological:      Mental Status: She is alert. Mental status is at baseline.   Psychiatric:         Behavior: Behavior normal.            Last Recorded Vitals  Blood pressure 124/60, pulse 96, temperature 36.2 °C (97.2 °F), temperature source Temporal, resp. rate 22, height 1.626 m (5' 4.02\"), weight 83.9 kg (185 lb), SpO2 94%.     Relevant Results               Results for orders placed or performed during the hospital encounter of 01/10/25 (from the past 24 hours)   CBC and Auto Differential   Result Value Ref Range    "  WBC 10.2 4.4 - 11.3 x10*3/uL     nRBC 0.0 0.0 - 0.0 /100 WBCs     RBC 4.16 4.00 - 5.20 x10*6/uL     Hemoglobin 9.5 (L) 12.0 - 16.0 g/dL     Hematocrit 31.9 (L) 36.0 - 46.0 %     MCV 77 (L) 80 - 100 fL     MCH 22.8 (L) 26.0 - 34.0 pg     MCHC 29.8 (L) 32.0 - 36.0 g/dL     RDW 18.1 (H) 11.5 - 14.5 %     Platelets 280 150 - 450 x10*3/uL     Neutrophils % 86.4 40.0 - 80.0 %     Immature Granulocytes %, Automated 0.6 0.0 - 0.9 %     Lymphocytes % 8.2 13.0 - 44.0 %     Monocytes % 4.3 2.0 - 10.0 %     Eosinophils % 0.2 0.0 - 6.0 %     Basophils % 0.3 0.0 - 2.0 %     Neutrophils Absolute 8.83 (H) 1.60 - 5.50 x10*3/uL     Immature Granulocytes Absolute, Automated 0.06 0.00 - 0.50 x10*3/uL     Lymphocytes Absolute 0.84 0.80 - 3.00 x10*3/uL     Monocytes Absolute 0.44 0.05 - 0.80 x10*3/uL     Eosinophils Absolute 0.02 0.00 - 0.40 x10*3/uL     Basophils Absolute 0.03 0.00 - 0.10 x10*3/uL   Comprehensive metabolic panel   Result Value Ref Range     Glucose 67 (L) 74 - 99 mg/dL     Sodium 138 136 - 145 mmol/L     Potassium 3.7 3.5 - 5.3 mmol/L     Chloride 102 98 - 107 mmol/L     Bicarbonate 25 21 - 32 mmol/L     Anion Gap 15 10 - 20 mmol/L     Urea Nitrogen 23 6 - 23 mg/dL     Creatinine 0.67 0.50 - 1.05 mg/dL     eGFR 84 >60 mL/min/1.73m*2     Calcium 9.2 8.6 - 10.3 mg/dL     Albumin 3.5 3.4 - 5.0 g/dL     Alkaline Phosphatase 55 33 - 136 U/L     Total Protein 7.2 6.4 - 8.2 g/dL     AST 8 (L) 9 - 39 U/L     Bilirubin, Total 0.4 0.0 - 1.2 mg/dL     ALT 6 (L) 7 - 45 U/L   Creatine Kinase   Result Value Ref Range     Creatine Kinase 68 0 - 215 U/L   Troponin I, High Sensitivity   Result Value Ref Range     Troponin I, High Sensitivity 98 (HH) 0 - 13 ng/L   Lactate   Result Value Ref Range     Lactate 1.4 0.4 - 2.0 mmol/L   POCT GLUCOSE   Result Value Ref Range     POCT Glucose 53 (L) 74 - 99 mg/dL   Troponin I, High Sensitivity   Result Value Ref Range     Troponin I, High Sensitivity 168 (HH) 0 - 13 ng/L   Urinalysis with  Reflex Culture and Microscopic   Result Value Ref Range     Color, Urine Light-Yellow Light-Yellow, Yellow, Dark-Yellow     Appearance, Urine Clear Clear     Specific Gravity, Urine 1.014 1.005 - 1.035     pH, Urine 5.5 5.0, 5.5, 6.0, 6.5, 7.0, 7.5, 8.0     Protein, Urine NEGATIVE NEGATIVE, 10 (TRACE), 20 (TRACE) mg/dL     Glucose, Urine Normal Normal mg/dL     Blood, Urine NEGATIVE NEGATIVE     Ketones, Urine NEGATIVE NEGATIVE mg/dL     Bilirubin, Urine NEGATIVE NEGATIVE     Urobilinogen, Urine Normal Normal mg/dL     Nitrite, Urine 2+ (A) NEGATIVE     Leukocyte Esterase, Urine NEGATIVE NEGATIVE   Extra Urine Gray Tube   Result Value Ref Range     Extra Tube Hold for add-ons.     Microscopic Only, Urine   Result Value Ref Range     WBC, Urine 1-5 1-5, NONE /HPF     RBC, Urine NONE NONE, 1-2, 3-5 /HPF     Bacteria, Urine 1+ (A) NONE SEEN /HPF     Mucus, Urine FEW Reference range not established. /LPF   Sars-CoV-2 and Influenza A/B PCR   Result Value Ref Range     Flu A Result Not Detected Not Detected     Flu B Result Not Detected Not Detected     Coronavirus 2019, PCR Not Detected Not Detected   POCT GLUCOSE   Result Value Ref Range     POCT Glucose 84 74 - 99 mg/dL   Troponin I, High Sensitivity   Result Value Ref Range     Troponin I, High Sensitivity 214 (HH) 0 - 13 ng/L   Creatine Kinase   Result Value Ref Range     Creatine Kinase 65 0 - 215 U/L   POCT GLUCOSE   Result Value Ref Range     POCT Glucose 84 74 - 99 mg/dL   POCT GLUCOSE   Result Value Ref Range     POCT Glucose 112 (H) 74 - 99 mg/dL   Troponin I, High Sensitivity   Result Value Ref Range     Troponin I, High Sensitivity 222 (HH) 0 - 13 ng/L      No results found.           Assessment/Plan     Assessment & Plan  Generalized weakness     89-year-old female with history of T2DM on insulin presented to the ED with a fall from standing position, lowering himself to the ground and remaining on the floor until family got home.  Patient found to be  hypoglycemic and treated with D10.  Patient will be admitted to observation under Dr. Lugo for further evaluation and care.     #Generalized weakness  #Hypoglycemia  #Elevated troponin, 98 with repeat of 168  - Continue D10 started in ED  - Defer consults to attending  - Vitals every 4  hours, telemetry  - Encourage oral hydration  - CK normal, check in a.m.  - Follow UA  - Follow A1c  - Follow COVID/flu test  - Trend troponin, elevation likely due to type II MI, follow a.m CK level. Denies chest pain, shortness of breath.  - Corrective insulin sliding scale, hypoglycemia protocol, carb consistent diet  - PT/OT/social work     Chronic conditions  #T2DM  #Hypertension  - Continue home meds when reconciled  - Carb consistent diet, corrective insulin sliding scale     Patient fully evaluated    for    Problem List Items Addressed This Visit         * (Principal) Generalized weakness - Primary      Other Visit Diagnoses         Hypoglycemia                 Patient seen resting in bed with head of bed elevated, no s/s or c/o acute difficulties at this time.  Vital signs for last 24 hours Temp:  [35.5 °C (95.9 °F)-37 °C (98.6 °F)] 36.2 °C (97.2 °F)  Heart Rate:  [85-98] 96  Resp:  [16-22] 22  BP: (115-171)/(56-76) 124/60    No intake/output data recorded.  Patient still requiring frequent cardiac and SPO2 monitoring. Continue aggressive pulmonary hygiene and oral hygiene. Off loading as tolerated for skin integrity. Medications and labs reviewed-             Results for orders placed or performed during the hospital encounter of 01/10/25 (from the past 24 hours)   CBC and Auto Differential   Result Value Ref Range     WBC 10.2 4.4 - 11.3 x10*3/uL     nRBC 0.0 0.0 - 0.0 /100 WBCs     RBC 4.16 4.00 - 5.20 x10*6/uL     Hemoglobin 9.5 (L) 12.0 - 16.0 g/dL     Hematocrit 31.9 (L) 36.0 - 46.0 %     MCV 77 (L) 80 - 100 fL     MCH 22.8 (L) 26.0 - 34.0 pg     MCHC 29.8 (L) 32.0 - 36.0 g/dL     RDW 18.1 (H) 11.5 - 14.5 %      Platelets 280 150 - 450 x10*3/uL     Neutrophils % 86.4 40.0 - 80.0 %     Immature Granulocytes %, Automated 0.6 0.0 - 0.9 %     Lymphocytes % 8.2 13.0 - 44.0 %     Monocytes % 4.3 2.0 - 10.0 %     Eosinophils % 0.2 0.0 - 6.0 %     Basophils % 0.3 0.0 - 2.0 %     Neutrophils Absolute 8.83 (H) 1.60 - 5.50 x10*3/uL     Immature Granulocytes Absolute, Automated 0.06 0.00 - 0.50 x10*3/uL     Lymphocytes Absolute 0.84 0.80 - 3.00 x10*3/uL     Monocytes Absolute 0.44 0.05 - 0.80 x10*3/uL     Eosinophils Absolute 0.02 0.00 - 0.40 x10*3/uL     Basophils Absolute 0.03 0.00 - 0.10 x10*3/uL   Comprehensive metabolic panel   Result Value Ref Range     Glucose 67 (L) 74 - 99 mg/dL     Sodium 138 136 - 145 mmol/L     Potassium 3.7 3.5 - 5.3 mmol/L     Chloride 102 98 - 107 mmol/L     Bicarbonate 25 21 - 32 mmol/L     Anion Gap 15 10 - 20 mmol/L     Urea Nitrogen 23 6 - 23 mg/dL     Creatinine 0.67 0.50 - 1.05 mg/dL     eGFR 84 >60 mL/min/1.73m*2     Calcium 9.2 8.6 - 10.3 mg/dL     Albumin 3.5 3.4 - 5.0 g/dL     Alkaline Phosphatase 55 33 - 136 U/L     Total Protein 7.2 6.4 - 8.2 g/dL     AST 8 (L) 9 - 39 U/L     Bilirubin, Total 0.4 0.0 - 1.2 mg/dL     ALT 6 (L) 7 - 45 U/L   Creatine Kinase   Result Value Ref Range     Creatine Kinase 68 0 - 215 U/L   Troponin I, High Sensitivity   Result Value Ref Range     Troponin I, High Sensitivity 98 (HH) 0 - 13 ng/L   Lactate   Result Value Ref Range     Lactate 1.4 0.4 - 2.0 mmol/L   POCT GLUCOSE   Result Value Ref Range     POCT Glucose 53 (L) 74 - 99 mg/dL   Troponin I, High Sensitivity   Result Value Ref Range     Troponin I, High Sensitivity 168 (HH) 0 - 13 ng/L   Urinalysis with Reflex Culture and Microscopic   Result Value Ref Range     Color, Urine Light-Yellow Light-Yellow, Yellow, Dark-Yellow     Appearance, Urine Clear Clear     Specific Gravity, Urine 1.014 1.005 - 1.035     pH, Urine 5.5 5.0, 5.5, 6.0, 6.5, 7.0, 7.5, 8.0     Protein, Urine NEGATIVE NEGATIVE, 10 (TRACE), 20  (TRACE) mg/dL     Glucose, Urine Normal Normal mg/dL     Blood, Urine NEGATIVE NEGATIVE     Ketones, Urine NEGATIVE NEGATIVE mg/dL     Bilirubin, Urine NEGATIVE NEGATIVE     Urobilinogen, Urine Normal Normal mg/dL     Nitrite, Urine 2+ (A) NEGATIVE     Leukocyte Esterase, Urine NEGATIVE NEGATIVE   Extra Urine Gray Tube   Result Value Ref Range     Extra Tube Hold for add-ons.     Microscopic Only, Urine   Result Value Ref Range     WBC, Urine 1-5 1-5, NONE /HPF     RBC, Urine NONE NONE, 1-2, 3-5 /HPF     Bacteria, Urine 1+ (A) NONE SEEN /HPF     Mucus, Urine FEW Reference range not established. /LPF   Sars-CoV-2 and Influenza A/B PCR   Result Value Ref Range     Flu A Result Not Detected Not Detected     Flu B Result Not Detected Not Detected     Coronavirus 2019, PCR Not Detected Not Detected   POCT GLUCOSE   Result Value Ref Range     POCT Glucose 84 74 - 99 mg/dL   Troponin I, High Sensitivity   Result Value Ref Range     Troponin I, High Sensitivity 214 (HH) 0 - 13 ng/L   Creatine Kinase   Result Value Ref Range     Creatine Kinase 65 0 - 215 U/L   POCT GLUCOSE   Result Value Ref Range     POCT Glucose 84 74 - 99 mg/dL   POCT GLUCOSE   Result Value Ref Range     POCT Glucose 112 (H) 74 - 99 mg/dL   Troponin I, High Sensitivity   Result Value Ref Range     Troponin I, High Sensitivity 222 (HH) 0 - 13 ng/L      Patient recently received an antibiotic (last 12 hours)         None             Plan discussed with interdisciplinary team, will continue Rocephin IV for urine positive for 2+ nitrites. WBC 10.2, will repeat labs. Hemoglobin 9.5, iron levels ordered. Strict intake and output. No acute events overnight, continue current and repeat labs in the AM. Therapy evaluations ordered. Home medication reconciliation needed.      Discharge planning discussed with patient and care team.  Anticipate HHC/SNF at discharge. Patient aware and agreeable to current plan, continue plan as above.      I spent a total of 50  minutes on the date of the service which included preparing to see the patient, face-to-face patient care, completing clinical documentation, obtaining and/or reviewing separately obtained history, performing a medically appropriate examination, counseling and educating the patient/family/caregiver, ordering medications, tests, or procedures, communicating with other HCPs (not separately reported), independently interpreting results (not separately reported), communicating results to the patient/family/caregiver, and care coordination (not separately reported).   I spent 60 minutes in the professional and overall care of this patient.                           Revision History           Patient fully evaluated 1/13  for    Problem List Items Addressed This Visit                     Symptoms and Signs     * (Principal) Generalized weakness - Primary      Other Visit Diagnoses         Hypoglycemia         Anemia, unspecified type         Relevant Orders     Esophagogastroduodenoscopy (EGD)             Patient seen resting in bed with head of bed elevated, no s/s or c/o acute difficulties at this time.  Vital signs for last 24 hours Temp:  [36.4 °C (97.5 °F)-37.8 °C (100 °F)] 36.4 °C (97.5 °F)  Heart Rate:  [82-98] 88  Resp:  [18-20] 18  BP: (108-133)/(50-59) 118/57    No intake/output data recorded.  Patient still requiring frequent cardiac and SPO2 monitoring. Continue aggressive pulmonary hygiene and oral hygiene. Off loading as tolerated for skin integrity. Medications and labs reviewed-             Results for orders placed or performed during the hospital encounter of 01/10/25 (from the past 24 hours)   POCT GLUCOSE   Result Value Ref Range     POCT Glucose 197 (H) 74 - 99 mg/dL   POCT GLUCOSE   Result Value Ref Range     POCT Glucose 202 (H) 74 - 99 mg/dL   CBC and Auto Differential   Result Value Ref Range     WBC 8.9 4.4 - 11.3 x10*3/uL     nRBC 0.0 0.0 - 0.0 /100 WBCs     RBC 3.55 (L) 4.00 - 5.20 x10*6/uL      Hemoglobin 8.0 (L) 12.0 - 16.0 g/dL     Hematocrit 27.7 (L) 36.0 - 46.0 %     MCV 78 (L) 80 - 100 fL     MCH 22.5 (L) 26.0 - 34.0 pg     MCHC 28.9 (L) 32.0 - 36.0 g/dL     RDW 18.2 (H) 11.5 - 14.5 %     Platelets 257 150 - 450 x10*3/uL     Neutrophils % 76.5 40.0 - 80.0 %     Immature Granulocytes %, Automated 0.8 0.0 - 0.9 %     Lymphocytes % 12.9 13.0 - 44.0 %     Monocytes % 8.8 2.0 - 10.0 %     Eosinophils % 0.7 0.0 - 6.0 %     Basophils % 0.3 0.0 - 2.0 %     Neutrophils Absolute 6.78 (H) 1.60 - 5.50 x10*3/uL     Immature Granulocytes Absolute, Automated 0.07 0.00 - 0.50 x10*3/uL     Lymphocytes Absolute 1.14 0.80 - 3.00 x10*3/uL     Monocytes Absolute 0.78 0.05 - 0.80 x10*3/uL     Eosinophils Absolute 0.06 0.00 - 0.40 x10*3/uL     Basophils Absolute 0.03 0.00 - 0.10 x10*3/uL   Comprehensive Metabolic Panel   Result Value Ref Range     Glucose 65 (L) 74 - 99 mg/dL     Sodium 135 (L) 136 - 145 mmol/L     Potassium 3.8 3.5 - 5.3 mmol/L     Chloride 99 98 - 107 mmol/L     Bicarbonate 30 21 - 32 mmol/L     Anion Gap 10 10 - 20 mmol/L     Urea Nitrogen 18 6 - 23 mg/dL     Creatinine 0.93 0.50 - 1.05 mg/dL     eGFR 59 (L) >60 mL/min/1.73m*2     Calcium 8.7 8.6 - 10.3 mg/dL     Albumin 3.0 (L) 3.4 - 5.0 g/dL     Alkaline Phosphatase 50 33 - 136 U/L     Total Protein 6.3 (L) 6.4 - 8.2 g/dL     AST 8 (L) 9 - 39 U/L     Bilirubin, Total 0.4 0.0 - 1.2 mg/dL     ALT 5 (L) 7 - 45 U/L   POCT GLUCOSE   Result Value Ref Range     POCT Glucose 68 (L) 74 - 99 mg/dL   Iron and TIBC   Result Value Ref Range     Iron 28 (L) 35 - 150 ug/dL     UIBC 212 110 - 370 ug/dL     TIBC 240 240 - 445 ug/dL     % Saturation 12 (L) 25 - 45 %   Type and Screen   Result Value Ref Range     ABO TYPE A       Rh TYPE POS       ANTIBODY SCREEN NEG     VERIFY ABO/Rh Group Test   Result Value Ref Range     ABO TYPE A       Rh TYPE POS     POCT GLUCOSE   Result Value Ref Range     POCT Glucose 101 (H) 74 - 99 mg/dL      Patient recently received an  antibiotic (last 12 hours)         None             Plan discussed with interdisciplinary team, per PT, patient experiencing decreased strength, endurance, mobility, sensation, impaired balance, and pain with good rehab prognosis. Per GI, plan is to scope patient tomorrow. Occult blood of the stool pending. Will give patient another dose of IV Venofer 300 mg for HARINI. Discontinued IV Rocephin and started patient on oral Ceftin for UTI. Will continue current and repeat labs in the AM, especially monitoring hemoglobin.     Discharge planning discussed with patient and care team. Therapy evaluations ordered. [facility] anticipate C/SNF at discharge. Patient aware and agreeable to current plan, continue plan as above.      I spent a total of 50 minutes on the date of the service which included preparing to see the patient, face-to-face patient care, completing clinical documentation, obtaining and/or reviewing separately obtained history, performing a medically appropriate examination, counseling and educating the patient/family/caregiver, ordering medications, tests, or procedures, communicating with other HCPs (not separately reported), independently interpreting results (not separately reported), communicating results to the patient/family/caregiver, and care coordination (not separately reported).         ZACHARIAH ANDINO                        Revision History     Patient fully evaluated    for    Problem List Items Addressed This Visit                  Symptoms and Signs     * (Principal) Generalized weakness - Primary     Relevant Orders     Surgical Pathology Exam     Surgical Pathology Exam      Other Visit Diagnoses         Hypoglycemia         Relevant Orders     Surgical Pathology Exam     Surgical Pathology Exam     Anemia, unspecified type         Relevant Orders     Esophagogastroduodenoscopy (EGD) (Completed)     Surgical Pathology Exam     Colonoscopy Diagnostic (Completed)     Surgical Pathology Exam              Patient seen resting in bed with head of bed elevated, no s/s or c/o acute difficulties at this time.  Vital signs for last 24 hours Temp:  [35.8 °C (96.4 °F)-36.5 °C (97.7 °F)] 35.8 °C (96.4 °F)  Heart Rate:  [72-88] 77  Resp:  [18] 18  BP: (121-164)/(56-80) 133/63    I/O this shift:  In: 237 [P.O.:237]  Out: -   Patient still requiring frequent cardiac and SPO2 monitoring. Continue aggressive pulmonary hygiene and oral hygiene. Off loading as tolerated for skin integrity. Medications and labs reviewed-         Results for orders placed or performed during the hospital encounter of 01/10/25 (from the past 24 hours)   POCT GLUCOSE   Result Value Ref Range     POCT Glucose 143 (H) 74 - 99 mg/dL   POCT GLUCOSE   Result Value Ref Range     POCT Glucose 145 (H) 74 - 99 mg/dL   POCT GLUCOSE   Result Value Ref Range     POCT Glucose 110 (H) 74 - 99 mg/dL   POCT GLUCOSE   Result Value Ref Range     POCT Glucose 66 (L) 74 - 99 mg/dL   CBC and Auto Differential   Result Value Ref Range     WBC 7.5 4.4 - 11.3 x10*3/uL     nRBC 0.0 0.0 - 0.0 /100 WBCs     RBC 3.50 (L) 4.00 - 5.20 x10*6/uL     Hemoglobin 7.9 (L) 12.0 - 16.0 g/dL     Hematocrit 27.6 (L) 36.0 - 46.0 %     MCV 79 (L) 80 - 100 fL     MCH 22.6 (L) 26.0 - 34.0 pg     MCHC 28.6 (L) 32.0 - 36.0 g/dL     RDW 19.2 (H) 11.5 - 14.5 %     Platelets 266 150 - 450 x10*3/uL     Neutrophils % 74.4 40.0 - 80.0 %     Immature Granulocytes %, Automated 0.5 0.0 - 0.9 %     Lymphocytes % 15.7 13.0 - 44.0 %     Monocytes % 7.6 2.0 - 10.0 %     Eosinophils % 1.5 0.0 - 6.0 %     Basophils % 0.3 0.0 - 2.0 %     Neutrophils Absolute 5.60 (H) 1.60 - 5.50 x10*3/uL     Immature Granulocytes Absolute, Automated 0.04 0.00 - 0.50 x10*3/uL     Lymphocytes Absolute 1.18 0.80 - 3.00 x10*3/uL     Monocytes Absolute 0.57 0.05 - 0.80 x10*3/uL     Eosinophils Absolute 0.11 0.00 - 0.40 x10*3/uL     Basophils Absolute 0.02 0.00 - 0.10 x10*3/uL   Comprehensive Metabolic Panel   Result Value  Ref Range     Glucose 57 (L) 74 - 99 mg/dL     Sodium 136 136 - 145 mmol/L     Potassium 3.9 3.5 - 5.3 mmol/L     Chloride 102 98 - 107 mmol/L     Bicarbonate 27 21 - 32 mmol/L     Anion Gap 11 10 - 20 mmol/L     Urea Nitrogen 12 6 - 23 mg/dL     Creatinine 0.66 0.50 - 1.05 mg/dL     eGFR 84 >60 mL/min/1.73m*2     Calcium 8.5 (L) 8.6 - 10.3 mg/dL     Albumin 2.9 (L) 3.4 - 5.0 g/dL     Alkaline Phosphatase 55 33 - 136 U/L     Total Protein 6.3 (L) 6.4 - 8.2 g/dL     AST 7 (L) 9 - 39 U/L     Bilirubin, Total 0.3 0.0 - 1.2 mg/dL     ALT 6 (L) 7 - 45 U/L   CEA   Result Value Ref Range     Carcinoembryonic AG 61.1 ug/L   POCT GLUCOSE   Result Value Ref Range     POCT Glucose 103 (H) 74 - 99 mg/dL   POCT GLUCOSE   Result Value Ref Range     POCT Glucose 124 (H) 74 - 99 mg/dL      Patient recently received an antibiotic (last 12 hours)         Date/Time Action Medication Dose     01/13/25 2029 Given    cefuroxime (Ceftin) tablet 250 mg 250 mg             Plan discussed with interdisciplinary team, per [facility]. Will watch for facility acceptance and start precert. continue current and repeat labs in the AM.      Discharge planning discussed with patient and care team. Therapy evaluations ordered. [facility] -10  , anticipate HHC/SNF at discharge.  Per [facility]. Will watch for facility acceptance and start precert. Patient aware and agreeable to current plan, continue plan as above.      I spent a total of 50 minutes on the date of the service which included preparing to see the patient, face-to-face patient care, completing clinical documentation, obtaining and/or reviewing separately obtained history, performing a medically appropriate examination, counseling and educating the patient/family/caregiver, ordering medications, tests, or procedures, communicating with other HCPs (not separately reported), independently interpreting  results (not separately reported), communicating results to the patient/family/caregiver, and care coordination (not separately reported).   No acute events overnight, patient denie. Seen by jannet Ramey chest pain, worsening SOB at this time.     Patient fully evaluated 1/15  for    Problem List Items Addressed This Visit                  Symptoms and Signs     * (Principal) Generalized weakness - Primary     Relevant Orders     Surgical Pathology Exam     Surgical Pathology Exam      Other Visit Diagnoses         Hypoglycemia         Relevant Orders     Surgical Pathology Exam     Surgical Pathology Exam     Anemia, unspecified type         Relevant Orders     Esophagogastroduodenoscopy (EGD) (Completed)     Surgical Pathology Exam     Colonoscopy Diagnostic (Completed)     Surgical Pathology Exam             Patient seen resting in bed with head of bed elevated, no s/s or c/o acute difficulties at this time.  Vital signs for last 24 hours Temp:  [35.8 °C (96.4 °F)-36.5 °C (97.7 °F)] 35.8 °C (96.4 °F)  Heart Rate:  [72-88] 77  Resp:  [18] 18  BP: (121-164)/(56-80) 133/63    I/O this shift:  In: 237 [P.O.:237]  Out: -   Patient still requiring frequent cardiac and SPO2 monitoring. Continue aggressive pulmonary hygiene and oral hygiene. Off loading as tolerated for skin integrity. Medications and labs reviewed-         Results for orders placed or performed during the hospital encounter of 01/10/25 (from the past 24 hours)   POCT GLUCOSE   Result Value Ref Range     POCT Glucose 143 (H) 74 - 99 mg/dL   POCT GLUCOSE   Result Value Ref Range     POCT Glucose 145 (H) 74 - 99 mg/dL   POCT GLUCOSE   Result Value Ref Range     POCT Glucose 110 (H) 74 - 99 mg/dL   POCT GLUCOSE   Result Value Ref Range     POCT Glucose 66 (L) 74 - 99 mg/dL   CBC and Auto Differential   Result Value Ref Range     WBC 7.5 4.4 - 11.3 x10*3/uL     nRBC 0.0 0.0 - 0.0 /100 WBCs     RBC 3.50 (L) 4.00 - 5.20 x10*6/uL     Hemoglobin 7.9 (L) 12.0 -  16.0 g/dL     Hematocrit 27.6 (L) 36.0 - 46.0 %     MCV 79 (L) 80 - 100 fL     MCH 22.6 (L) 26.0 - 34.0 pg     MCHC 28.6 (L) 32.0 - 36.0 g/dL     RDW 19.2 (H) 11.5 - 14.5 %     Platelets 266 150 - 450 x10*3/uL     Neutrophils % 74.4 40.0 - 80.0 %     Immature Granulocytes %, Automated 0.5 0.0 - 0.9 %     Lymphocytes % 15.7 13.0 - 44.0 %     Monocytes % 7.6 2.0 - 10.0 %     Eosinophils % 1.5 0.0 - 6.0 %     Basophils % 0.3 0.0 - 2.0 %     Neutrophils Absolute 5.60 (H) 1.60 - 5.50 x10*3/uL     Immature Granulocytes Absolute, Automated 0.04 0.00 - 0.50 x10*3/uL     Lymphocytes Absolute 1.18 0.80 - 3.00 x10*3/uL     Monocytes Absolute 0.57 0.05 - 0.80 x10*3/uL     Eosinophils Absolute 0.11 0.00 - 0.40 x10*3/uL     Basophils Absolute 0.02 0.00 - 0.10 x10*3/uL   Comprehensive Metabolic Panel   Result Value Ref Range     Glucose 57 (L) 74 - 99 mg/dL     Sodium 136 136 - 145 mmol/L     Potassium 3.9 3.5 - 5.3 mmol/L     Chloride 102 98 - 107 mmol/L     Bicarbonate 27 21 - 32 mmol/L     Anion Gap 11 10 - 20 mmol/L     Urea Nitrogen 12 6 - 23 mg/dL     Creatinine 0.66 0.50 - 1.05 mg/dL     eGFR 84 >60 mL/min/1.73m*2     Calcium 8.5 (L) 8.6 - 10.3 mg/dL     Albumin 2.9 (L) 3.4 - 5.0 g/dL     Alkaline Phosphatase 55 33 - 136 U/L     Total Protein 6.3 (L) 6.4 - 8.2 g/dL     AST 7 (L) 9 - 39 U/L     Bilirubin, Total 0.3 0.0 - 1.2 mg/dL     ALT 6 (L) 7 - 45 U/L   CEA   Result Value Ref Range     Carcinoembryonic AG 61.1 ug/L   POCT GLUCOSE   Result Value Ref Range     POCT Glucose 103 (H) 74 - 99 mg/dL   POCT GLUCOSE   Result Value Ref Range     POCT Glucose 124 (H) 74 - 99 mg/dL      Patient recently received an antibiotic (last 12 hours)         Date/Time Action Medication Dose     01/15/25 0904 Given    cefuroxime (Ceftin) tablet 250 mg 250 mg             Plan discussed with interdisciplinary team, per Gastroenterology, EGD did not show any abnormalities to explain the anemia and colonoscopy is recommended. Discussed staying  here for colonoscopy. Patient hesitant about using Golytely bowel prep but agrees to needing colonoscopy, would appreciate input from Gastroenterology on specific bowel prep. Patient will consider her options. Will continue current and repeat labs in the AM, especially monitoring her Iron deficiency anemia.      Discharge planning discussed with patient and care team. Therapy evaluations ordered. [facility]-12, anticipate C/SNF at discharge. Patient aware and agreeable to current plan, continue plan as above.      I spent a total of 50 minutes on the date of the service which included preparing to see the patient, face-to-face patient care, completing clinical documentation, obtaining and/or reviewing separately obtained history, performing a medically appropriate examination, counseling and educating the patient/family/caregiver, ordering medications, tests, or procedures, communicating with other HCPs (not separately reported), independently interpreting results (not separately reported), communicating results to the patient/family/caregiver, and care coordination (not separately reported).      Patient fully evaluated 1/16  for    Problem List Items Addressed This Visit                  Symptoms and Signs     * (Principal) Generalized weakness - Primary     Relevant Orders     Surgical Pathology Exam     Surgical Pathology Exam      Other Visit Diagnoses         Hypoglycemia         Relevant Orders     Surgical Pathology Exam     Surgical Pathology Exam     Anemia, unspecified type         Relevant Orders     Esophagogastroduodenoscopy (EGD) (Completed)     Surgical Pathology Exam     Colonoscopy Diagnostic (Completed)     Surgical Pathology Exam             Patient seen resting in bed with head of bed elevated, no s/s or c/o acute difficulties at this time.  Vital signs for last 24 hours Temp:  [35.8 °C (96.4 °F)-36.5 °C (97.7 °F)] 35.8 °C (96.4 °F)  Heart Rate:  [72-88] 77  Resp:  [18] 18  BP: (121-164)/(56-80)  133/63    I/O this shift:  In: 237 [P.O.:237]  Out: -   Patient still requiring frequent cardiac and SPO2 monitoring. Continue aggressive pulmonary hygiene and oral hygiene. Off loading as tolerated for skin integrity. Medications and labs reviewed-         Results for orders placed or performed during the hospital encounter of 01/10/25 (from the past 24 hours)   POCT GLUCOSE   Result Value Ref Range     POCT Glucose 143 (H) 74 - 99 mg/dL   POCT GLUCOSE   Result Value Ref Range     POCT Glucose 145 (H) 74 - 99 mg/dL   POCT GLUCOSE   Result Value Ref Range     POCT Glucose 110 (H) 74 - 99 mg/dL   POCT GLUCOSE   Result Value Ref Range     POCT Glucose 66 (L) 74 - 99 mg/dL   CBC and Auto Differential   Result Value Ref Range     WBC 7.5 4.4 - 11.3 x10*3/uL     nRBC 0.0 0.0 - 0.0 /100 WBCs     RBC 3.50 (L) 4.00 - 5.20 x10*6/uL     Hemoglobin 7.9 (L) 12.0 - 16.0 g/dL     Hematocrit 27.6 (L) 36.0 - 46.0 %     MCV 79 (L) 80 - 100 fL     MCH 22.6 (L) 26.0 - 34.0 pg     MCHC 28.6 (L) 32.0 - 36.0 g/dL     RDW 19.2 (H) 11.5 - 14.5 %     Platelets 266 150 - 450 x10*3/uL     Neutrophils % 74.4 40.0 - 80.0 %     Immature Granulocytes %, Automated 0.5 0.0 - 0.9 %     Lymphocytes % 15.7 13.0 - 44.0 %     Monocytes % 7.6 2.0 - 10.0 %     Eosinophils % 1.5 0.0 - 6.0 %     Basophils % 0.3 0.0 - 2.0 %     Neutrophils Absolute 5.60 (H) 1.60 - 5.50 x10*3/uL     Immature Granulocytes Absolute, Automated 0.04 0.00 - 0.50 x10*3/uL     Lymphocytes Absolute 1.18 0.80 - 3.00 x10*3/uL     Monocytes Absolute 0.57 0.05 - 0.80 x10*3/uL     Eosinophils Absolute 0.11 0.00 - 0.40 x10*3/uL     Basophils Absolute 0.02 0.00 - 0.10 x10*3/uL   Comprehensive Metabolic Panel   Result Value Ref Range     Glucose 57 (L) 74 - 99 mg/dL     Sodium 136 136 - 145 mmol/L     Potassium 3.9 3.5 - 5.3 mmol/L     Chloride 102 98 - 107 mmol/L     Bicarbonate 27 21 - 32 mmol/L     Anion Gap 11 10 - 20 mmol/L     Urea Nitrogen 12 6 - 23 mg/dL     Creatinine 0.66 0.50 -  1.05 mg/dL     eGFR 84 >60 mL/min/1.73m*2     Calcium 8.5 (L) 8.6 - 10.3 mg/dL     Albumin 2.9 (L) 3.4 - 5.0 g/dL     Alkaline Phosphatase 55 33 - 136 U/L     Total Protein 6.3 (L) 6.4 - 8.2 g/dL     AST 7 (L) 9 - 39 U/L     Bilirubin, Total 0.3 0.0 - 1.2 mg/dL     ALT 6 (L) 7 - 45 U/L   CEA   Result Value Ref Range     Carcinoembryonic AG 61.1 ug/L   POCT GLUCOSE   Result Value Ref Range     POCT Glucose 103 (H) 74 - 99 mg/dL   POCT GLUCOSE   Result Value Ref Range     POCT Glucose 124 (H) 74 - 99 mg/dL      Patient recently received an antibiotic (last 12 hours)         Date/Time Action Medication Dose     01/16/25 1115 Given    cefuroxime (Ceftin) tablet 250 mg 250 mg             Plan discussed with interdisciplinary team, patient underwent colonoscopy today for anemia. Per gastroenterology, diverticula of mild severity with no inflammation in the sigmoid colon, external and internal hemorrhoids present, three sessile polyps 4 cm up to 6 cm in the ascending colon, descending colon and sigmoid colon; performed cold snare with complete en bloc removal and retrieved specimen. Ulcerated friable mass extending from around the mid transverse colon to the hepatic flexure. The mass extended continuously from 55 cm to 65 cm from the anal canal. The mass was circumferential partially obstructing the bowel lumen. The pediatric colonoscopy was able to traverse the mass. A biopsy was performed on the mass, awaiting results. Will also review results of CEA.      Patient's hemoglobin today dropped from 7.8 to 7.5. Patient receiving IV Venofer 300 mg in sodium chloride 0.9% 282 mL. Will continue current and repeat labs in the AM.      Discharge planning discussed with patient and care team. Therapy evaluations ordered. [facility], anticipate HHC/SNF at discharge. Patient aware and agreeable to current plan, continue plan as above.      I spent a total of 50 minutes on the date of the service which included preparing to see the  patient, face-to-face patient care, completing clinical documentation, obtaining and/or reviewing separately obtained history, performing a medically appropriate examination, counseling and educating the patient/family/caregiver, ordering medications, tests, or procedures, communicating with other HCPs (not separately reported), independently interpreting results (not separately reported), communicating results to the patient/family/caregiver, and care coordination (not separately reported).         Patient fully evaluated 1/17  for    Problem List Items Addressed This Visit                  Symptoms and Signs     * (Principal) Generalized weakness - Primary     Relevant Orders     Surgical Pathology Exam     Surgical Pathology Exam      Other Visit Diagnoses         Hypoglycemia         Relevant Orders     Surgical Pathology Exam     Surgical Pathology Exam     Anemia, unspecified type         Relevant Orders     Esophagogastroduodenoscopy (EGD) (Completed)     Surgical Pathology Exam     Colonoscopy Diagnostic (Completed)     Surgical Pathology Exam             Patient seen resting in bed with head of bed elevated, no s/s or c/o acute difficulties at this time.  Vital signs for last 24 hours Temp:  [35.8 °C (96.4 °F)-36.5 °C (97.7 °F)] 35.8 °C (96.4 °F)  Heart Rate:  [72-88] 77  Resp:  [18] 18  BP: (121-164)/(56-80) 133/63    I/O this shift:  In: 237 [P.O.:237]  Out: -   Patient still requiring frequent cardiac and SPO2 monitoring. Continue aggressive pulmonary hygiene and oral hygiene. Off loading as tolerated for skin integrity. Medications and labs reviewed-         Results for orders placed or performed during the hospital encounter of 01/10/25 (from the past 24 hours)   POCT GLUCOSE   Result Value Ref Range     POCT Glucose 143 (H) 74 - 99 mg/dL   POCT GLUCOSE   Result Value Ref Range     POCT Glucose 145 (H) 74 - 99 mg/dL   POCT GLUCOSE   Result Value Ref Range     POCT Glucose 110 (H) 74 - 99 mg/dL   POCT  GLUCOSE   Result Value Ref Range     POCT Glucose 66 (L) 74 - 99 mg/dL   CBC and Auto Differential   Result Value Ref Range     WBC 7.5 4.4 - 11.3 x10*3/uL     nRBC 0.0 0.0 - 0.0 /100 WBCs     RBC 3.50 (L) 4.00 - 5.20 x10*6/uL     Hemoglobin 7.9 (L) 12.0 - 16.0 g/dL     Hematocrit 27.6 (L) 36.0 - 46.0 %     MCV 79 (L) 80 - 100 fL     MCH 22.6 (L) 26.0 - 34.0 pg     MCHC 28.6 (L) 32.0 - 36.0 g/dL     RDW 19.2 (H) 11.5 - 14.5 %     Platelets 266 150 - 450 x10*3/uL     Neutrophils % 74.4 40.0 - 80.0 %     Immature Granulocytes %, Automated 0.5 0.0 - 0.9 %     Lymphocytes % 15.7 13.0 - 44.0 %     Monocytes % 7.6 2.0 - 10.0 %     Eosinophils % 1.5 0.0 - 6.0 %     Basophils % 0.3 0.0 - 2.0 %     Neutrophils Absolute 5.60 (H) 1.60 - 5.50 x10*3/uL     Immature Granulocytes Absolute, Automated 0.04 0.00 - 0.50 x10*3/uL     Lymphocytes Absolute 1.18 0.80 - 3.00 x10*3/uL     Monocytes Absolute 0.57 0.05 - 0.80 x10*3/uL     Eosinophils Absolute 0.11 0.00 - 0.40 x10*3/uL     Basophils Absolute 0.02 0.00 - 0.10 x10*3/uL   Comprehensive Metabolic Panel   Result Value Ref Range     Glucose 57 (L) 74 - 99 mg/dL     Sodium 136 136 - 145 mmol/L     Potassium 3.9 3.5 - 5.3 mmol/L     Chloride 102 98 - 107 mmol/L     Bicarbonate 27 21 - 32 mmol/L     Anion Gap 11 10 - 20 mmol/L     Urea Nitrogen 12 6 - 23 mg/dL     Creatinine 0.66 0.50 - 1.05 mg/dL     eGFR 84 >60 mL/min/1.73m*2     Calcium 8.5 (L) 8.6 - 10.3 mg/dL     Albumin 2.9 (L) 3.4 - 5.0 g/dL     Alkaline Phosphatase 55 33 - 136 U/L     Total Protein 6.3 (L) 6.4 - 8.2 g/dL     AST 7 (L) 9 - 39 U/L     Bilirubin, Total 0.3 0.0 - 1.2 mg/dL     ALT 6 (L) 7 - 45 U/L   CEA   Result Value Ref Range     Carcinoembryonic AG 61.1 ug/L   POCT GLUCOSE   Result Value Ref Range     POCT Glucose 103 (H) 74 - 99 mg/dL   POCT GLUCOSE   Result Value Ref Range     POCT Glucose 124 (H) 74 - 99 mg/dL      Patient recently received an antibiotic (last 12 hours)         Date/Time Action Medication  Dose     01/17/25 0939 Given    cefuroxime (Ceftin) tablet 250 mg 250 mg      No acute events overnight. Hemoglobin improved at 7.9 from yesterday at 7.5.       Results of colonoscopy indicating colon cancer was discussed with the patient. Plan discussed with interdisciplinary team, surgery was also in the room and we discussed removal of the tumor with the patient. Patient is undergoing CT scan today to determine presence of metastasis or characterization of mass. Informed patient we are waiting for results of biopsy of the tumor. Patient still receiving Ceftin for UTI. Will continue current and repeat labs in the Am, monitoring anemia.      Discharge planning discussed with patient and care team. Therapy evaluations ordered. [facility], anticipate C/SNF at discharge. Patient aware and agreeable to current plan, continue plan as above.      I spent a total of 50 minutes on the date of the service which included preparing to see the patient, face-to-face patient care, completing clinical documentation, obtaining and/or reviewing separately obtained history, performing a medically appropriate examination, counseling and educating the patient/family/caregiver, ordering medications, tests, or procedures, communicating with other HCPs (not separately reported), independently interpreting results (not separately reported), communicating results to the patient/family/caregiver, and care coordination (not separately reported).                          Revision History         Pertinent Physical Exam At Time of Discharge  Physical Exam    Outpatient Follow-Up  No future appointments.      Tio Lugo MD

## 2025-01-18 NOTE — CARE PLAN
The patient's goals for the shift include pt. Safety.     The clinical goals for the shift include Patient will remain free from falls throughout shift

## 2025-01-18 NOTE — CONSULTS
"Nutrition Initial Assessment:   Nutrition Assessment         Patient is a 89 y.o. female presenting with fall      Nutrition History:  Food and Nutrient History: Pt is now taking 75% of her meals and is drinking her supplement  Food Allergies/Intolerances:  None  GI Symptoms: None  Oral Problems: None       Anthropometrics:  Height: 162.6 cm (5' 4.02\")   Weight: 83.9 kg (185 lb)   BMI (Calculated): 31.74  IBW/kg (Dietitian Calculated): 54 kg  Percent of IBW: 154 %       Weight History:     Weight Change %:       Nutrition Focused Physical Exam Findings:    Subcutaneous Fat Loss:      Muscle Wasting:     Edema:     Physical Findings:       Nutrition Significant Labs:  BMP Trend:   Results from last 7 days   Lab Units 01/17/25  0651 01/16/25  0708 01/15/25  0705 01/14/25  0706   GLUCOSE mg/dL 57* 132* 119* 79   CALCIUM mg/dL 8.5* 8.3* 8.3* 8.5*   SODIUM mmol/L 136 135* 135* 137   POTASSIUM mmol/L 3.9 4.3 4.2 4.3   CO2 mmol/L 27 27 25 26   CHLORIDE mmol/L 102 101 101 102   BUN mg/dL 12 12 16 19   CREATININE mg/dL 0.66 0.67 0.67 0.76        Nutrition Specific Medications:  Norvasc; lovenox;lantus; metamucil; zofran    I/O:   Last BM Date: 01/17/25; Stool Appearance: Loose (01/16/25 1738)    Dietary Orders (From admission, onward)       Start     Ordered    01/16/25 1153  Adult diet Consistent Carb; CCD 60 gm/meal  Diet effective now        Question Answer Comment   Diet type Consistent Carb    Carb diet selection: CCD 60 gm/meal        01/16/25 1152    01/13/25 1551  Oral nutritional supplements  Until discontinued        Question Answer Comment   Deliver with Lunch    Deliver with Dinner    Select supplement: Glucerna Shake        01/13/25 1550    01/11/25 0122  May Participate in Room Service With Assistance  ( ROOM SERVICE MAY PARTICIPATE WITH ASSISTANCE)  Once        Question:  .  Answer:  Yes    01/11/25 0121                     Estimated Needs:      Method for Estimating Needs: 4014-7956  26-30 ham kg of IBW   "   Method for Estimating Needs: 54-65  1-1.2 gm kg  of IBW     Method for Estimating Needs: 8107-8401  20-30 ml kg of IBW as medically indicated        Nutrition Diagnosis        Nutrition Diagnosis  Patient has Nutrition Diagnosis: Yes  Diagnosis Status (1): Resolved  Nutrition Diagnosis 1: Inadequate oral intake  Related to (1): limited food acceptance  As Evidenced by (1): Pt is taking 25% of her meals at this time       Nutrition Interventions/Recommendations         Nutrition Prescription:  Individualized Nutrition Prescription Provided for : Continue 60 gm carb consistent diet,  continue  glucerna X 2 to help meet nutritional needs        Nutrition Interventions:   Interventions: Meals and snacks, Medical food supplement  Meals and Snacks: Carbohydrate-modified diet  Goal: >75% of meals  Medical Food Supplement: Commercial beverage  Goal: 100% of supplement    Collaboration and Referral of Nutrition Care: Collaboration by nutrition professional with other providers    Nutrition Education:      Not at this time    Nutrition Monitoring and Evaluation   Food/Nutrient Related History Monitoring  Monitoring and Evaluation Plan: Energy intake, Fluid intake, Amount of food  Criteria: >75% of energy needs  Criteria: adequate fluid intake without fluid overload  Criteria: >75% of meals    Body Composition/Growth/Weight History  Monitoring and Evaluation Plan: Weight    Biochemical Data, Medical Tests and Procedures  Monitoring and Evaluation Plan: Electrolyte/renal panel, Glucose/endocrine profile  Criteria: improved sodium and stable BMP  Criteria: glucose within desired rangre              Time Spent (min): 30 minutes

## 2025-01-18 NOTE — PROGRESS NOTES
"Melissa Tse is a 89 y.o. female on day 6 of admission presenting with Generalized weakness.    Subjective   Pt had 2 Bms overnight. Tolerating diet, denies N/V or abdominal pain.     Dr. Ramos and myself met with patient and family at bedside to discuss CT findings.       Objective     Physical Exam  Constitutional:       General: She is not in acute distress.     Appearance: She is ill-appearing. She is not toxic-appearing.      Comments: Frail appearing elderly female, resting in bed   Cardiovascular:      Rate and Rhythm: Normal rate and regular rhythm.      Heart sounds: Normal heart sounds.   Pulmonary:      Effort: Pulmonary effort is normal. No respiratory distress.      Breath sounds: Normal breath sounds.      Comments: 2L NC  Abdominal:      General: Bowel sounds are normal. There is no distension.      Palpations: Abdomen is soft. There is mass.      Tenderness: There is no abdominal tenderness.      Comments: Palpable mass in epigastrum   Skin:     General: Skin is warm and dry.   Neurological:      Mental Status: She is alert and oriented to person, place, and time.   Psychiatric:         Mood and Affect: Mood normal.         Behavior: Behavior normal.         Last Recorded Vitals  Blood pressure 118/56, pulse 77, temperature 36.2 °C (97.2 °F), temperature source Temporal, resp. rate 16, height 1.626 m (5' 4.02\"), weight 83.9 kg (185 lb), SpO2 91%.  Intake/Output last 3 Shifts:  I/O last 3 completed shifts:  In: 519 (6.2 mL/kg) [P.O.:237; IV Piggyback:282]  Out: 1150 (13.7 mL/kg) [Urine:1150 (0.4 mL/kg/hr)]  Weight: 83.9 kg     Relevant Results  Results for orders placed or performed during the hospital encounter of 01/10/25 (from the past 24 hours)   POCT GLUCOSE   Result Value Ref Range    POCT Glucose 103 (H) 74 - 99 mg/dL   POCT GLUCOSE   Result Value Ref Range    POCT Glucose 124 (H) 74 - 99 mg/dL   POCT GLUCOSE   Result Value Ref Range    POCT Glucose 155 (H) 74 - 99 mg/dL   POCT GLUCOSE "   Result Value Ref Range    POCT Glucose 258 (H) 74 - 99 mg/dL   POCT GLUCOSE   Result Value Ref Range    POCT Glucose 60 (L) 74 - 99 mg/dL   POCT GLUCOSE   Result Value Ref Range    POCT Glucose 106 (H) 74 - 99 mg/dL     CT chest abdomen pelvis w IV contrast    Result Date: 1/17/2025  Interpreted By:  Arpita Nugent, STUDY: CT CHEST ABDOMEN PELVIS W IV CONTRAST;  1/17/2025 1:52 pm   INDICATION: Signs/Symptoms:colon mass rule out malignancy, needs staging.   COMPARISON: 03/08/2022   ACCESSION NUMBER(S): [id number]   ORDERING CLINICIAN: JAY JAY CHAPPELL   TECHNIQUE: CT of the chest, abdomen, and pelvis was performed. Contiguous axial images were obtained at 3 mm slice thickness through the chest, abdomen and pelvis. Coronal and sagittal reconstructions at 3 mm slice thickness were performed. 75 ML of Omnipaque 350 was administered intravenously without immediate complication.   FINDINGS: CHEST:   LUNG/PLEURA/LARGE AIRWAYS: There are small bilateral pleural effusions. There is compressive atelectasis of the periphery of the right and left lower lobes abutting the pleural effusions. There is no pulmonary nodule.   VESSELS: The thoracic vessels are unremarkable.   HEART: The heart is unremarkable.   MEDIASTINUM AND CHEMA: There is no hilar or mediastinal adenopathy.   CHEST WALL AND LOWER NECK: There is moderate compression of the superior endplate of T12. This is remote and was seen on CT abdomen and pelvis dated 03/28/2022. There is no abnormality of the lower neck.   ABDOMEN:   LIVER: There is a 1.1 cm non cystic hypodense mass in segment IV 8. Findings are suspicious for metastatic disease. There is a benign cyst involving the tip of the right lobe of the liver. Suspicious for primary   BILE DUCTS: There is no intrahepatic, common hepatic or common bile ductal dilatation.   GALLBLADDER: There are small gravel-like gallstones in the gallbladder.   PANCREAS: There is no abnormality of the pancreas.   SPLEEN: The spleen  is unremarkable. There is no splenic mass. There is no splenomegaly   ADRENAL GLANDS: The adrenal glands are unremarkable.   KIDNEYS AND URETERS:. The kidneys function symmetrically. There is a small benign right renal cyst. There is a punctate nonobstructing lower pole left intrarenal calculus.     PELVIS:   BLADDER: The bladder is unremarkable.   REPRODUCTIVE ORGANS: There is no abnormality of the reproductive organs.   BOWEL: There is a lobulated large apple-core lesion involving the mid transverse colon. Findings are consistent with malignancy. This involves a 13.4 cm segment of transverse colon. There is aneurysmal dilatation of the lumen containing of dots of air. This could represent necrosis. Aneurysmal dilatation can also be seen with lymphoma. There are abnormal enlarged lymph nodes surrounding this apple-core lesion. There is no proximal small bowel obstruction.   VESSELS: The abdominal and pelvic vessels are unremarkable.   PERITONEUM/RETROPERITONEUM/LYMPH NODES: There is no retroperitoneal or pelvic adenopathy.  There is no ascites.   ABDOMINAL WALL: The abdominal wall is unremarkable.   BONES AND SOFT TISSUES: There is no acute osseous finding. Status post right total hip arthroplasty.   There is no soft tissue abnormality.       CHEST: 1.  Small bilateral pleural effusions with mild compressive atelectasis of the periphery of the right and left lower lobes abutting the pleural effusions. 2. No metastatic disease to the chest.   ABDOMEN AND PELVIS: 1.  Large lobulated mass involving the mid transverse colon consistent malignancy. This could represent adenocarcinoma of the colon. Another possibility is lymphoma given the aneurysmal dilatation of the lumen. There are multiple dots of air which could also represent necrosis. There is no proximal colonic obstruction. 2. Abnormal adjacent lymph nodes suspicious for locoregional disease. 3. 1.1 cm mass right lobe liver suspicious for metastatic disease. 4.  Cholelithiasis. 5. Simple right renal cysts. Punctate nonobstructing lower pole left intrarenal calculus.   MACRO: None   Signed by: Arpita Nugent 1/17/2025 4:32 PM Dictation workstation:   [id number]        Assessment/Plan   Melissa Tse is a 89 y.o. female with PMH significant for HTN, DMT2 and Rt breast cancer who presented to [facility] ED on 1/10 for weakness. She was found to be anemic and underwent EGD on 1/14 and colonoscopy on 1/16. Colonoscopy revealed large invasive and ulcerated mass in the hepatic flexure to the transverse colon, partially obstructing; pediatric colonoscope was able to transverse.      Of note, pt was initially a DNRCC, but was changed to full code for the procedure.      Impression:  Large transverse colon mass w/ liver mets   > Possible invasion of abdominal wall and surrounding small bowel   > High risk for obstruction   > Pathology: INVASIVE ADENOCARCINOMA w/ glandular component and poorly differentiated component with signet ring cells  Anemia d/t above   > Initial drop, Hgb now stabilized in mid-upper 7s  Breast cancer, maintained on anastrozole     Recommendations:  - Patient is not a surgical candidate d/t advanced disease    > Would only be a surgical candidate if she develops complete obstruction, then would have small bowel ostomy for diversion/decompression  - Oncology consulted, appreciate recs    > Possible chemo/systemic therapy for palliation, TBD  - Monitor Hgb, transfusion per primary team  - Should probably continue iron supplementation  - Okay for diet as tolerated  - Maintain bowel regimen to prevent constipation in light of partially obstructing colon mass    Dispo:  Okay for discharge to SNF from surgery perspective.    Patient will be seen and discussed with attending surgeon, Dr. Ramos.       Rita Toney PA-C

## 2025-01-18 NOTE — CARE PLAN
Problem: Nutrition  Goal: Oral intake greater 75%  Outcome: Progressing     Problem: Nutrition  Goal: Consume prescribed supplement  Outcome: Progressing     Problem: Nutrition  Goal: Adequate PO fluid intake  Outcome: Progressing     Problem: Nutrition  Goal: Electrolytes WNL  Outcome: Progressing     Problem: Nutrition  Goal: Promote healing  Outcome: Progressing

## 2025-01-18 NOTE — PROGRESS NOTES
"Melissa Tse is a 89 y.o. female on day 6 of admission presenting with Generalized weakness.    Subjective   Patient denies any abdominal pain today.  However she does feel weak.       Objective 10 review of systems otherwise negative    Physical Exam GENERAL  MENTAL STATUS - Alert. GENERAL APPEARANCE - Cooperative and well groomed. ORIENTATION - Oriented X4. BUILD & NUTRITION - Well nourished and well developed. HYDRATION - Well hydrated.    INTEGUMENTARY  GENERAL CHARACTERISTICS - Overall examination of the patient's skin reveals no rashes. COLOR - not icteric. SKIN MOISTURE - normal skin moisture. TEMPERATURE - normal worth is noted.    HEAD AND NECK  HEAD  HEAD SHAPE - Atraumatic and normocephalic.  TRACHEA - midline.  THYROID  GLAND CHARACTERISTICS - normal size and consistency and no palpable nodules.    EYE  SCLERA/CONJUNCTIVA - BILATERAL - Anicteric.    CHEST AND LUNG EXAM  AUSCULTATION -  BREATH SOUNDS - Clear.    BREAST - Did not examine.    CARDIOVASCULAR  AUSCULTATION: RHYTHM - Regular. HEART SOUNDS - Normal heart sounds.  MURMURS & OTHER HEART SOUNDS - Auscultation of the heart reveals regular rate and no murmurs.    ABDOMEN  PALPATION/PERCUSSION - Palpation and percussion of the abdomen reveal soft, non tender, no mass and no hepatosplenomegaly.  6 cm mass present in epigastrium    LYMPHATIC  GENERAL LYMPHATICS  BREAST LYMPHATIC EXAM - No cervical adenopathy, supraclavicular adenopathy or axilliary adenopathy.       Last Recorded Vitals  Blood pressure 127/60, pulse 80, temperature 36.3 °C (97.3 °F), resp. rate 16, height 1.626 m (5' 4.02\"), weight 83.9 kg (185 lb), SpO2 94%.  Intake/Output last 3 Shifts:  I/O last 3 completed shifts:  In: 519 (6.2 mL/kg) [P.O.:237; IV Piggyback:282]  Out: 1150 (13.7 mL/kg) [Urine:1150 (0.4 mL/kg/hr)]  Weight: 83.9 kg     Relevant Results             WBC today 7.6.  H&H 7.6 and 26.2.  Albumin remains 2.8.                 Assessment/Plan   Assessment & " Plan  Generalized weakness    Extensive discussion with patient and multiple family members.  I have indicated that this is stage IV carcinoma of the colon due to the fact that there is most likely liver metastasis with enlarged mediastinal lymph nodes small pleural effusion.  I discussed the findings of the small bowel adherent to the large mass in the transverse colon.  I have indicated at this point that surgery would not be curative but the goal would be to operate for obstruction.  At the present time the patient is not obstructed.  I do not recommend surgery at this time.  I have discussed the potential for development of obstruction and potential small bowel stoma with high output and electrolyte imbalance.  At this point the patient will consider ostomy versus palliative care.  This discussion lasted approximately 45 minutes.       I spent 45 minutes in the professional and overall care of this patient.      Sanket Ramos MD

## 2025-01-21 LAB
ATRIAL RATE: 97 BPM
LAB AP ASR DISCLAIMER: NORMAL
LABORATORY COMMENT REPORT: NORMAL
LABORATORY COMMENT REPORT: NORMAL
P AXIS: 23 DEGREES
P OFFSET: 187 MS
P ONSET: 128 MS
PATH REPORT.ADDENDUM SPEC: NORMAL
PATH REPORT.COMMENTS IMP SPEC: NORMAL
PATH REPORT.FINAL DX SPEC: NORMAL
PATH REPORT.FINAL DX SPEC: NORMAL
PATH REPORT.GROSS SPEC: NORMAL
PATH REPORT.GROSS SPEC: NORMAL
PATH REPORT.RELEVANT HX SPEC: NORMAL
PATH REPORT.RELEVANT HX SPEC: NORMAL
PATH REPORT.TOTAL CANCER: NORMAL
PATH REPORT.TOTAL CANCER: NORMAL
PR INTERVAL: 170 MS
Q ONSET: 213 MS
QRS COUNT: 16 BEATS
QRS DURATION: 84 MS
QT INTERVAL: 372 MS
QTC CALCULATION(BAZETT): 472 MS
QTC FREDERICIA: 436 MS
R AXIS: 33 DEGREES
T AXIS: 115 DEGREES
T OFFSET: 399 MS
VENTRICULAR RATE: 97 BPM

## 2025-01-29 LAB
ELECTRONICALLY SIGNED BY: NORMAL
LAB AP ASR DISCLAIMER: NORMAL
LABORATORY COMMENT REPORT: NORMAL
MLH1 METHYLATION RESULT: NORMAL
PATH REPORT.ADDENDUM SPEC: NORMAL
PATH REPORT.ADDENDUM SPEC: NORMAL
PATH REPORT.COMMENTS IMP SPEC: NORMAL
PATH REPORT.FINAL DX SPEC: NORMAL
PATH REPORT.GROSS SPEC: NORMAL
PATH REPORT.RELEVANT HX SPEC: NORMAL
PATH REPORT.TOTAL CANCER: NORMAL

## 2025-02-13 ENCOUNTER — TELEPHONE (OUTPATIENT)
Dept: HEMATOLOGY/ONCOLOGY | Facility: CLINIC | Age: 89
End: 2025-02-13
Payer: MEDICARE

## 2025-02-13 NOTE — TELEPHONE ENCOUNTER
Received message from Dr. Shah to reach out to the patient to inquire if she desires to follow up with our clinic. Patient's number on file is inactive, RN left a voicemail on daughter Shania Desai's phone line with a request to call back advising if patient needs to schedule a visit.

## 2025-04-02 ENCOUNTER — APPOINTMENT (OUTPATIENT)
Dept: CARDIOLOGY | Facility: HOSPITAL | Age: 89
DRG: 699 | End: 2025-04-02
Payer: MEDICARE

## 2025-04-02 ENCOUNTER — APPOINTMENT (OUTPATIENT)
Dept: RADIOLOGY | Facility: HOSPITAL | Age: 89
DRG: 699 | End: 2025-04-02
Payer: MEDICARE

## 2025-04-02 ENCOUNTER — HOSPITAL ENCOUNTER (INPATIENT)
Facility: HOSPITAL | Age: 89
End: 2025-04-02
Attending: EMERGENCY MEDICINE | Admitting: INTERNAL MEDICINE
Payer: MEDICARE

## 2025-04-02 DIAGNOSIS — C79.9 METASTATIC MALIGNANT NEOPLASM, UNSPECIFIED SITE (MULTI): ICD-10-CM

## 2025-04-02 DIAGNOSIS — E87.1 HYPONATREMIA: ICD-10-CM

## 2025-04-02 DIAGNOSIS — N28.0 RENAL INFARCT (MULTI): Primary | ICD-10-CM

## 2025-04-02 LAB
ALBUMIN SERPL BCP-MCNC: 2.9 G/DL (ref 3.4–5)
ALP SERPL-CCNC: 258 U/L (ref 33–136)
ALT SERPL W P-5'-P-CCNC: 16 U/L (ref 7–45)
ANION GAP SERPL CALC-SCNC: 18 MMOL/L (ref 10–20)
AST SERPL W P-5'-P-CCNC: 22 U/L (ref 9–39)
BASOPHILS # BLD AUTO: 0.02 X10*3/UL (ref 0–0.1)
BASOPHILS NFR BLD AUTO: 0.2 %
BILIRUB SERPL-MCNC: 0.7 MG/DL (ref 0–1.2)
BUN SERPL-MCNC: 21 MG/DL (ref 6–23)
CALCIUM SERPL-MCNC: 8.4 MG/DL (ref 8.6–10.3)
CHLORIDE SERPL-SCNC: 96 MMOL/L (ref 98–107)
CO2 SERPL-SCNC: 22 MMOL/L (ref 21–32)
CREAT SERPL-MCNC: 0.89 MG/DL (ref 0.5–1.05)
EGFRCR SERPLBLD CKD-EPI 2021: 62 ML/MIN/1.73M*2
EOSINOPHIL # BLD AUTO: 0 X10*3/UL (ref 0–0.4)
EOSINOPHIL NFR BLD AUTO: 0 %
ERYTHROCYTE [DISTWIDTH] IN BLOOD BY AUTOMATED COUNT: 19.9 % (ref 11.5–14.5)
GLUCOSE BLD MANUAL STRIP-MCNC: 129 MG/DL (ref 74–99)
GLUCOSE SERPL-MCNC: 131 MG/DL (ref 74–99)
HCT VFR BLD AUTO: 38.8 % (ref 36–46)
HGB BLD-MCNC: 11.5 G/DL (ref 12–16)
IMM GRANULOCYTES # BLD AUTO: 0.07 X10*3/UL (ref 0–0.5)
IMM GRANULOCYTES NFR BLD AUTO: 0.6 % (ref 0–0.9)
LACTATE SERPL-SCNC: 1.2 MMOL/L (ref 0.4–2)
LIPASE SERPL-CCNC: 7 U/L (ref 9–82)
LYMPHOCYTES # BLD AUTO: 1.06 X10*3/UL (ref 0.8–3)
LYMPHOCYTES NFR BLD AUTO: 9.5 %
MAGNESIUM SERPL-MCNC: 1.9 MG/DL (ref 1.6–2.4)
MCH RBC QN AUTO: 26.4 PG (ref 26–34)
MCHC RBC AUTO-ENTMCNC: 29.6 G/DL (ref 32–36)
MCV RBC AUTO: 89 FL (ref 80–100)
MONOCYTES # BLD AUTO: 0.62 X10*3/UL (ref 0.05–0.8)
MONOCYTES NFR BLD AUTO: 5.6 %
NEUTROPHILS # BLD AUTO: 9.34 X10*3/UL (ref 1.6–5.5)
NEUTROPHILS NFR BLD AUTO: 84.1 %
NRBC BLD-RTO: 0 /100 WBCS (ref 0–0)
PLATELET # BLD AUTO: 198 X10*3/UL (ref 150–450)
POTASSIUM SERPL-SCNC: 4.6 MMOL/L (ref 3.5–5.3)
PROT SERPL-MCNC: 6.9 G/DL (ref 6.4–8.2)
RBC # BLD AUTO: 4.36 X10*6/UL (ref 4–5.2)
SODIUM SERPL-SCNC: 131 MMOL/L (ref 136–145)
WBC # BLD AUTO: 11.1 X10*3/UL (ref 4.4–11.3)

## 2025-04-02 PROCEDURE — 85025 COMPLETE CBC W/AUTO DIFF WBC: CPT | Performed by: EMERGENCY MEDICINE

## 2025-04-02 PROCEDURE — 36415 COLL VENOUS BLD VENIPUNCTURE: CPT | Performed by: EMERGENCY MEDICINE

## 2025-04-02 PROCEDURE — 96375 TX/PRO/DX INJ NEW DRUG ADDON: CPT

## 2025-04-02 PROCEDURE — 2500000004 HC RX 250 GENERAL PHARMACY W/ HCPCS (ALT 636 FOR OP/ED): Performed by: EMERGENCY MEDICINE

## 2025-04-02 PROCEDURE — 82947 ASSAY GLUCOSE BLOOD QUANT: CPT

## 2025-04-02 PROCEDURE — 96374 THER/PROPH/DIAG INJ IV PUSH: CPT

## 2025-04-02 PROCEDURE — 99285 EMERGENCY DEPT VISIT HI MDM: CPT | Mod: 25 | Performed by: EMERGENCY MEDICINE

## 2025-04-02 PROCEDURE — 80053 COMPREHEN METABOLIC PANEL: CPT | Performed by: EMERGENCY MEDICINE

## 2025-04-02 PROCEDURE — 96361 HYDRATE IV INFUSION ADD-ON: CPT

## 2025-04-02 PROCEDURE — 83735 ASSAY OF MAGNESIUM: CPT | Performed by: EMERGENCY MEDICINE

## 2025-04-02 PROCEDURE — 83690 ASSAY OF LIPASE: CPT | Performed by: EMERGENCY MEDICINE

## 2025-04-02 PROCEDURE — 83605 ASSAY OF LACTIC ACID: CPT | Performed by: EMERGENCY MEDICINE

## 2025-04-02 PROCEDURE — 74177 CT ABD & PELVIS W/CONTRAST: CPT

## 2025-04-02 PROCEDURE — 93005 ELECTROCARDIOGRAM TRACING: CPT

## 2025-04-02 RX ORDER — ONDANSETRON HYDROCHLORIDE 2 MG/ML
4 INJECTION, SOLUTION INTRAVENOUS ONCE
Status: COMPLETED | OUTPATIENT
Start: 2025-04-02 | End: 2025-04-02

## 2025-04-02 RX ORDER — MORPHINE SULFATE 4 MG/ML
4 INJECTION, SOLUTION INTRAMUSCULAR; INTRAVENOUS ONCE
Status: COMPLETED | OUTPATIENT
Start: 2025-04-02 | End: 2025-04-02

## 2025-04-02 RX ADMIN — ONDANSETRON 4 MG: 2 INJECTION INTRAMUSCULAR; INTRAVENOUS at 23:04

## 2025-04-02 RX ADMIN — SODIUM CHLORIDE, SODIUM LACTATE, POTASSIUM CHLORIDE, AND CALCIUM CHLORIDE 1000 ML: .6; .31; .03; .02 INJECTION, SOLUTION INTRAVENOUS at 23:04

## 2025-04-02 RX ADMIN — MORPHINE SULFATE 4 MG: 4 INJECTION, SOLUTION INTRAMUSCULAR; INTRAVENOUS at 23:05

## 2025-04-02 ASSESSMENT — PAIN - FUNCTIONAL ASSESSMENT
PAIN_FUNCTIONAL_ASSESSMENT: 0-10
PAIN_FUNCTIONAL_ASSESSMENT: 0-10

## 2025-04-02 ASSESSMENT — COLUMBIA-SUICIDE SEVERITY RATING SCALE - C-SSRS
2. HAVE YOU ACTUALLY HAD ANY THOUGHTS OF KILLING YOURSELF?: NO
6. HAVE YOU EVER DONE ANYTHING, STARTED TO DO ANYTHING, OR PREPARED TO DO ANYTHING TO END YOUR LIFE?: NO
1. IN THE PAST MONTH, HAVE YOU WISHED YOU WERE DEAD OR WISHED YOU COULD GO TO SLEEP AND NOT WAKE UP?: NO

## 2025-04-02 ASSESSMENT — PAIN DESCRIPTION - PROGRESSION: CLINICAL_PROGRESSION: GRADUALLY IMPROVING

## 2025-04-02 ASSESSMENT — PAIN DESCRIPTION - ORIENTATION: ORIENTATION: MID;LOWER

## 2025-04-02 ASSESSMENT — PAIN DESCRIPTION - LOCATION: LOCATION: ABDOMEN

## 2025-04-02 ASSESSMENT — PAIN SCALES - GENERAL
PAINLEVEL_OUTOF10: 6
PAINLEVEL_OUTOF10: 8

## 2025-04-02 ASSESSMENT — PAIN DESCRIPTION - PAIN TYPE
TYPE: ACUTE PAIN
TYPE: ACUTE PAIN

## 2025-04-02 ASSESSMENT — PAIN DESCRIPTION - DESCRIPTORS
DESCRIPTORS: PRESSURE
DESCRIPTORS: PRESSURE

## 2025-04-02 ASSESSMENT — PAIN DESCRIPTION - FREQUENCY: FREQUENCY: CONSTANT/CONTINUOUS

## 2025-04-03 PROBLEM — E87.1 HYPONATREMIA: Status: ACTIVE | Noted: 2025-04-03

## 2025-04-03 PROBLEM — N28.0 RENAL INFARCT (MULTI): Status: ACTIVE | Noted: 2025-04-03

## 2025-04-03 LAB
ANION GAP SERPL CALC-SCNC: 15 MMOL/L (ref 10–20)
BUN SERPL-MCNC: 19 MG/DL (ref 6–23)
CALCIUM SERPL-MCNC: 8 MG/DL (ref 8.6–10.3)
CHLORIDE SERPL-SCNC: 98 MMOL/L (ref 98–107)
CO2 SERPL-SCNC: 21 MMOL/L (ref 21–32)
CREAT SERPL-MCNC: 0.85 MG/DL (ref 0.5–1.05)
EGFRCR SERPLBLD CKD-EPI 2021: 66 ML/MIN/1.73M*2
ERYTHROCYTE [DISTWIDTH] IN BLOOD BY AUTOMATED COUNT: 19.9 % (ref 11.5–14.5)
GLUCOSE BLD MANUAL STRIP-MCNC: 119 MG/DL (ref 74–99)
GLUCOSE BLD MANUAL STRIP-MCNC: 123 MG/DL (ref 74–99)
GLUCOSE BLD MANUAL STRIP-MCNC: 154 MG/DL (ref 74–99)
GLUCOSE BLD MANUAL STRIP-MCNC: 163 MG/DL (ref 74–99)
GLUCOSE SERPL-MCNC: 129 MG/DL (ref 74–99)
HCT VFR BLD AUTO: 36.7 % (ref 36–46)
HGB BLD-MCNC: 10.9 G/DL (ref 12–16)
INR PPP: 1.6 (ref 0.9–1.1)
MCH RBC QN AUTO: 26.7 PG (ref 26–34)
MCHC RBC AUTO-ENTMCNC: 29.7 G/DL (ref 32–36)
MCV RBC AUTO: 90 FL (ref 80–100)
NRBC BLD-RTO: 0 /100 WBCS (ref 0–0)
PLATELET # BLD AUTO: 181 X10*3/UL (ref 150–450)
POTASSIUM SERPL-SCNC: 4.2 MMOL/L (ref 3.5–5.3)
PROTHROMBIN TIME: 17.4 SECONDS (ref 9.8–12.4)
RBC # BLD AUTO: 4.08 X10*6/UL (ref 4–5.2)
SODIUM SERPL-SCNC: 130 MMOL/L (ref 136–145)
UFH PPP CHRO-ACNC: 0.4 IU/ML
UFH PPP CHRO-ACNC: 0.4 IU/ML
UFH PPP CHRO-ACNC: 1.1 IU/ML
WBC # BLD AUTO: 11.5 X10*3/UL (ref 4.4–11.3)

## 2025-04-03 PROCEDURE — 36415 COLL VENOUS BLD VENIPUNCTURE: CPT | Performed by: NURSE PRACTITIONER

## 2025-04-03 PROCEDURE — 85520 HEPARIN ASSAY: CPT | Performed by: INTERNAL MEDICINE

## 2025-04-03 PROCEDURE — 82947 ASSAY GLUCOSE BLOOD QUANT: CPT

## 2025-04-03 PROCEDURE — 85027 COMPLETE CBC AUTOMATED: CPT | Performed by: NURSE PRACTITIONER

## 2025-04-03 PROCEDURE — 1100000001 HC PRIVATE ROOM DAILY

## 2025-04-03 PROCEDURE — 85520 HEPARIN ASSAY: CPT | Performed by: NURSE PRACTITIONER

## 2025-04-03 PROCEDURE — 99222 1ST HOSP IP/OBS MODERATE 55: CPT | Performed by: NURSE PRACTITIONER

## 2025-04-03 PROCEDURE — 85610 PROTHROMBIN TIME: CPT | Performed by: NURSE PRACTITIONER

## 2025-04-03 PROCEDURE — 82570 ASSAY OF URINE CREATININE: CPT | Performed by: INTERNAL MEDICINE

## 2025-04-03 PROCEDURE — 83935 ASSAY OF URINE OSMOLALITY: CPT | Mod: PARLAB | Performed by: INTERNAL MEDICINE

## 2025-04-03 PROCEDURE — 84540 ASSAY OF URINE/UREA-N: CPT | Performed by: INTERNAL MEDICINE

## 2025-04-03 PROCEDURE — 82436 ASSAY OF URINE CHLORIDE: CPT | Performed by: INTERNAL MEDICINE

## 2025-04-03 PROCEDURE — 82043 UR ALBUMIN QUANTITATIVE: CPT | Performed by: INTERNAL MEDICINE

## 2025-04-03 PROCEDURE — 2500000004 HC RX 250 GENERAL PHARMACY W/ HCPCS (ALT 636 FOR OP/ED): Performed by: NURSE PRACTITIONER

## 2025-04-03 PROCEDURE — 74177 CT ABD & PELVIS W/CONTRAST: CPT | Performed by: SURGERY

## 2025-04-03 PROCEDURE — 96376 TX/PRO/DX INJ SAME DRUG ADON: CPT

## 2025-04-03 PROCEDURE — 2500000004 HC RX 250 GENERAL PHARMACY W/ HCPCS (ALT 636 FOR OP/ED): Performed by: INTERNAL MEDICINE

## 2025-04-03 PROCEDURE — 2550000001 HC RX 255 CONTRASTS: Performed by: EMERGENCY MEDICINE

## 2025-04-03 PROCEDURE — 84300 ASSAY OF URINE SODIUM: CPT | Performed by: INTERNAL MEDICINE

## 2025-04-03 PROCEDURE — 2500000002 HC RX 250 W HCPCS SELF ADMINISTERED DRUGS (ALT 637 FOR MEDICARE OP, ALT 636 FOR OP/ED): Performed by: NURSE PRACTITIONER

## 2025-04-03 PROCEDURE — 99233 SBSQ HOSP IP/OBS HIGH 50: CPT | Performed by: NURSE PRACTITIONER

## 2025-04-03 PROCEDURE — 2500000004 HC RX 250 GENERAL PHARMACY W/ HCPCS (ALT 636 FOR OP/ED): Performed by: EMERGENCY MEDICINE

## 2025-04-03 PROCEDURE — 80048 BASIC METABOLIC PNL TOTAL CA: CPT | Performed by: NURSE PRACTITIONER

## 2025-04-03 RX ORDER — AMLODIPINE BESYLATE 5 MG/1
2.5 TABLET ORAL DAILY
Status: DISCONTINUED | OUTPATIENT
Start: 2025-04-03 | End: 2025-04-04

## 2025-04-03 RX ORDER — CETIRIZINE HYDROCHLORIDE 10 MG/1
10 TABLET, CHEWABLE ORAL DAILY PRN
Status: ON HOLD | COMMUNITY

## 2025-04-03 RX ORDER — LIDOCAINE 50 MG/G
1 PATCH TOPICAL DAILY
Status: ON HOLD | COMMUNITY

## 2025-04-03 RX ORDER — AMOXICILLIN 250 MG
1 CAPSULE ORAL NIGHTLY
Status: DISPENSED | OUTPATIENT
Start: 2025-04-03

## 2025-04-03 RX ORDER — DEXTROSE 50 % IN WATER (D50W) INTRAVENOUS SYRINGE
12.5
Status: ACTIVE | OUTPATIENT
Start: 2025-04-03

## 2025-04-03 RX ORDER — ESTRADIOL 1 MG/1
1 TABLET ORAL DAILY
Status: DISCONTINUED | OUTPATIENT
Start: 2025-04-03 | End: 2025-04-04

## 2025-04-03 RX ORDER — ONDANSETRON 4 MG/1
4 TABLET, FILM COATED ORAL EVERY 8 HOURS PRN
Status: ON HOLD | COMMUNITY

## 2025-04-03 RX ORDER — DEXTROSE, SODIUM CHLORIDE, SODIUM LACTATE, POTASSIUM CHLORIDE, AND CALCIUM CHLORIDE 5; .6; .31; .03; .02 G/100ML; G/100ML; G/100ML; G/100ML; G/100ML
50 INJECTION, SOLUTION INTRAVENOUS CONTINUOUS
Status: ACTIVE | OUTPATIENT
Start: 2025-04-03 | End: 2025-04-05

## 2025-04-03 RX ORDER — DOCUSATE SODIUM 100 MG/1
100 CAPSULE, LIQUID FILLED ORAL 2 TIMES DAILY
Status: ON HOLD | COMMUNITY

## 2025-04-03 RX ORDER — LISINOPRIL 10 MG/1
10 TABLET ORAL DAILY
Status: DISCONTINUED | OUTPATIENT
Start: 2025-04-03 | End: 2025-04-03

## 2025-04-03 RX ORDER — CYCLOBENZAPRINE HCL 10 MG
5 TABLET ORAL 3 TIMES DAILY PRN
Status: DISPENSED | OUTPATIENT
Start: 2025-04-03

## 2025-04-03 RX ORDER — SODIUM CHLORIDE 9 MG/ML
50 INJECTION, SOLUTION INTRAVENOUS CONTINUOUS
Status: DISCONTINUED | OUTPATIENT
Start: 2025-04-03 | End: 2025-04-03

## 2025-04-03 RX ORDER — MIRTAZAPINE 7.5 MG/1
7.5 TABLET, FILM COATED ORAL NIGHTLY
Status: ON HOLD | COMMUNITY

## 2025-04-03 RX ORDER — HYDROMORPHONE HYDROCHLORIDE 1 MG/ML
1 INJECTION, SOLUTION INTRAMUSCULAR; INTRAVENOUS; SUBCUTANEOUS EVERY 4 HOURS PRN
Status: DISCONTINUED | OUTPATIENT
Start: 2025-04-03 | End: 2025-04-05

## 2025-04-03 RX ORDER — MELOXICAM 7.5 MG/1
7.5 TABLET ORAL DAILY
Status: ON HOLD | COMMUNITY

## 2025-04-03 RX ORDER — SODIUM CHLORIDE 1000 MG
1 TABLET, SOLUBLE MISCELLANEOUS 2 TIMES DAILY
Status: ON HOLD | COMMUNITY

## 2025-04-03 RX ORDER — INSULIN LISPRO 100 [IU]/ML
0-5 INJECTION, SOLUTION INTRAVENOUS; SUBCUTANEOUS EVERY 6 HOURS
Status: DISPENSED | OUTPATIENT
Start: 2025-04-03

## 2025-04-03 RX ORDER — MORPHINE SULFATE 4 MG/ML
4 INJECTION, SOLUTION INTRAMUSCULAR; INTRAVENOUS ONCE
Status: COMPLETED | OUTPATIENT
Start: 2025-04-03 | End: 2025-04-03

## 2025-04-03 RX ORDER — INSULIN GLARGINE 100 [IU]/ML
7 INJECTION, SOLUTION SUBCUTANEOUS NIGHTLY
Status: ON HOLD | COMMUNITY

## 2025-04-03 RX ORDER — METOCLOPRAMIDE 5 MG/1
5 TABLET ORAL 3 TIMES DAILY
Status: ON HOLD | COMMUNITY

## 2025-04-03 RX ORDER — HYDRALAZINE HYDROCHLORIDE 20 MG/ML
10 INJECTION INTRAMUSCULAR; INTRAVENOUS EVERY 6 HOURS PRN
Status: ACTIVE | OUTPATIENT
Start: 2025-04-03

## 2025-04-03 RX ORDER — FERROUS SULFATE 325(65) MG
325 TABLET, DELAYED RELEASE (ENTERIC COATED) ORAL DAILY
Status: ON HOLD | COMMUNITY

## 2025-04-03 RX ORDER — TAMSULOSIN HYDROCHLORIDE 0.4 MG/1
0.4 CAPSULE ORAL DAILY
Status: DISPENSED | OUTPATIENT
Start: 2025-04-03

## 2025-04-03 RX ORDER — DEXTROSE 50 % IN WATER (D50W) INTRAVENOUS SYRINGE
25
Status: ACTIVE | OUTPATIENT
Start: 2025-04-03

## 2025-04-03 RX ORDER — POLYETHYLENE GLYCOL 3350 17 G/17G
17 POWDER, FOR SOLUTION ORAL DAILY
Status: DISPENSED | OUTPATIENT
Start: 2025-04-03

## 2025-04-03 RX ORDER — HEPARIN SODIUM 10000 [USP'U]/100ML
0-4500 INJECTION, SOLUTION INTRAVENOUS CONTINUOUS
Status: DISCONTINUED | OUTPATIENT
Start: 2025-04-03 | End: 2025-04-04

## 2025-04-03 RX ADMIN — HYDROMORPHONE HYDROCHLORIDE 1 MG: 1 INJECTION, SOLUTION INTRAMUSCULAR; INTRAVENOUS; SUBCUTANEOUS at 05:04

## 2025-04-03 RX ADMIN — DEXTROSE, SODIUM CHLORIDE, SODIUM LACTATE, POTASSIUM CHLORIDE, AND CALCIUM CHLORIDE 50 ML/HR: 5; .6; .31; .03; .02 INJECTION, SOLUTION INTRAVENOUS at 13:25

## 2025-04-03 RX ADMIN — INSULIN LISPRO 1 UNITS: 100 INJECTION, SOLUTION INTRAVENOUS; SUBCUTANEOUS at 17:45

## 2025-04-03 RX ADMIN — HYDROMORPHONE HYDROCHLORIDE 1 MG: 1 INJECTION, SOLUTION INTRAMUSCULAR; INTRAVENOUS; SUBCUTANEOUS at 10:24

## 2025-04-03 RX ADMIN — MORPHINE SULFATE 4 MG: 4 INJECTION, SOLUTION INTRAMUSCULAR; INTRAVENOUS at 01:08

## 2025-04-03 RX ADMIN — HEPARIN SODIUM 13 UNITS/HR: 10000 INJECTION, SOLUTION INTRAVENOUS at 21:52

## 2025-04-03 RX ADMIN — HEPARIN SODIUM 1500 UNITS/HR: 10000 INJECTION, SOLUTION INTRAVENOUS at 04:23

## 2025-04-03 RX ADMIN — IOHEXOL 80 ML: 350 INJECTION, SOLUTION INTRAVENOUS at 00:31

## 2025-04-03 RX ADMIN — SODIUM CHLORIDE 50 ML/HR: 0.9 INJECTION, SOLUTION INTRAVENOUS at 02:20

## 2025-04-03 RX ADMIN — HEPARIN SODIUM 1300 UNITS/HR: 10000 INJECTION, SOLUTION INTRAVENOUS at 11:21

## 2025-04-03 SDOH — SOCIAL STABILITY: SOCIAL INSECURITY: WITHIN THE LAST YEAR, HAVE YOU BEEN AFRAID OF YOUR PARTNER OR EX-PARTNER?: NO

## 2025-04-03 SDOH — SOCIAL STABILITY: SOCIAL INSECURITY: ABUSE: ADULT

## 2025-04-03 SDOH — SOCIAL STABILITY: SOCIAL INSECURITY: DO YOU FEEL ANYONE HAS EXPLOITED OR TAKEN ADVANTAGE OF YOU FINANCIALLY OR OF YOUR PERSONAL PROPERTY?: NO

## 2025-04-03 SDOH — SOCIAL STABILITY: SOCIAL INSECURITY: ARE THERE ANY APPARENT SIGNS OF INJURIES/BEHAVIORS THAT COULD BE RELATED TO ABUSE/NEGLECT?: NO

## 2025-04-03 SDOH — ECONOMIC STABILITY: FOOD INSECURITY: WITHIN THE PAST 12 MONTHS, THE FOOD YOU BOUGHT JUST DIDN'T LAST AND YOU DIDN'T HAVE MONEY TO GET MORE.: NEVER TRUE

## 2025-04-03 SDOH — SOCIAL STABILITY: SOCIAL INSECURITY: HAVE YOU HAD ANY THOUGHTS OF HARMING ANYONE ELSE?: NO

## 2025-04-03 SDOH — SOCIAL STABILITY: SOCIAL INSECURITY: ARE YOU OR HAVE YOU BEEN THREATENED OR ABUSED PHYSICALLY, EMOTIONALLY, OR SEXUALLY BY ANYONE?: NO

## 2025-04-03 SDOH — ECONOMIC STABILITY: FOOD INSECURITY: WITHIN THE PAST 12 MONTHS, YOU WORRIED THAT YOUR FOOD WOULD RUN OUT BEFORE YOU GOT THE MONEY TO BUY MORE.: NEVER TRUE

## 2025-04-03 SDOH — ECONOMIC STABILITY: INCOME INSECURITY: IN THE PAST 12 MONTHS HAS THE ELECTRIC, GAS, OIL, OR WATER COMPANY THREATENED TO SHUT OFF SERVICES IN YOUR HOME?: NO

## 2025-04-03 SDOH — SOCIAL STABILITY: SOCIAL INSECURITY: WITHIN THE LAST YEAR, HAVE YOU BEEN HUMILIATED OR EMOTIONALLY ABUSED IN OTHER WAYS BY YOUR PARTNER OR EX-PARTNER?: NO

## 2025-04-03 SDOH — SOCIAL STABILITY: SOCIAL INSECURITY: HAS ANYONE EVER THREATENED TO HURT YOUR FAMILY OR YOUR PETS?: NO

## 2025-04-03 SDOH — SOCIAL STABILITY: SOCIAL INSECURITY: DOES ANYONE TRY TO KEEP YOU FROM HAVING/CONTACTING OTHER FRIENDS OR DOING THINGS OUTSIDE YOUR HOME?: NO

## 2025-04-03 SDOH — SOCIAL STABILITY: SOCIAL INSECURITY: HAVE YOU HAD THOUGHTS OF HARMING ANYONE ELSE?: NO

## 2025-04-03 SDOH — SOCIAL STABILITY: SOCIAL INSECURITY: WERE YOU ABLE TO COMPLETE ALL THE BEHAVIORAL HEALTH SCREENINGS?: YES

## 2025-04-03 SDOH — SOCIAL STABILITY: SOCIAL INSECURITY: DO YOU FEEL UNSAFE GOING BACK TO THE PLACE WHERE YOU ARE LIVING?: NO

## 2025-04-03 ASSESSMENT — ACTIVITIES OF DAILY LIVING (ADL)
FEEDING YOURSELF: INDEPENDENT
WALKS IN HOME: DEPENDENT
HEARING - LEFT EAR: FUNCTIONAL
GROOMING: INDEPENDENT
TOILETING: NEEDS ASSISTANCE
BATHING: NEEDS ASSISTANCE
ASSISTIVE_DEVICE: WHEELCHAIR
DRESSING YOURSELF: NEEDS ASSISTANCE
HEARING - RIGHT EAR: FUNCTIONAL
LACK_OF_TRANSPORTATION: NO
ADEQUATE_TO_COMPLETE_ADL: YES
PATIENT'S MEMORY ADEQUATE TO SAFELY COMPLETE DAILY ACTIVITIES?: YES
JUDGMENT_ADEQUATE_SAFELY_COMPLETE_DAILY_ACTIVITIES: YES

## 2025-04-03 ASSESSMENT — COGNITIVE AND FUNCTIONAL STATUS - GENERAL
CLIMB 3 TO 5 STEPS WITH RAILING: TOTAL
DRESSING REGULAR LOWER BODY CLOTHING: A LITTLE
TURNING FROM BACK TO SIDE WHILE IN FLAT BAD: A LITTLE
MOBILITY SCORE: 15
MOVING TO AND FROM BED TO CHAIR: A LITTLE
DAILY ACTIVITIY SCORE: 22
STANDING UP FROM CHAIR USING ARMS: A LITTLE
PATIENT BASELINE BEDBOUND: NO
WALKING IN HOSPITAL ROOM: A LOT
MOVING FROM LYING ON BACK TO SITTING ON SIDE OF FLAT BED WITH BEDRAILS: A LITTLE
TOILETING: A LITTLE

## 2025-04-03 ASSESSMENT — COLUMBIA-SUICIDE SEVERITY RATING SCALE - C-SSRS
6. HAVE YOU EVER DONE ANYTHING, STARTED TO DO ANYTHING, OR PREPARED TO DO ANYTHING TO END YOUR LIFE?: NO
2. HAVE YOU ACTUALLY HAD ANY THOUGHTS OF KILLING YOURSELF?: NO
1. IN THE PAST MONTH, HAVE YOU WISHED YOU WERE DEAD OR WISHED YOU COULD GO TO SLEEP AND NOT WAKE UP?: NO

## 2025-04-03 ASSESSMENT — PAIN DESCRIPTION - ORIENTATION
ORIENTATION: MID;LOWER
ORIENTATION: MID;LOWER

## 2025-04-03 ASSESSMENT — PAIN SCALES - GENERAL
PAINLEVEL_OUTOF10: 7
PAINLEVEL_OUTOF10: 5 - MODERATE PAIN
PAINLEVEL_OUTOF10: 0 - NO PAIN
PAINLEVEL_OUTOF10: 9
PAINLEVEL_OUTOF10: 9

## 2025-04-03 ASSESSMENT — PATIENT HEALTH QUESTIONNAIRE - PHQ9
SUM OF ALL RESPONSES TO PHQ9 QUESTIONS 1 & 2: 0
2. FEELING DOWN, DEPRESSED OR HOPELESS: NOT AT ALL
1. LITTLE INTEREST OR PLEASURE IN DOING THINGS: NOT AT ALL

## 2025-04-03 ASSESSMENT — LIFESTYLE VARIABLES
HOW OFTEN DO YOU HAVE A DRINK CONTAINING ALCOHOL: NEVER
PRESCIPTION_ABUSE_PAST_12_MONTHS: NO
SUBSTANCE_ABUSE_PAST_12_MONTHS: NO
AUDIT-C TOTAL SCORE: 0
HOW OFTEN DO YOU HAVE 6 OR MORE DRINKS ON ONE OCCASION: NEVER
SKIP TO QUESTIONS 9-10: 1
HOW MANY STANDARD DRINKS CONTAINING ALCOHOL DO YOU HAVE ON A TYPICAL DAY: PATIENT DOES NOT DRINK
AUDIT-C TOTAL SCORE: 0

## 2025-04-03 ASSESSMENT — PAIN - FUNCTIONAL ASSESSMENT
PAIN_FUNCTIONAL_ASSESSMENT: 0-10
PAIN_FUNCTIONAL_ASSESSMENT: 0-10

## 2025-04-03 ASSESSMENT — PAIN DESCRIPTION - LOCATION
LOCATION: ABDOMEN
LOCATION: ABDOMEN

## 2025-04-03 NOTE — CONSULTS
Reason For Consult  Renal Infarct / Hypertension    History Of Present Illness  Melissa Tse is a 89 y.o. female presenting with .  Abdominal pain, nausea and vomiting.  Patient came to [facility], CT scan of the abdomen pelvis was performed which disclosed large area of a hypodense lesion on nearly 10.1 cm consistent with hepatic metastatic disease.  She also had cholelithiasis  Atrophic pancreas  Consistent with chronic pancreatitis  And  new and extensive hypo enhanced areas in the left renal parenchyma consistent with extensive cortical infarcts.  She also have critical and severe narrowing and occlusion of both left and right renal arteries consistent with renal artery stenosis,  Worsening retroperitoneal lymphadenopathy.,  Patient had a CT scan of the chest in January with disclosed cholelithiasis  Right renal cysts  And non obstructing left intrarenal calculus.  She also have a large apple core lesion involving the mid transverse colon in size was 13 point centimeter consistent with malignancy.  They suggest aneurysm as dilatation of the colon consistent with lymphoma.  Metastatic disease to the liver with a 1.1 cm mass in the right lobe was mention as well.  Patient has history of colon cancer stage IV  As well as right breast cancer.  Patient electrolytes also disclosed hyponatremia 130.  Blood pressure stable.      Patient was started on a heparin drip.  Her blood pressure medications and vitals review  ACE inhibitors to be discontinued because of bilateral renal artery stenosis  And hematology oncology consult is pending  Past Medical History  She has no past medical history on file.    Surgical History  She has a past surgical history that includes Other surgical history (02/25/2022).     Social History  She reports that she has never smoked. She has never been exposed to tobacco smoke. She has never used smokeless tobacco. She reports that she does not drink alcohol and does not use drugs.    Family  "History  No family history on file.     Allergies  Penicillin, Penicillins, and Statins-hmg-coa reductase inhibitors    Review of Systems  Review of system performed and negative except for as mentioned in the HPI     Physical Exam  General appearance; chronically ill looking  Skin pallor   HEENT; pupils react to light and accommodation  Neck; no JVD no bruit no thyromegaly  Lungs; inspiratory rhonchi  Heart; regular rate no gallop no rubs  Pulses are equal  Abdomen; active peristalsis no rebound or guarding is mild epigastric tenderness  ; mild left CVA tenderness  Extremities; decreased muscle tone poor skin turgor  Neurological; pathological reflexes are absent         I&O 24 HR    Intake/Output Summary (Last 24 hours) at 4/3/2025 1206  Last data filed at 4/3/2025 1000  Gross per 24 hour   Intake 551.83 ml   Output --   Net 551.83 ml       Vitals 24 HR  Heart Rate:  []   Temp:  [36.5 °C (97.7 °F)-36.7 °C (98.1 °F)]   Resp:  [16-20]   BP: (125-175)/(60-74)   Height:  [162.6 cm (5' 4\")]   Weight:  [81.6 kg (180 lb)]   SpO2:  [95 %-100 %]         Relevant Results  Results for orders placed or performed during the hospital encounter of 04/02/25 (from the past 24 hours)   CBC and Auto Differential   Result Value Ref Range    WBC 11.1 4.4 - 11.3 x10*3/uL    nRBC 0.0 0.0 - 0.0 /100 WBCs    RBC 4.36 4.00 - 5.20 x10*6/uL    Hemoglobin 11.5 (L) 12.0 - 16.0 g/dL    Hematocrit 38.8 36.0 - 46.0 %    MCV 89 80 - 100 fL    MCH 26.4 26.0 - 34.0 pg    MCHC 29.6 (L) 32.0 - 36.0 g/dL    RDW 19.9 (H) 11.5 - 14.5 %    Platelets 198 150 - 450 x10*3/uL    Neutrophils % 84.1 40.0 - 80.0 %    Immature Granulocytes %, Automated 0.6 0.0 - 0.9 %    Lymphocytes % 9.5 13.0 - 44.0 %    Monocytes % 5.6 2.0 - 10.0 %    Eosinophils % 0.0 0.0 - 6.0 %    Basophils % 0.2 0.0 - 2.0 %    Neutrophils Absolute 9.34 (H) 1.60 - 5.50 x10*3/uL    Immature Granulocytes Absolute, Automated 0.07 0.00 - 0.50 x10*3/uL    Lymphocytes Absolute 1.06 0.80 - " 3.00 x10*3/uL    Monocytes Absolute 0.62 0.05 - 0.80 x10*3/uL    Eosinophils Absolute 0.00 0.00 - 0.40 x10*3/uL    Basophils Absolute 0.02 0.00 - 0.10 x10*3/uL   Comprehensive metabolic panel   Result Value Ref Range    Glucose 131 (H) 74 - 99 mg/dL    Sodium 131 (L) 136 - 145 mmol/L    Potassium 4.6 3.5 - 5.3 mmol/L    Chloride 96 (L) 98 - 107 mmol/L    Bicarbonate 22 21 - 32 mmol/L    Anion Gap 18 10 - 20 mmol/L    Urea Nitrogen 21 6 - 23 mg/dL    Creatinine 0.89 0.50 - 1.05 mg/dL    eGFR 62 >60 mL/min/1.73m*2    Calcium 8.4 (L) 8.6 - 10.3 mg/dL    Albumin 2.9 (L) 3.4 - 5.0 g/dL    Alkaline Phosphatase 258 (H) 33 - 136 U/L    Total Protein 6.9 6.4 - 8.2 g/dL    AST 22 9 - 39 U/L    Bilirubin, Total 0.7 0.0 - 1.2 mg/dL    ALT 16 7 - 45 U/L   Lipase   Result Value Ref Range    Lipase 7 (L) 9 - 82 U/L   Lactate   Result Value Ref Range    Lactate 1.2 0.4 - 2.0 mmol/L   Magnesium   Result Value Ref Range    Magnesium 1.90 1.60 - 2.40 mg/dL   POCT GLUCOSE   Result Value Ref Range    POCT Glucose 129 (H) 74 - 99 mg/dL   POCT GLUCOSE   Result Value Ref Range    POCT Glucose 119 (H) 74 - 99 mg/dL   Protime-INR   Result Value Ref Range    Protime 17.4 (H) 9.8 - 12.4 seconds    INR 1.6 (H) 0.9 - 1.1   CBC   Result Value Ref Range    WBC 11.5 (H) 4.4 - 11.3 x10*3/uL    nRBC 0.0 0.0 - 0.0 /100 WBCs    RBC 4.08 4.00 - 5.20 x10*6/uL    Hemoglobin 10.9 (L) 12.0 - 16.0 g/dL    Hematocrit 36.7 36.0 - 46.0 %    MCV 90 80 - 100 fL    MCH 26.7 26.0 - 34.0 pg    MCHC 29.7 (L) 32.0 - 36.0 g/dL    RDW 19.9 (H) 11.5 - 14.5 %    Platelets 181 150 - 450 x10*3/uL   Basic Metabolic Panel   Result Value Ref Range    Glucose 129 (H) 74 - 99 mg/dL    Sodium 130 (L) 136 - 145 mmol/L    Potassium 4.2 3.5 - 5.3 mmol/L    Chloride 98 98 - 107 mmol/L    Bicarbonate 21 21 - 32 mmol/L    Anion Gap 15 10 - 20 mmol/L    Urea Nitrogen 19 6 - 23 mg/dL    Creatinine 0.85 0.50 - 1.05 mg/dL    eGFR 66 >60 mL/min/1.73m*2    Calcium 8.0 (L) 8.6 - 10.3 mg/dL    Heparin Assay, UFH   Result Value Ref Range    Heparin Unfractionated 1.1 (HH) See Comment Below for Therapeutic Ranges IU/mL   POCT GLUCOSE   Result Value Ref Range    POCT Glucose 123 (H) 74 - 99 mg/dL    CT abdomen pelvis w IV contrast    Result Date: 4/3/2025  Interpreted By:  Faraz Link, STUDY: CT ABDOMEN PELVIS W IV CONTRAST; ;  4/3/2025 12:30 am   INDICATION: Signs/Symptoms:stage IV colon cancer, now with R abd pain.     COMPARISON: 01/17/2025.   ACCESSION NUMBER(S): [id number]   ORDERING CLINICIAN: ELYSE KLERMAN   TECHNIQUE: Axial CT images of the abdomen and pelvis with coronal and sagittal reconstructed images performed after intravenous administration of 80 cc Omnipaque 350.   FINDINGS: LOWER CHEST: No acute abnormality of the lung bases. BONES: No acute osseous abnormality. Osteopenia. Right hip arthroplasty in standard alignment. Similar appearance of mild-to-moderate chronic compression of the T11 vertebral body. Stable grade 1 degenerative anterolisthesis at L4-L5 and moderate to severe multilevel degenerative changes of the imaged spine. ABDOMINAL WALL: Within normal limits.   ABDOMEN:   LIVER: There is a large ill-defined region of hypoenhancement in the right hepatic lobe measuring up to 10.8 cm in transaxial diameter, up to at least 10.1 cm in CC diameter, suspicious for interval progression of the hepatic metastasis. There is a new hypoattenuating lesion inferolaterally in the inferior tip of the right hepatic lobe (series 601, images 57-63), measuring up to approximately 2.2 cm in transaxial diameter, suspicious for new metastatic lesion. Liver is enlarged, similar to before. BILE DUCTS: Normal caliber. GALLBLADDER: Cholelithiasis. No wall thickening or pericholecystic fluid. PANCREAS:Atrophic with pancreatic ductal dilation and irregularity and parenchymal calcifications in keeping with sequelae of chronic pancreatitis. SPLEEN: Within normal limits. ADRENALS: Within normal limits.  KIDNEYS and URETERS: New extensive hypoenhancement/non enhancement of the left renal parenchyma compatible with extensive renal cortical infarcts. Right kidney and ureter appear normal.     VESSELS: Severe atherosclerosis, with critical narrowing or occlusion of the left renal artery. Severe narrowing of the right renal artery also noted. RETROPERITONEUM: Interval enlargement of celiac axis and portocaval nodes, suspicious for worsened metastatic disease.   PELVIS:   REPRODUCTIVE ORGANS: Endometrial fluid suggests cervical stenosis and is new from the prior exam. BLADDER: Within normal limits.   BOWEL: When allowing for differences in patient positioning in slice selection, there is no substantial change in the very large mass encompassing the mid-transverse colon and several adjacent loops of small bowel and adjacent omentum and mesocolon, compatible with patient's known advanced malignancy. No dilated bowel to suggest obstruction. Stomach appears grossly normal. No definite additional abnormal bowel wall thickening. Normal appendix. PERITONEUM: No ascites or free air, no fluid collection.       New extensive hypoenhancement/non enhancement of the left renal parenchyma compatible with extensive renal cortical infarcts. Right kidney and ureter appear normal.   There is a large ill-defined region of hypoenhancement in the right hepatic lobe measuring up to 10.8 cm in transaxial diameter, up to at least 10.1 cm in CC diameter, suspicious for interval progression of the hepatic metastasis. There is a new hypoattenuating lesion inferolaterally in the inferior tip of the right hepatic lobe (series 601, images 57-63), measuring up to approximately 2.2 cm in transaxial diameter, suspicious for new metastatic lesion. Liver is enlarged, similar to before.   Interval worsening of retroperitoneal metastatic lymphadenopathy.   Cholelithiasis without convincing evidence of acute cholecystitis.   New fluid in the endometrial  canal suggesting cervical stenosis.   Additional findings as discussed above.   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      Garry Pacheco MD

## 2025-04-03 NOTE — ED NOTES
Patient transported upstairs.  Vitals stable at time of transport.       Shaheen Reynolds RN  04/03/25 0956

## 2025-04-03 NOTE — H&P
History Of Present Illness  Melissa Tse is a 89 y.o. female with a past medical history significant for breast cancer and stage IV colon cancer presenting to emergency department from [facility] for evaluation of abdominal pain.  Patient also reports an episode of vomiting this morning and states she has been unable to keep anything down today and complains of  a pressure in the mid to lower abdomen that radiates to her right flank.  Patient reports that her doctor will only give her Tylenol for pain and that is not working.  Patient states that her last bowel movement was 4/1/2025 without difficulty.  Patient denies chest pain or dizziness.  Patient denies recent illness, fever, or chills.  Patient denies shortness of breath.    In ED, lipase 7, glucose 131, sodium 131, chloride 96, calcium 8.4, albumin 2.9, alk phos 258, hemoglobin 11.5.  EKG completed showing normal sinus rhythm at a rate of 88 without ST elevation or depression with a prolonged QTc of 507 per ED physician review.  A CT of the abdomen and pelvis was completed showing hypo-enhancement of the left renal parenchyma compatible with extensive renal cortical infarcts as well as fluid in the endometrial canal concerning for cervical stenosis and worsening of a retroperitoneal metastatic lymphadenopathy as well as progression of hepatic  metastasis per radiology review.  Patient medicated with Zofran, morphine, LR x 1 L bolus in ED.  Blood pressure 149/62, heart rate 92, respirations 20, temperature 36.5 °C, SpO2 95% on room air.  Consult placed to vascular surgery and nephrology.  Patient started on a heparin drip.  Inpatient admission under the care of Dr. Lugo who will continue to follow.  I was asked to do H&P and place initial admission orders.    Prior medical records reviewed.     Past Medical History  Breast cancer, stage IV colon cancer, T2DM, anemia, hypertension, hyperlipidemia  Surgical History  Colonoscopy, tubal  "ligation, hip replacement, cataract surgery     Social History  Never smoker, no drug use, no alcohol use  Family History  Cataract, diabetes     Allergies  Penicillin, Penicillins, and Statins-hmg-coa reductase inhibitors    Review of Systems  A 10 point review of systems was completed and is negative except what is listed in HPI  Physical Exam  Constitutional:       Appearance: She is ill-appearing.   HENT:      Mouth/Throat:      Mouth: Mucous membranes are dry.      Pharynx: Oropharynx is clear.   Eyes:      Pupils: Pupils are equal, round, and reactive to light.   Cardiovascular:      Rate and Rhythm: Normal rate and regular rhythm.   Pulmonary:      Effort: Pulmonary effort is normal.      Breath sounds: Normal breath sounds.   Abdominal:      Palpations: Abdomen is soft.      Tenderness: There is abdominal tenderness in the periumbilical area and suprapubic area.      Comments: Right flank tenderness   Musculoskeletal:      Cervical back: Normal range of motion.   Skin:     General: Skin is warm and dry.   Neurological:      General: No focal deficit present.      Mental Status: She is alert.   Psychiatric:         Mood and Affect: Mood normal.         Behavior: Behavior normal.          Last Recorded Vitals  Blood pressure 125/63, pulse 83, temperature 36.5 °C (97.7 °F), temperature source Temporal, resp. rate 16, height 1.626 m (5' 4\"), weight 81.6 kg (180 lb), SpO2 97%.    Relevant Results  CT abdomen pelvis w IV contrast    Result Date: 4/3/2025  Interpreted By:  Faraz Link, STUDY: CT ABDOMEN PELVIS W IV CONTRAST; ;  4/3/2025 12:30 am   INDICATION: Signs/Symptoms:stage IV colon cancer, now with R abd pain.     COMPARISON: 01/17/2025.   ACCESSION NUMBER(S): [id number]   ORDERING CLINICIAN: ELYSE KLERMAN   TECHNIQUE: Axial CT images of the abdomen and pelvis with coronal and sagittal reconstructed images performed after intravenous administration of 80 cc Omnipaque 350.   FINDINGS: LOWER CHEST: No " acute abnormality of the lung bases. BONES: No acute osseous abnormality. Osteopenia. Right hip arthroplasty in standard alignment. Similar appearance of mild-to-moderate chronic compression of the T11 vertebral body. Stable grade 1 degenerative anterolisthesis at L4-L5 and moderate to severe multilevel degenerative changes of the imaged spine. ABDOMINAL WALL: Within normal limits.   ABDOMEN:   LIVER: There is a large ill-defined region of hypoenhancement in the right hepatic lobe measuring up to 10.8 cm in transaxial diameter, up to at least 10.1 cm in CC diameter, suspicious for interval progression of the hepatic metastasis. There is a new hypoattenuating lesion inferolaterally in the inferior tip of the right hepatic lobe (series 601, images 57-63), measuring up to approximately 2.2 cm in transaxial diameter, suspicious for new metastatic lesion. Liver is enlarged, similar to before. BILE DUCTS: Normal caliber. GALLBLADDER: Cholelithiasis. No wall thickening or pericholecystic fluid. PANCREAS:Atrophic with pancreatic ductal dilation and irregularity and parenchymal calcifications in keeping with sequelae of chronic pancreatitis. SPLEEN: Within normal limits. ADRENALS: Within normal limits. KIDNEYS and URETERS: New extensive hypoenhancement/non enhancement of the left renal parenchyma compatible with extensive renal cortical infarcts. Right kidney and ureter appear normal.     VESSELS: Severe atherosclerosis, with critical narrowing or occlusion of the left renal artery. Severe narrowing of the right renal artery also noted. RETROPERITONEUM: Interval enlargement of celiac axis and portocaval nodes, suspicious for worsened metastatic disease.   PELVIS:   REPRODUCTIVE ORGANS: Endometrial fluid suggests cervical stenosis and is new from the prior exam. BLADDER: Within normal limits.   BOWEL: When allowing for differences in patient positioning in slice selection, there is no substantial change in the very large mass  encompassing the mid-transverse colon and several adjacent loops of small bowel and adjacent omentum and mesocolon, compatible with patient's known advanced malignancy. No dilated bowel to suggest obstruction. Stomach appears grossly normal. No definite additional abnormal bowel wall thickening. Normal appendix. PERITONEUM: No ascites or free air, no fluid collection.       New extensive hypoenhancement/non enhancement of the left renal parenchyma compatible with extensive renal cortical infarcts. Right kidney and ureter appear normal.   There is a large ill-defined region of hypoenhancement in the right hepatic lobe measuring up to 10.8 cm in transaxial diameter, up to at least 10.1 cm in CC diameter, suspicious for interval progression of the hepatic metastasis. There is a new hypoattenuating lesion inferolaterally in the inferior tip of the right hepatic lobe (series 601, images 57-63), measuring up to approximately 2.2 cm in transaxial diameter, suspicious for new metastatic lesion. Liver is enlarged, similar to before.   Interval worsening of retroperitoneal metastatic lymphadenopathy.   Cholelithiasis without convincing evidence of acute cholecystitis.   New fluid in the endometrial canal suggesting cervical stenosis.   Additional findings as discussed above.   MACRO: None   Signed by: Faraz Link 4/3/2025 12:52 AM Dictation workstation:   [id number]   Results for orders placed or performed during the hospital encounter of 04/02/25 (from the past 24 hours)   CBC and Auto Differential   Result Value Ref Range    WBC 11.1 4.4 - 11.3 x10*3/uL    nRBC 0.0 0.0 - 0.0 /100 WBCs    RBC 4.36 4.00 - 5.20 x10*6/uL    Hemoglobin 11.5 (L) 12.0 - 16.0 g/dL    Hematocrit 38.8 36.0 - 46.0 %    MCV 89 80 - 100 fL    MCH 26.4 26.0 - 34.0 pg    MCHC 29.6 (L) 32.0 - 36.0 g/dL    RDW 19.9 (H) 11.5 - 14.5 %    Platelets 198 150 - 450 x10*3/uL    Neutrophils % 84.1 40.0 - 80.0 %    Immature Granulocytes %, Automated 0.6 0.0 - 0.9 %     Lymphocytes % 9.5 13.0 - 44.0 %    Monocytes % 5.6 2.0 - 10.0 %    Eosinophils % 0.0 0.0 - 6.0 %    Basophils % 0.2 0.0 - 2.0 %    Neutrophils Absolute 9.34 (H) 1.60 - 5.50 x10*3/uL    Immature Granulocytes Absolute, Automated 0.07 0.00 - 0.50 x10*3/uL    Lymphocytes Absolute 1.06 0.80 - 3.00 x10*3/uL    Monocytes Absolute 0.62 0.05 - 0.80 x10*3/uL    Eosinophils Absolute 0.00 0.00 - 0.40 x10*3/uL    Basophils Absolute 0.02 0.00 - 0.10 x10*3/uL   Comprehensive metabolic panel   Result Value Ref Range    Glucose 131 (H) 74 - 99 mg/dL    Sodium 131 (L) 136 - 145 mmol/L    Potassium 4.6 3.5 - 5.3 mmol/L    Chloride 96 (L) 98 - 107 mmol/L    Bicarbonate 22 21 - 32 mmol/L    Anion Gap 18 10 - 20 mmol/L    Urea Nitrogen 21 6 - 23 mg/dL    Creatinine 0.89 0.50 - 1.05 mg/dL    eGFR 62 >60 mL/min/1.73m*2    Calcium 8.4 (L) 8.6 - 10.3 mg/dL    Albumin 2.9 (L) 3.4 - 5.0 g/dL    Alkaline Phosphatase 258 (H) 33 - 136 U/L    Total Protein 6.9 6.4 - 8.2 g/dL    AST 22 9 - 39 U/L    Bilirubin, Total 0.7 0.0 - 1.2 mg/dL    ALT 16 7 - 45 U/L   Lipase   Result Value Ref Range    Lipase 7 (L) 9 - 82 U/L   Lactate   Result Value Ref Range    Lactate 1.2 0.4 - 2.0 mmol/L   Magnesium   Result Value Ref Range    Magnesium 1.90 1.60 - 2.40 mg/dL   POCT GLUCOSE   Result Value Ref Range    POCT Glucose 129 (H) 74 - 99 mg/dL   POCT GLUCOSE   Result Value Ref Range    POCT Glucose 119 (H) 74 - 99 mg/dL   Protime-INR   Result Value Ref Range    Protime 17.4 (H) 9.8 - 12.4 seconds    INR 1.6 (H) 0.9 - 1.1       Assessment/Plan   Melissa is an 89-year-old female patient presenting to emergency department from skilled nursing facility for evaluation of abdominal pain and vomiting.  Patient states that she developed an 8 out of 10 mid to lower abdominal pain radiating to her right flank.  Patient with a history of breast cancer and colon cancer.  CT imaging showing extensive left renal cortical infarcts as well as worsening of retroperitoneal  metastatic lymphadenopathy and progression of hepatic metastasis per radiology review.  Consults placed to nephrology and vascular surgery, patient started on a heparin drip, medicated with morphine and admitted for further medical management.  Assessment & Plan  Renal infarct (Multi)    Hyponatremia    Hyponatremia/renal infarct  Inpatient admission to Dr. Lugo  See imaging results above  DNRCCA  N.p.o.  Consult nephrology and appreciate input  Consult vascular surgery and appreciate input  Coag screen ordered  Continue heparin drip  Continue gentle IV fluids  Repeat labs  Monitor heparin assay  Morphine/Zofran in ED  Dilaudid IV as needed  Tigan IM as needed 2/2 prolonged Qtc    T2DM/breast cancer/colon cancer/anemia/HLD/HTN  #Chronic conditions  Insulin sliding scale for n.p.o.  Hypoglycemia protocol  Continue home MiraLAX and Colace  Continue home lisinopril daily  Continue home amlodipine daily  Continue home Estrace tablet    DVT Ppx  SCDs  Continue heparin drip  Bedrest/up to chair as tolerated        I spent 45 minutes in the professional and overall care of this patient.  Dena Sanchez, APRN-CNP

## 2025-04-03 NOTE — CARE PLAN
Problem: Pain - Adult  Goal: Verbalizes/displays adequate comfort level or baseline comfort level  4/3/2025 1102 by Kathi Stein RN  Outcome: Progressing  4/3/2025 1101 by Kathi Stein RN  Outcome: Progressing  4/3/2025 1101 by Kathi Stein RN  Outcome: Progressing     Problem: Safety - Adult  Goal: Free from fall injury  4/3/2025 1102 by Kathi Stein RN  Outcome: Progressing  4/3/2025 1101 by Kathi Stein RN  Outcome: Progressing  4/3/2025 1101 by Kathi Stein RN  Outcome: Progressing     Problem: Discharge Planning  Goal: Discharge to home or other facility with appropriate resources  4/3/2025 1102 by Kathi Stein RN  Outcome: Progressing  4/3/2025 1101 by Kathi Stein RN  Outcome: Progressing  4/3/2025 1101 by Kathi Stein RN  Outcome: Progressing     Problem: Chronic Conditions and Co-morbidities  Goal: Patient's chronic conditions and co-morbidity symptoms are monitored and maintained or improved  4/3/2025 1102 by Kathi Stein RN  Outcome: Progressing  4/3/2025 1101 by Kathi Stein RN  Outcome: Progressing  4/3/2025 1101 by Kathi Stein RN  Outcome: Progressing     Problem: Nutrition  Goal: Nutrient intake appropriate for maintaining nutritional needs  4/3/2025 1102 by Kathi Stein RN  Outcome: Progressing  4/3/2025 1101 by Kathi Stein RN  Outcome: Progressing  4/3/2025 1101 by Kathi Stein RN  Outcome: Progressing     Problem: Fall/Injury  Goal: Not fall by end of shift  4/3/2025 1102 by Kathi Stein RN  Outcome: Progressing  4/3/2025 1101 by Kathi Stein RN  Outcome: Progressing  4/3/2025 1101 by Kathi Stein RN  Outcome: Progressing  Goal: Be free from injury by end of the shift  4/3/2025 1102 by Kathi Stein RN  Outcome: Progressing  4/3/2025 1101 by Kathi Stein RN  Outcome: Progressing  4/3/2025 1101 by Kathi Stein RN  Outcome: Progressing  Goal: Verbalize understanding of personal risk factors for fall in the hospital  4/3/2025 1102 by  Kathi Stein RN  Outcome: Progressing  4/3/2025 1101 by Kathi Stein RN  Outcome: Progressing  4/3/2025 1101 by Kathi Stein RN  Outcome: Progressing  Goal: Verbalize understanding of risk factor reduction measures to prevent injury from fall in the home  4/3/2025 1102 by Kathi Stein RN  Outcome: Progressing  4/3/2025 1101 by Kathi Stein RN  Outcome: Progressing  4/3/2025 1101 by Kathi tSein RN  Outcome: Progressing  Goal: Use assistive devices by end of the shift  4/3/2025 1102 by Kathi Stein RN  Outcome: Progressing  4/3/2025 1101 by Kathi Stein RN  Outcome: Progressing  4/3/2025 1101 by Kathi Stein RN  Outcome: Progressing  Goal: Pace activities to prevent fatigue by end of the shift  4/3/2025 1102 by Kathi Stein RN  Outcome: Progressing  4/3/2025 1101 by Kathi Stein RN  Outcome: Progressing  4/3/2025 1101 by Kathi Stein RN  Outcome: Progressing

## 2025-04-03 NOTE — ASSESSMENT & PLAN NOTE
Hyponatremia/renal infarct  Inpatient admission to Dr. Lugo  See imaging results above  DNRCCA  N.p.o.  Consult nephrology and appreciate input  Consult vascular surgery and appreciate input  Coag screen ordered  Continue heparin drip  Continue gentle IV fluids  Repeat labs  Monitor heparin assay  Morphine/Zofran in ED  Dilaudid IV as needed  Tigan IM as needed 2/2 prolonged Qtc    T2DM/breast cancer/colon cancer/anemia/HLD/HTN  #Chronic conditions  Insulin sliding scale for n.p.o.  Hypoglycemia protocol  Continue home MiraLAX and Colace  Continue home lisinopril daily  Continue home amlodipine daily  Continue home Estrace tablet    DVT Ppx  SCDs  Continue heparin drip  Bedrest/up to chair as tolerated

## 2025-04-03 NOTE — ED TRIAGE NOTES
"Pt BIBA from [facility] facility for c/o abdominal pain 8/10 with one episode of vomiting this morning. Pt has stage IV colon cancer; DNR-CCA on file. Per pt pain is a \"pressure\" in the middle lower abdomin that radiates to the right flank area and facility MD will only order her Tylenol for pain. Last BM 4/1/25 without difficulty.   "

## 2025-04-03 NOTE — PROGRESS NOTES
Sw spoke with Alyson with [facility] and pt is able to return to them once medically stable phone [phone]. Pt will need a new precert to return. Pt has been there since January.  Sw to have the [facility] send the SNF return referral.  Sw will continue to provide support and services as needed.

## 2025-04-03 NOTE — H&P
History Of Present Illness  Melissa Tse is a 89 y.o. female with a past medical history significant for breast cancer and stage IV colon cancer presenting to emergency department from [facility] for evaluation of abdominal pain.  Patient also reports an episode of vomiting this morning and states she has been unable to keep anything down today and complains of  a pressure in the mid to lower abdomen that radiates to her right flank.  Patient reports that her doctor will only give her Tylenol for pain and that is not working.  Patient states that her last bowel movement was 4/1/2025 without difficulty.  Patient denies chest pain or dizziness.  Patient denies recent illness, fever, or chills.  Patient denies shortness of breath.    In ED, lipase 7, glucose 131, sodium 131, chloride 96, calcium 8.4, albumin 2.9, alk phos 258, hemoglobin 11.5.  EKG completed showing normal sinus rhythm at a rate of 88 without ST elevation or depression with a prolonged QTc of 507 per ED physician review.  A CT of the abdomen and pelvis was completed showing hypo-enhancement of the left renal parenchyma compatible with extensive renal cortical infarcts as well as fluid in the endometrial canal concerning for cervical stenosis and worsening of a retroperitoneal metastatic lymphadenopathy as well as progression of hepatic  metastasis per radiology review.  Patient medicated with Zofran, morphine, LR x 1 L bolus in ED.  Blood pressure 149/62, heart rate 92, respirations 20, temperature 36.5 °C, SpO2 95% on room air.  Consult placed to vascular surgery and nephrology.  Patient started on a heparin drip.  Inpatient admission under the care of Dr. Lugo who will continue to follow.  I was asked to do H&P and place initial admission orders.    Prior medical records reviewed.     Past Medical History  Breast cancer, stage IV colon cancer, T2DM, anemia, hypertension, hyperlipidemia  Surgical History  Colonoscopy, tubal  "ligation, hip replacement, cataract surgery     Social History  Never smoker, no drug use, no alcohol use  Family History  Cataract, diabetes     Allergies  Penicillin, Penicillins, and Statins-hmg-coa reductase inhibitors    Review of Systems  A 10 point review of systems was completed and is negative except what is listed in HPI  Physical Exam  Constitutional:       Appearance: She is ill-appearing.   HENT:      Mouth/Throat:      Mouth: Mucous membranes are dry.      Pharynx: Oropharynx is clear.   Eyes:      Pupils: Pupils are equal, round, and reactive to light.   Cardiovascular:      Rate and Rhythm: Normal rate and regular rhythm.   Pulmonary:      Effort: Pulmonary effort is normal.      Breath sounds: Normal breath sounds.   Abdominal:      Palpations: Abdomen is soft.      Tenderness: There is abdominal tenderness in the periumbilical area and suprapubic area.      Comments: Right flank tenderness   Musculoskeletal:      Cervical back: Normal range of motion.   Skin:     General: Skin is warm and dry.   Neurological:      General: No focal deficit present.      Mental Status: She is alert.   Psychiatric:         Mood and Affect: Mood normal.         Behavior: Behavior normal.          Last Recorded Vitals  Blood pressure 126/64, pulse 100, temperature 36.7 °C (98.1 °F), resp. rate 18, height 1.626 m (5' 4\"), weight 81.6 kg (180 lb), SpO2 99%.    Relevant Results  CT abdomen pelvis w IV contrast    Result Date: 4/3/2025  Interpreted By:  Faraz Link, STUDY: CT ABDOMEN PELVIS W IV CONTRAST; ;  4/3/2025 12:30 am   INDICATION: Signs/Symptoms:stage IV colon cancer, now with R abd pain.     COMPARISON: 01/17/2025.   ACCESSION NUMBER(S): [id number]   ORDERING CLINICIAN: ELYSE KLERMAN   TECHNIQUE: Axial CT images of the abdomen and pelvis with coronal and sagittal reconstructed images performed after intravenous administration of 80 cc Omnipaque 350.   FINDINGS: LOWER CHEST: No acute abnormality of the lung " bases. BONES: No acute osseous abnormality. Osteopenia. Right hip arthroplasty in standard alignment. Similar appearance of mild-to-moderate chronic compression of the T11 vertebral body. Stable grade 1 degenerative anterolisthesis at L4-L5 and moderate to severe multilevel degenerative changes of the imaged spine. ABDOMINAL WALL: Within normal limits.   ABDOMEN:   LIVER: There is a large ill-defined region of hypoenhancement in the right hepatic lobe measuring up to 10.8 cm in transaxial diameter, up to at least 10.1 cm in CC diameter, suspicious for interval progression of the hepatic metastasis. There is a new hypoattenuating lesion inferolaterally in the inferior tip of the right hepatic lobe (series 601, images 57-63), measuring up to approximately 2.2 cm in transaxial diameter, suspicious for new metastatic lesion. Liver is enlarged, similar to before. BILE DUCTS: Normal caliber. GALLBLADDER: Cholelithiasis. No wall thickening or pericholecystic fluid. PANCREAS:Atrophic with pancreatic ductal dilation and irregularity and parenchymal calcifications in keeping with sequelae of chronic pancreatitis. SPLEEN: Within normal limits. ADRENALS: Within normal limits. KIDNEYS and URETERS: New extensive hypoenhancement/non enhancement of the left renal parenchyma compatible with extensive renal cortical infarcts. Right kidney and ureter appear normal.     VESSELS: Severe atherosclerosis, with critical narrowing or occlusion of the left renal artery. Severe narrowing of the right renal artery also noted. RETROPERITONEUM: Interval enlargement of celiac axis and portocaval nodes, suspicious for worsened metastatic disease.   PELVIS:   REPRODUCTIVE ORGANS: Endometrial fluid suggests cervical stenosis and is new from the prior exam. BLADDER: Within normal limits.   BOWEL: When allowing for differences in patient positioning in slice selection, there is no substantial change in the very large mass encompassing the  mid-transverse colon and several adjacent loops of small bowel and adjacent omentum and mesocolon, compatible with patient's known advanced malignancy. No dilated bowel to suggest obstruction. Stomach appears grossly normal. No definite additional abnormal bowel wall thickening. Normal appendix. PERITONEUM: No ascites or free air, no fluid collection.       New extensive hypoenhancement/non enhancement of the left renal parenchyma compatible with extensive renal cortical infarcts. Right kidney and ureter appear normal.   There is a large ill-defined region of hypoenhancement in the right hepatic lobe measuring up to 10.8 cm in transaxial diameter, up to at least 10.1 cm in CC diameter, suspicious for interval progression of the hepatic metastasis. There is a new hypoattenuating lesion inferolaterally in the inferior tip of the right hepatic lobe (series 601, images 57-63), measuring up to approximately 2.2 cm in transaxial diameter, suspicious for new metastatic lesion. Liver is enlarged, similar to before.   Interval worsening of retroperitoneal metastatic lymphadenopathy.   Cholelithiasis without convincing evidence of acute cholecystitis.   New fluid in the endometrial canal suggesting cervical stenosis.   Additional findings as discussed above.   MACRO: None   Signed by: Faraz Link 4/3/2025 12:52 AM Dictation workstation:   [id number]   Results for orders placed or performed during the hospital encounter of 04/02/25 (from the past 24 hours)   CBC and Auto Differential   Result Value Ref Range    WBC 11.1 4.4 - 11.3 x10*3/uL    nRBC 0.0 0.0 - 0.0 /100 WBCs    RBC 4.36 4.00 - 5.20 x10*6/uL    Hemoglobin 11.5 (L) 12.0 - 16.0 g/dL    Hematocrit 38.8 36.0 - 46.0 %    MCV 89 80 - 100 fL    MCH 26.4 26.0 - 34.0 pg    MCHC 29.6 (L) 32.0 - 36.0 g/dL    RDW 19.9 (H) 11.5 - 14.5 %    Platelets 198 150 - 450 x10*3/uL    Neutrophils % 84.1 40.0 - 80.0 %    Immature Granulocytes %, Automated 0.6 0.0 - 0.9 %    Lymphocytes %  9.5 13.0 - 44.0 %    Monocytes % 5.6 2.0 - 10.0 %    Eosinophils % 0.0 0.0 - 6.0 %    Basophils % 0.2 0.0 - 2.0 %    Neutrophils Absolute 9.34 (H) 1.60 - 5.50 x10*3/uL    Immature Granulocytes Absolute, Automated 0.07 0.00 - 0.50 x10*3/uL    Lymphocytes Absolute 1.06 0.80 - 3.00 x10*3/uL    Monocytes Absolute 0.62 0.05 - 0.80 x10*3/uL    Eosinophils Absolute 0.00 0.00 - 0.40 x10*3/uL    Basophils Absolute 0.02 0.00 - 0.10 x10*3/uL   Comprehensive metabolic panel   Result Value Ref Range    Glucose 131 (H) 74 - 99 mg/dL    Sodium 131 (L) 136 - 145 mmol/L    Potassium 4.6 3.5 - 5.3 mmol/L    Chloride 96 (L) 98 - 107 mmol/L    Bicarbonate 22 21 - 32 mmol/L    Anion Gap 18 10 - 20 mmol/L    Urea Nitrogen 21 6 - 23 mg/dL    Creatinine 0.89 0.50 - 1.05 mg/dL    eGFR 62 >60 mL/min/1.73m*2    Calcium 8.4 (L) 8.6 - 10.3 mg/dL    Albumin 2.9 (L) 3.4 - 5.0 g/dL    Alkaline Phosphatase 258 (H) 33 - 136 U/L    Total Protein 6.9 6.4 - 8.2 g/dL    AST 22 9 - 39 U/L    Bilirubin, Total 0.7 0.0 - 1.2 mg/dL    ALT 16 7 - 45 U/L   Lipase   Result Value Ref Range    Lipase 7 (L) 9 - 82 U/L   Lactate   Result Value Ref Range    Lactate 1.2 0.4 - 2.0 mmol/L   Magnesium   Result Value Ref Range    Magnesium 1.90 1.60 - 2.40 mg/dL   POCT GLUCOSE   Result Value Ref Range    POCT Glucose 129 (H) 74 - 99 mg/dL   POCT GLUCOSE   Result Value Ref Range    POCT Glucose 119 (H) 74 - 99 mg/dL   Protime-INR   Result Value Ref Range    Protime 17.4 (H) 9.8 - 12.4 seconds    INR 1.6 (H) 0.9 - 1.1   CBC   Result Value Ref Range    WBC 11.5 (H) 4.4 - 11.3 x10*3/uL    nRBC 0.0 0.0 - 0.0 /100 WBCs    RBC 4.08 4.00 - 5.20 x10*6/uL    Hemoglobin 10.9 (L) 12.0 - 16.0 g/dL    Hematocrit 36.7 36.0 - 46.0 %    MCV 90 80 - 100 fL    MCH 26.7 26.0 - 34.0 pg    MCHC 29.7 (L) 32.0 - 36.0 g/dL    RDW 19.9 (H) 11.5 - 14.5 %    Platelets 181 150 - 450 x10*3/uL   Basic Metabolic Panel   Result Value Ref Range    Glucose 129 (H) 74 - 99 mg/dL    Sodium 130 (L) 136 - 145  mmol/L    Potassium 4.2 3.5 - 5.3 mmol/L    Chloride 98 98 - 107 mmol/L    Bicarbonate 21 21 - 32 mmol/L    Anion Gap 15 10 - 20 mmol/L    Urea Nitrogen 19 6 - 23 mg/dL    Creatinine 0.85 0.50 - 1.05 mg/dL    eGFR 66 >60 mL/min/1.73m*2    Calcium 8.0 (L) 8.6 - 10.3 mg/dL   Heparin Assay, UFH   Result Value Ref Range    Heparin Unfractionated 1.1 (HH) See Comment Below for Therapeutic Ranges IU/mL   POCT GLUCOSE   Result Value Ref Range    POCT Glucose 123 (H) 74 - 99 mg/dL       Assessment/Plan   Melissa is an 89-year-old female patient presenting to emergency department from [facility] nursing facility for evaluation of abdominal pain and vomiting.  Patient states that she developed an 8 out of 10 mid to lower abdominal pain radiating to her right flank.  Patient with a history of breast cancer and colon cancer.  CT imaging showing extensive left renal cortical infarcts as well as worsening of retroperitoneal metastatic lymphadenopathy and progression of hepatic metastasis per radiology review.  Consults placed to nephrology and vascular surgery, patient started on a heparin drip, medicated with morphine and admitted for further medical management.  Assessment & Plan  Renal infarct (Multi)    Hyponatremia    Hyponatremia/renal infarct  Inpatient admission to Dr. Lugo  See imaging results above  DNRCCA  N.p.o.  Consult nephrology and appreciate input  Consult vascular surgery and appreciate input  Coag screen ordered  Continue heparin drip  Continue gentle IV fluids  Repeat labs  Monitor heparin assay  Morphine/Zofran in ED  Dilaudid IV as needed  Tigan IM as needed 2/2 prolonged Qtc    T2DM/breast cancer/colon cancer/anemia/HLD/HTN  #Chronic conditions  Insulin sliding scale for n.p.o.  Hypoglycemia protocol  Continue home MiraLAX and Colace  Continue home lisinopril daily  Continue home amlodipine daily  Continue home Estrace tablet    DVT Ppx  SCDs  Continue heparin drip  Bedrest/up to chair as tolerated  Patient  fully evaluated on April 3.  Continue analgesics and await nephrology consultation.  Appreciate vascular surgery consultation.  Patient with significant metastatic cancer which comfort measures are in place.      I spent 60 minutes in the professional and overall care of this patient.  Tio Lugo MD

## 2025-04-03 NOTE — ED PROVIDER NOTES
HPI   Chief Complaint   Patient presents with    Abdominal Pain       This is an 89-year-old female sent in from SNF for abdominal pain.  Patient reports that she has stage IV colon cancer.  Symptoms started yesterday, with periumbilical abdominal pain and nausea/vomiting.  She feels like there is a lump that is firm, hard and tender.  She is not sure if she has ever had it before for when it developed.  She vomited earlier today and has been unable to keep anything down.  She also reports significant belching.  She said the facility will only give her Tylenol, which is not enough for her pain.      History provided by:  Patient and medical records   used: No            Patient History   No past medical history on file.  Past Surgical History:   Procedure Laterality Date    OTHER SURGICAL HISTORY  02/25/2022    Hip replacement     No family history on file.  Social History     Tobacco Use    Smoking status: Never     Passive exposure: Never    Smokeless tobacco: Never   Vaping Use    Vaping status: Never Used   Substance Use Topics    Alcohol use: Never    Drug use: Never       Physical Exam   ED Triage Vitals [04/02/25 2207]   Temperature Heart Rate Respirations BP   36.5 °C (97.7 °F) 83 20 149/62      Pulse Ox Temp Source Heart Rate Source Patient Position   95 % Temporal Monitor Sitting      BP Location FiO2 (%)     Left arm --       Physical Exam  Vitals and nursing note reviewed.   Constitutional:       General: She is in acute distress.      Appearance: She is well-developed. She is not ill-appearing, toxic-appearing or diaphoretic.   HENT:      Head: Normocephalic and atraumatic.      Mouth/Throat:      Mouth: Mucous membranes are moist.   Eyes:      General: No scleral icterus.     Extraocular Movements: Extraocular movements intact.   Cardiovascular:      Rate and Rhythm: Normal rate and regular rhythm.      Heart sounds: No murmur heard.     No friction rub. No gallop.   Pulmonary:       Effort: Pulmonary effort is normal. No respiratory distress.      Breath sounds: Normal breath sounds. No stridor. No wheezing, rhonchi or rales.   Abdominal:      Palpations: Abdomen is soft.      Tenderness: There is abdominal tenderness in the periumbilical area. There is no guarding or rebound.      Hernia: A hernia is present. Hernia is present in the umbilical area.   Skin:     General: Skin is warm and dry.      Coloration: Skin is not cyanotic, jaundiced, mottled or pale.      Findings: No erythema or rash.   Neurological:      General: No focal deficit present.      Mental Status: She is alert and oriented to person, place, and time.      Cranial Nerves: No cranial nerve deficit.      Motor: No weakness.   Psychiatric:         Mood and Affect: Mood normal. Mood is not anxious or depressed.         Behavior: Behavior normal.           ED Course & MDM   ED Course as of 04/03/25 0138 Wed Apr 02, 2025   2259 EKG obtained for abdominal pain.  EKG independently interpreted me at 2246 showing normal sinus rhythm normal axis prolonged QTc 507.  Inferior Q waves no ST changes no STEMI no prior available for comparison [EK]      ED Course User Index  [EK] Elyse H Klerman, MD         Diagnoses as of 04/03/25 0138   Renal infarct (Multi)   Metastatic malignant neoplasm, unspecified site (Multi)                 No data recorded     [place] Coma Scale Score: 15 (04/02/25 2215 : Jamila Pate RN)                           Medical Decision Making  89yF metastatic colon cancer presents with new abd pain and n/v since yesterday.  Pt hypertensive and tachycardic on ED arrival with abd soft though with firm possible hernia.  Labs with mild hyponatremia, normal electrolytes and no LOPEZ.  CT consistent with renal infarct.  Pt requiring multiple doses of morphine nearly q2hr for her pain.  Discussed with PCP Dr. Lugo and house WYATT Fernandes, who agree to admit and will manage from this point forward, including additional  treatment and/or diagnostic workup for this renal infarct.    Amount and/or Complexity of Data Reviewed  Labs: ordered.  Radiology: ordered.  ECG/medicine tests: ordered and independent interpretation performed. Decision-making details documented in ED Course.    Risk  Parenteral controlled substances.  Decision regarding hospitalization.        Procedure  Procedures     Elyse H Klerman, MD  04/03/25 0142

## 2025-04-03 NOTE — ASSESSMENT & PLAN NOTE
Hyponatremia/renal infarct  Inpatient admission to Dr. Lugo  See imaging results above  DNRCCA  N.p.o.  Consult nephrology and appreciate input  Consult vascular surgery and appreciate input  Coag screen ordered  Continue heparin drip  Continue gentle IV fluids  Repeat labs  Monitor heparin assay  Morphine/Zofran in ED  Dilaudid IV as needed  Tigan IM as needed 2/2 prolonged Qtc    T2DM/breast cancer/colon cancer/anemia/HLD/HTN  #Chronic conditions  Insulin sliding scale for n.p.o.  Hypoglycemia protocol  Continue home MiraLAX and Colace  Continue home lisinopril daily  Continue home amlodipine daily  Continue home Estrace tablet    DVT Ppx  SCDs  Continue heparin drip  Bedrest/up to chair as tolerated  Patient fully evaluated on April 3.  Continue analgesics and await nephrology consultation.  Appreciate vascular surgery consultation.  Patient with significant metastatic cancer which comfort measures are in place.

## 2025-04-03 NOTE — CONSULTS
Consults  Vascular Surgery  Reason For Consult  Renal infarct    History Of Present Illness  Melissa Tse is a 89 y.o. female presenting with a past medical history significant for breast cancer and stage IV colon cancer presenting to emergency department from [facility] for evaluation of abdominal pain.  Patient also reports an episode of vomiting this morning and states she has been unable to keep anything down today and complains of  a pressure in the mid to lower abdomen that radiates to her right flank.  Patient reports that her doctor will only give her Tylenol for pain and that is not working.  Patient states that her last bowel movement was 4/1/2025 without difficulty.  Patient denies chest pain or dizziness.  Patient denies recent illness, fever, or chills.  Patient denies shortness of breath.     In ED, lipase 7, glucose 131, sodium 131, chloride 96, calcium 8.4, albumin 2.9, alk phos 258, hemoglobin 11.5.  EKG completed showing normal sinus rhythm at a rate of 88 without ST elevation or depression with a prolonged QTc of 507 per ED physician review.  A CT of the abdomen and pelvis was completed showing hypo-enhancement of the left renal parenchyma compatible with extensive renal cortical infarcts as well as fluid in the endometrial canal concerning for cervical stenosis and worsening of a retroperitoneal metastatic lymphadenopathy as well as progression of hepatic  metastasis per radiology review.  Patient medicated with Zofran, morphine, LR x 1 L bolus in ED.  Blood pressure 149/62, heart rate 92, respirations 20, temperature 36.5 °C, SpO2 95% on room air.  Consult placed to vascular surgery and nephrology.  Patient started on a heparin drip.  Patient was examined at bedside with Dr. Chow.  Patient currently pain-free of her abdominal symptoms.  Information was obtained from chart review and patient interview.  Past Medical History  She has no past medical history on  "file.    Surgical History  She has a past surgical history that includes Other surgical history (02/25/2022).     Social History  She reports that she has never smoked. She has never been exposed to tobacco smoke. She has never used smokeless tobacco. She reports that she does not drink alcohol and does not use drugs.    Family History  No family history on file.     Allergies  Penicillin, Penicillins, and Statins-hmg-coa reductase inhibitors    Review of Systems A 10 point ROS was performed with the patient denying any complaint at this time aside from those listed in the HPI above.     Physical Exam  Constitutional: Well developed , awake/alert/oriented x3, in no distress,  Eyes: Clear sclera  ENMT: mucous membranes are moist, no apparent injury, no lesions seen,   Head/neck: Neck supple, trachea  is midline, no apparent injury, no bruits, no mass, no stridor  Respiratory/thorax: Breath sounds clear and equal diminished bilaterally throughout, thorax symmetric  Cardiac/Vascular: Regular, rate and rhythm, no murmurs, 2+ radial pulses, palpable bilateral popliteals, palpable right DP and PT, palpable left posterior tib  Gastrointestinal: Nondistended soft nontender, positive bowel sounds, no bruits.  Musculoskeletal: Moves all extremities, limited range of motion , no joint swelling,   Extremities: No cyanosis,   Neurological: Alert and oriented x3,  Lymphatic: No significant lymphadenopathy  Skin: Warm and dry, no lesions, no rashes, pallor  Psychological: Appropriate mood and behavior  Last Recorded Vitals  Blood pressure 175/71, pulse 82, temperature 36.5 °C (97.7 °F), temperature source Temporal, resp. rate 16, height 1.626 m (5' 4\"), weight 81.6 kg (180 lb), SpO2 100%.    Relevant Results     Results for orders placed or performed during the hospital encounter of 04/02/25 (from the past 24 hours)   CBC and Auto Differential   Result Value Ref Range    WBC 11.1 4.4 - 11.3 x10*3/uL    nRBC 0.0 0.0 - 0.0 /100 WBCs "    RBC 4.36 4.00 - 5.20 x10*6/uL    Hemoglobin 11.5 (L) 12.0 - 16.0 g/dL    Hematocrit 38.8 36.0 - 46.0 %    MCV 89 80 - 100 fL    MCH 26.4 26.0 - 34.0 pg    MCHC 29.6 (L) 32.0 - 36.0 g/dL    RDW 19.9 (H) 11.5 - 14.5 %    Platelets 198 150 - 450 x10*3/uL    Neutrophils % 84.1 40.0 - 80.0 %    Immature Granulocytes %, Automated 0.6 0.0 - 0.9 %    Lymphocytes % 9.5 13.0 - 44.0 %    Monocytes % 5.6 2.0 - 10.0 %    Eosinophils % 0.0 0.0 - 6.0 %    Basophils % 0.2 0.0 - 2.0 %    Neutrophils Absolute 9.34 (H) 1.60 - 5.50 x10*3/uL    Immature Granulocytes Absolute, Automated 0.07 0.00 - 0.50 x10*3/uL    Lymphocytes Absolute 1.06 0.80 - 3.00 x10*3/uL    Monocytes Absolute 0.62 0.05 - 0.80 x10*3/uL    Eosinophils Absolute 0.00 0.00 - 0.40 x10*3/uL    Basophils Absolute 0.02 0.00 - 0.10 x10*3/uL   Comprehensive metabolic panel   Result Value Ref Range    Glucose 131 (H) 74 - 99 mg/dL    Sodium 131 (L) 136 - 145 mmol/L    Potassium 4.6 3.5 - 5.3 mmol/L    Chloride 96 (L) 98 - 107 mmol/L    Bicarbonate 22 21 - 32 mmol/L    Anion Gap 18 10 - 20 mmol/L    Urea Nitrogen 21 6 - 23 mg/dL    Creatinine 0.89 0.50 - 1.05 mg/dL    eGFR 62 >60 mL/min/1.73m*2    Calcium 8.4 (L) 8.6 - 10.3 mg/dL    Albumin 2.9 (L) 3.4 - 5.0 g/dL    Alkaline Phosphatase 258 (H) 33 - 136 U/L    Total Protein 6.9 6.4 - 8.2 g/dL    AST 22 9 - 39 U/L    Bilirubin, Total 0.7 0.0 - 1.2 mg/dL    ALT 16 7 - 45 U/L   Lipase   Result Value Ref Range    Lipase 7 (L) 9 - 82 U/L   Lactate   Result Value Ref Range    Lactate 1.2 0.4 - 2.0 mmol/L   Magnesium   Result Value Ref Range    Magnesium 1.90 1.60 - 2.40 mg/dL   POCT GLUCOSE   Result Value Ref Range    POCT Glucose 129 (H) 74 - 99 mg/dL   POCT GLUCOSE   Result Value Ref Range    POCT Glucose 119 (H) 74 - 99 mg/dL   Protime-INR   Result Value Ref Range    Protime 17.4 (H) 9.8 - 12.4 seconds    INR 1.6 (H) 0.9 - 1.1   CBC   Result Value Ref Range    WBC 11.5 (H) 4.4 - 11.3 x10*3/uL    nRBC 0.0 0.0 - 0.0 /100 WBCs     RBC 4.08 4.00 - 5.20 x10*6/uL    Hemoglobin 10.9 (L) 12.0 - 16.0 g/dL    Hematocrit 36.7 36.0 - 46.0 %    MCV 90 80 - 100 fL    MCH 26.7 26.0 - 34.0 pg    MCHC 29.7 (L) 32.0 - 36.0 g/dL    RDW 19.9 (H) 11.5 - 14.5 %    Platelets 181 150 - 450 x10*3/uL   Basic Metabolic Panel   Result Value Ref Range    Glucose 129 (H) 74 - 99 mg/dL    Sodium 130 (L) 136 - 145 mmol/L    Potassium 4.2 3.5 - 5.3 mmol/L    Chloride 98 98 - 107 mmol/L    Bicarbonate 21 21 - 32 mmol/L    Anion Gap 15 10 - 20 mmol/L    Urea Nitrogen 19 6 - 23 mg/dL    Creatinine 0.85 0.50 - 1.05 mg/dL    eGFR 66 >60 mL/min/1.73m*2    Calcium 8.0 (L) 8.6 - 10.3 mg/dL   Heparin Assay, UFH   Result Value Ref Range    Heparin Unfractionated 1.1 (HH) See Comment Below for Therapeutic Ranges IU/mL       Imaging  CT abdomen pelvis w IV contrast    Result Date: 4/3/2025  New extensive hypoenhancement/non enhancement of the left renal parenchyma compatible with extensive renal cortical infarcts. Right kidney and ureter appear normal.   There is a large ill-defined region of hypoenhancement in the right hepatic lobe measuring up to 10.8 cm in transaxial diameter, up to at least 10.1 cm in CC diameter, suspicious for interval progression of the hepatic metastasis. There is a new hypoattenuating lesion inferolaterally in the inferior tip of the right hepatic lobe (series 601, images [id number]), measuring up to approximately 2.2 cm in transaxial diameter, suspicious for new metastatic lesion. Liver is enlarged, similar to before.   Interval worsening of retroperitoneal metastatic lymphadenopathy.   Cholelithiasis without convincing evidence of acute cholecystitis.   New fluid in the endometrial canal suggesting cervical stenosis.   Additional findings as discussed above.   MACRO: None   Signed by: Faraz Link 4/3/2025 12:52 AM Dictation workstation:   [id number]     Cardiology, Vascular, and Other Imaging  No other imaging results found for the past 7 days      Assessment/Plan  1. Renal infarct (Multi)        2. Metastatic malignant neoplasm, unspecified site (Multi)            Patient was evaluated by this practitioner and Dr. Chow.  Patient case was discussed.  Patient denies any shortness of breath or chest pain.  Patient abdominal pain and pressure symptoms have subsided.  Patient's CTA was reviewed revealing a left renal cortical infarct.  Uncertain if this was cardio embolic or due to patient's malignancies having a thrombogenic etiology.  No endovascular or surgical intervention required.  Would recommend hematology/oncology consultation for malignancy and anticoagulation therapy.  Patient does not require to be n.p.o. for vascular surgery services.  Will observe.    Thank you very much for allowing Vascular Surgery to be involved in the care of your patient sincerely Trell CHASE .  (This note was generated with voice recognition software and may contain errors including spelling, grammar, syntax and missed recognition of what was dictated, of which may not have been fully corrected)

## 2025-04-03 NOTE — PROGRESS NOTES
Patient fully evaluated on 4/3. Patient resting in bed, breathing comfortably on 3L O2 via nasal cannula. Accompanied by daughter in the room. Evaluated today by nephrology, lisinopril discontinued d/t jr renal artery stenosis, flomax added. Hypertension will be treated with prn medications to metoprolol/apresoline IV. Continue heparin drip. Also evaluated by vascular surgery, no surgery recommended at this time. Recommended consult to heme-onc, family declined patient being seen by heme-onc - not wishing to pursue aggressive chemotherapy treatment at this time. Labs reviewed. Mild hyponatremia noted, continue sodium chloride drip. Being given comfort measures for pain, currently receiving dilaudid, morphine.    Problem List Items Addressed This Visit          Genitourinary and Reproductive    * (Principal) Renal infarct (Multi) - Primary     Other Visit Diagnoses       Metastatic malignant neoplasm, unspecified site (Multi)              Patient seen resting in bed with head of bed elevated, no s/s or c/o acute difficulties at this time.  Vital signs for last 24 hours Temp:  [36.5 °C (97.7 °F)-36.7 °C (98.1 °F)] 36.7 °C (98.1 °F)  Heart Rate:  [] 100  Resp:  [16-20] 18  BP: (125-175)/(60-74) 126/64    I/O this shift:  In: 551.8 [I.V.:551.8]  Out: -   Patient still requiring frequent cardiac and SPO2 monitoring. Continue aggressive pulmonary hygiene and oral hygiene. Off loading as tolerated for skin integrity. Medications and labs reviewed-   Results for orders placed or performed during the hospital encounter of 04/02/25 (from the past 24 hours)   CBC and Auto Differential   Result Value Ref Range    WBC 11.1 4.4 - 11.3 x10*3/uL    nRBC 0.0 0.0 - 0.0 /100 WBCs    RBC 4.36 4.00 - 5.20 x10*6/uL    Hemoglobin 11.5 (L) 12.0 - 16.0 g/dL    Hematocrit 38.8 36.0 - 46.0 %    MCV 89 80 - 100 fL    MCH 26.4 26.0 - 34.0 pg    MCHC 29.6 (L) 32.0 - 36.0 g/dL    RDW 19.9 (H) 11.5 - 14.5 %    Platelets 198 150 - 450  x10*3/uL    Neutrophils % 84.1 40.0 - 80.0 %    Immature Granulocytes %, Automated 0.6 0.0 - 0.9 %    Lymphocytes % 9.5 13.0 - 44.0 %    Monocytes % 5.6 2.0 - 10.0 %    Eosinophils % 0.0 0.0 - 6.0 %    Basophils % 0.2 0.0 - 2.0 %    Neutrophils Absolute 9.34 (H) 1.60 - 5.50 x10*3/uL    Immature Granulocytes Absolute, Automated 0.07 0.00 - 0.50 x10*3/uL    Lymphocytes Absolute 1.06 0.80 - 3.00 x10*3/uL    Monocytes Absolute 0.62 0.05 - 0.80 x10*3/uL    Eosinophils Absolute 0.00 0.00 - 0.40 x10*3/uL    Basophils Absolute 0.02 0.00 - 0.10 x10*3/uL   Comprehensive metabolic panel   Result Value Ref Range    Glucose 131 (H) 74 - 99 mg/dL    Sodium 131 (L) 136 - 145 mmol/L    Potassium 4.6 3.5 - 5.3 mmol/L    Chloride 96 (L) 98 - 107 mmol/L    Bicarbonate 22 21 - 32 mmol/L    Anion Gap 18 10 - 20 mmol/L    Urea Nitrogen 21 6 - 23 mg/dL    Creatinine 0.89 0.50 - 1.05 mg/dL    eGFR 62 >60 mL/min/1.73m*2    Calcium 8.4 (L) 8.6 - 10.3 mg/dL    Albumin 2.9 (L) 3.4 - 5.0 g/dL    Alkaline Phosphatase 258 (H) 33 - 136 U/L    Total Protein 6.9 6.4 - 8.2 g/dL    AST 22 9 - 39 U/L    Bilirubin, Total 0.7 0.0 - 1.2 mg/dL    ALT 16 7 - 45 U/L   Lipase   Result Value Ref Range    Lipase 7 (L) 9 - 82 U/L   Lactate   Result Value Ref Range    Lactate 1.2 0.4 - 2.0 mmol/L   Magnesium   Result Value Ref Range    Magnesium 1.90 1.60 - 2.40 mg/dL   POCT GLUCOSE   Result Value Ref Range    POCT Glucose 129 (H) 74 - 99 mg/dL   POCT GLUCOSE   Result Value Ref Range    POCT Glucose 119 (H) 74 - 99 mg/dL   Protime-INR   Result Value Ref Range    Protime 17.4 (H) 9.8 - 12.4 seconds    INR 1.6 (H) 0.9 - 1.1   CBC   Result Value Ref Range    WBC 11.5 (H) 4.4 - 11.3 x10*3/uL    nRBC 0.0 0.0 - 0.0 /100 WBCs    RBC 4.08 4.00 - 5.20 x10*6/uL    Hemoglobin 10.9 (L) 12.0 - 16.0 g/dL    Hematocrit 36.7 36.0 - 46.0 %    MCV 90 80 - 100 fL    MCH 26.7 26.0 - 34.0 pg    MCHC 29.7 (L) 32.0 - 36.0 g/dL    RDW 19.9 (H) 11.5 - 14.5 %    Platelets 181 150 - 450  x10*3/uL   Basic Metabolic Panel   Result Value Ref Range    Glucose 129 (H) 74 - 99 mg/dL    Sodium 130 (L) 136 - 145 mmol/L    Potassium 4.2 3.5 - 5.3 mmol/L    Chloride 98 98 - 107 mmol/L    Bicarbonate 21 21 - 32 mmol/L    Anion Gap 15 10 - 20 mmol/L    Urea Nitrogen 19 6 - 23 mg/dL    Creatinine 0.85 0.50 - 1.05 mg/dL    eGFR 66 >60 mL/min/1.73m*2    Calcium 8.0 (L) 8.6 - 10.3 mg/dL   Heparin Assay, UFH   Result Value Ref Range    Heparin Unfractionated 1.1 (HH) See Comment Below for Therapeutic Ranges IU/mL   POCT GLUCOSE   Result Value Ref Range    POCT Glucose 123 (H) 74 - 99 mg/dL      Patient recently received an antibiotic (last 12 hours)       None            Plan discussed with interdisciplinary team, continue current and repeat labs in the AM.     Patient aware and agreeable to current plan, continue plan as above.     I spent a total of 50 minutes on the date of the service which included preparing to see the patient, face-to-face patient care, completing clinical documentation, obtaining and/or reviewing separately obtained history, performing a medically appropriate examination, counseling and educating the patient/family/caregiver, ordering medications, tests, or procedures, communicating with other HCPs (not separately reported), independently interpreting results (not separately reported), communicating results to the patient/family/caregiver, and care coordination (not separately reported). Melissa Tse is a 89 y.o. female on day 0 of admission presenting with Renal infarct (Multi).      Subjective         Tio Lugo MD   Physician  Internal Medicine     H&P     Signed     Date of Service: 4/3/2025 12:43 PM     Signed       Expand All Collapse All    History Of Present Illness  Melissa Tse is a 89 y.o. female with a past medical history significant for breast cancer and stage IV colon cancer presenting to emergency department from [facility] for evaluation of  abdominal pain.  Patient also reports an episode of vomiting this morning and states she has been unable to keep anything down today and complains of  a pressure in the mid to lower abdomen that radiates to her right flank.  Patient reports that her doctor will only give her Tylenol for pain and that is not working.  Patient states that her last bowel movement was 4/1/2025 without difficulty.  Patient denies chest pain or dizziness.  Patient denies recent illness, fever, or chills.  Patient denies shortness of breath.     In ED, lipase 7, glucose 131, sodium 131, chloride 96, calcium 8.4, albumin 2.9, alk phos 258, hemoglobin 11.5.  EKG completed showing normal sinus rhythm at a rate of 88 without ST elevation or depression with a prolonged QTc of 507 per ED physician review.  A CT of the abdomen and pelvis was completed showing hypo-enhancement of the left renal parenchyma compatible with extensive renal cortical infarcts as well as fluid in the endometrial canal concerning for cervical stenosis and worsening of a retroperitoneal metastatic lymphadenopathy as well as progression of hepatic  metastasis per radiology review.  Patient medicated with Zofran, morphine, LR x 1 L bolus in ED.  Blood pressure 149/62, heart rate 92, respirations 20, temperature 36.5 °C, SpO2 95% on room air.  Consult placed to vascular surgery and nephrology.  Patient started on a heparin drip.  Inpatient admission under the care of Dr. Lugo who will continue to follow.  I was asked to do H&P and place initial admission orders.     Prior medical records reviewed.     Past Medical History  Breast cancer, stage IV colon cancer, T2DM, anemia, hypertension, hyperlipidemia  Surgical History  Colonoscopy, tubal ligation, hip replacement, cataract surgery      Social History  Never smoker, no drug use, no alcohol use  Family History  Cataract, diabetes     Allergies  Penicillin, Penicillins, and Statins-hmg-coa reductase inhibitors     Review of  "Systems  A 10 point review of systems was completed and is negative except what is listed in HPI  Physical Exam  Constitutional:       Appearance: She is ill-appearing.   HENT:      Mouth/Throat:      Mouth: Mucous membranes are dry.      Pharynx: Oropharynx is clear.   Eyes:      Pupils: Pupils are equal, round, and reactive to light.   Cardiovascular:      Rate and Rhythm: Normal rate and regular rhythm.   Pulmonary:      Effort: Pulmonary effort is normal.      Breath sounds: Normal breath sounds.   Abdominal:      Palpations: Abdomen is soft.      Tenderness: There is abdominal tenderness in the periumbilical area and suprapubic area.      Comments: Right flank tenderness   Musculoskeletal:      Cervical back: Normal range of motion.   Skin:     General: Skin is warm and dry.   Neurological:      General: No focal deficit present.      Mental Status: She is alert.   Psychiatric:         Mood and Affect: Mood normal.         Behavior: Behavior normal.            Last Recorded Vitals  Blood pressure 126/64, pulse 100, temperature 36.7 °C (98.1 °F), resp. rate 18, height 1.626 m (5' 4\"), weight 81.6 kg (180 lb), SpO2 99%.     Relevant Results  CT abdomen pelvis w IV contrast     Result Date: 4/3/2025  Interpreted By:  Faraz Link, STUDY: CT ABDOMEN PELVIS W IV CONTRAST; ;  4/3/2025 12:30 am   INDICATION: Signs/Symptoms:stage IV colon cancer, now with R abd pain.     COMPARISON: 01/17/2025.   ACCESSION NUMBER(S): [id number]   ORDERING CLINICIAN: ELYSE KLERMAN   TECHNIQUE: Axial CT images of the abdomen and pelvis with coronal and sagittal reconstructed images performed after intravenous administration of 80 cc Omnipaque 350.   FINDINGS: LOWER CHEST: No acute abnormality of the lung bases. BONES: No acute osseous abnormality. Osteopenia. Right hip arthroplasty in standard alignment. Similar appearance of mild-to-moderate chronic compression of the T11 vertebral body. Stable grade 1 degenerative anterolisthesis " at L4-L5 and moderate to severe multilevel degenerative changes of the imaged spine. ABDOMINAL WALL: Within normal limits.   ABDOMEN:   LIVER: There is a large ill-defined region of hypoenhancement in the right hepatic lobe measuring up to 10.8 cm in transaxial diameter, up to at least 10.1 cm in CC diameter, suspicious for interval progression of the hepatic metastasis. There is a new hypoattenuating lesion inferolaterally in the inferior tip of the right hepatic lobe (series 601, images 57-63), measuring up to approximately 2.2 cm in transaxial diameter, suspicious for new metastatic lesion. Liver is enlarged, similar to before. BILE DUCTS: Normal caliber. GALLBLADDER: Cholelithiasis. No wall thickening or pericholecystic fluid. PANCREAS:Atrophic with pancreatic ductal dilation and irregularity and parenchymal calcifications in keeping with sequelae of chronic pancreatitis. SPLEEN: Within normal limits. ADRENALS: Within normal limits. KIDNEYS and URETERS: New extensive hypoenhancement/non enhancement of the left renal parenchyma compatible with extensive renal cortical infarcts. Right kidney and ureter appear normal.     VESSELS: Severe atherosclerosis, with critical narrowing or occlusion of the left renal artery. Severe narrowing of the right renal artery also noted. RETROPERITONEUM: Interval enlargement of celiac axis and portocaval nodes, suspicious for worsened metastatic disease.   PELVIS:   REPRODUCTIVE ORGANS: Endometrial fluid suggests cervical stenosis and is new from the prior exam. BLADDER: Within normal limits.   BOWEL: When allowing for differences in patient positioning in slice selection, there is no substantial change in the very large mass encompassing the mid-transverse colon and several adjacent loops of small bowel and adjacent omentum and mesocolon, compatible with patient's known advanced malignancy. No dilated bowel to suggest obstruction. Stomach appears grossly normal. No definite  additional abnormal bowel wall thickening. Normal appendix. PERITONEUM: No ascites or free air, no fluid collection.        New extensive hypoenhancement/non enhancement of the left renal parenchyma compatible with extensive renal cortical infarcts. Right kidney and ureter appear normal.   There is a large ill-defined region of hypoenhancement in the right hepatic lobe measuring up to 10.8 cm in transaxial diameter, up to at least 10.1 cm in CC diameter, suspicious for interval progression of the hepatic metastasis. There is a new hypoattenuating lesion inferolaterally in the inferior tip of the right hepatic lobe (series 601, images 57-63), measuring up to approximately 2.2 cm in transaxial diameter, suspicious for new metastatic lesion. Liver is enlarged, similar to before.   Interval worsening of retroperitoneal metastatic lymphadenopathy.   Cholelithiasis without convincing evidence of acute cholecystitis.   New fluid in the endometrial canal suggesting cervical stenosis.   Additional findings as discussed above.   MACRO: None   Signed by: Faraz Link 4/3/2025 12:52 AM Dictation workstation:   [id number]         Results for orders placed or performed during the hospital encounter of 04/02/25 (from the past 24 hours)   CBC and Auto Differential   Result Value Ref Range     WBC 11.1 4.4 - 11.3 x10*3/uL     nRBC 0.0 0.0 - 0.0 /100 WBCs     RBC 4.36 4.00 - 5.20 x10*6/uL     Hemoglobin 11.5 (L) 12.0 - 16.0 g/dL     Hematocrit 38.8 36.0 - 46.0 %     MCV 89 80 - 100 fL     MCH 26.4 26.0 - 34.0 pg     MCHC 29.6 (L) 32.0 - 36.0 g/dL     RDW 19.9 (H) 11.5 - 14.5 %     Platelets 198 150 - 450 x10*3/uL     Neutrophils % 84.1 40.0 - 80.0 %     Immature Granulocytes %, Automated 0.6 0.0 - 0.9 %     Lymphocytes % 9.5 13.0 - 44.0 %     Monocytes % 5.6 2.0 - 10.0 %     Eosinophils % 0.0 0.0 - 6.0 %     Basophils % 0.2 0.0 - 2.0 %     Neutrophils Absolute 9.34 (H) 1.60 - 5.50 x10*3/uL     Immature Granulocytes Absolute, Automated  0.07 0.00 - 0.50 x10*3/uL     Lymphocytes Absolute 1.06 0.80 - 3.00 x10*3/uL     Monocytes Absolute 0.62 0.05 - 0.80 x10*3/uL     Eosinophils Absolute 0.00 0.00 - 0.40 x10*3/uL     Basophils Absolute 0.02 0.00 - 0.10 x10*3/uL   Comprehensive metabolic panel   Result Value Ref Range     Glucose 131 (H) 74 - 99 mg/dL     Sodium 131 (L) 136 - 145 mmol/L     Potassium 4.6 3.5 - 5.3 mmol/L     Chloride 96 (L) 98 - 107 mmol/L     Bicarbonate 22 21 - 32 mmol/L     Anion Gap 18 10 - 20 mmol/L     Urea Nitrogen 21 6 - 23 mg/dL     Creatinine 0.89 0.50 - 1.05 mg/dL     eGFR 62 >60 mL/min/1.73m*2     Calcium 8.4 (L) 8.6 - 10.3 mg/dL     Albumin 2.9 (L) 3.4 - 5.0 g/dL     Alkaline Phosphatase 258 (H) 33 - 136 U/L     Total Protein 6.9 6.4 - 8.2 g/dL     AST 22 9 - 39 U/L     Bilirubin, Total 0.7 0.0 - 1.2 mg/dL     ALT 16 7 - 45 U/L   Lipase   Result Value Ref Range     Lipase 7 (L) 9 - 82 U/L   Lactate   Result Value Ref Range     Lactate 1.2 0.4 - 2.0 mmol/L   Magnesium   Result Value Ref Range     Magnesium 1.90 1.60 - 2.40 mg/dL   POCT GLUCOSE   Result Value Ref Range     POCT Glucose 129 (H) 74 - 99 mg/dL   POCT GLUCOSE   Result Value Ref Range     POCT Glucose 119 (H) 74 - 99 mg/dL   Protime-INR   Result Value Ref Range     Protime 17.4 (H) 9.8 - 12.4 seconds     INR 1.6 (H) 0.9 - 1.1   CBC   Result Value Ref Range     WBC 11.5 (H) 4.4 - 11.3 x10*3/uL     nRBC 0.0 0.0 - 0.0 /100 WBCs     RBC 4.08 4.00 - 5.20 x10*6/uL     Hemoglobin 10.9 (L) 12.0 - 16.0 g/dL     Hematocrit 36.7 36.0 - 46.0 %     MCV 90 80 - 100 fL     MCH 26.7 26.0 - 34.0 pg     MCHC 29.7 (L) 32.0 - 36.0 g/dL     RDW 19.9 (H) 11.5 - 14.5 %     Platelets 181 150 - 450 x10*3/uL   Basic Metabolic Panel   Result Value Ref Range     Glucose 129 (H) 74 - 99 mg/dL     Sodium 130 (L) 136 - 145 mmol/L     Potassium 4.2 3.5 - 5.3 mmol/L     Chloride 98 98 - 107 mmol/L     Bicarbonate 21 21 - 32 mmol/L     Anion Gap 15 10 - 20 mmol/L     Urea Nitrogen 19 6 - 23 mg/dL      Creatinine 0.85 0.50 - 1.05 mg/dL     eGFR 66 >60 mL/min/1.73m*2     Calcium 8.0 (L) 8.6 - 10.3 mg/dL   Heparin Assay, UFH   Result Value Ref Range     Heparin Unfractionated 1.1 (HH) See Comment Below for Therapeutic Ranges IU/mL   POCT GLUCOSE   Result Value Ref Range     POCT Glucose 123 (H) 74 - 99 mg/dL            Assessment/Plan  Melissa is an 89-year-old female patient presenting to emergency department from [facility] nursing [facility] for evaluation of abdominal pain and vomiting.  Patient states that she developed an 8 out of 10 mid to lower abdominal pain radiating to her right flank.  Patient with a history of breast cancer and colon cancer.  CT imaging showing extensive left renal cortical infarcts as well as worsening of retroperitoneal metastatic lymphadenopathy and progression of hepatic metastasis per radiology review.  Consults placed to nephrology and vascular surgery, patient started on a heparin drip, medicated with morphine and admitted for further medical management.     Assessment & Plan  Renal infarct (Multi)     Hyponatremia     Hyponatremia/renal infarct  Inpatient admission to Dr. Lugo  See imaging results above  DNRCCA  N.p.o.  Consult nephrology and appreciate input  Consult vascular surgery and appreciate input  Coag screen ordered  Continue heparin drip  Continue gentle IV fluids  Repeat labs  Monitor heparin assay  Morphine/Zofran in ED  Dilaudid IV as needed  Tigan IM as needed 2/2 prolonged Qtc     T2DM/breast cancer/colon cancer/anemia/HLD/HTN  #Chronic conditions  Insulin sliding scale for n.p.o.  Hypoglycemia protocol  Continue home MiraLAX and Colace  Continue home lisinopril daily  Continue home amlodipine daily  Continue home Estrace tablet     DVT Ppx  SCDs  Continue heparin drip  Bedrest/up to chair as tolerated  Patient fully evaluated on April 3.  Continue analgesics and await nephrology consultation.  Appreciate vascular surgery consultation.  Patient with significant  metastatic cancer which comfort measures are in place.        I spent 60 minutes in the professional and overall care of this patient.  Tio Lugo MD                         Objective     Last Recorded Vitals  /64   Pulse 100   Temp 36.7 °C (98.1 °F)   Resp 18   Wt 81.6 kg (180 lb)   SpO2 99%   Intake/Output last 3 Shifts:    Intake/Output Summary (Last 24 hours) at 4/3/2025 1349  Last data filed at 4/3/2025 1000  Gross per 24 hour   Intake 551.83 ml   Output --   Net 551.83 ml       Admission Weight  Weight: 81.6 kg (180 lb) (04/02/25 2207)    Daily Weight  04/02/25 : 81.6 kg (180 lb)    Image Results  CT abdomen pelvis w IV contrast  Narrative: Interpreted By:  Faraz Link,   STUDY:  CT ABDOMEN PELVIS W IV CONTRAST; ;  4/3/2025 12:30 am      INDICATION:  Signs/Symptoms:stage IV colon cancer, now with R abd pain.          COMPARISON:  01/17/2025.      ACCESSION NUMBER(S):  [id number]      ORDERING CLINICIAN:  ELYSE KLERMAN      TECHNIQUE:  Axial CT images of the abdomen and pelvis with coronal and sagittal  reconstructed images performed after intravenous administration of 80  cc Omnipaque 350.      FINDINGS:  LOWER CHEST: No acute abnormality of the lung bases.  BONES: No acute osseous abnormality. Osteopenia. Right hip  arthroplasty in standard alignment. Similar appearance of  mild-to-moderate chronic compression of the T11 vertebral body.  Stable grade 1 degenerative anterolisthesis at L4-L5 and moderate to  severe multilevel degenerative changes of the imaged spine. ABDOMINAL  WALL: Within normal limits.      ABDOMEN:      LIVER: There is a large ill-defined region of hypoenhancement in the  right hepatic lobe measuring up to 10.8 cm in transaxial diameter, up  to at least 10.1 cm in CC diameter, suspicious for interval  progression of the hepatic metastasis. There is a new hypoattenuating  lesion inferolaterally in the inferior tip of the right hepatic lobe  (series 601, images 57-63),  measuring up to approximately 2.2 cm in  transaxial diameter, suspicious for new metastatic lesion. Liver is  enlarged, similar to before. BILE DUCTS: Normal caliber.  GALLBLADDER: Cholelithiasis. No wall thickening or pericholecystic  fluid. PANCREAS:Atrophic with pancreatic ductal dilation and  irregularity and parenchymal calcifications in keeping with sequelae  of chronic pancreatitis. SPLEEN: Within normal limits.  ADRENALS: Within normal limits.  KIDNEYS and URETERS: New extensive hypoenhancement/non enhancement of  the left renal parenchyma compatible with extensive renal cortical  infarcts. Right kidney and ureter appear normal.          VESSELS: Severe atherosclerosis, with critical narrowing or occlusion  of the left renal artery. Severe narrowing of the right renal artery  also noted. RETROPERITONEUM: Interval enlargement of celiac axis and  portocaval nodes, suspicious for worsened metastatic disease.      PELVIS:      REPRODUCTIVE ORGANS: Endometrial fluid suggests cervical stenosis and  is new from the prior exam. BLADDER: Within normal limits.      BOWEL: When allowing for differences in patient positioning in slice  selection, there is no substantial change in the very large mass  encompassing the mid-transverse colon and several adjacent loops of  small bowel and adjacent omentum and mesocolon, compatible with  patient's known advanced malignancy. No dilated bowel to suggest  obstruction. Stomach appears grossly normal. No definite additional  abnormal bowel wall thickening. Normal appendix. PERITONEUM: No  ascites or free air, no fluid collection.      Impression: New extensive hypoenhancement/non enhancement of the left renal  parenchyma compatible with extensive renal cortical infarcts. Right  kidney and ureter appear normal.      There is a large ill-defined region of hypoenhancement in the right  hepatic lobe measuring up to 10.8 cm in transaxial diameter, up to at  least 10.1 cm in CC  diameter, suspicious for interval progression of  the hepatic metastasis. There is a new hypoattenuating lesion  inferolaterally in the inferior tip of the right hepatic lobe (series  601, images 57-63), measuring up to approximately 2.2 cm in  transaxial diameter, suspicious for new metastatic lesion. Liver is  enlarged, similar to before.      Interval worsening of retroperitoneal metastatic lymphadenopathy.      Cholelithiasis without convincing evidence of acute cholecystitis.      New fluid in the endometrial canal suggesting cervical stenosis.      Additional findings as discussed above.      MACRO:  None      Signed by: Faraz Link 4/3/2025 12:52 AM  Dictation workstation:   [id number]      Physical Exam  Constitutional:       Appearance: She is ill-appearing.   HENT:      Mouth/Throat:      Mouth: Mucous membranes are dry.      Pharynx: Oropharynx is clear.   Eyes:      Pupils: Pupils are equal, round, and reactive to light.   Cardiovascular:      Rate and Rhythm: Normal rate and regular rhythm.   Pulmonary:      Effort: Pulmonary effort is normal.      Breath sounds: Normal breath sounds.   Abdominal:      Palpations: Abdomen is soft.      Tenderness: There is abdominal tenderness in the periumbilical area and suprapubic area.      Comments: Right flank tenderness   Musculoskeletal:      Cervical back: Normal range of motion.   Skin:     General: Skin is warm and dry.   Neurological:      General: No focal deficit present.      Mental Status: She is alert.   Psychiatric:         Mood and Affect: Mood normal.         Behavior: Behavior normal.        Relevant Results               Assessment/Plan                  Assessment & Plan  Renal infarct (Multi)    Hyponatremia    Hyponatremia/renal infarct  Inpatient admission to Dr. Lugo  See imaging results above  DNRCCA  N.p.o.  Consult nephrology and appreciate input  Consult vascular surgery and appreciate input  Coag screen ordered  Continue heparin  drip  Continue gentle IV fluids  Repeat labs  Monitor heparin assay  Morphine/Zofran in ED  Dilaudid IV as needed  Tigan IM as needed 2/2 prolonged Qtc    T2DM/breast cancer/colon cancer/anemia/HLD/HTN  #Chronic conditions  Insulin sliding scale for n.p.o.  Hypoglycemia protocol  Continue home MiraLAX and Colace  Continue home lisinopril daily  Continue home amlodipine daily  Continue home Estrace tablet    DVT Ppx  SCDs  Continue heparin drip  Bedrest/up to chair as tolerated  Patient fully evaluated on April 3.  Continue analgesics and await nephrology consultation.  Appreciate vascular surgery consultation.  Patient with significant metastatic cancer which comfort measures are in place.

## 2025-04-03 NOTE — CARE PLAN
The patient's goals for the shift include      The clinical goals for the shift include Maintain comfort and safety measures      Problem: Skin  Goal: Decreased wound size/increased tissue granulation at next dressing change  Outcome: Progressing  Flowsheets (Taken 4/3/2025 1106)  Decreased wound size/increased tissue granulation at next dressing change:   Promote sleep for wound healing   Protective dressings over bony prominences  Goal: Participates in plan/prevention/treatment measures  Outcome: Progressing  Flowsheets (Taken 4/3/2025 1106)  Participates in plan/prevention/treatment measures: Increase activity/out of bed for meals  Goal: Prevent/manage excess moisture  Outcome: Progressing  Flowsheets (Taken 4/3/2025 1106)  Prevent/manage excess moisture: Cleanse incontinence/protect with barrier cream  Goal: Prevent/minimize sheer/friction injuries  Outcome: Progressing  Flowsheets (Taken 4/3/2025 1106)  Prevent/minimize sheer/friction injuries: HOB 30 degrees or less  Goal: Promote/optimize nutrition  Outcome: Progressing  Flowsheets (Taken 4/3/2025 1106)  Promote/optimize nutrition: Reassess MST if dietician not consulted  Goal: Promote skin healing  Outcome: Progressing  Flowsheets (Taken 4/3/2025 1106)  Promote skin healing:   Assess skin/pad under line(s)/device(s)   Protective dressings over bony prominences

## 2025-04-04 LAB
ALBUMIN SERPL BCP-MCNC: 2.3 G/DL (ref 3.4–5)
ALP SERPL-CCNC: 165 U/L (ref 33–136)
ALT SERPL W P-5'-P-CCNC: 12 U/L (ref 7–45)
ANION GAP SERPL CALC-SCNC: 13 MMOL/L (ref 10–20)
AST SERPL W P-5'-P-CCNC: 14 U/L (ref 9–39)
BILIRUB SERPL-MCNC: 0.8 MG/DL (ref 0–1.2)
BUN SERPL-MCNC: 24 MG/DL (ref 6–23)
CALCIUM SERPL-MCNC: 7.8 MG/DL (ref 8.6–10.3)
CHLORIDE SERPL-SCNC: 100 MMOL/L (ref 98–107)
CHLORIDE UR-SCNC: 104 MMOL/L
CHLORIDE/CREATININE (MMOL/G) IN URINE: 167 MMOL/G CREAT (ref 38–318)
CO2 SERPL-SCNC: 23 MMOL/L (ref 21–32)
CREAT SERPL-MCNC: 0.91 MG/DL (ref 0.5–1.05)
CREAT UR-MCNC: 62.1 MG/DL (ref 20–320)
D DIMER PPP FEU-MCNC: 1650 NG/ML FEU
EGFRCR SERPLBLD CKD-EPI 2021: 61 ML/MIN/1.73M*2
ERYTHROCYTE [DISTWIDTH] IN BLOOD BY AUTOMATED COUNT: 20.1 % (ref 11.5–14.5)
GLUCOSE BLD MANUAL STRIP-MCNC: 157 MG/DL (ref 74–99)
GLUCOSE BLD MANUAL STRIP-MCNC: 167 MG/DL (ref 74–99)
GLUCOSE BLD MANUAL STRIP-MCNC: 175 MG/DL (ref 74–99)
GLUCOSE BLD MANUAL STRIP-MCNC: 179 MG/DL (ref 74–99)
GLUCOSE SERPL-MCNC: 196 MG/DL (ref 74–99)
HCT VFR BLD AUTO: 36.3 % (ref 36–46)
HGB BLD-MCNC: 11.1 G/DL (ref 12–16)
MCH RBC QN AUTO: 27.1 PG (ref 26–34)
MCHC RBC AUTO-ENTMCNC: 30.6 G/DL (ref 32–36)
MCV RBC AUTO: 89 FL (ref 80–100)
MICROALBUMIN UR-MCNC: 44.8 MG/L
MICROALBUMIN/CREAT UR: 72.1 UG/MG CREAT
NRBC BLD-RTO: 0 /100 WBCS (ref 0–0)
OSMOLALITY UR: 630 MOSM/KG (ref 200–1200)
PLATELET # BLD AUTO: 180 X10*3/UL (ref 150–450)
POTASSIUM SERPL-SCNC: 4.2 MMOL/L (ref 3.5–5.3)
POTASSIUM UR-SCNC: 63 MMOL/L
POTASSIUM/CREAT UR-RTO: 101 MMOL/G CREAT
PROT SERPL-MCNC: 5.6 G/DL (ref 6.4–8.2)
RBC # BLD AUTO: 4.09 X10*6/UL (ref 4–5.2)
SODIUM SERPL-SCNC: 132 MMOL/L (ref 136–145)
SODIUM UR-SCNC: 87 MMOL/L
SODIUM/CREAT UR-RTO: 140 MMOL/G CREAT
UFH PPP CHRO-ACNC: 0.1 IU/ML
UREA/CREAT UR-SRTO: 9.7 G/G CREAT
UUN UR-MCNC: 602 MG/DL
WBC # BLD AUTO: 15.4 X10*3/UL (ref 4.4–11.3)

## 2025-04-04 PROCEDURE — 1100000001 HC PRIVATE ROOM DAILY

## 2025-04-04 PROCEDURE — 85379 FIBRIN DEGRADATION QUANT: CPT | Performed by: INTERNAL MEDICINE

## 2025-04-04 PROCEDURE — 99232 SBSQ HOSP IP/OBS MODERATE 35: CPT | Performed by: NURSE PRACTITIONER

## 2025-04-04 PROCEDURE — 85027 COMPLETE CBC AUTOMATED: CPT | Performed by: INTERNAL MEDICINE

## 2025-04-04 PROCEDURE — 2500000004 HC RX 250 GENERAL PHARMACY W/ HCPCS (ALT 636 FOR OP/ED): Performed by: INTERNAL MEDICINE

## 2025-04-04 PROCEDURE — 2500000001 HC RX 250 WO HCPCS SELF ADMINISTERED DRUGS (ALT 637 FOR MEDICARE OP): Performed by: INTERNAL MEDICINE

## 2025-04-04 PROCEDURE — 2500000002 HC RX 250 W HCPCS SELF ADMINISTERED DRUGS (ALT 637 FOR MEDICARE OP, ALT 636 FOR OP/ED): Performed by: NURSE PRACTITIONER

## 2025-04-04 PROCEDURE — 2500000001 HC RX 250 WO HCPCS SELF ADMINISTERED DRUGS (ALT 637 FOR MEDICARE OP): Performed by: NURSE PRACTITIONER

## 2025-04-04 PROCEDURE — 36415 COLL VENOUS BLD VENIPUNCTURE: CPT | Performed by: INTERNAL MEDICINE

## 2025-04-04 PROCEDURE — 82947 ASSAY GLUCOSE BLOOD QUANT: CPT

## 2025-04-04 PROCEDURE — 85520 HEPARIN ASSAY: CPT | Performed by: NURSE PRACTITIONER

## 2025-04-04 PROCEDURE — 80053 COMPREHEN METABOLIC PANEL: CPT | Performed by: INTERNAL MEDICINE

## 2025-04-04 RX ORDER — ONDANSETRON HYDROCHLORIDE 2 MG/ML
4 INJECTION, SOLUTION INTRAVENOUS EVERY 4 HOURS PRN
Status: DISCONTINUED | OUTPATIENT
Start: 2025-04-04 | End: 2025-04-04

## 2025-04-04 RX ORDER — PHENAZOPYRIDINE HYDROCHLORIDE 100 MG/1
95 TABLET, FILM COATED ORAL
Status: DISPENSED | OUTPATIENT
Start: 2025-04-04

## 2025-04-04 RX ORDER — ONDANSETRON HYDROCHLORIDE 2 MG/ML
4 INJECTION, SOLUTION INTRAVENOUS EVERY 6 HOURS PRN
Status: DISPENSED | OUTPATIENT
Start: 2025-04-04

## 2025-04-04 RX ADMIN — PHENAZOPYRIDINE 100 MG: 100 TABLET ORAL at 21:24

## 2025-04-04 RX ADMIN — ONDANSETRON 4 MG: 2 INJECTION INTRAMUSCULAR; INTRAVENOUS at 13:44

## 2025-04-04 RX ADMIN — SENNOSIDES AND DOCUSATE SODIUM 1 TABLET: 50; 8.6 TABLET ORAL at 21:24

## 2025-04-04 RX ADMIN — APIXABAN 2.5 MG: 2.5 TABLET, FILM COATED ORAL at 11:33

## 2025-04-04 RX ADMIN — APIXABAN 2.5 MG: 2.5 TABLET, FILM COATED ORAL at 21:24

## 2025-04-04 RX ADMIN — ONDANSETRON 4 MG: 2 INJECTION INTRAMUSCULAR; INTRAVENOUS at 21:32

## 2025-04-04 RX ADMIN — INSULIN LISPRO 1 UNITS: 100 INJECTION, SOLUTION INTRAVENOUS; SUBCUTANEOUS at 00:35

## 2025-04-04 ASSESSMENT — PAIN SCALES - GENERAL
PAINLEVEL_OUTOF10: 0 - NO PAIN
PAINLEVEL_OUTOF10: 0 - NO PAIN

## 2025-04-04 NOTE — PROGRESS NOTES
Melissa Tse is a 89 y.o. female on day 1 of admission presenting with Renal infarct (Multi).      Subjective   Abdominal pain persist.  Today she is hypotensive  amlodipine to be placed on hold.  She is on as needed Apresoline.  IV.  Continue to be on anticoagulation  FENA= plan 0.92%  Objective          Vitals 24HR  Heart Rate:  []   Temp:  [35.8 °C (96.4 °F)-36.3 °C (97.3 °F)]   Resp:  [18]   BP: ()/(50-77)   SpO2:  [92 %-99 %]         Intake/Output last 3 Shifts:    Intake/Output Summary (Last 24 hours) at 4/4/2025 1146  Last data filed at 4/4/2025 1139  Gross per 24 hour   Intake 1140.33 ml   Output --   Net 1140.33 ml       Physical Exam  General appearance; chronically ill looking  Skin pallor   HEENT; pupils react to light and accommodation  Neck; no JVD no bruit no thyromegaly  Lungs; inspiratory rhonchi  Heart; regular rate no gallop no rubs  Pulses are equal  Abdomen; active peristalsis no rebound or guarding is mild epigastric tenderness  ; mild left CVA tenderness  Extremities; decreased muscle tone poor skin turgor  Neurological; pathological reflexes are absent    Relevant Results  Results for orders placed or performed during the hospital encounter of 04/02/25 (from the past 24 hours)   Heparin Assay, UFH   Result Value Ref Range    Heparin Unfractionated 0.4 See Comment Below for Therapeutic Ranges IU/mL   POCT GLUCOSE   Result Value Ref Range    POCT Glucose 163 (H) 74 - 99 mg/dL   Albumin-Creatinine Ratio, Urine Random   Result Value Ref Range    Albumin, Urine Random 44.8 Not established mg/L    Creatinine, Urine Random 62.1 20.0 - 320.0 mg/dL    Albumin/Creatinine Ratio 72.1 (H) <30.0 ug/mg Creat   Urine electrolytes   Result Value Ref Range    Sodium, Urine Random 87 mmol/L    Sodium/Creatinine Ratio 140 Not established. mmol/g Creat    Potassium, Urine Random 63 mmol/L    Potassium/Creatinine Ratio 101 Not established mmol/g Creat    Chloride, Urine Random 104 mmol/L     Chloride/Creatinine Ratio 167 38 - 318 mmol/g creat    Creatinine, Urine Random 62.1 20.0 - 320.0 mg/dL   Osmolality, urine   Result Value Ref Range    Osmolality, Urine Random 630 200 - 1,200 mOsm/kg   Urea Nitrogen, Urine Random   Result Value Ref Range    Urea Nitrogen, Urine Random 602 mg/dL    Creatinine, Urine Random 62.1 20.0 - 320.0 mg/dL    Urea Nitrogen/Creatinine Ratio 9.7 Not established. g/g creat   POCT GLUCOSE   Result Value Ref Range    POCT Glucose 154 (H) 74 - 99 mg/dL   Heparin Assay, UFH   Result Value Ref Range    Heparin Unfractionated 0.4 See Comment Below for Therapeutic Ranges IU/mL   POCT GLUCOSE   Result Value Ref Range    POCT Glucose 157 (H) 74 - 99 mg/dL   Comprehensive Metabolic Panel   Result Value Ref Range    Glucose 196 (H) 74 - 99 mg/dL    Sodium 132 (L) 136 - 145 mmol/L    Potassium 4.2 3.5 - 5.3 mmol/L    Chloride 100 98 - 107 mmol/L    Bicarbonate 23 21 - 32 mmol/L    Anion Gap 13 10 - 20 mmol/L    Urea Nitrogen 24 (H) 6 - 23 mg/dL    Creatinine 0.91 0.50 - 1.05 mg/dL    eGFR 61 >60 mL/min/1.73m*2    Calcium 7.8 (L) 8.6 - 10.3 mg/dL    Albumin 2.3 (L) 3.4 - 5.0 g/dL    Alkaline Phosphatase 165 (H) 33 - 136 U/L    Total Protein 5.6 (L) 6.4 - 8.2 g/dL    AST 14 9 - 39 U/L    Bilirubin, Total 0.8 0.0 - 1.2 mg/dL    ALT 12 7 - 45 U/L   D-dimer, Non VTE   Result Value Ref Range    D-Dimer Non VTE, Quant (ng/mL FEU) 1,650 (H) <=500 ng/mL FEU   CBC   Result Value Ref Range    WBC 15.4 (H) 4.4 - 11.3 x10*3/uL    nRBC 0.0 0.0 - 0.0 /100 WBCs    RBC 4.09 4.00 - 5.20 x10*6/uL    Hemoglobin 11.1 (L) 12.0 - 16.0 g/dL    Hematocrit 36.3 36.0 - 46.0 %    MCV 89 80 - 100 fL    MCH 27.1 26.0 - 34.0 pg    MCHC 30.6 (L) 32.0 - 36.0 g/dL    RDW 20.1 (H) 11.5 - 14.5 %    Platelets 180 150 - 450 x10*3/uL   Heparin Assay, UFH   Result Value Ref Range    Heparin Unfractionated 0.1 See Comment Below for Therapeutic Ranges IU/mL   POCT GLUCOSE   Result Value Ref Range    POCT Glucose 179 (H) 74 - 99  mg/dL       Electrocardiogram, 12-lead PRN ACS symptoms    Result Date: 4/4/2025  Unknown rhythm, irregular rate Probable lateral infarct, old    CT abdomen pelvis w IV contrast    Result Date: 4/3/2025  Interpreted By:  Faraz Link, STUDY: CT ABDOMEN PELVIS W IV CONTRAST; ;  4/3/2025 12:30 am   INDICATION: Signs/Symptoms:stage IV colon cancer, now with R abd pain.     COMPARISON: 01/17/2025.   ACCESSION NUMBER(S): [id number]   ORDERING CLINICIAN: ELYSE KLERMAN   TECHNIQUE: Axial CT images of the abdomen and pelvis with coronal and sagittal reconstructed images performed after intravenous administration of 80 cc Omnipaque 350.   FINDINGS: LOWER CHEST: No acute abnormality of the lung bases. BONES: No acute osseous abnormality. Osteopenia. Right hip arthroplasty in standard alignment. Similar appearance of mild-to-moderate chronic compression of the T11 vertebral body. Stable grade 1 degenerative anterolisthesis at L4-L5 and moderate to severe multilevel degenerative changes of the imaged spine. ABDOMINAL WALL: Within normal limits.   ABDOMEN:   LIVER: There is a large ill-defined region of hypoenhancement in the right hepatic lobe measuring up to 10.8 cm in transaxial diameter, up to at least 10.1 cm in CC diameter, suspicious for interval progression of the hepatic metastasis. There is a new hypoattenuating lesion inferolaterally in the inferior tip of the right hepatic lobe (series 601, images 57-63), measuring up to approximately 2.2 cm in transaxial diameter, suspicious for new metastatic lesion. Liver is enlarged, similar to before. BILE DUCTS: Normal caliber. GALLBLADDER: Cholelithiasis. No wall thickening or pericholecystic fluid. PANCREAS:Atrophic with pancreatic ductal dilation and irregularity and parenchymal calcifications in keeping with sequelae of chronic pancreatitis. SPLEEN: Within normal limits. ADRENALS: Within normal limits. KIDNEYS and URETERS: New extensive hypoenhancement/non enhancement  of the left renal parenchyma compatible with extensive renal cortical infarcts. Right kidney and ureter appear normal.     VESSELS: Severe atherosclerosis, with critical narrowing or occlusion of the left renal artery. Severe narrowing of the right renal artery also noted. RETROPERITONEUM: Interval enlargement of celiac axis and portocaval nodes, suspicious for worsened metastatic disease.   PELVIS:   REPRODUCTIVE ORGANS: Endometrial fluid suggests cervical stenosis and is new from the prior exam. BLADDER: Within normal limits.   BOWEL: When allowing for differences in patient positioning in slice selection, there is no substantial change in the very large mass encompassing the mid-transverse colon and several adjacent loops of small bowel and adjacent omentum and mesocolon, compatible with patient's known advanced malignancy. No dilated bowel to suggest obstruction. Stomach appears grossly normal. No definite additional abnormal bowel wall thickening. Normal appendix. PERITONEUM: No ascites or free air, no fluid collection.       New extensive hypoenhancement/non enhancement of the left renal parenchyma compatible with extensive renal cortical infarcts. Right kidney and ureter appear normal.   There is a large ill-defined region of hypoenhancement in the right hepatic lobe measuring up to 10.8 cm in transaxial diameter, up to at least 10.1 cm in CC diameter, suspicious for interval progression of the hepatic metastasis. There is a new hypoattenuating lesion inferolaterally in the inferior tip of the right hepatic lobe (series 601, images 57-63), measuring up to approximately 2.2 cm in transaxial diameter, suspicious for new metastatic lesion. Liver is enlarged, similar to before.   Interval worsening of retroperitoneal metastatic lymphadenopathy.   Cholelithiasis without convincing evidence of acute cholecystitis.   New fluid in the endometrial canal suggesting cervical stenosis.   Additional findings as discussed  above.   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      Garry Pacheco MD

## 2025-04-04 NOTE — CARE PLAN
The patient's goals for the shift include  to be left alone    The clinical goals for the shift include Maintain comfort and safety measures      Problem: Pain - Adult  Goal: Verbalizes/displays adequate comfort level or baseline comfort level  Outcome: Progressing     Problem: Safety - Adult  Goal: Free from fall injury  Outcome: Progressing     Problem: Discharge Planning  Goal: Discharge to home or other facility with appropriate resources  Outcome: Progressing     Problem: Chronic Conditions and Co-morbidities  Goal: Patient's chronic conditions and co-morbidity symptoms are monitored and maintained or improved  Outcome: Progressing     Problem: Nutrition  Goal: Nutrient intake appropriate for maintaining nutritional needs  Outcome: Progressing     Problem: Fall/Injury  Goal: Not fall by end of shift  Outcome: Progressing  Goal: Be free from injury by end of the shift  Outcome: Progressing  Goal: Verbalize understanding of personal risk factors for fall in the hospital  Outcome: Progressing  Goal: Verbalize understanding of risk factor reduction measures to prevent injury from fall in the home  Outcome: Progressing  Goal: Use assistive devices by end of the shift  Outcome: Progressing  Goal: Pace activities to prevent fatigue by end of the shift  Outcome: Progressing     Problem: Skin  Goal: Decreased wound size/increased tissue granulation at next dressing change  4/3/2025 2349 by Alesha Rucker RN  Outcome: Progressing  4/3/2025 2349 by Alesha Rucker RN  Flowsheets (Taken 4/3/2025 2349)  Decreased wound size/increased tissue granulation at next dressing change: Promote sleep for wound healing  Goal: Participates in plan/prevention/treatment measures  4/3/2025 2349 by Alesha Rucker RN  Outcome: Progressing  4/3/2025 2349 by Alesha Rucker RN  Flowsheets (Taken 4/3/2025 2349)  Participates in plan/prevention/treatment measures: Elevate heels  Goal: Prevent/manage excess moisture  4/3/2025 2349 by Alesha  LINNETTE Rucker  Outcome: Progressing  4/3/2025 2349 by Alesha Rucker RN  Flowsheets (Taken 4/3/2025 2349)  Prevent/manage excess moisture:   Moisturize dry skin   Cleanse incontinence/protect with barrier cream  Goal: Prevent/minimize sheer/friction injuries  4/3/2025 2349 by Alesha Rucker RN  Outcome: Progressing  4/3/2025 2349 by Alesha Rucker RN  Flowsheets (Taken 4/3/2025 2349)  Prevent/minimize sheer/friction injuries: Use pull sheet  Goal: Promote/optimize nutrition  4/3/2025 2349 by Alesha Rucker RN  Outcome: Progressing  4/3/2025 2349 by Alesha Rucker RN  Flowsheets (Taken 4/3/2025 2349)  Promote/optimize nutrition:   Offer water/supplements/favorite foods   Monitor/record intake including meals  Goal: Promote skin healing  4/3/2025 2349 by Alesha Rucker RN  Outcome: Progressing  4/3/2025 2349 by Alesha Rucker RN  Flowsheets (Taken 4/3/2025 2349)  Promote skin healing:   Turn/reposition every 2 hours/use positioning/transfer devices   Assess skin/pad under line(s)/device(s)     Problem: Diabetes  Goal: Achieve decreasing blood glucose levels by end of shift  Outcome: Progressing  Goal: Increase stability of blood glucose readings by end of shift  Outcome: Progressing  Goal: Decrease in ketones present in urine by end of shift  Outcome: Progressing  Goal: Maintain electrolyte levels within acceptable range throughout shift  Outcome: Progressing  Goal: Maintain glucose levels >70mg/dl to <250mg/dl throughout shift  Outcome: Progressing  Goal: No changes in neurological exam by end of shift  Outcome: Progressing  Goal: Learn about and adhere to nutrition recommendations by end of shift  Outcome: Progressing  Goal: Vital signs within normal range for age by end of shift  Outcome: Progressing  Goal: Increase self care and/or family involovement by end of shift  Outcome: Progressing  Goal: Receive DSME education by end of shift  Outcome: Progressing

## 2025-04-04 NOTE — CONSULTS
"Nutrition Initial Assessment:   Nutrition Assessment    Reason for Assessment: Admission nursing screening (MSt=5 for weight loss and decreased PO intakes/wound)    Patient is a 89 y.o. female presenting with abdominal pain; renal infarct    Pmhx: breast CA, stage 4 colon cancer, T2DM, anemia, HTN, HLD    Nutrition History:  Energy Intake: Poor < 50 % (NPO)  Pain affecting nutrition status: N/A  Food and Nutrient History: Pt unavailable at time of attempted visit today, pt care being performed.  Pt is NPO. Per review of chart CT shows worsening retroperitoneal metastatic lympadenopathy and progression of hepatic metastasis.       Anthropometrics:  Height: 162.6 cm (5' 4.02\")   Weight: 81.6 kg (179 lb 14.3 oz)   BMI (Calculated): 30.86  IBW/kg (Dietitian Calculated): 54.5 kg  Percent of IBW: 150 %      Weight History:     Weight Change %:  Weight History / % Weight Change: 1/16 83.9kg, 1/10 83.9kg  Significant Weight Loss: No    Nutrition Focused Physical Exam Findings:    Subcutaneous Fat Loss:   Defer Subcutaneous Fat Loss Assessment: Defer all  Defer All Reason: unavailable  Muscle Wasting:  Defer Muscle Wasting Assessment: Defer all  Defer All Reason: unavailable  Edema:  Edema: none  Physical Findings:  Skin: Positive (L foot wound)    Nutrition Significant Labs:  CBC Trend:   Results from last 7 days   Lab Units 04/04/25  0658 04/03/25  0627 04/02/25  2259   WBC AUTO x10*3/uL 15.4* 11.5* 11.1   RBC AUTO x10*6/uL 4.09 4.08 4.36   HEMOGLOBIN g/dL 11.1* 10.9* 11.5*   HEMATOCRIT % 36.3 36.7 38.8   MCV fL 89 90 89   PLATELETS AUTO x10*3/uL 180 181 198    , BMP Trend:   Results from last 7 days   Lab Units 04/04/25  0658 04/03/25  0627 04/02/25  2259   GLUCOSE mg/dL 196* 129* 131*   CALCIUM mg/dL 7.8* 8.0* 8.4*   SODIUM mmol/L 132* 130* 131*   POTASSIUM mmol/L 4.2 4.2 4.6   CO2 mmol/L 23 21 22   CHLORIDE mmol/L 100 98 96*   BUN mg/dL 24* 19 21   CREATININE mg/dL 0.91 0.85 0.89    , A1C:  Lab Results   Component Value " Date    HGBA1C 5.9 (H) 01/10/2025   , BG POCT trend:   Results from last 7 days   Lab Units 04/04/25  1131 04/04/25  0104 04/03/25  1936/25  1712 04/03/25  1131   POCT GLUCOSE mg/dL 179* 157* 154* 163* 123*        Nutrition Specific Medications:  Reviewed     I/O:   Last BM Date: 04/02/25;      Dietary Orders (From admission, onward)       Start     Ordered    04/03/25 1205  Oral nutritional supplements  Until discontinued        Question Answer Comment   Deliver with All meals    Select supplement: Magic Cup        04/03/25 1205    04/03/25 0833  May Participate in Room Service  ( ROOM SERVICE MAY PARTICIPATE)  Once        Question:  .  Answer:  Yes    04/03/25 0832    04/03/25 0339  NPO Diet; Effective now  Diet effective now         04/03/25 0338                     Estimated Needs:      Method for Estimating Needs: 1525-1745kcals (28-32kcals/kg IBW)     Method for Estimating 24 Hour Protein Needs: 65-82g (1.2-1.5g/kg IBW )     Method for Estimating 24 Hour Fluid Needs: 1 mL/kcal or as per MD  Patient on Order Fluid Restriction: No        Nutrition Diagnosis   Malnutrition Diagnosis  Patient has Malnutrition Diagnosis:  (unable to determine at this time)    Nutrition Diagnosis  Patient has Nutrition Diagnosis: Yes  Diagnosis Status (1): New  Nutrition Diagnosis 1: Inadequate protein energy intake  Related to (1): acute on chronic illness  As Evidenced by (1): NPO       Nutrition Interventions/Recommendations        Nutrition Recommendations:  Individualized Nutrition Prescription Provided for : Consider palliative consult for GOC discussions; provide LRD for pleasure    Nutrition Interventions/Goals:          Education Documentation  No documentation found.     N/A     Nutrition Monitoring and Evaluation   Food/Nutrient Related History Monitoring  Additional Plans: Monitor for GOC discussions or ability to advance to LRD    Anthropometric Measurements  Monitoring and Evaluation Plan: Body weight  Body  Weight: Body weight - Maintain stable weight    Biochemical Data, Medical Tests and Procedures  Monitoring and Evaluation Plan: Electrolyte/renal panel, Glucose/endocrine profile  Electrolyte and Renal Panel: Electrolytes within normal limits, Sodium  Glucose/Endocrine Profile: Glucose within normal limits ( mg/dL)    Physical Exam Findings  Monitoring and Evaluation Plan: Skin  Skin Finding: Impaired wound healing - Improved wound healing, Impaired wound healing - Skin to heal    Goal Status: New goal(s) identified    Time Spent (min): 30 minutes

## 2025-04-04 NOTE — CARE PLAN
The patient's goals for the shift include      The clinical goals for the shift include Pt. comfort    Problem: Pain - Adult  Goal: Verbalizes/displays adequate comfort level or baseline comfort level  Outcome: Progressing     Problem: Safety - Adult  Goal: Free from fall injury  Outcome: Progressing     Problem: Discharge Planning  Goal: Discharge to home or other facility with appropriate resources  Outcome: Progressing     Problem: Chronic Conditions and Co-morbidities  Goal: Patient's chronic conditions and co-morbidity symptoms are monitored and maintained or improved  Outcome: Progressing     Problem: Nutrition  Goal: Nutrient intake appropriate for maintaining nutritional needs  Outcome: Progressing     Problem: Fall/Injury  Goal: Not fall by end of shift  Outcome: Progressing  Goal: Be free from injury by end of the shift  Outcome: Progressing  Goal: Verbalize understanding of personal risk factors for fall in the hospital  Outcome: Progressing  Goal: Verbalize understanding of risk factor reduction measures to prevent injury from fall in the home  Outcome: Progressing  Goal: Use assistive devices by end of the shift  Outcome: Progressing  Goal: Pace activities to prevent fatigue by end of the shift  Outcome: Progressing     Problem: Skin  Goal: Decreased wound size/increased tissue granulation at next dressing change  Outcome: Progressing  Goal: Participates in plan/prevention/treatment measures  Outcome: Progressing  Goal: Prevent/manage excess moisture  Outcome: Progressing  Goal: Prevent/minimize sheer/friction injuries  Outcome: Progressing  Goal: Promote/optimize nutrition  Outcome: Progressing  Goal: Promote skin healing  Outcome: Progressing     Problem: Diabetes  Goal: Achieve decreasing blood glucose levels by end of shift  Outcome: Progressing  Goal: Increase stability of blood glucose readings by end of shift  Outcome: Progressing  Goal: Decrease in ketones present in urine by end of  shift  Outcome: Progressing  Goal: Maintain electrolyte levels within acceptable range throughout shift  Outcome: Progressing  Goal: Maintain glucose levels >70mg/dl to <250mg/dl throughout shift  Outcome: Progressing  Goal: No changes in neurological exam by end of shift  Outcome: Progressing  Goal: Learn about and adhere to nutrition recommendations by end of shift  Outcome: Progressing  Goal: Vital signs within normal range for age by end of shift  Outcome: Progressing  Goal: Increase self care and/or family involovement by end of shift  Outcome: Progressing  Goal: Receive DSME education by end of shift  Outcome: Progressing

## 2025-04-04 NOTE — DISCHARGE SUMMARY
Discharge Diagnosis  Renal infarct (Multi)    Issues Requiring Follow-Up      Discharge Meds     Medication List      ASK your doctor about these medications     acetaminophen 325 mg tablet; Commonly known as: Tylenol; Take 2 tablets   (650 mg) by mouth every 4 hours if needed for mild pain (1 - 3).   amLODIPine 2.5 mg tablet; Commonly known as: Norvasc   Basaglar KwikPen U-100 Insulin 100 unit/mL (3 mL) pen; Generic drug:   insulin glargine   cetirizine 10 mg chewable tablet; Commonly known as: ZyrTEC   docusate sodium 100 mg capsule; Commonly known as: Colace   enoxaparin 40 mg/0.4 mL syringe; Commonly known as: Lovenox; Inject 0.4   mL (40 mg) under the skin once every 24 hours.   estradiol 1 mg tablet; Commonly known as: Estrace; Take 1 tablet (1 mg)   by mouth once daily.   ferrous sulfate 325 (65 Fe) mg EC tablet   insulin lispro 100 unit/mL injection; Inject 0-5 Units under the skin 3   times a day before meals. Take as directed per insulin instructions.   lactobacillus acidophilus & bulgar 1 million cell chewable tablet;   Commonly known as: Lactinex   lidocaine 5 % patch; Commonly known as: Lidoderm   lisinopril 10 mg tablet   meloxicam 7.5 mg tablet; Commonly known as: Mobic   metoclopramide 5 mg tablet; Commonly known as: Reglan   mirtazapine 7.5 mg tablet; Commonly known as: Remeron   ondansetron 4 mg tablet; Commonly known as: Zofran   polyethylene glycol 17 gram packet; Commonly known as: Glycolax,   Miralax; Take 17 g by mouth once daily.   sennosides-docusate sodium 8.6-50 mg tablet; Commonly known as:   Johana-Colace; Take 1 tablet by mouth once daily at bedtime.   sodium chloride 1,000 mg tablet   trolamine salicylate 10 % cream; Commonly known as: Aspercreme       Test Results Pending At Discharge  Pending Labs       No current pending labs.            Hospital Course         Tio Lugo MD   Physician  Internal Medicine     H&P     Signed     Date of Service: 4/3/2025 12:43 PM     Signed        Expand All Collapse All    History Of Present Illness  Melissa Tse is a 89 y.o. female with a past medical history significant for breast cancer and stage IV colon cancer presenting to emergency department from [facility] for evaluation of abdominal pain.  Patient also reports an episode of vomiting this morning and states she has been unable to keep anything down today and complains of  a pressure in the mid to lower abdomen that radiates to her right flank.  Patient reports that her doctor will only give her Tylenol for pain and that is not working.  Patient states that her last bowel movement was 4/1/2025 without difficulty.  Patient denies chest pain or dizziness.  Patient denies recent illness, fever, or chills.  Patient denies shortness of breath.     In ED, lipase 7, glucose 131, sodium 131, chloride 96, calcium 8.4, albumin 2.9, alk phos 258, hemoglobin 11.5.  EKG completed showing normal sinus rhythm at a rate of 88 without ST elevation or depression with a prolonged QTc of 507 per ED physician review.  A CT of the abdomen and pelvis was completed showing hypo-enhancement of the left renal parenchyma compatible with extensive renal cortical infarcts as well as fluid in the endometrial canal concerning for cervical stenosis and worsening of a retroperitoneal metastatic lymphadenopathy as well as progression of hepatic  metastasis per radiology review.  Patient medicated with Zofran, morphine, LR x 1 L bolus in ED.  Blood pressure 149/62, heart rate 92, respirations 20, temperature 36.5 °C, SpO2 95% on room air.  Consult placed to vascular surgery and nephrology.  Patient started on a heparin drip.  Inpatient admission under the care of Dr. Lugo who will continue to follow.  I was asked to do H&P and place initial admission orders.     Prior medical records reviewed.     Past Medical History  Breast cancer, stage IV colon cancer, T2DM, anemia, hypertension, hyperlipidemia  Surgical  "History  Colonoscopy, tubal ligation, hip replacement, cataract surgery      Social History  Never smoker, no drug use, no alcohol use  Family History  Cataract, diabetes     Allergies  Penicillin, Penicillins, and Statins-hmg-coa reductase inhibitors     Review of Systems  A 10 point review of systems was completed and is negative except what is listed in HPI  Physical Exam  Constitutional:       Appearance: She is ill-appearing.   HENT:      Mouth/Throat:      Mouth: Mucous membranes are dry.      Pharynx: Oropharynx is clear.   Eyes:      Pupils: Pupils are equal, round, and reactive to light.   Cardiovascular:      Rate and Rhythm: Normal rate and regular rhythm.   Pulmonary:      Effort: Pulmonary effort is normal.      Breath sounds: Normal breath sounds.   Abdominal:      Palpations: Abdomen is soft.      Tenderness: There is abdominal tenderness in the periumbilical area and suprapubic area.      Comments: Right flank tenderness   Musculoskeletal:      Cervical back: Normal range of motion.   Skin:     General: Skin is warm and dry.   Neurological:      General: No focal deficit present.      Mental Status: She is alert.   Psychiatric:         Mood and Affect: Mood normal.         Behavior: Behavior normal.            Last Recorded Vitals  Blood pressure 126/64, pulse 100, temperature 36.7 °C (98.1 °F), resp. rate 18, height 1.626 m (5' 4\"), weight 81.6 kg (180 lb), SpO2 99%.     Relevant Results  CT abdomen pelvis w IV contrast     Result Date: 4/3/2025  Interpreted By:  Faraz Link, STUDY: CT ABDOMEN PELVIS W IV CONTRAST; ;  4/3/2025 12:30 am   INDICATION: Signs/Symptoms:stage IV colon cancer, now with R abd pain.     COMPARISON: 01/17/2025.   ACCESSION NUMBER(S): [id number]   ORDERING CLINICIAN: ELYSE KLERMAN   TECHNIQUE: Axial CT images of the abdomen and pelvis with coronal and sagittal reconstructed images performed after intravenous administration of 80 cc Omnipaque 350.   FINDINGS: LOWER CHEST: " No acute abnormality of the lung bases. BONES: No acute osseous abnormality. Osteopenia. Right hip arthroplasty in standard alignment. Similar appearance of mild-to-moderate chronic compression of the T11 vertebral body. Stable grade 1 degenerative anterolisthesis at L4-L5 and moderate to severe multilevel degenerative changes of the imaged spine. ABDOMINAL WALL: Within normal limits.   ABDOMEN:   LIVER: There is a large ill-defined region of hypoenhancement in the right hepatic lobe measuring up to 10.8 cm in transaxial diameter, up to at least 10.1 cm in CC diameter, suspicious for interval progression of the hepatic metastasis. There is a new hypoattenuating lesion inferolaterally in the inferior tip of the right hepatic lobe (series 601, images 57-63), measuring up to approximately 2.2 cm in transaxial diameter, suspicious for new metastatic lesion. Liver is enlarged, similar to before. BILE DUCTS: Normal caliber. GALLBLADDER: Cholelithiasis. No wall thickening or pericholecystic fluid. PANCREAS:Atrophic with pancreatic ductal dilation and irregularity and parenchymal calcifications in keeping with sequelae of chronic pancreatitis. SPLEEN: Within normal limits. ADRENALS: Within normal limits. KIDNEYS and URETERS: New extensive hypoenhancement/non enhancement of the left renal parenchyma compatible with extensive renal cortical infarcts. Right kidney and ureter appear normal.     VESSELS: Severe atherosclerosis, with critical narrowing or occlusion of the left renal artery. Severe narrowing of the right renal artery also noted. RETROPERITONEUM: Interval enlargement of celiac axis and portocaval nodes, suspicious for worsened metastatic disease.   PELVIS:   REPRODUCTIVE ORGANS: Endometrial fluid suggests cervical stenosis and is new from the prior exam. BLADDER: Within normal limits.   BOWEL: When allowing for differences in patient positioning in slice selection, there is no substantial change in the very large  mass encompassing the mid-transverse colon and several adjacent loops of small bowel and adjacent omentum and mesocolon, compatible with patient's known advanced malignancy. No dilated bowel to suggest obstruction. Stomach appears grossly normal. No definite additional abnormal bowel wall thickening. Normal appendix. PERITONEUM: No ascites or free air, no fluid collection.        New extensive hypoenhancement/non enhancement of the left renal parenchyma compatible with extensive renal cortical infarcts. Right kidney and ureter appear normal.   There is a large ill-defined region of hypoenhancement in the right hepatic lobe measuring up to 10.8 cm in transaxial diameter, up to at least 10.1 cm in CC diameter, suspicious for interval progression of the hepatic metastasis. There is a new hypoattenuating lesion inferolaterally in the inferior tip of the right hepatic lobe (series 601, images 57-63), measuring up to approximately 2.2 cm in transaxial diameter, suspicious for new metastatic lesion. Liver is enlarged, similar to before.   Interval worsening of retroperitoneal metastatic lymphadenopathy.   Cholelithiasis without convincing evidence of acute cholecystitis.   New fluid in the endometrial canal suggesting cervical stenosis.   Additional findings as discussed above.   MACRO: None   Signed by: Faraz Link 4/3/2025 12:52 AM Dictation workstation:   [id number]         Results for orders placed or performed during the hospital encounter of 04/02/25 (from the past 24 hours)   CBC and Auto Differential   Result Value Ref Range     WBC 11.1 4.4 - 11.3 x10*3/uL     nRBC 0.0 0.0 - 0.0 /100 WBCs     RBC 4.36 4.00 - 5.20 x10*6/uL     Hemoglobin 11.5 (L) 12.0 - 16.0 g/dL     Hematocrit 38.8 36.0 - 46.0 %     MCV 89 80 - 100 fL     MCH 26.4 26.0 - 34.0 pg     MCHC 29.6 (L) 32.0 - 36.0 g/dL     RDW 19.9 (H) 11.5 - 14.5 %     Platelets 198 150 - 450 x10*3/uL     Neutrophils % 84.1 40.0 - 80.0 %     Immature Granulocytes %,  Automated 0.6 0.0 - 0.9 %     Lymphocytes % 9.5 13.0 - 44.0 %     Monocytes % 5.6 2.0 - 10.0 %     Eosinophils % 0.0 0.0 - 6.0 %     Basophils % 0.2 0.0 - 2.0 %     Neutrophils Absolute 9.34 (H) 1.60 - 5.50 x10*3/uL     Immature Granulocytes Absolute, Automated 0.07 0.00 - 0.50 x10*3/uL     Lymphocytes Absolute 1.06 0.80 - 3.00 x10*3/uL     Monocytes Absolute 0.62 0.05 - 0.80 x10*3/uL     Eosinophils Absolute 0.00 0.00 - 0.40 x10*3/uL     Basophils Absolute 0.02 0.00 - 0.10 x10*3/uL   Comprehensive metabolic panel   Result Value Ref Range     Glucose 131 (H) 74 - 99 mg/dL     Sodium 131 (L) 136 - 145 mmol/L     Potassium 4.6 3.5 - 5.3 mmol/L     Chloride 96 (L) 98 - 107 mmol/L     Bicarbonate 22 21 - 32 mmol/L     Anion Gap 18 10 - 20 mmol/L     Urea Nitrogen 21 6 - 23 mg/dL     Creatinine 0.89 0.50 - 1.05 mg/dL     eGFR 62 >60 mL/min/1.73m*2     Calcium 8.4 (L) 8.6 - 10.3 mg/dL     Albumin 2.9 (L) 3.4 - 5.0 g/dL     Alkaline Phosphatase 258 (H) 33 - 136 U/L     Total Protein 6.9 6.4 - 8.2 g/dL     AST 22 9 - 39 U/L     Bilirubin, Total 0.7 0.0 - 1.2 mg/dL     ALT 16 7 - 45 U/L   Lipase   Result Value Ref Range     Lipase 7 (L) 9 - 82 U/L   Lactate   Result Value Ref Range     Lactate 1.2 0.4 - 2.0 mmol/L   Magnesium   Result Value Ref Range     Magnesium 1.90 1.60 - 2.40 mg/dL   POCT GLUCOSE   Result Value Ref Range     POCT Glucose 129 (H) 74 - 99 mg/dL   POCT GLUCOSE   Result Value Ref Range     POCT Glucose 119 (H) 74 - 99 mg/dL   Protime-INR   Result Value Ref Range     Protime 17.4 (H) 9.8 - 12.4 seconds     INR 1.6 (H) 0.9 - 1.1   CBC   Result Value Ref Range     WBC 11.5 (H) 4.4 - 11.3 x10*3/uL     nRBC 0.0 0.0 - 0.0 /100 WBCs     RBC 4.08 4.00 - 5.20 x10*6/uL     Hemoglobin 10.9 (L) 12.0 - 16.0 g/dL     Hematocrit 36.7 36.0 - 46.0 %     MCV 90 80 - 100 fL     MCH 26.7 26.0 - 34.0 pg     MCHC 29.7 (L) 32.0 - 36.0 g/dL     RDW 19.9 (H) 11.5 - 14.5 %     Platelets 181 150 - 450 x10*3/uL   Basic Metabolic Panel    Result Value Ref Range     Glucose 129 (H) 74 - 99 mg/dL     Sodium 130 (L) 136 - 145 mmol/L     Potassium 4.2 3.5 - 5.3 mmol/L     Chloride 98 98 - 107 mmol/L     Bicarbonate 21 21 - 32 mmol/L     Anion Gap 15 10 - 20 mmol/L     Urea Nitrogen 19 6 - 23 mg/dL     Creatinine 0.85 0.50 - 1.05 mg/dL     eGFR 66 >60 mL/min/1.73m*2     Calcium 8.0 (L) 8.6 - 10.3 mg/dL   Heparin Assay, UFH   Result Value Ref Range     Heparin Unfractionated 1.1 (HH) See Comment Below for Therapeutic Ranges IU/mL   POCT GLUCOSE   Result Value Ref Range     POCT Glucose 123 (H) 74 - 99 mg/dL            Assessment/Plan  Melissa is an 89-year-old female patient presenting to emergency department from [facility] for evaluation of abdominal pain and vomiting.  Patient states that she developed an 8 out of 10 mid to lower abdominal pain radiating to her right flank.  Patient with a history of breast cancer and colon cancer.  CT imaging showing extensive left renal cortical infarcts as well as worsening of retroperitoneal metastatic lymphadenopathy and progression of hepatic metastasis per radiology review.  Consults placed to nephrology and vascular surgery, patient started on a heparin drip, medicated with morphine and admitted for further medical management.     Assessment & Plan  Renal infarct (Multi)     Hyponatremia     Hyponatremia/renal infarct  Inpatient admission to Dr. Lugo  See imaging results above  DNRCCA  N.p.o.  Consult nephrology and appreciate input  Consult vascular surgery and appreciate input  Coag screen ordered  Continue heparin drip  Continue gentle IV fluids  Repeat labs  Monitor heparin assay  Morphine/Zofran in ED  Dilaudid IV as needed  Tigan IM as needed 2/2 prolonged Qtc     T2DM/breast cancer/colon cancer/anemia/HLD/HTN  #Chronic conditions  Insulin sliding scale for n.p.o.  Hypoglycemia protocol  Continue home MiraLAX and Colace  Continue home lisinopril daily  Continue home amlodipine  daily  Continue home Estrace tablet     DVT Ppx  SCDs  Continue heparin drip  Bedrest/up to chair as tolerated  Patient fully evaluated on April 3.  Continue analgesics and await nephrology consultation.  Appreciate vascular surgery consultation.  Patient with significant metastatic cancer which comfort measures are in place.        I spent 60 minutes in the professional and overall care of this patient.  Tio Lugo MD                 Patient fully evaluated on 4/4, resting in bed breathing comfortably on room air. Labs and medications reviewed. Estrase discontinued due to coagulation risk. Switched from heparin to Eliquis 2.5 BID for outpatient anticoagulation. Evaluated by vascular surgery, no surgery recommended at this time. Family declined heme-onc consult, not wishing to pursue aggressive chemotherapy treatment at this time. Continue comfort care for pain. Pyridium ordered for suprapubic pain. Per nursing staff, patient does not like Tigan, order switched to Zofran. Patient medically cleared for discharge to SNF today.       Outpatient Follow-Up  No future appointments.      CARLOS MOY

## 2025-04-04 NOTE — PROGRESS NOTES
04/04/25 1016   Discharge Planning   Living Arrangements Other (Comment)  ([facility] [facility])   Support Systems Children   Assistance Needed ADL's   Type of Residence Skilled nursing facility   Do you have animals or pets at home? No   Who is requesting discharge planning? Provider   Home or Post Acute Services Post acute facilities (Rehab/SNF/etc)   Type of Post Acute Facility Services Skilled nursing   Expected Discharge Disposition SNF   Does the patient need discharge transport arranged? Yes   RoundTrip coordination needed? Yes   Has discharge transport been arranged? No   Intensity of Service   Intensity of Service 0-30 min     Met with patient at bedside. Introduced self and role as care coordinator. Demographics verified. Patient PCP is Brittney. Patient insurance is [employer]. Patient is  current resident at [facility]. Patient needs assistance with bathing and dressing. Patient uses a w/c. Plan is to return to [facility]. Care coordinator will follow for discharge planning.

## 2025-04-04 NOTE — PROGRESS NOTES
"Melissa Tse is a 89 y.o. female on day 1 of admission presenting with Renal infarct (Multi).    Subjective   I evaluated this patient at bedside.  Patient denies any left quadrant abdominal or flank pain.  Patient endorses right upper quadrant pain a 6 out of 10.  This coincides with patient's malignancy and chronic pancreatitis.    Objective     Vitals:    04/04/25 0744   BP: 98/50   Pulse: 96   Resp: 18   Temp:    SpO2: 96%      Physical Exam  Constitutional: Well developed , awake/alert/oriented x3, in no distress,  Eyes: Clear sclera  ENMT: mucous membranes are moist, no apparent injury, no lesions seen,   Head/neck: Neck supple, trachea  is midline, no apparent injury, no bruits, no mass, no stridor  Respiratory/thorax: Breath sounds clear and equal diminished bilaterally throughout, thorax symmetric  Cardiac/Vascular: Regular, rate and rhythm, no murmurs, 2+ radial pulses, palpable bilateral popliteals, palpable right DP and PT, palpable left posterior tib  Gastrointestinal: Nondistended soft , tender right upper quadrant abdomen positive bowel sounds, no bruits.  Complains of right upper abdominal pain  Musculoskeletal: Moves all extremities, limited range of motion , no joint swelling,   Extremities: No cyanosis,   Neurological: Alert and oriented x3,  Lymphatic: No significant lymphadenopathy  Skin: Warm and dry, no lesions, no rashes, pallor  Psychological: Appropriate mood and behavior  Blood pressure 98/50, pulse 96, temperature 36.1 °C (97 °F), temperature source Temporal, resp. rate 18, height 1.626 m (5' 4\"), weight 81.6 kg (180 lb), SpO2 96%.        Intake/Output last 3 Shifts:  I/O last 3 completed shifts:  In: 1345.2 (16.5 mL/kg) [I.V.:1345.2 (16.5 mL/kg)]  Out: - (0 mL/kg)   Dosing Weight: 81.6 kg     Patient Active Problem List    Diagnosis Date Noted    Renal infarct (Multi) 04/03/2025    Hyponatremia 04/03/2025    Diabetes mellitus (Multi) 01/16/2025    Invasive lobular carcinoma of right " breast in female 01/16/2025    Generalized weakness 01/10/2025    Iron deficiency anemia, unspecified 05/11/2017    Presence of right artificial hip joint 05/11/2017    Type 2 diabetes mellitus with polyneuropathy 03/01/2017          Current Facility-Administered Medications:     apixaban (Eliquis) tablet 2.5 mg, 2.5 mg, oral, q12h, Tio Lugo MD    cyclobenzaprine (Flexeril) tablet 5 mg, 5 mg, oral, TID PRN, Garry Pacheco MD    dextrose 5 % and lactated Ringer's infusion, 50 mL/hr, intravenous, Continuous, Garry Pahceco MD, Last Rate: 50 mL/hr at 04/04/25 0910, 50 mL/hr at 04/04/25 0910    dextrose 50 % injection 12.5 g, 12.5 g, intravenous, q15 min PRN, Dena Sanchez APRN-CNP    dextrose 50 % injection 25 g, 25 g, intravenous, q15 min PRN, Dena Sanchez APRN-CNP    glucagon (Glucagen) injection 1 mg, 1 mg, intramuscular, q15 min PRN, Dena Sanchez APRN-CNP    glucagon (Glucagen) injection 1 mg, 1 mg, intramuscular, q15 min PRN, Dena Sanchez APRN-CNP    hydrALAZINE (Apresoline) injection 10 mg, 10 mg, intravenous, q6h PRN, Garry Pacheco MD    HYDROmorphone (Dilaudid) injection 0.5 mg, 0.5 mg, intravenous, q4h PRN, Dena Sanchez APRN-CNP    HYDROmorphone (Dilaudid) injection 1 mg, 1 mg, intravenous, q4h PRN, Dena Sanchez APRN-CNP, 1 mg at 04/03/25 1024    insulin lispro injection 0-5 Units, 0-5 Units, subcutaneous, q6h, Dena Sanchez APRN-CNP, 1 Units at 04/04/25 0035    polyethylene glycol (Glycolax, Miralax) packet 17 g, 17 g, oral, Daily, Dena Sanchez APRN-CNP    sennosides-docusate sodium (Johana-Colace) 8.6-50 mg per tablet 1 tablet, 1 tablet, oral, Nightly, SALVATORE Morfin    tamsulosin (Flomax) 24 hr capsule 0.4 mg, 0.4 mg, oral, Daily, Garry Pacheco MD    trimethobenzamide (Tigan) injection 200 mg, 200 mg, intramuscular, Once PRN, SALVATORE Morfin     Lab Results   Component Value Date    WBC 15.4 (H)  04/04/2025    HGB 11.1 (L) 04/04/2025    HCT 36.3 04/04/2025     04/04/2025    CHOL 194 08/26/2023    TRIG 248 (H) 08/26/2023    HDL 41.8 08/26/2023    ALT 12 04/04/2025    AST 14 04/04/2025     (L) 04/04/2025    K 4.2 04/04/2025     04/04/2025    CREATININE 0.91 04/04/2025    BUN 24 (H) 04/04/2025    CO2 23 04/04/2025    TSH 2.47 08/26/2023    INR 1.6 (H) 04/03/2025    HGBA1C 5.9 (H) 01/10/2025       Electrocardiogram, 12-lead PRN ACS symptoms    Result Date: 4/4/2025  Unknown rhythm, irregular rate Probable lateral infarct, old    CT abdomen pelvis w IV contrast    Result Date: 4/3/2025  Interpreted By:  Faraz Link, STUDY: CT ABDOMEN PELVIS W IV CONTRAST; ;  4/3/2025 12:30 am   INDICATION: Signs/Symptoms:stage IV colon cancer, now with R abd pain.     COMPARISON: 01/17/2025.   ACCESSION NUMBER(S): [id number]   ORDERING CLINICIAN: ELYSE KLERMAN   TECHNIQUE: Axial CT images of the abdomen and pelvis with coronal and sagittal reconstructed images performed after intravenous administration of 80 cc Omnipaque 350.   FINDINGS: LOWER CHEST: No acute abnormality of the lung bases. BONES: No acute osseous abnormality. Osteopenia. Right hip arthroplasty in standard alignment. Similar appearance of mild-to-moderate chronic compression of the T11 vertebral body. Stable grade 1 degenerative anterolisthesis at L4-L5 and moderate to severe multilevel degenerative changes of the imaged spine. ABDOMINAL WALL: Within normal limits.   ABDOMEN:   LIVER: There is a large ill-defined region of hypoenhancement in the right hepatic lobe measuring up to 10.8 cm in transaxial diameter, up to at least 10.1 cm in CC diameter, suspicious for interval progression of the hepatic metastasis. There is a new hypoattenuating lesion inferolaterally in the inferior tip of the right hepatic lobe (series 601, images [id number]), measuring up to approximately 2.2 cm in transaxial diameter, suspicious for new metastatic lesion. Liver  is enlarged, similar to before. BILE DUCTS: Normal caliber. GALLBLADDER: Cholelithiasis. No wall thickening or pericholecystic fluid. PANCREAS:Atrophic with pancreatic ductal dilation and irregularity and parenchymal calcifications in keeping with sequelae of chronic pancreatitis. SPLEEN: Within normal limits. ADRENALS: Within normal limits. KIDNEYS and URETERS: New extensive hypoenhancement/non enhancement of the left renal parenchyma compatible with extensive renal cortical infarcts. Right kidney and ureter appear normal.     VESSELS: Severe atherosclerosis, with critical narrowing or occlusion of the left renal artery. Severe narrowing of the right renal artery also noted. RETROPERITONEUM: Interval enlargement of celiac axis and portocaval nodes, suspicious for worsened metastatic disease.   PELVIS:   REPRODUCTIVE ORGANS: Endometrial fluid suggests cervical stenosis and is new from the prior exam. BLADDER: Within normal limits.   BOWEL: When allowing for differences in patient positioning in slice selection, there is no substantial change in the very large mass encompassing the mid-transverse colon and several adjacent loops of small bowel and adjacent omentum and mesocolon, compatible with patient's known advanced malignancy. No dilated bowel to suggest obstruction. Stomach appears grossly normal. No definite additional abnormal bowel wall thickening. Normal appendix. PERITONEUM: No ascites or free air, no fluid collection.       New extensive hypoenhancement/non enhancement of the left renal parenchyma compatible with extensive renal cortical infarcts. Right kidney and ureter appear normal.   There is a large ill-defined region of hypoenhancement in the right hepatic lobe measuring up to 10.8 cm in transaxial diameter, up to at least 10.1 cm in CC diameter, suspicious for interval progression of the hepatic metastasis. There is a new hypoattenuating lesion inferolaterally in the inferior tip of the right  hepatic lobe (series 601, images 57-63), measuring up to approximately 2.2 cm in transaxial diameter, suspicious for new metastatic lesion. Liver is enlarged, similar to before.   Interval worsening of retroperitoneal metastatic lymphadenopathy.   Cholelithiasis without convincing evidence of acute cholecystitis.   New fluid in the endometrial canal suggesting cervical stenosis.   Additional findings as discussed above.   MACRO: None   Signed by: Faraz Link 4/3/2025 12:52 AM Dictation workstation:   [id number]       Assessment & Plan  Renal infarct (Multi)    Hyponatremia    Discussed case with Dr. Chow.  CTA reveals extensive left renal cortical infarct.  Patient may be transitioned from IV anticoagulation therapy of heparin infusion to oral anticoagulation.  Considering patient's extensive malignancy consider palliative or hospice care.  No plans for surgical or endovascular intervention.  From a vascular surgery standpoint there are no objections or barriers for this patient to be discharged.    Thank you very much for allowing Vascular Surgery to be involved in the care of your patient sincerely Trell Draper APRN-CNP .  (This note was generated with voice recognition software and may contain errors including spelling, grammar, syntax and missed recognition of what was dictated, of which may not have been fully corrected)  Trell Draper, APRN-CNP

## 2025-04-05 LAB
ALBUMIN SERPL BCP-MCNC: 2.2 G/DL (ref 3.4–5)
ALP SERPL-CCNC: 155 U/L (ref 33–136)
ALT SERPL W P-5'-P-CCNC: 8 U/L (ref 7–45)
ANION GAP SERPL CALC-SCNC: 14 MMOL/L (ref 10–20)
AST SERPL W P-5'-P-CCNC: 23 U/L (ref 9–39)
ATRIAL RATE: 80 BPM
BASOPHILS # BLD AUTO: 0.01 X10*3/UL (ref 0–0.1)
BASOPHILS NFR BLD AUTO: 0.1 %
BILIRUB SERPL-MCNC: 0.9 MG/DL (ref 0–1.2)
BUN SERPL-MCNC: 26 MG/DL (ref 6–23)
BURR CELLS BLD QL SMEAR: NORMAL
CALCIUM SERPL-MCNC: 7.7 MG/DL (ref 8.6–10.3)
CHLORIDE SERPL-SCNC: 102 MMOL/L (ref 98–107)
CO2 SERPL-SCNC: 20 MMOL/L (ref 21–32)
CREAT SERPL-MCNC: 0.85 MG/DL (ref 0.5–1.05)
EGFRCR SERPLBLD CKD-EPI 2021: 66 ML/MIN/1.73M*2
EOSINOPHIL # BLD AUTO: 0.01 X10*3/UL (ref 0–0.4)
EOSINOPHIL NFR BLD AUTO: 0.1 %
ERYTHROCYTE [DISTWIDTH] IN BLOOD BY AUTOMATED COUNT: 20.3 % (ref 11.5–14.5)
GLUCOSE BLD MANUAL STRIP-MCNC: 129 MG/DL (ref 74–99)
GLUCOSE BLD MANUAL STRIP-MCNC: 140 MG/DL (ref 74–99)
GLUCOSE BLD MANUAL STRIP-MCNC: 148 MG/DL (ref 74–99)
GLUCOSE BLD MANUAL STRIP-MCNC: 173 MG/DL (ref 74–99)
GLUCOSE SERPL-MCNC: 134 MG/DL (ref 74–99)
HCT VFR BLD AUTO: 36.5 % (ref 36–46)
HGB BLD-MCNC: 10.8 G/DL (ref 12–16)
IMM GRANULOCYTES # BLD AUTO: 0.06 X10*3/UL (ref 0–0.5)
IMM GRANULOCYTES NFR BLD AUTO: 0.5 % (ref 0–0.9)
LYMPHOCYTES # BLD AUTO: 1.01 X10*3/UL (ref 0.8–3)
LYMPHOCYTES NFR BLD AUTO: 7.9 %
MCH RBC QN AUTO: 27.3 PG (ref 26–34)
MCHC RBC AUTO-ENTMCNC: 29.6 G/DL (ref 32–36)
MCV RBC AUTO: 92 FL (ref 80–100)
MONOCYTES # BLD AUTO: 0.86 X10*3/UL (ref 0.05–0.8)
MONOCYTES NFR BLD AUTO: 6.7 %
NEUTROPHILS # BLD AUTO: 10.86 X10*3/UL (ref 1.6–5.5)
NEUTROPHILS NFR BLD AUTO: 84.7 %
NRBC BLD-RTO: 0 /100 WBCS (ref 0–0)
OVALOCYTES BLD QL SMEAR: NORMAL
P AXIS: 48 DEGREES
PLATELET # BLD AUTO: 173 X10*3/UL (ref 150–450)
POLYCHROMASIA BLD QL SMEAR: NORMAL
POTASSIUM SERPL-SCNC: 5.2 MMOL/L (ref 3.5–5.3)
PR INTERVAL: 149 MS
PROT SERPL-MCNC: 5.7 G/DL (ref 6.4–8.2)
Q ONSET: 252 MS
QRS COUNT: 13 BEATS
QRS DURATION: 105 MS
QT INTERVAL: 418 MS
QTC CALCULATION(BAZETT): 480 MS
QTC FREDERICIA: 458 MS
R AXIS: 46 DEGREES
RBC # BLD AUTO: 3.96 X10*6/UL (ref 4–5.2)
RBC MORPH BLD: NORMAL
SODIUM SERPL-SCNC: 131 MMOL/L (ref 136–145)
T AXIS: 12 DEGREES
T OFFSET: 461 MS
VENTRICULAR RATE: 79 BPM
WBC # BLD AUTO: 12.8 X10*3/UL (ref 4.4–11.3)

## 2025-04-05 PROCEDURE — 2500000002 HC RX 250 W HCPCS SELF ADMINISTERED DRUGS (ALT 637 FOR MEDICARE OP, ALT 636 FOR OP/ED): Performed by: NURSE PRACTITIONER

## 2025-04-05 PROCEDURE — 80053 COMPREHEN METABOLIC PANEL: CPT | Performed by: INTERNAL MEDICINE

## 2025-04-05 PROCEDURE — 85025 COMPLETE CBC W/AUTO DIFF WBC: CPT | Performed by: INTERNAL MEDICINE

## 2025-04-05 PROCEDURE — 1100000001 HC PRIVATE ROOM DAILY

## 2025-04-05 PROCEDURE — 2500000001 HC RX 250 WO HCPCS SELF ADMINISTERED DRUGS (ALT 637 FOR MEDICARE OP): Performed by: INTERNAL MEDICINE

## 2025-04-05 PROCEDURE — 2500000004 HC RX 250 GENERAL PHARMACY W/ HCPCS (ALT 636 FOR OP/ED): Performed by: INTERNAL MEDICINE

## 2025-04-05 PROCEDURE — 82947 ASSAY GLUCOSE BLOOD QUANT: CPT

## 2025-04-05 PROCEDURE — 2500000001 HC RX 250 WO HCPCS SELF ADMINISTERED DRUGS (ALT 637 FOR MEDICARE OP): Performed by: NURSE PRACTITIONER

## 2025-04-05 PROCEDURE — 2500000004 HC RX 250 GENERAL PHARMACY W/ HCPCS (ALT 636 FOR OP/ED): Mod: JZ | Performed by: NURSE PRACTITIONER

## 2025-04-05 PROCEDURE — 36415 COLL VENOUS BLD VENIPUNCTURE: CPT | Performed by: INTERNAL MEDICINE

## 2025-04-05 PROCEDURE — 2500000002 HC RX 250 W HCPCS SELF ADMINISTERED DRUGS (ALT 637 FOR MEDICARE OP, ALT 636 FOR OP/ED): Performed by: INTERNAL MEDICINE

## 2025-04-05 RX ORDER — CYPROHEPTADINE HYDROCHLORIDE 4 MG/1
4 TABLET ORAL 3 TIMES DAILY
Status: DISPENSED | OUTPATIENT
Start: 2025-04-05

## 2025-04-05 RX ORDER — OXYCODONE AND ACETAMINOPHEN 5; 325 MG/1; MG/1
1 TABLET ORAL EVERY 6 HOURS PRN
Status: DISPENSED | OUTPATIENT
Start: 2025-04-05

## 2025-04-05 RX ORDER — DICLOFENAC SODIUM 10 MG/G
4 GEL TOPICAL 4 TIMES DAILY
Status: DISPENSED | OUTPATIENT
Start: 2025-04-05

## 2025-04-05 RX ADMIN — CYCLOBENZAPRINE 5 MG: 10 TABLET, FILM COATED ORAL at 08:39

## 2025-04-05 RX ADMIN — APIXABAN 2.5 MG: 2.5 TABLET, FILM COATED ORAL at 20:24

## 2025-04-05 RX ADMIN — DICLOFENAC SODIUM 4 G: 10 GEL TOPICAL at 17:35

## 2025-04-05 RX ADMIN — CYPROHEPTADINE HYDROCHLORIDE 4 MG: 4 TABLET ORAL at 17:33

## 2025-04-05 RX ADMIN — PHENAZOPYRIDINE 100 MG: 100 TABLET ORAL at 17:33

## 2025-04-05 RX ADMIN — OXYCODONE HYDROCHLORIDE AND ACETAMINOPHEN 1 TABLET: 5; 325 TABLET ORAL at 17:33

## 2025-04-05 RX ADMIN — TAMSULOSIN HYDROCHLORIDE 0.4 MG: 0.4 CAPSULE ORAL at 08:39

## 2025-04-05 RX ADMIN — DEXTROSE, SODIUM CHLORIDE, SODIUM LACTATE, POTASSIUM CHLORIDE, AND CALCIUM CHLORIDE 50 ML/HR: 5; .6; .31; .03; .02 INJECTION, SOLUTION INTRAVENOUS at 03:46

## 2025-04-05 RX ADMIN — APIXABAN 2.5 MG: 2.5 TABLET, FILM COATED ORAL at 08:39

## 2025-04-05 RX ADMIN — HYDROMORPHONE HYDROCHLORIDE 1 MG: 1 INJECTION, SOLUTION INTRAMUSCULAR; INTRAVENOUS; SUBCUTANEOUS at 08:39

## 2025-04-05 RX ADMIN — PHENAZOPYRIDINE 100 MG: 100 TABLET ORAL at 13:02

## 2025-04-05 RX ADMIN — SENNOSIDES AND DOCUSATE SODIUM 1 TABLET: 50; 8.6 TABLET ORAL at 20:24

## 2025-04-05 RX ADMIN — CYPROHEPTADINE HYDROCHLORIDE 4 MG: 4 TABLET ORAL at 21:24

## 2025-04-05 RX ADMIN — INSULIN LISPRO 1 UNITS: 100 INJECTION, SOLUTION INTRAVENOUS; SUBCUTANEOUS at 13:02

## 2025-04-05 RX ADMIN — POLYETHYLENE GLYCOL 3350 17 G: 17 POWDER, FOR SOLUTION ORAL at 08:46

## 2025-04-05 RX ADMIN — ONDANSETRON 4 MG: 2 INJECTION INTRAMUSCULAR; INTRAVENOUS at 20:24

## 2025-04-05 RX ADMIN — PHENAZOPYRIDINE 100 MG: 100 TABLET ORAL at 08:39

## 2025-04-05 RX ADMIN — DICLOFENAC SODIUM 4 G: 10 GEL TOPICAL at 21:24

## 2025-04-05 ASSESSMENT — PAIN - FUNCTIONAL ASSESSMENT
PAIN_FUNCTIONAL_ASSESSMENT: 0-10

## 2025-04-05 ASSESSMENT — PAIN SCALES - GENERAL
PAINLEVEL_OUTOF10: 10 - WORST POSSIBLE PAIN
PAINLEVEL_OUTOF10: 0 - NO PAIN
PAINLEVEL_OUTOF10: 0 - NO PAIN
PAINLEVEL_OUTOF10: 2
PAINLEVEL_OUTOF10: 10 - WORST POSSIBLE PAIN

## 2025-04-05 ASSESSMENT — PAIN DESCRIPTION - LOCATION
LOCATION: BACK
LOCATION: SHOULDER

## 2025-04-05 ASSESSMENT — PAIN DESCRIPTION - ORIENTATION: ORIENTATION: RIGHT

## 2025-04-05 NOTE — CARE PLAN
The clinical goals for the shift include maintain safety and comfort      Problem: Pain - Adult  Goal: Verbalizes/displays adequate comfort level or baseline comfort level  Outcome: Progressing     Problem: Nutrition  Goal: Nutrient intake appropriate for maintaining nutritional needs  Outcome: Progressing

## 2025-04-05 NOTE — PROGRESS NOTES
Melissa Tse is a 89 y.o. female on day 2 of admission presenting with Renal infarct (Multi).      Subjective   Patient fully evaluated  04/05  for    Problem List Items Addressed This Visit       * (Principal) Renal infarct (Multi) - Primary     Other Visit Diagnoses       Metastatic malignant neoplasm, unspecified site (Multi)              Patient seen resting in bed with head of bed elevated, no s/s or c/o acute difficulties at this time. Patient still with significant pain noted, medications adjusted, percocet added, will monitor overnight and repeat labs in the AM.     Vital signs for last 24 hours Temp:  [35.6 °C (96.1 °F)-36.5 °C (97.7 °F)] 36.1 °C (97 °F)  Heart Rate:  [96-98] 96  Resp:  [18] 18  BP: (114-148)/(55-67) 148/67    I/O this shift:  In: 220.8 [I.V.:220.8]  Out: -   Patient still requiring frequent cardiac and SPO2 monitoring. Continue aggressive pulmonary hygiene and oral hygiene. Off loading as tolerated for skin integrity. Medications and labs reviewed-   Results for orders placed or performed during the hospital encounter of 04/02/25 (from the past 24 hours)   POCT GLUCOSE   Result Value Ref Range    POCT Glucose 175 (H) 74 - 99 mg/dL   POCT GLUCOSE   Result Value Ref Range    POCT Glucose 167 (H) 74 - 99 mg/dL   POCT GLUCOSE   Result Value Ref Range    POCT Glucose 148 (H) 74 - 99 mg/dL   POCT GLUCOSE   Result Value Ref Range    POCT Glucose 140 (H) 74 - 99 mg/dL   CBC and Auto Differential   Result Value Ref Range    WBC 12.8 (H) 4.4 - 11.3 x10*3/uL    nRBC 0.0 0.0 - 0.0 /100 WBCs    RBC 3.96 (L) 4.00 - 5.20 x10*6/uL    Hemoglobin 10.8 (L) 12.0 - 16.0 g/dL    Hematocrit 36.5 36.0 - 46.0 %    MCV 92 80 - 100 fL    MCH 27.3 26.0 - 34.0 pg    MCHC 29.6 (L) 32.0 - 36.0 g/dL    RDW 20.3 (H) 11.5 - 14.5 %    Platelets 173 150 - 450 x10*3/uL    Neutrophils % 84.7 40.0 - 80.0 %    Immature Granulocytes %, Automated 0.5 0.0 - 0.9 %    Lymphocytes % 7.9 13.0 - 44.0 %    Monocytes % 6.7 2.0 - 10.0 %     Eosinophils % 0.1 0.0 - 6.0 %    Basophils % 0.1 0.0 - 2.0 %    Neutrophils Absolute 10.86 (H) 1.60 - 5.50 x10*3/uL    Immature Granulocytes Absolute, Automated 0.06 0.00 - 0.50 x10*3/uL    Lymphocytes Absolute 1.01 0.80 - 3.00 x10*3/uL    Monocytes Absolute 0.86 (H) 0.05 - 0.80 x10*3/uL    Eosinophils Absolute 0.01 0.00 - 0.40 x10*3/uL    Basophils Absolute 0.01 0.00 - 0.10 x10*3/uL   Comprehensive Metabolic Panel   Result Value Ref Range    Glucose 134 (H) 74 - 99 mg/dL    Sodium 131 (L) 136 - 145 mmol/L    Potassium 5.2 3.5 - 5.3 mmol/L    Chloride 102 98 - 107 mmol/L    Bicarbonate 20 (L) 21 - 32 mmol/L    Anion Gap 14 10 - 20 mmol/L    Urea Nitrogen 26 (H) 6 - 23 mg/dL    Creatinine 0.85 0.50 - 1.05 mg/dL    eGFR 66 >60 mL/min/1.73m*2    Calcium 7.7 (L) 8.6 - 10.3 mg/dL    Albumin 2.2 (L) 3.4 - 5.0 g/dL    Alkaline Phosphatase 155 (H) 33 - 136 U/L    Total Protein 5.7 (L) 6.4 - 8.2 g/dL    AST 23 9 - 39 U/L    Bilirubin, Total 0.9 0.0 - 1.2 mg/dL    ALT 8 7 - 45 U/L   Morphology   Result Value Ref Range    RBC Morphology See Below     Polychromasia Mild     Ovalocytes Few     Centerview Cells Few    POCT GLUCOSE   Result Value Ref Range    POCT Glucose 173 (H) 74 - 99 mg/dL      Patient recently received an antibiotic (last 12 hours)       None           Plan discussed with interdisciplinary team, will continue to monitor pain levels and monitor overnight. Will continue current and repeat labs in the AM.     Discharge planning discussed with patient and care team. Therapy evaluations ordered. Anticipate HHC/SNF at discharge. Patient aware and agreeable to current plan, continue plan as above.     I spent a total of 50 minutes on the date of the service which included preparing to see the patient, face-to-face patient care, completing clinical documentation, obtaining and/or reviewing separately obtained history, performing a medically appropriate examination, counseling and educating the patient/family/caregiver,  ordering medications, tests, or procedures, communicating with other HCPs (not separately reported), independently interpreting results (not separately reported), communicating results to the patient/family/caregiver, and care coordination (not separately reported).        Objective     Last Recorded Vitals  /67 (BP Location: Right leg, Patient Position: Lying)   Pulse 96   Temp 36.1 °C (97 °F) (Temporal)   Resp 18   Wt 81.6 kg (179 lb 14.3 oz)   SpO2 93%   Intake/Output last 3 Shifts:    Intake/Output Summary (Last 24 hours) at 4/5/2025 1559  Last data filed at 4/5/2025 1301  Gross per 24 hour   Intake 1036.67 ml   Output 200 ml   Net 836.67 ml       Admission Weight  Weight: 81.6 kg (180 lb) (04/02/25 2207)    Daily Weight  04/04/25 : 81.6 kg (179 lb 14.3 oz)    Image Results  Electrocardiogram, 12-lead PRN ACS symptoms  Unknown rhythm, irregular rate  Probable lateral infarct, old    See ED provider note for full interpretation and clinical correlation  Confirmed by Rivka Manzano ([id number]) on 4/5/2025 10:05:09 AM      Physical Exam    Relevant Results               Assessment/Plan      Assessment & Plan  Renal infarct (Multi)    Hyponatremia    Hyponatremia/renal infarct  Inpatient admission to Dr. Lugo  See imaging results above  DNRCCA  N.p.o.  Consult nephrology and appreciate input  Consult vascular surgery and appreciate input  Coag screen ordered  Continue heparin drip  Continue gentle IV fluids  Repeat labs  Monitor heparin assay  Morphine/Zofran in ED  Dilaudid IV as needed  Tigan IM as needed 2/2 prolonged Qtc    T2DM/breast cancer/colon cancer/anemia/HLD/HTN  #Chronic conditions  Insulin sliding scale for n.p.o.  Hypoglycemia protocol  Continue home MiraLAX and Colace  Continue home lisinopril daily  Continue home amlodipine daily  Continue home Estrace tablet    DVT Ppx  SCDs  Continue heparin drip  Bedrest/up to chair as tolerated  Patient fully evaluated on April 3.  Continue analgesics and  await nephrology consultation.  Appreciate vascular surgery consultation.  Patient with significant metastatic cancer which comfort measures are in place.          Tio Lugo MD   Physician  Internal Medicine     Progress Notes     Addendum     Date of Service: 4/3/2025  1:49 PM     Addendum       Expand All Collapse All    Patient fully evaluated on 4/3. Patient resting in bed, breathing comfortably on 3L O2 via nasal cannula. Accompanied by daughter in the room. Evaluated today by nephrology, lisinopril discontinued d/t jr renal artery stenosis, flomax added. Hypertension will be treated with prn medications to metoprolol/apresoline IV. Continue heparin drip. Also evaluated by vascular surgery, no surgery recommended at this time. Recommended consult to heme-onc, family declined patient being seen by heme-onc - not wishing to pursue aggressive chemotherapy treatment at this time. Labs reviewed. Mild hyponatremia noted, continue sodium chloride drip. Being given comfort measures for pain, currently receiving dilaudid, morphine.     Problem List Items Addressed This Visit                  Genitourinary and Reproductive     * (Principal) Renal infarct (Multi) - Primary      Other Visit Diagnoses         Metastatic malignant neoplasm, unspecified site (Multi)                 Patient seen resting in bed with head of bed elevated, no s/s or c/o acute difficulties at this time.  Vital signs for last 24 hours Temp:  [36.5 °C (97.7 °F)-36.7 °C (98.1 °F)] 36.7 °C (98.1 °F)  Heart Rate:  [] 100  Resp:  [16-20] 18  BP: (125-175)/(60-74) 126/64    I/O this shift:  In: 551.8 [I.V.:551.8]  Out: -   Patient still requiring frequent cardiac and SPO2 monitoring. Continue aggressive pulmonary hygiene and oral hygiene. Off loading as tolerated for skin integrity. Medications and labs reviewed-         Results for orders placed or performed during the hospital encounter of 04/02/25 (from the past 24 hours)   CBC and Auto  Differential   Result Value Ref Range     WBC 11.1 4.4 - 11.3 x10*3/uL     nRBC 0.0 0.0 - 0.0 /100 WBCs     RBC 4.36 4.00 - 5.20 x10*6/uL     Hemoglobin 11.5 (L) 12.0 - 16.0 g/dL     Hematocrit 38.8 36.0 - 46.0 %     MCV 89 80 - 100 fL     MCH 26.4 26.0 - 34.0 pg     MCHC 29.6 (L) 32.0 - 36.0 g/dL     RDW 19.9 (H) 11.5 - 14.5 %     Platelets 198 150 - 450 x10*3/uL     Neutrophils % 84.1 40.0 - 80.0 %     Immature Granulocytes %, Automated 0.6 0.0 - 0.9 %     Lymphocytes % 9.5 13.0 - 44.0 %     Monocytes % 5.6 2.0 - 10.0 %     Eosinophils % 0.0 0.0 - 6.0 %     Basophils % 0.2 0.0 - 2.0 %     Neutrophils Absolute 9.34 (H) 1.60 - 5.50 x10*3/uL     Immature Granulocytes Absolute, Automated 0.07 0.00 - 0.50 x10*3/uL     Lymphocytes Absolute 1.06 0.80 - 3.00 x10*3/uL     Monocytes Absolute 0.62 0.05 - 0.80 x10*3/uL     Eosinophils Absolute 0.00 0.00 - 0.40 x10*3/uL     Basophils Absolute 0.02 0.00 - 0.10 x10*3/uL   Comprehensive metabolic panel   Result Value Ref Range     Glucose 131 (H) 74 - 99 mg/dL     Sodium 131 (L) 136 - 145 mmol/L     Potassium 4.6 3.5 - 5.3 mmol/L     Chloride 96 (L) 98 - 107 mmol/L     Bicarbonate 22 21 - 32 mmol/L     Anion Gap 18 10 - 20 mmol/L     Urea Nitrogen 21 6 - 23 mg/dL     Creatinine 0.89 0.50 - 1.05 mg/dL     eGFR 62 >60 mL/min/1.73m*2     Calcium 8.4 (L) 8.6 - 10.3 mg/dL     Albumin 2.9 (L) 3.4 - 5.0 g/dL     Alkaline Phosphatase 258 (H) 33 - 136 U/L     Total Protein 6.9 6.4 - 8.2 g/dL     AST 22 9 - 39 U/L     Bilirubin, Total 0.7 0.0 - 1.2 mg/dL     ALT 16 7 - 45 U/L   Lipase   Result Value Ref Range     Lipase 7 (L) 9 - 82 U/L   Lactate   Result Value Ref Range     Lactate 1.2 0.4 - 2.0 mmol/L   Magnesium   Result Value Ref Range     Magnesium 1.90 1.60 - 2.40 mg/dL   POCT GLUCOSE   Result Value Ref Range     POCT Glucose 129 (H) 74 - 99 mg/dL   POCT GLUCOSE   Result Value Ref Range     POCT Glucose 119 (H) 74 - 99 mg/dL   Protime-INR   Result Value Ref Range     Protime 17.4 (H)  9.8 - 12.4 seconds     INR 1.6 (H) 0.9 - 1.1   CBC   Result Value Ref Range     WBC 11.5 (H) 4.4 - 11.3 x10*3/uL     nRBC 0.0 0.0 - 0.0 /100 WBCs     RBC 4.08 4.00 - 5.20 x10*6/uL     Hemoglobin 10.9 (L) 12.0 - 16.0 g/dL     Hematocrit 36.7 36.0 - 46.0 %     MCV 90 80 - 100 fL     MCH 26.7 26.0 - 34.0 pg     MCHC 29.7 (L) 32.0 - 36.0 g/dL     RDW 19.9 (H) 11.5 - 14.5 %     Platelets 181 150 - 450 x10*3/uL   Basic Metabolic Panel   Result Value Ref Range     Glucose 129 (H) 74 - 99 mg/dL     Sodium 130 (L) 136 - 145 mmol/L     Potassium 4.2 3.5 - 5.3 mmol/L     Chloride 98 98 - 107 mmol/L     Bicarbonate 21 21 - 32 mmol/L     Anion Gap 15 10 - 20 mmol/L     Urea Nitrogen 19 6 - 23 mg/dL     Creatinine 0.85 0.50 - 1.05 mg/dL     eGFR 66 >60 mL/min/1.73m*2     Calcium 8.0 (L) 8.6 - 10.3 mg/dL   Heparin Assay, UFH   Result Value Ref Range     Heparin Unfractionated 1.1 (HH) See Comment Below for Therapeutic Ranges IU/mL   POCT GLUCOSE   Result Value Ref Range     POCT Glucose 123 (H) 74 - 99 mg/dL      Patient recently received an antibiotic (last 12 hours)         None                Plan discussed with interdisciplinary team, continue current and repeat labs in the AM.      Patient aware and agreeable to current plan, continue plan as above.      I spent a total of 50 minutes on the date of the service which included preparing to see the patient, face-to-face patient care, completing clinical documentation, obtaining and/or reviewing separately obtained history, performing a medically appropriate examination, counseling and educating the patient/family/caregiver, ordering medications, tests, or procedures, communicating with other HCPs (not separately reported), independently interpreting results (not separately reported), communicating results to the patient/family/caregiver, and care coordination (not separately reported). Melissa Tse is a 89 y.o. female on day 0 of admission presenting with Renal infarct  (Multi).           Subjective        Tio Lugo MD   Physician  Internal Medicine     H&P     Signed     Date of Service: 4/3/2025 12:43 PM      Signed        Expand All Collapse All    History Of Present Illness  Melissa Tse is a 89 y.o. female with a past medical history significant for breast cancer and stage IV colon cancer presenting to emergency department from [facility] for evaluation of abdominal pain.  Patient also reports an episode of vomiting this morning and states she has been unable to keep anything down today and complains of  a pressure in the mid to lower abdomen that radiates to her right flank.  Patient reports that her doctor will only give her Tylenol for pain and that is not working.  Patient states that her last bowel movement was 4/1/2025 without difficulty.  Patient denies chest pain or dizziness.  Patient denies recent illness, fever, or chills.  Patient denies shortness of breath.     In ED, lipase 7, glucose 131, sodium 131, chloride 96, calcium 8.4, albumin 2.9, alk phos 258, hemoglobin 11.5.  EKG completed showing normal sinus rhythm at a rate of 88 without ST elevation or depression with a prolonged QTc of 507 per ED physician review.  A CT of the abdomen and pelvis was completed showing hypo-enhancement of the left renal parenchyma compatible with extensive renal cortical infarcts as well as fluid in the endometrial canal concerning for cervical stenosis and worsening of a retroperitoneal metastatic lymphadenopathy as well as progression of hepatic  metastasis per radiology review.  Patient medicated with Zofran, morphine, LR x 1 L bolus in ED.  Blood pressure 149/62, heart rate 92, respirations 20, temperature 36.5 °C, SpO2 95% on room air.  Consult placed to vascular surgery and nephrology.  Patient started on a heparin drip.  Inpatient admission under the care of Dr. Lugo who will continue to follow.  I was asked to do H&P and place initial admission  "orders.     Prior medical records reviewed.     Past Medical History  Breast cancer, stage IV colon cancer, T2DM, anemia, hypertension, hyperlipidemia  Surgical History  Colonoscopy, tubal ligation, hip replacement, cataract surgery      Social History  Never smoker, no drug use, no alcohol use  Family History  Cataract, diabetes     Allergies  Penicillin, Penicillins, and Statins-hmg-coa reductase inhibitors     Review of Systems  A 10 point review of systems was completed and is negative except what is listed in HPI  Physical Exam  Constitutional:       Appearance: She is ill-appearing.   HENT:      Mouth/Throat:      Mouth: Mucous membranes are dry.      Pharynx: Oropharynx is clear.   Eyes:      Pupils: Pupils are equal, round, and reactive to light.   Cardiovascular:      Rate and Rhythm: Normal rate and regular rhythm.   Pulmonary:      Effort: Pulmonary effort is normal.      Breath sounds: Normal breath sounds.   Abdominal:      Palpations: Abdomen is soft.      Tenderness: There is abdominal tenderness in the periumbilical area and suprapubic area.      Comments: Right flank tenderness   Musculoskeletal:      Cervical back: Normal range of motion.   Skin:     General: Skin is warm and dry.   Neurological:      General: No focal deficit present.      Mental Status: She is alert.   Psychiatric:         Mood and Affect: Mood normal.         Behavior: Behavior normal.            Last Recorded Vitals  Blood pressure 126/64, pulse 100, temperature 36.7 °C (98.1 °F), resp. rate 18, height 1.626 m (5' 4\"), weight 81.6 kg (180 lb), SpO2 99%.     Relevant Results  CT abdomen pelvis w IV contrast     Result Date: 4/3/2025  Interpreted By:  Faraz Link, STUDY: CT ABDOMEN PELVIS W IV CONTRAST; ;  4/3/2025 12:30 am   INDICATION: Signs/Symptoms:stage IV colon cancer, now with R abd pain.     COMPARISON: 01/17/2025.   ACCESSION NUMBER(S): [id number]   ORDERING CLINICIAN: ELYSE KLERMAN   TECHNIQUE: Axial CT images of " the abdomen and pelvis with coronal and sagittal reconstructed images performed after intravenous administration of 80 cc Omnipaque 350.   FINDINGS: LOWER CHEST: No acute abnormality of the lung bases. BONES: No acute osseous abnormality. Osteopenia. Right hip arthroplasty in standard alignment. Similar appearance of mild-to-moderate chronic compression of the T11 vertebral body. Stable grade 1 degenerative anterolisthesis at L4-L5 and moderate to severe multilevel degenerative changes of the imaged spine. ABDOMINAL WALL: Within normal limits.   ABDOMEN:   LIVER: There is a large ill-defined region of hypoenhancement in the right hepatic lobe measuring up to 10.8 cm in transaxial diameter, up to at least 10.1 cm in CC diameter, suspicious for interval progression of the hepatic metastasis. There is a new hypoattenuating lesion inferolaterally in the inferior tip of the right hepatic lobe (series 601, images 57-63), measuring up to approximately 2.2 cm in transaxial diameter, suspicious for new metastatic lesion. Liver is enlarged, similar to before. BILE DUCTS: Normal caliber. GALLBLADDER: Cholelithiasis. No wall thickening or pericholecystic fluid. PANCREAS:Atrophic with pancreatic ductal dilation and irregularity and parenchymal calcifications in keeping with sequelae of chronic pancreatitis. SPLEEN: Within normal limits. ADRENALS: Within normal limits. KIDNEYS and URETERS: New extensive hypoenhancement/non enhancement of the left renal parenchyma compatible with extensive renal cortical infarcts. Right kidney and ureter appear normal.     VESSELS: Severe atherosclerosis, with critical narrowing or occlusion of the left renal artery. Severe narrowing of the right renal artery also noted. RETROPERITONEUM: Interval enlargement of celiac axis and portocaval nodes, suspicious for worsened metastatic disease.   PELVIS:   REPRODUCTIVE ORGANS: Endometrial fluid suggests cervical stenosis and is new from the prior exam.  BLADDER: Within normal limits.   BOWEL: When allowing for differences in patient positioning in slice selection, there is no substantial change in the very large mass encompassing the mid-transverse colon and several adjacent loops of small bowel and adjacent omentum and mesocolon, compatible with patient's known advanced malignancy. No dilated bowel to suggest obstruction. Stomach appears grossly normal. No definite additional abnormal bowel wall thickening. Normal appendix. PERITONEUM: No ascites or free air, no fluid collection.        New extensive hypoenhancement/non enhancement of the left renal parenchyma compatible with extensive renal cortical infarcts. Right kidney and ureter appear normal.   There is a large ill-defined region of hypoenhancement in the right hepatic lobe measuring up to 10.8 cm in transaxial diameter, up to at least 10.1 cm in CC diameter, suspicious for interval progression of the hepatic metastasis. There is a new hypoattenuating lesion inferolaterally in the inferior tip of the right hepatic lobe (series 601, images 57-63), measuring up to approximately 2.2 cm in transaxial diameter, suspicious for new metastatic lesion. Liver is enlarged, similar to before.   Interval worsening of retroperitoneal metastatic lymphadenopathy.   Cholelithiasis without convincing evidence of acute cholecystitis.   New fluid in the endometrial canal suggesting cervical stenosis.   Additional findings as discussed above.   MACRO: None   Signed by: Fraaz Link 4/3/2025 12:52 AM Dictation workstation:   [id number]             Results for orders placed or performed during the hospital encounter of 04/02/25 (from the past 24 hours)   CBC and Auto Differential   Result Value Ref Range     WBC 11.1 4.4 - 11.3 x10*3/uL     nRBC 0.0 0.0 - 0.0 /100 WBCs     RBC 4.36 4.00 - 5.20 x10*6/uL     Hemoglobin 11.5 (L) 12.0 - 16.0 g/dL     Hematocrit 38.8 36.0 - 46.0 %     MCV 89 80 - 100 fL     MCH 26.4 26.0 - 34.0 pg      MCHC 29.6 (L) 32.0 - 36.0 g/dL     RDW 19.9 (H) 11.5 - 14.5 %     Platelets 198 150 - 450 x10*3/uL     Neutrophils % 84.1 40.0 - 80.0 %     Immature Granulocytes %, Automated 0.6 0.0 - 0.9 %     Lymphocytes % 9.5 13.0 - 44.0 %     Monocytes % 5.6 2.0 - 10.0 %     Eosinophils % 0.0 0.0 - 6.0 %     Basophils % 0.2 0.0 - 2.0 %     Neutrophils Absolute 9.34 (H) 1.60 - 5.50 x10*3/uL     Immature Granulocytes Absolute, Automated 0.07 0.00 - 0.50 x10*3/uL     Lymphocytes Absolute 1.06 0.80 - 3.00 x10*3/uL     Monocytes Absolute 0.62 0.05 - 0.80 x10*3/uL     Eosinophils Absolute 0.00 0.00 - 0.40 x10*3/uL     Basophils Absolute 0.02 0.00 - 0.10 x10*3/uL   Comprehensive metabolic panel   Result Value Ref Range     Glucose 131 (H) 74 - 99 mg/dL     Sodium 131 (L) 136 - 145 mmol/L     Potassium 4.6 3.5 - 5.3 mmol/L     Chloride 96 (L) 98 - 107 mmol/L     Bicarbonate 22 21 - 32 mmol/L     Anion Gap 18 10 - 20 mmol/L     Urea Nitrogen 21 6 - 23 mg/dL     Creatinine 0.89 0.50 - 1.05 mg/dL     eGFR 62 >60 mL/min/1.73m*2     Calcium 8.4 (L) 8.6 - 10.3 mg/dL     Albumin 2.9 (L) 3.4 - 5.0 g/dL     Alkaline Phosphatase 258 (H) 33 - 136 U/L     Total Protein 6.9 6.4 - 8.2 g/dL     AST 22 9 - 39 U/L     Bilirubin, Total 0.7 0.0 - 1.2 mg/dL     ALT 16 7 - 45 U/L   Lipase   Result Value Ref Range     Lipase 7 (L) 9 - 82 U/L   Lactate   Result Value Ref Range     Lactate 1.2 0.4 - 2.0 mmol/L   Magnesium   Result Value Ref Range     Magnesium 1.90 1.60 - 2.40 mg/dL   POCT GLUCOSE   Result Value Ref Range     POCT Glucose 129 (H) 74 - 99 mg/dL   POCT GLUCOSE   Result Value Ref Range     POCT Glucose 119 (H) 74 - 99 mg/dL   Protime-INR   Result Value Ref Range     Protime 17.4 (H) 9.8 - 12.4 seconds     INR 1.6 (H) 0.9 - 1.1   CBC   Result Value Ref Range     WBC 11.5 (H) 4.4 - 11.3 x10*3/uL     nRBC 0.0 0.0 - 0.0 /100 WBCs     RBC 4.08 4.00 - 5.20 x10*6/uL     Hemoglobin 10.9 (L) 12.0 - 16.0 g/dL     Hematocrit 36.7 36.0 - 46.0 %     MCV 90  80 - 100 fL     MCH 26.7 26.0 - 34.0 pg     MCHC 29.7 (L) 32.0 - 36.0 g/dL     RDW 19.9 (H) 11.5 - 14.5 %     Platelets 181 150 - 450 x10*3/uL   Basic Metabolic Panel   Result Value Ref Range     Glucose 129 (H) 74 - 99 mg/dL     Sodium 130 (L) 136 - 145 mmol/L     Potassium 4.2 3.5 - 5.3 mmol/L     Chloride 98 98 - 107 mmol/L     Bicarbonate 21 21 - 32 mmol/L     Anion Gap 15 10 - 20 mmol/L     Urea Nitrogen 19 6 - 23 mg/dL     Creatinine 0.85 0.50 - 1.05 mg/dL     eGFR 66 >60 mL/min/1.73m*2     Calcium 8.0 (L) 8.6 - 10.3 mg/dL   Heparin Assay, UFH   Result Value Ref Range     Heparin Unfractionated 1.1 (HH) See Comment Below for Therapeutic Ranges IU/mL   POCT GLUCOSE   Result Value Ref Range     POCT Glucose 123 (H) 74 - 99 mg/dL            Assessment/Plan  Melissa is an 89-year-old female patient presenting to emergency department from [facility] for evaluation of abdominal pain and vomiting.  Patient states that she developed an 8 out of 10 mid to lower abdominal pain radiating to her right flank.  Patient with a history of breast cancer and colon cancer.  CT imaging showing extensive left renal cortical infarcts as well as worsening of retroperitoneal metastatic lymphadenopathy and progression of hepatic metastasis per radiology review.  Consults placed to nephrology and vascular surgery, patient started on a heparin drip, medicated with morphine and admitted for further medical management.     Assessment & Plan  Renal infarct (Multi)     Hyponatremia     Hyponatremia/renal infarct  Inpatient admission to Dr. Lugo  See imaging results above  DNRCCA  N.p.o.  Consult nephrology and appreciate input  Consult vascular surgery and appreciate input  Coag screen ordered  Continue heparin drip  Continue gentle IV fluids  Repeat labs  Monitor heparin assay  Morphine/Zofran in ED  Dilaudid IV as needed  Tigan IM as needed 2/2 prolonged Qtc     T2DM/breast cancer/colon cancer/anemia/HLD/HTN  #Chronic  conditions  Insulin sliding scale for n.p.o.  Hypoglycemia protocol  Continue home MiraLAX and Colace  Continue home lisinopril daily  Continue home amlodipine daily  Continue home Estrace tablet     DVT Ppx  SCDs  Continue heparin drip  Bedrest/up to chair as tolerated  Patient fully evaluated on April 3.  Continue analgesics and await nephrology consultation.  Appreciate vascular surgery consultation.  Patient with significant metastatic cancer which comfort measures are in place.        I spent 60 minutes in the professional and overall care of this patient.  Tio Lugo MD                                 Objective  Last Recorded Vitals  /64   Pulse 100   Temp 36.7 °C (98.1 °F)   Resp 18   Wt 81.6 kg (180 lb)   SpO2 99%   Intake/Output last 3 Shifts:     Intake/Output Summary (Last 24 hours) at 4/3/2025 1349  Last data filed at 4/3/2025 1000      Gross per 24 hour   Intake 551.83 ml   Output --   Net 551.83 ml         Admission Weight  Weight: 81.6 kg (180 lb) (04/02/25 2207)     Daily Weight  04/02/25 : 81.6 kg (180 lb)     Image Results  CT abdomen pelvis w IV contrast  Narrative: Interpreted By:  Faraz Link,   STUDY:  CT ABDOMEN PELVIS W IV CONTRAST; ;  4/3/2025 12:30 am      INDICATION:  Signs/Symptoms:stage IV colon cancer, now with R abd pain.          COMPARISON:  01/17/2025.      ACCESSION NUMBER(S):  [id number]      ORDERING CLINICIAN:  ELYSE KLERMAN      TECHNIQUE:  Axial CT images of the abdomen and pelvis with coronal and sagittal  reconstructed images performed after intravenous administration of 80  cc Omnipaque 350.      FINDINGS:  LOWER CHEST: No acute abnormality of the lung bases.  BONES: No acute osseous abnormality. Osteopenia. Right hip  arthroplasty in standard alignment. Similar appearance of  mild-to-moderate chronic compression of the T11 vertebral body.  Stable grade 1 degenerative anterolisthesis at L4-L5 and moderate to  severe multilevel degenerative changes of  the imaged spine. ABDOMINAL  WALL: Within normal limits.      ABDOMEN:      LIVER: There is a large ill-defined region of hypoenhancement in the  right hepatic lobe measuring up to 10.8 cm in transaxial diameter, up  to at least 10.1 cm in CC diameter, suspicious for interval  progression of the hepatic metastasis. There is a new hypoattenuating  lesion inferolaterally in the inferior tip of the right hepatic lobe  (series 601, images [id number]), measuring up to approximately 2.2 cm in  transaxial diameter, suspicious for new metastatic lesion. Liver is  enlarged, similar to before. BILE DUCTS: Normal caliber.  GALLBLADDER: Cholelithiasis. No wall thickening or pericholecystic  fluid. PANCREAS:Atrophic with pancreatic ductal dilation and  irregularity and parenchymal calcifications in keeping with sequelae  of chronic pancreatitis. SPLEEN: Within normal limits.  ADRENALS: Within normal limits.  KIDNEYS and URETERS: New extensive hypoenhancement/non enhancement of  the left renal parenchyma compatible with extensive renal cortical  infarcts. Right kidney and ureter appear normal.          VESSELS: Severe atherosclerosis, with critical narrowing or occlusion  of the left renal artery. Severe narrowing of the right renal artery  also noted. RETROPERITONEUM: Interval enlargement of celiac axis and  portocaval nodes, suspicious for worsened metastatic disease.      PELVIS:      REPRODUCTIVE ORGANS: Endometrial fluid suggests cervical stenosis and  is new from the prior exam. BLADDER: Within normal limits.      BOWEL: When allowing for differences in patient positioning in slice  selection, there is no substantial change in the very large mass  encompassing the mid-transverse colon and several adjacent loops of  small bowel and adjacent omentum and mesocolon, compatible with  patient's known advanced malignancy. No dilated bowel to suggest  obstruction. Stomach appears grossly normal. No definite additional  abnormal bowel  wall thickening. Normal appendix. PERITONEUM: No  ascites or free air, no fluid collection.      Impression: New extensive hypoenhancement/non enhancement of the left renal  parenchyma compatible with extensive renal cortical infarcts. Right  kidney and ureter appear normal.      There is a large ill-defined region of hypoenhancement in the right  hepatic lobe measuring up to 10.8 cm in transaxial diameter, up to at  least 10.1 cm in CC diameter, suspicious for interval progression of  the hepatic metastasis. There is a new hypoattenuating lesion  inferolaterally in the inferior tip of the right hepatic lobe (series  601, images 57-63), measuring up to approximately 2.2 cm in  transaxial diameter, suspicious for new metastatic lesion. Liver is  enlarged, similar to before.      Interval worsening of retroperitoneal metastatic lymphadenopathy.      Cholelithiasis without convincing evidence of acute cholecystitis.      New fluid in the endometrial canal suggesting cervical stenosis.      Additional findings as discussed above.      MACRO:  None      Signed by: Faraz Link 4/3/2025 12:52 AM  Dictation workstation:   [id number]        Physical Exam  Constitutional:       Appearance: She is ill-appearing.   HENT:      Mouth/Throat:      Mouth: Mucous membranes are dry.      Pharynx: Oropharynx is clear.   Eyes:      Pupils: Pupils are equal, round, and reactive to light.   Cardiovascular:      Rate and Rhythm: Normal rate and regular rhythm.   Pulmonary:      Effort: Pulmonary effort is normal.      Breath sounds: Normal breath sounds.   Abdominal:      Palpations: Abdomen is soft.      Tenderness: There is abdominal tenderness in the periumbilical area and suprapubic area.      Comments: Right flank tenderness   Musculoskeletal:      Cervical back: Normal range of motion.   Skin:     General: Skin is warm and dry.   Neurological:      General: No focal deficit present.      Mental Status: She is alert.   Psychiatric:          Mood and Affect: Mood normal.         Behavior: Behavior normal.         Relevant Results                          Assessment/Plan                       Assessment & Plan  Renal infarct (Multi)     Hyponatremia     Hyponatremia/renal infarct  Inpatient admission to Dr. Lugo  See imaging results above  DNRCCA  N.p.o.  Consult nephrology and appreciate input  Consult vascular surgery and appreciate input  Coag screen ordered  Continue heparin drip  Continue gentle IV fluids  Repeat labs  Monitor heparin assay  Morphine/Zofran in ED  Dilaudid IV as needed  Tigan IM as needed 2/2 prolonged Qtc     T2DM/breast cancer/colon cancer/anemia/HLD/HTN  #Chronic conditions  Insulin sliding scale for n.p.o.  Hypoglycemia protocol  Continue home MiraLAX and Colace  Continue home lisinopril daily  Continue home amlodipine daily  Continue home Estrace tablet     DVT Ppx  SCDs  Continue heparin drip  Bedrest/up to chair as tolerated  Patient fully evaluated on April 3.  Continue analgesics and await nephrology consultation.  Appreciate vascular surgery consultation.  Patient with significant metastatic cancer which comfort measures are in place.      Patient fully evaluated on 4/4, resting in bed breathing comfortably on room air. Labs and medications reviewed. Estrase discontinued due to coagulation risk. Switched from heparin to Eliquis 2.5 BID for outpatient anticoagulation. Evaluated by vascular surgery, no surgery recommended at this time. Family declined heme-onc consult, not wishing to pursue aggressive chemotherapy treatment at this time. Continue comfort care for pain. Pyridium ordered for suprapubic pain. Per nursing staff, patient does not like Tigan, order switched to Zofran. Patient medically cleared for discharge to SNF today.                 Revision History                 Jo Dominique

## 2025-04-05 NOTE — CARE PLAN
The clinical goals for the shift include maintain safety and comfort      Problem: Pain - Adult  Goal: Verbalizes/displays adequate comfort level or baseline comfort level  Outcome: Progressing     Problem: Safety - Adult  Goal: Free from fall injury  Outcome: Progressing     Problem: Discharge Planning  Goal: Discharge to home or other facility with appropriate resources  Outcome: Progressing     Problem: Chronic Conditions and Co-morbidities  Goal: Patient's chronic conditions and co-morbidity symptoms are monitored and maintained or improved  Outcome: Progressing     Problem: Nutrition  Goal: Nutrient intake appropriate for maintaining nutritional needs  Outcome: Progressing     Problem: Fall/Injury  Goal: Not fall by end of shift  Outcome: Progressing  Goal: Be free from injury by end of the shift  Outcome: Progressing  Goal: Verbalize understanding of personal risk factors for fall in the hospital  Outcome: Progressing  Goal: Verbalize understanding of risk factor reduction measures to prevent injury from fall in the home  Outcome: Progressing  Goal: Use assistive devices by end of the shift  Outcome: Progressing  Goal: Pace activities to prevent fatigue by end of the shift  Outcome: Progressing     Problem: Skin  Goal: Decreased wound size/increased tissue granulation at next dressing change  4/5/2025 1010 by Chester Luther RN  Flowsheets (Taken 4/5/2025 1010)  Decreased wound size/increased tissue granulation at next dressing change: Promote sleep for wound healing  4/5/2025 1006 by Chester Luther RN  Outcome: Progressing  Goal: Participates in plan/prevention/treatment measures  4/5/2025 1010 by Chester Luther RN  Flowsheets (Taken 4/5/2025 1010)  Participates in plan/prevention/treatment measures:   Elevate heels   Discuss with provider PT/OT consult  4/5/2025 1006 by Chester Luther RN  Outcome: Progressing  Goal: Prevent/manage excess moisture  4/5/2025 1010 by Chester Luther RN  Flowsheets (Taken 4/5/2025  1010)  Prevent/manage excess moisture:   Cleanse incontinence/protect with barrier cream   Moisturize dry skin  4/5/2025 1006 by Chester Luther RN  Outcome: Progressing  Goal: Prevent/minimize sheer/friction injuries  4/5/2025 1010 by Chester Luther RN  Flowsheets (Taken 4/5/2025 1010)  Prevent/minimize sheer/friction injuries:   Turn/reposition every 2 hours/use positioning/transfer devices   Increase activity/out of bed for meals   HOB 30 degrees or less  4/5/2025 1006 by Chester Luther RN  Outcome: Progressing  Goal: Promote/optimize nutrition  4/5/2025 1010 by Chester Luther RN  Flowsheets (Taken 4/5/2025 1010)  Promote/optimize nutrition: Offer water/supplements/favorite foods  4/5/2025 1006 by Chester Luther RN  Outcome: Progressing  Goal: Promote skin healing  4/5/2025 1010 by Chester Luther RN  Flowsheets (Taken 4/5/2025 1010)  Promote skin healing: Turn/reposition every 2 hours/use positioning/transfer devices  4/5/2025 1006 by Chester Luther RN  Outcome: Progressing     Problem: Diabetes  Goal: Achieve decreasing blood glucose levels by end of shift  Outcome: Progressing  Goal: Increase stability of blood glucose readings by end of shift  Outcome: Progressing  Goal: Decrease in ketones present in urine by end of shift  Outcome: Progressing  Goal: Maintain electrolyte levels within acceptable range throughout shift  Outcome: Progressing  Goal: Maintain glucose levels >70mg/dl to <250mg/dl throughout shift  Outcome: Progressing  Goal: No changes in neurological exam by end of shift  Outcome: Progressing  Goal: Learn about and adhere to nutrition recommendations by end of shift  Outcome: Progressing  Goal: Vital signs within normal range for age by end of shift  Outcome: Progressing  Goal: Increase self care and/or family involovement by end of shift  Outcome: Progressing  Goal: Receive DSME education by end of shift  Outcome: Progressing

## 2025-04-05 NOTE — PROGRESS NOTES
Vascular surgery to sign off at this time. Thank you for the opportunity to be a part of this patient's multidisciplinary treatment team.  If any additional questions or concerns arise, please do not hesitate to contact via [facility] and  reconsult.

## 2025-04-05 NOTE — PROGRESS NOTES
"Melissa Tse is a 89 y.o. female on day 2 of admission presenting with Renal infarct (Multi).      Subjective   Today her main complaint is right shoulder pain.  She also complained of the amount of blood test she is receiving.  Also refers no appetite  Objective          Vitals 24HR  Heart Rate:  [96-98]   Temp:  [35.6 °C (96.1 °F)-36.5 °C (97.7 °F)]   Resp:  [18]   BP: (114-148)/(55-67)   Height:  [162.6 cm (5' 4.02\")]   Weight:  [81.6 kg (179 lb 14.3 oz)]   SpO2:  [93 %-97 %]         Intake/Output last 3 Shifts:    Intake/Output Summary (Last 24 hours) at 4/5/2025 1329  Last data filed at 4/5/2025 1301  Gross per 24 hour   Intake 1163.34 ml   Output 200 ml   Net 963.34 ml       Physical Exam  General appearance; chronically ill looking  Skin pallor   HEENT; pupils react to light and accommodation  Neck; no JVD no bruit no thyromegaly  Right shoulder; there is swelling and tenderness in the anterior shoulder bursa with diminished range of motion  Lungs; inspiratory rhonchi  Heart; regular rate no gallop no rubs  Pulses are equal  Abdomen; active peristalsis no rebound or guarding is mild epigastric tenderness  ; mild left CVA tenderness  Extremities; decreased muscle tone poor skin turgor  Neurological; pathological reflexes are absent    Relevant Results  Results for orders placed or performed during the hospital encounter of 04/02/25 (from the past 24 hours)   POCT GLUCOSE   Result Value Ref Range    POCT Glucose 175 (H) 74 - 99 mg/dL   POCT GLUCOSE   Result Value Ref Range    POCT Glucose 167 (H) 74 - 99 mg/dL   POCT GLUCOSE   Result Value Ref Range    POCT Glucose 148 (H) 74 - 99 mg/dL   POCT GLUCOSE   Result Value Ref Range    POCT Glucose 140 (H) 74 - 99 mg/dL   CBC and Auto Differential   Result Value Ref Range    WBC 12.8 (H) 4.4 - 11.3 x10*3/uL    nRBC 0.0 0.0 - 0.0 /100 WBCs    RBC 3.96 (L) 4.00 - 5.20 x10*6/uL    Hemoglobin 10.8 (L) 12.0 - 16.0 g/dL    Hematocrit 36.5 36.0 - 46.0 %    MCV 92 80 - " 100 fL    MCH 27.3 26.0 - 34.0 pg    MCHC 29.6 (L) 32.0 - 36.0 g/dL    RDW 20.3 (H) 11.5 - 14.5 %    Platelets 173 150 - 450 x10*3/uL    Neutrophils % 84.7 40.0 - 80.0 %    Immature Granulocytes %, Automated 0.5 0.0 - 0.9 %    Lymphocytes % 7.9 13.0 - 44.0 %    Monocytes % 6.7 2.0 - 10.0 %    Eosinophils % 0.1 0.0 - 6.0 %    Basophils % 0.1 0.0 - 2.0 %    Neutrophils Absolute 10.86 (H) 1.60 - 5.50 x10*3/uL    Immature Granulocytes Absolute, Automated 0.06 0.00 - 0.50 x10*3/uL    Lymphocytes Absolute 1.01 0.80 - 3.00 x10*3/uL    Monocytes Absolute 0.86 (H) 0.05 - 0.80 x10*3/uL    Eosinophils Absolute 0.01 0.00 - 0.40 x10*3/uL    Basophils Absolute 0.01 0.00 - 0.10 x10*3/uL   Comprehensive Metabolic Panel   Result Value Ref Range    Glucose 134 (H) 74 - 99 mg/dL    Sodium 131 (L) 136 - 145 mmol/L    Potassium 5.2 3.5 - 5.3 mmol/L    Chloride 102 98 - 107 mmol/L    Bicarbonate 20 (L) 21 - 32 mmol/L    Anion Gap 14 10 - 20 mmol/L    Urea Nitrogen 26 (H) 6 - 23 mg/dL    Creatinine 0.85 0.50 - 1.05 mg/dL    eGFR 66 >60 mL/min/1.73m*2    Calcium 7.7 (L) 8.6 - 10.3 mg/dL    Albumin 2.2 (L) 3.4 - 5.0 g/dL    Alkaline Phosphatase 155 (H) 33 - 136 U/L    Total Protein 5.7 (L) 6.4 - 8.2 g/dL    AST 23 9 - 39 U/L    Bilirubin, Total 0.9 0.0 - 1.2 mg/dL    ALT 8 7 - 45 U/L   Morphology   Result Value Ref Range    RBC Morphology See Below     Polychromasia Mild     Ovalocytes Few     [place]o Cells Few    POCT GLUCOSE   Result Value Ref Range    POCT Glucose 173 (H) 74 - 99 mg/dL       No results found.              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  Patient is advised that if pain persist we might need to have a orthopedic consult and right shoulder x-ray.  Continue  to monitor renal chemistries  Will hold blood test tomorrow as per patient request  Assessment & Plan  Renal infarct (Multi)    Hyponatremia  Thank You For allowing me to participate on this patient's care         I spent 20 minutes in the professional and overall care of this patient.      Garry Pacheco MD

## 2025-04-06 VITALS
WEIGHT: 179.9 LBS | TEMPERATURE: 98.1 F | SYSTOLIC BLOOD PRESSURE: 123 MMHG | OXYGEN SATURATION: 94 % | DIASTOLIC BLOOD PRESSURE: 75 MMHG | RESPIRATION RATE: 18 BRPM | HEART RATE: 112 BPM | BODY MASS INDEX: 30.71 KG/M2 | HEIGHT: 64 IN

## 2025-04-06 LAB
GLUCOSE BLD MANUAL STRIP-MCNC: 106 MG/DL (ref 74–99)
GLUCOSE BLD MANUAL STRIP-MCNC: 129 MG/DL (ref 74–99)
GLUCOSE BLD MANUAL STRIP-MCNC: 140 MG/DL (ref 74–99)

## 2025-04-06 PROCEDURE — 2500000001 HC RX 250 WO HCPCS SELF ADMINISTERED DRUGS (ALT 637 FOR MEDICARE OP): Performed by: INTERNAL MEDICINE

## 2025-04-06 PROCEDURE — 1100000001 HC PRIVATE ROOM DAILY

## 2025-04-06 PROCEDURE — 2500000004 HC RX 250 GENERAL PHARMACY W/ HCPCS (ALT 636 FOR OP/ED): Performed by: NURSE PRACTITIONER

## 2025-04-06 PROCEDURE — 82947 ASSAY GLUCOSE BLOOD QUANT: CPT

## 2025-04-06 PROCEDURE — 2500000002 HC RX 250 W HCPCS SELF ADMINISTERED DRUGS (ALT 637 FOR MEDICARE OP, ALT 636 FOR OP/ED): Performed by: INTERNAL MEDICINE

## 2025-04-06 PROCEDURE — 2500000001 HC RX 250 WO HCPCS SELF ADMINISTERED DRUGS (ALT 637 FOR MEDICARE OP): Performed by: NURSE PRACTITIONER

## 2025-04-06 RX ORDER — MIRTAZAPINE 15 MG/1
7.5 TABLET, FILM COATED ORAL NIGHTLY
Status: ACTIVE | OUTPATIENT
Start: 2025-04-06

## 2025-04-06 RX ADMIN — PHENAZOPYRIDINE 100 MG: 100 TABLET ORAL at 11:05

## 2025-04-06 RX ADMIN — APIXABAN 2.5 MG: 2.5 TABLET, FILM COATED ORAL at 20:04

## 2025-04-06 RX ADMIN — CYPROHEPTADINE HYDROCHLORIDE 4 MG: 4 TABLET ORAL at 08:06

## 2025-04-06 RX ADMIN — DICLOFENAC SODIUM 4 G: 10 GEL TOPICAL at 08:04

## 2025-04-06 RX ADMIN — CYPROHEPTADINE HYDROCHLORIDE 4 MG: 4 TABLET ORAL at 14:06

## 2025-04-06 RX ADMIN — TAMSULOSIN HYDROCHLORIDE 0.4 MG: 0.4 CAPSULE ORAL at 08:04

## 2025-04-06 RX ADMIN — DICLOFENAC SODIUM 4 G: 10 GEL TOPICAL at 16:05

## 2025-04-06 RX ADMIN — PHENAZOPYRIDINE 100 MG: 100 TABLET ORAL at 16:05

## 2025-04-06 RX ADMIN — DICLOFENAC SODIUM 4 G: 10 GEL TOPICAL at 12:33

## 2025-04-06 RX ADMIN — PHENAZOPYRIDINE 100 MG: 100 TABLET ORAL at 08:03

## 2025-04-06 RX ADMIN — APIXABAN 2.5 MG: 2.5 TABLET, FILM COATED ORAL at 08:04

## 2025-04-06 RX ADMIN — SENNOSIDES AND DOCUSATE SODIUM 1 TABLET: 50; 8.6 TABLET ORAL at 20:04

## 2025-04-06 RX ADMIN — OXYCODONE HYDROCHLORIDE AND ACETAMINOPHEN 1 TABLET: 5; 325 TABLET ORAL at 11:08

## 2025-04-06 RX ADMIN — CYPROHEPTADINE HYDROCHLORIDE 4 MG: 4 TABLET ORAL at 20:04

## 2025-04-06 RX ADMIN — POLYETHYLENE GLYCOL 3350 17 G: 17 POWDER, FOR SOLUTION ORAL at 08:03

## 2025-04-06 ASSESSMENT — PAIN - FUNCTIONAL ASSESSMENT
PAIN_FUNCTIONAL_ASSESSMENT: 0-10
PAIN_FUNCTIONAL_ASSESSMENT: 0-10

## 2025-04-06 ASSESSMENT — PAIN SCALES - GENERAL
PAINLEVEL_OUTOF10: 0 - NO PAIN
PAINLEVEL_OUTOF10: 0 - NO PAIN
PAINLEVEL_OUTOF10: 9
PAINLEVEL_OUTOF10: 4

## 2025-04-06 NOTE — CARE PLAN
The patient's goals for the shift include      The clinical goals for the shift include Patient will remain free from injury      Problem: Pain - Adult  Goal: Verbalizes/displays adequate comfort level or baseline comfort level  Outcome: Progressing     Problem: Safety - Adult  Goal: Free from fall injury  Outcome: Progressing     Problem: Fall/Injury  Goal: Verbalize understanding of risk factor reduction measures to prevent injury from fall in the home  Outcome: Progressing

## 2025-04-06 NOTE — CARE PLAN
The patient's goals for the shift include      The clinical goals for the shift include Patient will remain free from injury

## 2025-04-06 NOTE — PROGRESS NOTES
Melissa Tse is a 89 y.o. female on day 3 of admission presenting with Renal infarct (Multi).      Subjective   No voiced complaint denies shoulder pain today  Labs ordered for tomorrow  Objective          Vitals 24HR  Heart Rate:  []   Temp:  [35 °C (95 °F)-36.5 °C (97.7 °F)]   Resp:  [18]   BP: (120-164)/(54-69)   SpO2:  [92 %-96 %]         Intake/Output last 3 Shifts:    Intake/Output Summary (Last 24 hours) at 4/6/2025 1321  Last data filed at 4/5/2025 1738  Gross per 24 hour   Intake 0 ml   Output 400 ml   Net -400 ml       Physical Exam  General appearance; chronically ill looking  Skin pallor   HEENT; pupils react to light and accommodation  Neck; no JVD no bruit no thyromegaly  Right shoulder; there is swelling and tenderness in the anterior shoulder bursa with diminished range of motion  Lungs; inspiratory rhonchi  Heart; regular rate no gallop no rubs  Pulses are equal  Abdomen; active peristalsis no rebound or guarding is mild epigastric tenderness  ; mild left CVA tenderness  Extremities; decreased muscle tone poor skin turgor  Neurological; pathological reflexes are absent    Relevant Results  Results for orders placed or performed during the hospital encounter of 04/02/25 (from the past 24 hours)   POCT GLUCOSE   Result Value Ref Range    POCT Glucose 129 (H) 74 - 99 mg/dL   POCT GLUCOSE   Result Value Ref Range    POCT Glucose 106 (H) 74 - 99 mg/dL   POCT GLUCOSE   Result Value Ref Range    POCT Glucose 140 (H) 74 - 99 mg/dL                  Assessment/Plan      Left renal cortical infarct  Right shoulder bursitis  Anorexia  Bilateral renal artery stenosis  Carcinoma with metastasis to the liver  Thrombophilia either acquired or secondary to cancer  Hyponatremia  Metabolic acidosis    Plan  Cyproheptadine  Will add Voltaren cream to shoulder.  Patient is advised that if pain persist we might need to have a orthopedic consult and right shoulder x-ray.  Continue to monitor renal  chemistries  Labs tomorrow  Assessment & Plan  Renal infarct (Multi)    Hyponatremia  Thank You For allowing me to participate on this patient's care         I spent 20 minutes in the professional and overall care of this patient.      Garry Pacheco MD

## 2025-04-07 LAB
ALBUMIN SERPL BCP-MCNC: 2.1 G/DL (ref 3.4–5)
ALP SERPL-CCNC: 551 U/L (ref 33–136)
ALT SERPL W P-5'-P-CCNC: 86 U/L (ref 7–45)
ANION GAP SERPL CALC-SCNC: 11 MMOL/L (ref 10–20)
AST SERPL W P-5'-P-CCNC: 126 U/L (ref 9–39)
BASOPHILS # BLD AUTO: 0.03 X10*3/UL (ref 0–0.1)
BASOPHILS NFR BLD AUTO: 0.3 %
BILIRUB SERPL-MCNC: 1.1 MG/DL (ref 0–1.2)
BUN SERPL-MCNC: 27 MG/DL (ref 6–23)
CALCIUM SERPL-MCNC: 7.8 MG/DL (ref 8.6–10.3)
CHLORIDE SERPL-SCNC: 102 MMOL/L (ref 98–107)
CO2 SERPL-SCNC: 26 MMOL/L (ref 21–32)
CREAT SERPL-MCNC: 0.79 MG/DL (ref 0.5–1.05)
EGFRCR SERPLBLD CKD-EPI 2021: 72 ML/MIN/1.73M*2
EOSINOPHIL # BLD AUTO: 0.02 X10*3/UL (ref 0–0.4)
EOSINOPHIL NFR BLD AUTO: 0.2 %
ERYTHROCYTE [DISTWIDTH] IN BLOOD BY AUTOMATED COUNT: 19.7 % (ref 11.5–14.5)
GLUCOSE BLD MANUAL STRIP-MCNC: 107 MG/DL (ref 74–99)
GLUCOSE BLD MANUAL STRIP-MCNC: 112 MG/DL (ref 74–99)
GLUCOSE BLD MANUAL STRIP-MCNC: 115 MG/DL (ref 74–99)
GLUCOSE BLD MANUAL STRIP-MCNC: 127 MG/DL (ref 74–99)
GLUCOSE BLD MANUAL STRIP-MCNC: 136 MG/DL (ref 74–99)
GLUCOSE SERPL-MCNC: 111 MG/DL (ref 74–99)
HCT VFR BLD AUTO: 37.6 % (ref 36–46)
HGB BLD-MCNC: 11.4 G/DL (ref 12–16)
IMM GRANULOCYTES # BLD AUTO: 0.14 X10*3/UL (ref 0–0.5)
IMM GRANULOCYTES NFR BLD AUTO: 1.4 % (ref 0–0.9)
LYMPHOCYTES # BLD AUTO: 1.24 X10*3/UL (ref 0.8–3)
LYMPHOCYTES NFR BLD AUTO: 12.3 %
MCH RBC QN AUTO: 27.1 PG (ref 26–34)
MCHC RBC AUTO-ENTMCNC: 30.3 G/DL (ref 32–36)
MCV RBC AUTO: 89 FL (ref 80–100)
MONOCYTES # BLD AUTO: 0.89 X10*3/UL (ref 0.05–0.8)
MONOCYTES NFR BLD AUTO: 8.8 %
NEUTROPHILS # BLD AUTO: 7.77 X10*3/UL (ref 1.6–5.5)
NEUTROPHILS NFR BLD AUTO: 77 %
NRBC BLD-RTO: 0 /100 WBCS (ref 0–0)
PLATELET # BLD AUTO: 126 X10*3/UL (ref 150–450)
POTASSIUM SERPL-SCNC: 4.2 MMOL/L (ref 3.5–5.3)
PROT SERPL-MCNC: 5.7 G/DL (ref 6.4–8.2)
RBC # BLD AUTO: 4.21 X10*6/UL (ref 4–5.2)
SODIUM SERPL-SCNC: 135 MMOL/L (ref 136–145)
WBC # BLD AUTO: 10.1 X10*3/UL (ref 4.4–11.3)

## 2025-04-07 PROCEDURE — 2500000001 HC RX 250 WO HCPCS SELF ADMINISTERED DRUGS (ALT 637 FOR MEDICARE OP): Performed by: NURSE PRACTITIONER

## 2025-04-07 PROCEDURE — 2500000001 HC RX 250 WO HCPCS SELF ADMINISTERED DRUGS (ALT 637 FOR MEDICARE OP): Performed by: INTERNAL MEDICINE

## 2025-04-07 PROCEDURE — 36415 COLL VENOUS BLD VENIPUNCTURE: CPT | Performed by: INTERNAL MEDICINE

## 2025-04-07 PROCEDURE — 82947 ASSAY GLUCOSE BLOOD QUANT: CPT

## 2025-04-07 PROCEDURE — 85025 COMPLETE CBC W/AUTO DIFF WBC: CPT | Performed by: INTERNAL MEDICINE

## 2025-04-07 PROCEDURE — 2500000004 HC RX 250 GENERAL PHARMACY W/ HCPCS (ALT 636 FOR OP/ED): Performed by: NURSE PRACTITIONER

## 2025-04-07 PROCEDURE — 1100000001 HC PRIVATE ROOM DAILY

## 2025-04-07 PROCEDURE — 2500000002 HC RX 250 W HCPCS SELF ADMINISTERED DRUGS (ALT 637 FOR MEDICARE OP, ALT 636 FOR OP/ED): Performed by: INTERNAL MEDICINE

## 2025-04-07 PROCEDURE — 2500000004 HC RX 250 GENERAL PHARMACY W/ HCPCS (ALT 636 FOR OP/ED): Performed by: INTERNAL MEDICINE

## 2025-04-07 PROCEDURE — 80053 COMPREHEN METABOLIC PANEL: CPT | Performed by: INTERNAL MEDICINE

## 2025-04-07 RX ORDER — CEFAZOLIN SODIUM 2 G/100ML
2 INJECTION, SOLUTION INTRAVENOUS EVERY 8 HOURS
Status: DISCONTINUED | OUTPATIENT
Start: 2025-04-07 | End: 2025-04-11 | Stop reason: HOSPADM

## 2025-04-07 RX ORDER — TRIAMCINOLONE ACETONIDE 1 MG/G
OINTMENT TOPICAL 2 TIMES DAILY
Status: DISCONTINUED | OUTPATIENT
Start: 2025-04-07 | End: 2025-04-11 | Stop reason: HOSPADM

## 2025-04-07 RX ADMIN — DICLOFENAC SODIUM 4 G: 10 GEL TOPICAL at 13:16

## 2025-04-07 RX ADMIN — POLYETHYLENE GLYCOL 3350 17 G: 17 POWDER, FOR SOLUTION ORAL at 09:08

## 2025-04-07 RX ADMIN — MIRTAZAPINE 7.5 MG: 15 TABLET, FILM COATED ORAL at 21:19

## 2025-04-07 RX ADMIN — OXYCODONE HYDROCHLORIDE AND ACETAMINOPHEN 1 TABLET: 5; 325 TABLET ORAL at 09:08

## 2025-04-07 RX ADMIN — APIXABAN 2.5 MG: 2.5 TABLET, FILM COATED ORAL at 12:49

## 2025-04-07 RX ADMIN — APIXABAN 2.5 MG: 2.5 TABLET, FILM COATED ORAL at 21:25

## 2025-04-07 RX ADMIN — ONDANSETRON 4 MG: 2 INJECTION INTRAMUSCULAR; INTRAVENOUS at 21:25

## 2025-04-07 RX ADMIN — PHENAZOPYRIDINE 100 MG: 100 TABLET ORAL at 18:18

## 2025-04-07 RX ADMIN — DICLOFENAC SODIUM 4 G: 10 GEL TOPICAL at 18:19

## 2025-04-07 RX ADMIN — TRIAMCINOLONE ACETONIDE: 1 OINTMENT TOPICAL at 12:53

## 2025-04-07 RX ADMIN — TAMSULOSIN HYDROCHLORIDE 0.4 MG: 0.4 CAPSULE ORAL at 12:52

## 2025-04-07 RX ADMIN — CEFAZOLIN SODIUM 2 G: 2 INJECTION, SOLUTION INTRAVENOUS at 18:18

## 2025-04-07 RX ADMIN — SENNOSIDES AND DOCUSATE SODIUM 1 TABLET: 50; 8.6 TABLET ORAL at 21:25

## 2025-04-07 RX ADMIN — PHENAZOPYRIDINE 100 MG: 100 TABLET ORAL at 12:51

## 2025-04-07 ASSESSMENT — COGNITIVE AND FUNCTIONAL STATUS - GENERAL
DRESSING REGULAR UPPER BODY CLOTHING: A LITTLE
WALKING IN HOSPITAL ROOM: A LOT
EATING MEALS: A LITTLE
CLIMB 3 TO 5 STEPS WITH RAILING: TOTAL
HELP NEEDED FOR BATHING: A LITTLE
MOVING FROM LYING ON BACK TO SITTING ON SIDE OF FLAT BED WITH BEDRAILS: A LITTLE
MOBILITY SCORE: 15
DAILY ACTIVITIY SCORE: 18
TOILETING: A LITTLE
TURNING FROM BACK TO SIDE WHILE IN FLAT BAD: A LITTLE
DRESSING REGULAR LOWER BODY CLOTHING: A LITTLE
STANDING UP FROM CHAIR USING ARMS: A LITTLE
MOVING TO AND FROM BED TO CHAIR: A LITTLE
PERSONAL GROOMING: A LITTLE

## 2025-04-07 ASSESSMENT — PAIN SCALES - GENERAL
PAINLEVEL_OUTOF10: 0 - NO PAIN
PAINLEVEL_OUTOF10: 0 - NO PAIN

## 2025-04-07 NOTE — CARE PLAN
The patient's goals for the shift include      The clinical goals for the shift include patient will remain free from injury    Problem: Pain - Adult  Goal: Verbalizes/displays adequate comfort level or baseline comfort level  Outcome: Progressing     Problem: Safety - Adult  Goal: Free from fall injury  Outcome: Progressing     Problem: Discharge Planning  Goal: Discharge to home or other facility with appropriate resources  Outcome: Progressing     Problem: Chronic Conditions and Co-morbidities  Goal: Patient's chronic conditions and co-morbidity symptoms are monitored and maintained or improved  Outcome: Progressing     Problem: Nutrition  Goal: Nutrient intake appropriate for maintaining nutritional needs  Outcome: Progressing     Problem: Fall/Injury  Goal: Not fall by end of shift  Outcome: Progressing  Goal: Be free from injury by end of the shift  Outcome: Progressing  Goal: Verbalize understanding of personal risk factors for fall in the hospital  Outcome: Progressing  Goal: Verbalize understanding of risk factor reduction measures to prevent injury from fall in the home  Outcome: Progressing  Goal: Use assistive devices by end of the shift  Outcome: Progressing  Goal: Pace activities to prevent fatigue by end of the shift  Outcome: Progressing     Problem: Skin  Goal: Decreased wound size/increased tissue granulation at next dressing change  Outcome: Progressing  Goal: Participates in plan/prevention/treatment measures  Outcome: Progressing  Goal: Prevent/manage excess moisture  Outcome: Progressing  Goal: Prevent/minimize sheer/friction injuries  Outcome: Progressing  Goal: Promote/optimize nutrition  Outcome: Progressing  Goal: Promote skin healing  Outcome: Progressing     Problem: Diabetes  Goal: Achieve decreasing blood glucose levels by end of shift  Outcome: Progressing  Goal: Increase stability of blood glucose readings by end of shift  Outcome: Progressing  Goal: Decrease in ketones present in  urine by end of shift  Outcome: Progressing  Goal: Maintain electrolyte levels within acceptable range throughout shift  Outcome: Progressing  Goal: Maintain glucose levels >70mg/dl to <250mg/dl throughout shift  Outcome: Progressing  Goal: No changes in neurological exam by end of shift  Outcome: Progressing  Goal: Learn about and adhere to nutrition recommendations by end of shift  Outcome: Progressing  Goal: Vital signs within normal range for age by end of shift  Outcome: Progressing  Goal: Increase self care and/or family involovement by end of shift  Outcome: Progressing  Goal: Receive DSME education by end of shift  Outcome: Progressing

## 2025-04-07 NOTE — PROGRESS NOTES
Melissa Tse is a 89 y.o. female on day 3 of admission presenting with Renal infarct (Multi).      Subjective   No events overnight. Patient seen and examined at bedside. She is still having pain. She states that her appetite is poor.        Objective     Last Recorded Vitals  /75 (BP Location: Left arm, Patient Position: Lying)   Pulse (!) 112   Temp 36.7 °C (98.1 °F) (Temporal)   Resp 18   Wt 81.6 kg (179 lb 14.3 oz)   SpO2 94%   Intake/Output last 3 Shifts:    Intake/Output Summary (Last 24 hours) at 4/6/2025 2114  Last data filed at 4/6/2025 1900  Gross per 24 hour   Intake --   Output 150 ml   Net -150 ml       Admission Weight  Weight: 81.6 kg (180 lb) (04/02/25 2207)    Daily Weight  04/04/25 : 81.6 kg (179 lb 14.3 oz)    Image Results  Electrocardiogram, 12-lead PRN ACS symptoms  Unknown rhythm, irregular rate  Probable lateral infarct, old    See ED provider note for full interpretation and clinical correlation  Confirmed by Rivka Manzano ([id number]) on 4/5/2025 10:05:09 AM      Physical Exam  Constitutional:       Appearance: She is ill-appearing.   HENT:      Mouth/Throat:      Mouth: Mucous membranes are dry.      Pharynx: Oropharynx is clear.   Eyes:      Pupils: Pupils are equal, round, and reactive to light.   Cardiovascular:      Rate and Rhythm: Normal rate and regular rhythm.   Pulmonary:      Effort: Pulmonary effort is normal.      Breath sounds: Normal breath sounds.   Abdominal:      Palpations: Abdomen is soft.      Tenderness: There is abdominal tenderness in the periumbilical area and suprapubic area.      Comments: Right flank tenderness   Musculoskeletal:      Cervical back: Normal range of motion.   Skin:     General: Skin is warm and dry.   Neurological:      General: No focal deficit present.      Mental Status: She is alert.   Psychiatric:         Mood and Affect: Mood normal.         Behavior: Behavior normal.      Relevant Results               Assessment/Plan                   Assessment & Plan  Renal infarct (Multi)    Hyponatremia    Hyponatremia/renal infarct  Inpatient admission to Dr. Lugo  See imaging results above  DNRCCA  N.p.o.  Consult nephrology and appreciate input  Consult vascular surgery and appreciate input  Coag screen ordered  Continue heparin drip  Continue gentle IV fluids  Repeat labs  Monitor heparin assay  Morphine/Zofran in ED  Dilaudid IV as needed  Tigan IM as needed 2/2 prolonged Qtc    T2DM/breast cancer/colon cancer/anemia/HLD/HTN  #Chronic conditions  Insulin sliding scale for n.p.o.  Hypoglycemia protocol  Continue home MiraLAX and Colace  Continue home lisinopril daily  Continue home amlodipine daily  Continue home Estrace tablet    DVT Ppx  SCDs  Continue heparin drip  Bedrest/up to chair as tolerated    4/6: Continue current management. Add mirtazapine to boost appetite. Continue pain control.               Xu Kinsey, DO

## 2025-04-07 NOTE — PROGRESS NOTES
Melissa Tse is a 89 y.o. female on day 4 of admission presenting with Renal infarct (Multi).      Subjective   Labs are stable.  No new Voice Complaint  Objective          Vitals 24HR  Heart Rate:  []   Temp:  [35.5 °C (95.9 °F)-36.7 °C (98.1 °F)]   Resp:  [18]   BP: ()/(53-75)   SpO2:  [92 %-94 %]         Intake/Output last 3 Shifts:    Intake/Output Summary (Last 24 hours) at 4/7/2025 1108  Last data filed at 4/7/2025 0819  Gross per 24 hour   Intake 120 ml   Output 500 ml   Net -380 ml       Physical Exam  General appearance; chronically ill looking  Skin pallor   HEENT; pupils react to light and accommodation  Neck; no JVD no bruit no thyromegaly  Right shoulder; there is swelling and tenderness in the anterior shoulder bursa with diminished range of motion  Lungs; inspiratory rhonchi  Heart; regular rate no gallop no rubs  Pulses are equal  Abdomen; active peristalsis no rebound or guarding is mild epigastric tenderness  ; mild left CVA tenderness  Extremities; decreased muscle tone poor skin turgor  Neurological; pathological reflexes are absent    Relevant Results  Results for orders placed or performed during the hospital encounter of 04/02/25 (from the past 24 hours)   POCT GLUCOSE   Result Value Ref Range    POCT Glucose 140 (H) 74 - 99 mg/dL   POCT GLUCOSE   Result Value Ref Range    POCT Glucose 129 (H) 74 - 99 mg/dL   POCT GLUCOSE   Result Value Ref Range    POCT Glucose 136 (H) 74 - 99 mg/dL   CBC and Auto Differential   Result Value Ref Range    WBC 10.1 4.4 - 11.3 x10*3/uL    nRBC 0.0 0.0 - 0.0 /100 WBCs    RBC 4.21 4.00 - 5.20 x10*6/uL    Hemoglobin 11.4 (L) 12.0 - 16.0 g/dL    Hematocrit 37.6 36.0 - 46.0 %    MCV 89 80 - 100 fL    MCH 27.1 26.0 - 34.0 pg    MCHC 30.3 (L) 32.0 - 36.0 g/dL    RDW 19.7 (H) 11.5 - 14.5 %    Platelets 126 (L) 150 - 450 x10*3/uL    Neutrophils % 77.0 40.0 - 80.0 %    Immature Granulocytes %, Automated 1.4 (H) 0.0 - 0.9 %    Lymphocytes % 12.3 13.0 - 44.0  %    Monocytes % 8.8 2.0 - 10.0 %    Eosinophils % 0.2 0.0 - 6.0 %    Basophils % 0.3 0.0 - 2.0 %    Neutrophils Absolute 7.77 (H) 1.60 - 5.50 x10*3/uL    Immature Granulocytes Absolute, Automated 0.14 0.00 - 0.50 x10*3/uL    Lymphocytes Absolute 1.24 0.80 - 3.00 x10*3/uL    Monocytes Absolute 0.89 (H) 0.05 - 0.80 x10*3/uL    Eosinophils Absolute 0.02 0.00 - 0.40 x10*3/uL    Basophils Absolute 0.03 0.00 - 0.10 x10*3/uL   Comprehensive Metabolic Panel   Result Value Ref Range    Glucose 111 (H) 74 - 99 mg/dL    Sodium 135 (L) 136 - 145 mmol/L    Potassium 4.2 3.5 - 5.3 mmol/L    Chloride 102 98 - 107 mmol/L    Bicarbonate 26 21 - 32 mmol/L    Anion Gap 11 10 - 20 mmol/L    Urea Nitrogen 27 (H) 6 - 23 mg/dL    Creatinine 0.79 0.50 - 1.05 mg/dL    eGFR 72 >60 mL/min/1.73m*2    Calcium 7.8 (L) 8.6 - 10.3 mg/dL    Albumin 2.1 (L) 3.4 - 5.0 g/dL    Alkaline Phosphatase 551 (H) 33 - 136 U/L    Total Protein 5.7 (L) 6.4 - 8.2 g/dL     (H) 9 - 39 U/L    Bilirubin, Total 1.1 0.0 - 1.2 mg/dL    ALT 86 (H) 7 - 45 U/L   POCT GLUCOSE   Result Value Ref Range    POCT Glucose 115 (H) 74 - 99 mg/dL                  Assessment/Plan      Left renal cortical infarct  Right shoulder bursitis  Anorexia  Bilateral renal artery stenosis  Carcinoma with 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      Garry Pacheco MD

## 2025-04-07 NOTE — PROGRESS NOTES
Per Blu, patient from [facility], she is a long term care patient and no precert needed for return.

## 2025-04-07 NOTE — CARE PLAN
The patient's goals for the shift include      The clinical goals for the shift include patient will remain free from injury      Problem: Pain - Adult  Goal: Verbalizes/displays adequate comfort level or baseline comfort level  Outcome: Progressing     Problem: Safety - Adult  Goal: Free from fall injury  Outcome: Progressing     Problem: Fall/Injury  Goal: Not fall by end of shift  Outcome: Progressing

## 2025-04-07 NOTE — PROGRESS NOTES
Melissa Tse is a 89 y.o. female on day 4 of admission presenting with Renal infarct (Multi).      Subjective   No significant events overnight. Patient seen and evaluated at bedside.        Objective     Last Recorded Vitals  BP 92/55 (BP Location: Left arm, Patient Position: Lying)   Pulse (!) 112   Temp 35.5 °C (95.9 °F) (Temporal)   Resp 18   Wt 81.6 kg (179 lb 14.3 oz)   SpO2 92%   Intake/Output last 3 Shifts:    Intake/Output Summary (Last 24 hours) at 4/7/2025 1503  Last data filed at 4/7/2025 1257  Gross per 24 hour   Intake 300 ml   Output 500 ml   Net -200 ml       Admission Weight  Weight: 81.6 kg (180 lb) (04/02/25 2207)    Daily Weight  04/04/25 : 81.6 kg (179 lb 14.3 oz)    Image Results  Electrocardiogram, 12-lead PRN ACS symptoms  Unknown rhythm, irregular rate  Probable lateral infarct, old    See ED provider note for full interpretation and clinical correlation  Confirmed by Rivka Manzano ([id number]) on 4/5/2025 10:05:09 AM      Physical Exam     MD Tio Hodges MD    Physician  Internal Medicine     Progress Notes   This note has been shared with the patient  Signed     Date of Service: 4/5/2025  3:59 PM  Signed   Expand All Collapse All    Melissa Tse is a 89 y.o. female on day 2 of admission presenting with Renal infarct (Multi).           Subjective  Patient fully evaluated  04/05for    Problem List Items Addressed This Visit         * (Principal) Renal infarct (Multi) - Primary     Other Visit Diagnoses         Metastatic malignant neoplasm, unspecified site (Multi)             Patient seen resting in bed with head of bed elevated, no s/s or c/o acute difficulties at this time. Patient still with significant pain noted, medications adjusted, percocet added, will monitor overnight and repeat labs in the AM.  Vital signs for last 24 hours Temp:  [35.6 °C (96.1 °F)-36.5 °C (97.7 °F)] 36.1 °C (97 °F)Heart Rate:  [96-98] 96Resp:  [18] 18BP: (114-148)/(55-67) 148/67  I/O  this shift:In: 220.8 [I.V.:220.8]Out: - Patient still requiring frequent cardiac and SPO2 monitoring. Continue aggressive pulmonary hygiene and oral hygiene. Off loading as tolerated for skin integrity. Medications and labs reviewed-   Results for orders placed or performed during the hospital encounter of 04/02/25 (from the past 24 hours)  POCT GLUCOSE  Result  Value  Ref Range     POCT Glucose  175 (H)  74 - 99 mg/dL  POCT GLUCOSE  Result  Value  Ref Range     POCT Glucose  167 (H)  74 - 99 mg/dL  POCT GLUCOSE  Result  Value  Ref Range     POCT Glucose  148 (H)  74 - 99 mg/dL  POCT GLUCOSE  Result  Value  Ref Range     POCT Glucose  140 (H)  74 - 99 mg/dL  CBC and Auto Differential  Result  Value  Ref Range     WBC  12.8 (H)  4.4 - 11.3 x10*3/uL     nRBC  0.0  0.0 - 0.0 /100 WBCs     RBC  3.96 (L)  4.00 - 5.20 x10*6/uL     Hemoglobin  10.8 (L)  12.0 - 16.0 g/dL     Hematocrit  36.5  36.0 - 46.0 %     MCV  92  80 - 100 fL     MCH  27.3  26.0 - 34.0 pg     MCHC  29.6 (L)  32.0 - 36.0 g/dL     RDW  20.3 (H)  11.5 - 14.5 %     Platelets  173  150 - 450 x10*3/uL     Neutrophils %  84.7  40.0 - 80.0 %     Immature Granulocytes %, Automated  0.5  0.0 - 0.9 %     Lymphocytes %  7.9  13.0 - 44.0 %     Monocytes %  6.7  2.0 - 10.0 %     Eosinophils %  0.1  0.0 - 6.0 %     Basophils %  0.1  0.0 - 2.0 %     Neutrophils Absolute  10.86 (H)  1.60 - 5.50 x10*3/uL     Immature Granulocytes Absolute, Automated  0.06  0.00 - 0.50 x10*3/uL     Lymphocytes Absolute  1.01  0.80 - 3.00 x10*3/uL     Monocytes Absolute  0.86 (H)  0.05 - 0.80 x10*3/uL     Eosinophils Absolute  0.01  0.00 - 0.40 x10*3/uL     Basophils Absolute  0.01  0.00 - 0.10 x10*3/uL  Comprehensive Metabolic Panel  Result  Value  Ref Range     Glucose  134 (H)  74 - 99 mg/dL     Sodium  131 (L)  136 - 145 mmol/L     Potassium  5.2  3.5 - 5.3 mmol/L     Chloride  102  98 - 107 mmol/L     Bicarbonate  20 (L)  21 - 32 mmol/L     Anion Gap  14  10 - 20 mmol/L     Urea  Nitrogen  26 (H)  6 - 23 mg/dL     Creatinine  0.85  0.50 - 1.05 mg/dL     eGFR  66  >60 mL/min/1.73m*2     Calcium  7.7 (L)  8.6 - 10.3 mg/dL     Albumin  2.2 (L)  3.4 - 5.0 g/dL     Alkaline Phosphatase  155 (H)  33 - 136 U/L     Total Protein  5.7 (L)  6.4 - 8.2 g/dL     AST  23  9 - 39 U/L     Bilirubin, Total  0.9  0.0 - 1.2 mg/dL     ALT  8  7 - 45 U/L  Morphology  Result  Value  Ref Range     RBC Morphology  See Below        Polychromasia  Mild        Ovalocytes  Few        Selam Cells  Few     POCT GLUCOSE  Result  Value  Ref Range     POCT Glucose  173 (H)  74 - 99 mg/dL     Patient recently received an antibiotic (last 12 hours)         None         Plan discussed with interdisciplinary team, will continue to monitor pain levels and monitor overnight. Will continue current and repeat labs in the AM.  Discharge planning discussed with patient and care team. Therapy evaluations ordered. Anticipate HHC/SNF at discharge. Patient aware and agreeable to current plan, continue plan as above.  I spent a total of 50 minutes on the date of the service which included preparing to see the patient, face-to-face patient care, completing clinical documentation, obtaining and/or reviewing separately obtained history, performing a medically appropriate examination, counseling and educating the patient/family/caregiver, ordering medications, tests, or procedures, communicating with other HCPs (not separately reported), independently interpreting results (not separately reported), communicating results to the patient/family/caregiver, and care coordination (not separately reported).             ObjectiveExpand by Default  Last Recorded Vitals  /67 (BP Location: Right leg, Patient Position: Lying)   Pulse 96   Temp 36.1 °C (97 °F) (Temporal)   Resp 18   Wt 81.6 kg (179 lb 14.3 oz)   SpO2 93%   Intake/Output last 3 Shifts:     Intake/Output Summary (Last 24 hours) at 4/5/2025 5389  Last data filed at 4/5/2025  1301  Gross per 24 hour  Intake  1036.67 ml  Output  200 ml  Net  836.67 ml        Admission Weight  Weight: 81.6 kg (180 lb) (04/02/25 2207)     Daily Weight  04/04/25 : 81.6 kg (179 lb 14.3 oz)     Image Results  Electrocardiogram, 12-lead PRN ACS symptoms  Unknown rhythm, irregular rate  Probable lateral infarct, old     See ED provider note for full interpretation and clinical correlation  Confirmed by Rivka Manzano ([id number]) on 4/5/2025 10:05:09 AM        Physical Exam     Relevant Results                       Assessment/Plan     Assessment & Plan  Renal infarct (Multi)     Hyponatremia     Hyponatremia/renal infarct  Inpatient admission to Dr. Lugo  See imaging results above  DNRCCA  N.p.o.  Consult nephrology and appreciate input  Consult vascular surgery and appreciate input  Coag screen ordered  Continue heparin drip  Continue gentle IV fluids  Repeat labs  Monitor heparin assay  Morphine/Zofran in ED  Dilaudid IV as needed  Tigan IM as needed 2/2 prolonged Qtc     T2DM/breast cancer/colon cancer/anemia/HLD/HTN  #Chronic conditions  Insulin sliding scale for n.p.o.  Hypoglycemia protocol  Continue home MiraLAX and Colace  Continue home lisinopril daily  Continue home amlodipine daily  Continue home Estrace tablet     DVT Ppx  SCDs  Continue heparin drip  Bedrest/up to chair as tolerated  Patient fully evaluated on April 3.  Continue analgesics and await nephrology consultation.  Appreciate vascular surgery consultation.  Patient with significant metastatic cancer which comfort measures are in place.        MD Tio Hodges MD untitled image  Physician  Internal Medicine     Progress Notes   This note has been shared with the patient  Addendum     Date of Service: 4/3/2025  1:49 PM     Addendum     Expand All Collapse All  untitled image  Patient fully evaluated on 4/3. Patient resting in bed, breathing comfortably on 3L O2 via nasal cannula. Accompanied by daughter in the room.  Evaluated today by nephrology, lisinopril discontinued d/t jr renal artery stenosis, flomax added. Hypertension will be treated with prn medications to metoprolol/apresoline IV. Continue heparin drip. Also evaluated by vascular surgery, no surgery recommended at this time. Recommended consult to heme-onc, family declined patient being seen by heme-onc - not wishing to pursue aggressive chemotherapy treatment at this time. Labs reviewed. Mild hyponatremia noted, continue sodium chloride drip. Being given comfort measures for pain, currently receiving dilaudid, morphine.     Problem List Items Addressed This Visit                     Genitourinary and Reproductive     * (Principal) Renal infarct (Multi) - Primary     Other Visit Diagnoses         Metastatic malignant neoplasm, unspecified site (Multi)               Patient seen resting in bed with head of bed elevated, no s/s or c/o acute difficulties at this time.  Vital signs for last 24 hours Temp:  [36.5 °C (97.7 °F)-36.7 °C (98.1 °F)] 36.7 °C (98.1 °F)  Heart Rate:  [] 100  Resp:  [16-20] 18  BP: (125-175)/(60-74) 126/64    I/O this shift:  In: 551.8 [I.V.:551.8]  Out: -   Patient still requiring frequent cardiac and SPO2 monitoring. Continue aggressive pulmonary hygiene and oral hygiene. Off loading as tolerated for skin integrity. Medications and labs reviewed-               Results for orders placed or performed during the hospital encounter of 04/02/25 (from the past 24 hours)  CBC and Auto Differential  Result  Value  Ref Range     WBC  11.1  4.4 - 11.3 x10*3/uL     nRBC  0.0  0.0 - 0.0 /100 WBCs     RBC  4.36  4.00 - 5.20 x10*6/uL     Hemoglobin  11.5 (L)  12.0 - 16.0 g/dL     Hematocrit  38.8  36.0 - 46.0 %     MCV  89  80 - 100 fL     MCH  26.4  26.0 - 34.0 pg     MCHC  29.6 (L)  32.0 - 36.0 g/dL     RDW  19.9 (H)  11.5 - 14.5 %     Platelets  198  150 - 450 x10*3/uL     Neutrophils %  84.1  40.0 - 80.0 %     Immature Granulocytes %,  Automated  0.6  0.0 - 0.9 %     Lymphocytes %  9.5  13.0 - 44.0 %     Monocytes %  5.6  2.0 - 10.0 %     Eosinophils %  0.0  0.0 - 6.0 %     Basophils %  0.2  0.0 - 2.0 %     Neutrophils Absolute  9.34 (H)  1.60 - 5.50 x10*3/uL     Immature Granulocytes Absolute, Automated  0.07  0.00 - 0.50 x10*3/uL     Lymphocytes Absolute  1.06  0.80 - 3.00 x10*3/uL     Monocytes Absolute  0.62  0.05 - 0.80 x10*3/uL     Eosinophils Absolute  0.00  0.00 - 0.40 x10*3/uL     Basophils Absolute  0.02  0.00 - 0.10 x10*3/uL  Comprehensive metabolic panel  Result  Value  Ref Range     Glucose  131 (H)  74 - 99 mg/dL     Sodium  131 (L)  136 - 145 mmol/L     Potassium  4.6  3.5 - 5.3 mmol/L     Chloride  96 (L)  98 - 107 mmol/L     Bicarbonate  22  21 - 32 mmol/L     Anion Gap  18  10 - 20 mmol/L     Urea Nitrogen  21  6 - 23 mg/dL     Creatinine  0.89  0.50 - 1.05 mg/dL     eGFR  62  >60 mL/min/1.73m*2     Calcium  8.4 (L)  8.6 - 10.3 mg/dL     Albumin  2.9 (L)  3.4 - 5.0 g/dL     Alkaline Phosphatase  258 (H)  33 - 136 U/L     Total Protein  6.9  6.4 - 8.2 g/dL     AST  22  9 - 39 U/L     Bilirubin, Total  0.7  0.0 - 1.2 mg/dL     ALT  16  7 - 45 U/L  Lipase  Result  Value  Ref Range     Lipase  7 (L)  9 - 82 U/L  Lactate  Result  Value  Ref Range     Lactate  1.2  0.4 - 2.0 mmol/L  Magnesium  Result  Value  Ref Range     Magnesium  1.90  1.60 - 2.40 mg/dL  POCT GLUCOSE  Result  Value  Ref Range     POCT Glucose  129 (H)  74 - 99 mg/dL  POCT GLUCOSE  Result  Value  Ref Range     POCT Glucose  119 (H)  74 - 99 mg/dL  Protime-INR  Result  Value  Ref Range     Protime  17.4 (H)  9.8 - 12.4 seconds     INR  1.6 (H)  0.9 - 1.1  CBC  Result  Value  Ref Range     WBC  11.5 (H)  4.4 - 11.3 x10*3/uL     nRBC  0.0  0.0 - 0.0 /100 WBCs     RBC  4.08  4.00 - 5.20 x10*6/uL     Hemoglobin  10.9 (L)  12.0 - 16.0 g/dL     Hematocrit  36.7  36.0 - 46.0 %     MCV  90  80 - 100 fL     MCH  26.7  26.0 - 34.0 pg     MCHC  29.7 (L)  32.0 - 36.0 g/dL      RDW  19.9 (H)  11.5 - 14.5 %     Platelets  181  150 - 450 x10*3/uL  Basic Metabolic Panel  Result  Value  Ref Range     Glucose  129 (H)  74 - 99 mg/dL     Sodium  130 (L)  136 - 145 mmol/L     Potassium  4.2  3.5 - 5.3 mmol/L     Chloride  98  98 - 107 mmol/L     Bicarbonate  21  21 - 32 mmol/L     Anion Gap  15  10 - 20 mmol/L     Urea Nitrogen  19  6 - 23 mg/dL     Creatinine  0.85  0.50 - 1.05 mg/dL     eGFR  66  >60 mL/min/1.73m*2     Calcium  8.0 (L)  8.6 - 10.3 mg/dL  Heparin Assay, UFH  Result  Value  Ref Range     Heparin Unfractionated  1.1 (HH)  See Comment Below for Therapeutic Ranges IU/mL  POCT GLUCOSE  Result  Value  Ref Range     POCT Glucose  123 (H)  74 - 99 mg/dL     Patient recently received an antibiotic (last 12 hours)         None              Plan discussed with interdisciplinary team, continue current and repeat labs in the AM.      Patient aware and agreeable to current plan, continue plan as above.      I spent a total of 50 minutes on the date of the service which included preparing to see the patient, face-to-face patient care, completing clinical documentation, obtaining and/or reviewing separately obtained history, performing a medically appropriate examination, counseling and educating the patient/family/caregiver, ordering medications, tests, or procedures, communicating with other HCPs (not separately reported), independently interpreting results (not separately reported), communicating results to the patient/family/caregiver, and care coordination (not separately reported). Melissa Tse is a 89 y.o. female on day 0 of admission presenting with Renal infarct (Multi).           Subjective     MD Tio Hodges MD untitled image  Physician  Internal Medicine     H&P   This note has been shared with the patient  Signed     Date of Service: 4/3/2025 12:43 PM     Signed     Expand All Collapse All  untitled image  History Of Present Illness  Melissa Tse is a  89 y.o. female with a past medical history significant for breast cancer and stage IV colon cancer presenting to emergency department from [facility] for evaluation of abdominal pain.  Patient also reports an episode of vomiting this morning and states she has been unable to keep anything down today and complains of  a pressure in the mid to lower abdomen that radiates to her right flank.  Patient reports that her doctor will only give her Tylenol for pain and that is not working.  Patient states that her last bowel movement was 4/1/2025 without difficulty.  Patient denies chest pain or dizziness.  Patient denies recent illness, fever, or chills.  Patient denies shortness of breath.     In ED, lipase 7, glucose 131, sodium 131, chloride 96, calcium 8.4, albumin 2.9, alk phos 258, hemoglobin 11.5.  EKG completed showing normal sinus rhythm at a rate of 88 without ST elevation or depression with a prolonged QTc of 507 per ED physician review.  A CT of the abdomen and pelvis was completed showing hypo-enhancement of the left renal parenchyma compatible with extensive renal cortical infarcts as well as fluid in the endometrial canal concerning for cervical stenosis and worsening of a retroperitoneal metastatic lymphadenopathy as well as progression of hepatic  metastasis per radiology review.  Patient medicated with Zofran, morphine, LR x 1 L bolus in ED.  Blood pressure 149/62, heart rate 92, respirations 20, temperature 36.5 °C, SpO2 95% on room air.  Consult placed to vascular surgery and nephrology.  Patient started on a heparin drip.  Inpatient admission under the care of Dr. Lugo who will continue to follow.  I was asked to do H&P and place initial admission orders.     Prior medical records reviewed.     Past Medical History  Breast cancer, stage IV colon cancer, T2DM, anemia, hypertension, hyperlipidemia  Surgical History  Colonoscopy, tubal ligation, hip replacement, cataract surgery       "Social History  Never smoker, no drug use, no alcohol use  Family History  Cataract, diabetes     Allergies  Penicillin, Penicillins, and Statins-hmg-coa reductase inhibitors     Review of Systems  A 10 point review of systems was completed and is negative except what is listed in HPI  Physical Exam  Constitutional:       Appearance: She is ill-appearing.   HENT:      Mouth/Throat:      Mouth: Mucous membranes are dry.      Pharynx: Oropharynx is clear.   Eyes:      Pupils: Pupils are equal, round, and reactive to light.   Cardiovascular:      Rate and Rhythm: Normal rate and regular rhythm.   Pulmonary:      Effort: Pulmonary effort is normal.      Breath sounds: Normal breath sounds.   Abdominal:      Palpations: Abdomen is soft.      Tenderness: There is abdominal tenderness in the periumbilical area and suprapubic area.      Comments: Right flank tenderness   Musculoskeletal:      Cervical back: Normal range of motion.   Skin:     General: Skin is warm and dry.   Neurological:      General: No focal deficit present.      Mental Status: She is alert.   Psychiatric:         Mood and Affect: Mood normal.         Behavior: Behavior normal.            Last Recorded Vitals  Blood pressure 126/64, pulse 100, temperature 36.7 °C (98.1 °F), resp. rate 18, height 1.626 m (5' 4\"), weight 81.6 kg (180 lb), SpO2 99%.     Relevant Results  CT abdomen pelvis w IV contrast     Result Date: 4/3/2025  Interpreted By:  Faraz Link, STUDY: CT ABDOMEN PELVIS W IV CONTRAST; ;  4/3/2025 12:30 am   INDICATION: Signs/Symptoms:stage IV colon cancer, now with R abd pain.     COMPARISON: 01/17/2025.   ACCESSION NUMBER(S): [id number]   ORDERING CLINICIAN: ELYSE KLERMAN   TECHNIQUE: Axial CT images of the abdomen and pelvis with coronal and sagittal reconstructed images performed after intravenous administration of 80 cc Omnipaque 350.   FINDINGS: LOWER CHEST: No acute abnormality of the lung bases. BONES: No acute osseous abnormality. " Osteopenia. Right hip arthroplasty in standard alignment. Similar appearance of mild-to-moderate chronic compression of the T11 vertebral body. Stable grade 1 degenerative anterolisthesis at L4-L5 and moderate to severe multilevel degenerative changes of the imaged spine. ABDOMINAL WALL: Within normal limits.   ABDOMEN:   LIVER: There is a large ill-defined region of hypoenhancement in the right hepatic lobe measuring up to 10.8 cm in transaxial diameter, up to at least 10.1 cm in CC diameter, suspicious for interval progression of the hepatic metastasis. There is a new hypoattenuating lesion inferolaterally in the inferior tip of the right hepatic lobe (series 601, images 57-63), measuring up to approximately 2.2 cm in transaxial diameter, suspicious for new metastatic lesion. Liver is enlarged, similar to before. BILE DUCTS: Normal caliber. GALLBLADDER: Cholelithiasis. No wall thickening or pericholecystic fluid. PANCREAS:Atrophic with pancreatic ductal dilation and irregularity and parenchymal calcifications in keeping with sequelae of chronic pancreatitis. SPLEEN: Within normal limits. ADRENALS: Within normal limits. KIDNEYS and URETERS: New extensive hypoenhancement/non enhancement of the left renal parenchyma compatible with extensive renal cortical infarcts. Right kidney and ureter appear normal.     VESSELS: Severe atherosclerosis, with critical narrowing or occlusion of the left renal artery. Severe narrowing of the right renal artery also noted. RETROPERITONEUM: Interval enlargement of celiac axis and portocaval nodes, suspicious for worsened metastatic disease.   PELVIS:   REPRODUCTIVE ORGANS: Endometrial fluid suggests cervical stenosis and is new from the prior exam. BLADDER: Within normal limits.   BOWEL: When allowing for differences in patient positioning in slice selection, there is no substantial change in the very large mass encompassing the mid-transverse colon and several adjacent loops of small  bowel and adjacent omentum and mesocolon, compatible with patient's known advanced malignancy. No dilated bowel to suggest obstruction. Stomach appears grossly normal. No definite additional abnormal bowel wall thickening. Normal appendix. PERITONEUM: No ascites or free air, no fluid collection.        New extensive hypoenhancement/non enhancement of the left renal parenchyma compatible with extensive renal cortical infarcts. Right kidney and ureter appear normal.   There is a large ill-defined region of hypoenhancement in the right hepatic lobe measuring up to 10.8 cm in transaxial diameter, up to at least 10.1 cm in CC diameter, suspicious for interval progression of the hepatic metastasis. There is a new hypoattenuating lesion inferolaterally in the inferior tip of the right hepatic lobe (series 601, images 57-63), measuring up to approximately 2.2 cm in transaxial diameter, suspicious for new metastatic lesion. Liver is enlarged, similar to before.   Interval worsening of retroperitoneal metastatic lymphadenopathy.   Cholelithiasis without convincing evidence of acute cholecystitis.   New fluid in the endometrial canal suggesting cervical stenosis.   Additional findings as discussed above.   MACRO: None   Signed by: Faraz Link 4/3/2025 12:52 AM Dictation workstation:   [id number]               Results for orders placed or performed during the hospital encounter of 04/02/25 (from the past 24 hours)  CBC and Auto Differential  Result  Value  Ref Range     WBC  11.1  4.4 - 11.3 x10*3/uL     nRBC  0.0  0.0 - 0.0 /100 WBCs     RBC  4.36  4.00 - 5.20 x10*6/uL     Hemoglobin  11.5 (L)  12.0 - 16.0 g/dL     Hematocrit  38.8  36.0 - 46.0 %     MCV  89  80 - 100 fL     MCH  26.4  26.0 - 34.0 pg     MCHC  29.6 (L)  32.0 - 36.0 g/dL     RDW  19.9 (H)  11.5 - 14.5 %     Platelets  198  150 - 450 x10*3/uL     Neutrophils %  84.1  40.0 - 80.0 %     Immature Granulocytes %, Automated  0.6  0.0 - 0.9 %     Lymphocytes  %  9.5  13.0 - 44.0 %     Monocytes %  5.6  2.0 - 10.0 %     Eosinophils %  0.0  0.0 - 6.0 %     Basophils %  0.2  0.0 - 2.0 %     Neutrophils Absolute  9.34 (H)  1.60 - 5.50 x10*3/uL     Immature Granulocytes Absolute, Automated  0.07  0.00 - 0.50 x10*3/uL     Lymphocytes Absolute  1.06  0.80 - 3.00 x10*3/uL     Monocytes Absolute  0.62  0.05 - 0.80 x10*3/uL     Eosinophils Absolute  0.00  0.00 - 0.40 x10*3/uL     Basophils Absolute  0.02  0.00 - 0.10 x10*3/uL  Comprehensive metabolic panel  Result  Value  Ref Range     Glucose  131 (H)  74 - 99 mg/dL     Sodium  131 (L)  136 - 145 mmol/L     Potassium  4.6  3.5 - 5.3 mmol/L     Chloride  96 (L)  98 - 107 mmol/L     Bicarbonate  22  21 - 32 mmol/L     Anion Gap  18  10 - 20 mmol/L     Urea Nitrogen  21  6 - 23 mg/dL     Creatinine  0.89  0.50 - 1.05 mg/dL     eGFR  62  >60 mL/min/1.73m*2     Calcium  8.4 (L)  8.6 - 10.3 mg/dL     Albumin  2.9 (L)  3.4 - 5.0 g/dL     Alkaline Phosphatase  258 (H)  33 - 136 U/L     Total Protein  6.9  6.4 - 8.2 g/dL     AST  22  9 - 39 U/L     Bilirubin, Total  0.7  0.0 - 1.2 mg/dL     ALT  16  7 - 45 U/L  Lipase  Result  Value  Ref Range     Lipase  7 (L)  9 - 82 U/L  Lactate  Result  Value  Ref Range     Lactate  1.2  0.4 - 2.0 mmol/L  Magnesium  Result  Value  Ref Range     Magnesium  1.90  1.60 - 2.40 mg/dL  POCT GLUCOSE  Result  Value  Ref Range     POCT Glucose  129 (H)  74 - 99 mg/dL  POCT GLUCOSE  Result  Value  Ref Range     POCT Glucose  119 (H)  74 - 99 mg/dL  Protime-INR  Result  Value  Ref Range     Protime  17.4 (H)  9.8 - 12.4 seconds     INR  1.6 (H)  0.9 - 1.1  CBC  Result  Value  Ref Range     WBC  11.5 (H)  4.4 - 11.3 x10*3/uL     nRBC  0.0  0.0 - 0.0 /100 WBCs     RBC  4.08  4.00 - 5.20 x10*6/uL     Hemoglobin  10.9 (L)  12.0 - 16.0 g/dL     Hematocrit  36.7  36.0 - 46.0 %     MCV  90  80 - 100 fL     MCH  26.7  26.0 - 34.0 pg     MCHC  29.7 (L)  32.0 - 36.0 g/dL     RDW  19.9 (H)  11.5 - 14.5 %      Platelets  181  150 - 450 x10*3/uL  Basic Metabolic Panel  Result  Value  Ref Range     Glucose  129 (H)  74 - 99 mg/dL     Sodium  130 (L)  136 - 145 mmol/L     Potassium  4.2  3.5 - 5.3 mmol/L     Chloride  98  98 - 107 mmol/L     Bicarbonate  21  21 - 32 mmol/L     Anion Gap  15  10 - 20 mmol/L     Urea Nitrogen  19  6 - 23 mg/dL     Creatinine  0.85  0.50 - 1.05 mg/dL     eGFR  66  >60 mL/min/1.73m*2     Calcium  8.0 (L)  8.6 - 10.3 mg/dL  Heparin Assay, UFH  Result  Value  Ref Range     Heparin Unfractionated  1.1 (HH)  See Comment Below for Therapeutic Ranges IU/mL  POCT GLUCOSE  Result  Value  Ref Range     POCT Glucose  123 (H)  74 - 99 mg/dL           Assessment/Plan  Melissa is an 89-year-old female patient presenting to emergency department from [facility] for evaluation of abdominal pain and vomiting.  Patient states that she developed an 8 out of 10 mid to lower abdominal pain radiating to her right flank.  Patient with a history of breast cancer and colon cancer.  CT imaging showing extensive left renal cortical infarcts as well as worsening of retroperitoneal metastatic lymphadenopathy and progression of hepatic metastasis per radiology review.  Consults placed to nephrology and vascular surgery, patient started on a heparin drip, medicated with morphine and admitted for further medical management.     Assessment & Planuntitled image  untitled imageRenal infarct (Multi)     Hyponatremia     Hyponatremia/renal infarct  Inpatient admission to Dr. Lugo  See imaging results above  DNRCCA  N.p.o.  Consult nephrology and appreciate input  Consult vascular surgery and appreciate input  Coag screen ordered  Continue heparin drip  Continue gentle IV fluids  Repeat labs  Monitor heparin assay  Morphine/Zofran in ED  Dilaudid IV as needed  Tigan IM as needed 2/2 prolonged Qtc     T2DM/breast cancer/colon cancer/anemia/HLD/HTN  #Chronic conditions  Insulin sliding scale for n.p.o.  Hypoglycemia  protocol  Continue home MiraLAX and Colace  Continue home lisinopril daily  Continue home amlodipine daily  Continue home Estrace tablet     DVT Ppx  SCDs  Continue heparin drip  Bedrest/up to chair as tolerated  Patient fully evaluated on April 3.  Continue analgesics and await nephrology consultation.  Appreciate vascular surgery consultation.  Patient with significant metastatic cancer which comfort measures are in place.        I spent 60 minutes in the professional and overall care of this patient.  Tio Lugo MD                       Objective  Last Recorded Vitals  /64   Pulse 100   Temp 36.7 °C (98.1 °F)   Resp 18   Wt 81.6 kg (180 lb)   SpO2 99%   Intake/Output last 3 Shifts:     Intake/Output Summary (Last 24 hours) at 4/3/2025 1349  Last data filed at 4/3/2025 1000        Gross per 24 hour  Intake  551.83 ml  Output  --  Net  551.83 ml        Admission Weight  Weight: 81.6 kg (180 lb) (04/02/25 2207)     Daily Weight  04/02/25 : 81.6 kg (180 lb)     Image Results  CT abdomen pelvis w IV contrast  Narrative: Interpreted By:  Faraz Link,   STUDY:  CT ABDOMEN PELVIS W IV CONTRAST; ;  4/3/2025 12:30 am      INDICATION:  Signs/Symptoms:stage IV colon cancer, now with R abd pain.          COMPARISON:  01/17/2025.      ACCESSION NUMBER(S):  [id number]      ORDERING CLINICIAN:  ELYSE KLERMAN      TECHNIQUE:  Axial CT images of the abdomen and pelvis with coronal and sagittal  reconstructed images performed after intravenous administration of 80  cc Omnipaque 350.      FINDINGS:  LOWER CHEST: No acute abnormality of the lung bases.  BONES: No acute osseous abnormality. Osteopenia. Right hip  arthroplasty in standard alignment. Similar appearance of  mild-to-moderate chronic compression of the T11 vertebral body.  Stable grade 1 degenerative anterolisthesis at L4-L5 and moderate to  severe multilevel degenerative changes of the imaged spine. ABDOMINAL  WALL: Within normal limits.       ABDOMEN:      LIVER: There is a large ill-defined region of hypoenhancement in the  right hepatic lobe measuring up to 10.8 cm in transaxial diameter, up  to at least 10.1 cm in CC diameter, suspicious for interval  progression of the hepatic metastasis. There is a new hypoattenuating  lesion inferolaterally in the inferior tip of the right hepatic lobe  (series 601, images [id number]), measuring up to approximately 2.2 cm in  transaxial diameter, suspicious for new metastatic lesion. Liver is  enlarged, similar to before. BILE DUCTS: Normal caliber.  GALLBLADDER: Cholelithiasis. No wall thickening or pericholecystic  fluid. PANCREAS:Atrophic with pancreatic ductal dilation and  irregularity and parenchymal calcifications in keeping with sequelae  of chronic pancreatitis. SPLEEN: Within normal limits.  ADRENALS: Within normal limits.  KIDNEYS and URETERS: New extensive hypoenhancement/non enhancement of  the left renal parenchyma compatible with extensive renal cortical  infarcts. Right kidney and ureter appear normal.          VESSELS: Severe atherosclerosis, with critical narrowing or occlusion  of the left renal artery. Severe narrowing of the right renal artery  also noted. RETROPERITONEUM: Interval enlargement of celiac axis and  portocaval nodes, suspicious for worsened metastatic disease.      PELVIS:      REPRODUCTIVE ORGANS: Endometrial fluid suggests cervical stenosis and  is new from the prior exam. BLADDER: Within normal limits.      BOWEL: When allowing for differences in patient positioning in slice  selection, there is no substantial change in the very large mass  encompassing the mid-transverse colon and several adjacent loops of  small bowel and adjacent omentum and mesocolon, compatible with  patient's known advanced malignancy. No dilated bowel to suggest  obstruction. Stomach appears grossly normal. No definite additional  abnormal bowel wall thickening. Normal appendix. PERITONEUM: No  ascites or  free air, no fluid collection.      Impression: New extensive hypoenhancement/non enhancement of the left renal  parenchyma compatible with extensive renal cortical infarcts. Right  kidney and ureter appear normal.      There is a large ill-defined region of hypoenhancement in the right  hepatic lobe measuring up to 10.8 cm in transaxial diameter, up to at  least 10.1 cm in CC diameter, suspicious for interval progression of  the hepatic metastasis. There is a new hypoattenuating lesion  inferolaterally in the inferior tip of the right hepatic lobe (series  601, images 57-63), measuring up to approximately 2.2 cm in  transaxial diameter, suspicious for new metastatic lesion. Liver is  enlarged, similar to before.      Interval worsening of retroperitoneal metastatic lymphadenopathy.      Cholelithiasis without convincing evidence of acute cholecystitis.      New fluid in the endometrial canal suggesting cervical stenosis.      Additional findings as discussed above.      MACRO:  None      Signed by: Faraz Link 4/3/2025 12:52 AM  Dictation workstation:   [id number]        Physical Exam  Constitutional:       Appearance: She is ill-appearing.   HENT:      Mouth/Throat:      Mouth: Mucous membranes are dry.      Pharynx: Oropharynx is clear.   Eyes:      Pupils: Pupils are equal, round, and reactive to light.   Cardiovascular:      Rate and Rhythm: Normal rate and regular rhythm.   Pulmonary:      Effort: Pulmonary effort is normal.      Breath sounds: Normal breath sounds.   Abdominal:      Palpations: Abdomen is soft.      Tenderness: There is abdominal tenderness in the periumbilical area and suprapubic area.      Comments: Right flank tenderness   Musculoskeletal:      Cervical back: Normal range of motion.   Skin:     General: Skin is warm and dry.   Neurological:      General: No focal deficit present.      Mental Status: She is alert.   Psychiatric:         Mood and Affect: Mood normal.         Behavior:  Behavior normal.         Relevant Results  ?         ?               Assessment/Plan                       Assessment & Planuntitled image  untitled imageRenal infarct (Multi)     Hyponatremia     Hyponatremia/renal infarct  Inpatient admission to Dr. Lugo  See imaging results above  DNRCCA  N.p.o.  Consult nephrology and appreciate input  Consult vascular surgery and appreciate input  Coag screen ordered  Continue heparin drip  Continue gentle IV fluids  Repeat labs  Monitor heparin assay  Morphine/Zofran in ED  Dilaudid IV as needed  Tigan IM as needed 2/2 prolonged Qtc     T2DM/breast cancer/colon cancer/anemia/HLD/HTN  #Chronic conditions  Insulin sliding scale for n.p.o.  Hypoglycemia protocol  Continue home MiraLAX and Colace  Continue home lisinopril daily  Continue home amlodipine daily  Continue home Estrace tablet     DVT Ppx  SCDs  Continue heparin drip  Bedrest/up to chair as tolerated  Patient fully evaluated on April 3.  Continue analgesics and await nephrology consultation.  Appreciate vascular surgery consultation.  Patient with significant metastatic cancer which comfort measures are in place.      Patient fully evaluated on 4/4, resting in bed breathing comfortably on room air. Labs and medications reviewed. Estrase discontinued due to coagulation risk. Switched from heparin to Eliquis 2.5 BID for outpatient anticoagulation. Evaluated by vascular surgery, no surgery recommended at this time. Family declined heme-onc consult, not wishing to pursue aggressive chemotherapy treatment at this time. Continue comfort care for pain. Pyridium ordered for suprapubic pain. Per nursing staff, patient does not like Tigan, order switched to Zofran. Patient medically cleared for discharge to SNF today.                  Assessment/Plan                  Assessment & Plan  Renal infarct (Multi)    Hyponatremia    Hyponatremia/renal infarct  Inpatient admission to Dr. Lugo  See imaging results  above  DNRCCA  N.p.o.  Consult nephrology and appreciate input  Consult vascular surgery and appreciate input  Coag screen ordered  Continue heparin drip  Continue gentle IV fluids  Repeat labs  Monitor heparin assay  Morphine/Zofran in ED  Dilaudid IV as needed  Tigan IM as needed 2/2 prolonged Qtc    T2DM/breast cancer/colon cancer/anemia/HLD/HTN  #Chronic conditions  Insulin sliding scale for n.p.o.  Hypoglycemia protocol  Continue home MiraLAX and Colace  Continue home lisinopril daily  Continue home amlodipine daily  Continue home Estrace tablet    DVT Ppx  SCDs  Continue heparin drip  Bedrest/up to chair as tolerated    Patient fully evaluated  04/05  for    Problem List Items Addressed This Visit         * (Principal) Renal infarct (Multi) - Primary      Other Visit Diagnoses         Metastatic malignant neoplasm, unspecified site (Multi)                 Patient seen resting in bed with head of bed elevated, no s/s or c/o acute difficulties at this time. Patient still with significant pain noted, medications adjusted, percocet added, will monitor overnight and repeat labs in the AM.      Vital signs for last 24 hours Temp:  [35.6 °C (96.1 °F)-36.5 °C (97.7 °F)] 36.1 °C (97 °F)  Heart Rate:  [96-98] 96  Resp:  [18] 18  BP: (114-148)/(55-67) 148/67    I/O this shift:  In: 220.8 [I.V.:220.8]  Out: -   Patient still requiring frequent cardiac and SPO2 monitoring. Continue aggressive pulmonary hygiene and oral hygiene. Off loading as tolerated for skin integrity. Medications and labs reviewed-         Results for orders placed or performed during the hospital encounter of 04/02/25 (from the past 24 hours)   POCT GLUCOSE   Result Value Ref Range     POCT Glucose 175 (H) 74 - 99 mg/dL   POCT GLUCOSE   Result Value Ref Range     POCT Glucose 167 (H) 74 - 99 mg/dL   POCT GLUCOSE   Result Value Ref Range     POCT Glucose 148 (H) 74 - 99 mg/dL   POCT GLUCOSE   Result Value Ref Range     POCT Glucose 140 (H) 74 - 99  mg/dL   CBC and Auto Differential   Result Value Ref Range     WBC 12.8 (H) 4.4 - 11.3 x10*3/uL     nRBC 0.0 0.0 - 0.0 /100 WBCs     RBC 3.96 (L) 4.00 - 5.20 x10*6/uL     Hemoglobin 10.8 (L) 12.0 - 16.0 g/dL     Hematocrit 36.5 36.0 - 46.0 %     MCV 92 80 - 100 fL     MCH 27.3 26.0 - 34.0 pg     MCHC 29.6 (L) 32.0 - 36.0 g/dL     RDW 20.3 (H) 11.5 - 14.5 %     Platelets 173 150 - 450 x10*3/uL     Neutrophils % 84.7 40.0 - 80.0 %     Immature Granulocytes %, Automated 0.5 0.0 - 0.9 %     Lymphocytes % 7.9 13.0 - 44.0 %     Monocytes % 6.7 2.0 - 10.0 %     Eosinophils % 0.1 0.0 - 6.0 %     Basophils % 0.1 0.0 - 2.0 %     Neutrophils Absolute 10.86 (H) 1.60 - 5.50 x10*3/uL     Immature Granulocytes Absolute, Automated 0.06 0.00 - 0.50 x10*3/uL     Lymphocytes Absolute 1.01 0.80 - 3.00 x10*3/uL     Monocytes Absolute 0.86 (H) 0.05 - 0.80 x10*3/uL     Eosinophils Absolute 0.01 0.00 - 0.40 x10*3/uL     Basophils Absolute 0.01 0.00 - 0.10 x10*3/uL   Comprehensive Metabolic Panel   Result Value Ref Range     Glucose 134 (H) 74 - 99 mg/dL     Sodium 131 (L) 136 - 145 mmol/L     Potassium 5.2 3.5 - 5.3 mmol/L     Chloride 102 98 - 107 mmol/L     Bicarbonate 20 (L) 21 - 32 mmol/L     Anion Gap 14 10 - 20 mmol/L     Urea Nitrogen 26 (H) 6 - 23 mg/dL     Creatinine 0.85 0.50 - 1.05 mg/dL     eGFR 66 >60 mL/min/1.73m*2     Calcium 7.7 (L) 8.6 - 10.3 mg/dL     Albumin 2.2 (L) 3.4 - 5.0 g/dL     Alkaline Phosphatase 155 (H) 33 - 136 U/L     Total Protein 5.7 (L) 6.4 - 8.2 g/dL     AST 23 9 - 39 U/L     Bilirubin, Total 0.9 0.0 - 1.2 mg/dL     ALT 8 7 - 45 U/L   Morphology   Result Value Ref Range     RBC Morphology See Below       Polychromasia Mild       Ovalocytes Few       Selam Cells Few     POCT GLUCOSE   Result Value Ref Range     POCT Glucose 173 (H) 74 - 99 mg/dL      Patient recently received an antibiotic (last 12 hours)         None             Plan discussed with interdisciplinary team, will continue to monitor pain  levels and monitor overnight. Will continue current and repeat labs in the AM.      Discharge planning discussed with patient and care team. Therapy evaluations ordered. Anticipate HHC/SNF at discharge. Patient aware and agreeable to current plan, continue plan as above.      I spent a total of 50 minutes on the date of the service which included preparing to see the patient, face-to-face patient care, completing clinical documentation, obtaining and/or reviewing separately obtained history, performing a medically appropriate examination, counseling and educating the patient/family/caregiver, ordering medications, tests, or procedures, communicating with other HCPs (not separately reported), independently interpreting results (not separately reported), communicating results to the patient/family/caregiver, and care coordination (not separately reported).     Patient fully evaluated on 4/7. Evaluated by nephrology, cyproheptadine added for appetite. Cyproheptadine discontinued today to avoid oversedation as patient is already on mirtazapine for appetite. LFTs elevated today, continue to monitor. No leukocytosis. Patient's family feels her pain is well-controlled with current regimen. Continuing hematuria noted in wall suction canister with flank pain, cannot rule out UTI. Cefazolin IV ordered. Urology consult placed. Appreciate recs re: possible bladder irrigation. Per daughter, patient also having hallucinations of seeing and hearing people who are not there. Plan to keep at least another day for monitoring.   I spent a total of 50 minutes on the date of the service which included preparing to see the patient, face-to-face patient care, completing clinical documentation, obtaining and/or reviewing separately obtained history, performing a medically appropriate examination, counseling and educating the patient/family/caregiver, ordering medications, tests, or procedures, communicating with other HCPs (not separately  reported), independently interpreting results (not separately reported), communicating results to the patient/family/caregiver, and care coordination (not separately reported).               CARLOS MOY

## 2025-04-07 NOTE — PROGRESS NOTES
"Nutrition Follow Up Assessment:     Nutrition History:  Energy Intake: Poor < 50 %  Pain affecting nutrition status: N/A  Food and Nutrient History: Pt sleeping soundly at time of visit today. Family in room at bedside.  Family reports poor appetite, eating \"a little bit\".  Mirtazepine ordered however per family, pt refused.  Pt only has upper dentures, so has a difficult time chewing, however family did not want diet modified, pt likes soups so family ordered soup for lunch and dinner.  Family agreeable to have pt try ONS, may be more likely to drink than eat. Last BM 4/6--loose.   With continued poor PO intakes, may need to consider palliative consult for GOC discussions.       Anthropometrics:  Height: 162.6 cm (5' 4.02\")   Weight: 81.6 kg (179 lb 14.3 oz)   BMI (Calculated): 30.86  IBW/kg (Dietitian Calculated): 54.5 kg  Percent of IBW: 150 %    Weight History:     Weight Change %:   No new weight to assess     Nutrition Focused Physical Exam Findings:    Subcutaneous Fat Loss:   Defer Subcutaneous Fat Loss Assessment: Defer all  Defer All Reason: sleeping  Muscle Wasting:  Defer Muscle Wasting Assessment: Defer all  Defer All Reason: sleeping  Edema:  Edema: none  Physical Findings:  Skin: Positive (L foot wound)  Digestive System Findings: Loose stool  Mouth Findings: Chewing difficulty    Nutrition Significant Labs:  BMP Trend:   Results from last 7 days   Lab Units 04/07/25  0600 04/05/25  0648 04/04/25  0658 04/03/25  0627   GLUCOSE mg/dL 111* 134* 196* 129*   CALCIUM mg/dL 7.8* 7.7* 7.8* 8.0*   SODIUM mmol/L 135* 131* 132* 130*   POTASSIUM mmol/L 4.2 5.2 4.2 4.2   CO2 mmol/L 26 20* 23 21   CHLORIDE mmol/L 102 102 100 98   BUN mg/dL 27* 26* 24* 19   CREATININE mg/dL 0.79 0.85 0.91 0.85    , BG POCT trend:   Results from last 7 days   Lab Units 04/07/25  1113 04/07/25  0637 04/07/25  0157 04/06/25  1716 04/06/25  1110   POCT GLUCOSE mg/dL 127* 115* 136* 129* 140*    , Liver Function Trend:   Results from " last 7 days   Lab Units 04/07/25  0600 04/05/25  0648 04/04/25  0658 04/02/25  2259   ALK PHOS U/L 551* 155* 165* 258*   AST U/L 126* 23 14 22   ALT U/L 86* 8 12 16   BILIRUBIN TOTAL mg/dL 1.1 0.9 0.8 0.7        Nutrition Specific Medications:  Reviewed     I/O:   Last BM Date: 04/05/25; Stool Appearance: Loose (04/06/25 0000)    Dietary Orders (From admission, onward)       Start     Ordered    04/07/25 1212  Adult diet Regular; Thin 0  Diet effective now        Question Answer Comment   Diet type Regular    Fluid consistency Thin 0        04/07/25 1211    04/07/25 1212  Oral nutritional supplements  Until discontinued        Comments: Chocolate   Question Answer Comment   Deliver with Breakfast    Deliver with Lunch    Deliver with Dinner    Select supplement: Ensure Plus High Protein        04/07/25 1211    04/03/25 1205  Oral nutritional supplements  Until discontinued        Question Answer Comment   Deliver with All meals    Select supplement: Magic Cup        04/03/25 1205    04/03/25 0833  May Participate in Room Service  ( ROOM SERVICE MAY PARTICIPATE)  Once        Question:  .  Answer:  Yes    04/03/25 0832                     Estimated Needs:      Method for Estimating Needs: 1525-1745kcals (28-32kcals/kg IBW)     Method for Estimating 24 Hour Protein Needs: 65-82g (1.2-1.5g/kg IBW )     Method for Estimating 24 Hour Fluid Needs: 1 mL/kcal or as per MD  Patient on Order Fluid Restriction: No        Nutrition Diagnosis   Malnutrition Diagnosis  Patient has Malnutrition Diagnosis:  (unable to determine at this time)    Nutrition Diagnosis  Patient has Nutrition Diagnosis: Yes  Diagnosis Status (1): Active  Nutrition Diagnosis 1: Inadequate protein energy intake  Related to (1): acute on chronic illness  As Evidenced by (1): NPO  Additional Assessment Information (1): diet advanced however pt with poor PO intakes reported       Nutrition Interventions/Recommendations   Nutrition prescription for oral  nutrition    Nutrition Recommendations:  Individualized Nutrition Prescription Provided for : Liberalize diet due to poor PO Intakes. Will send Magic cup TID and add Ensure plus HP TID    Nutrition Interventions/Goals:   Meals and Snacks: General healthful diet  Goal: consume >50% of meals  Medical Food Supplement: Commercial beverage medical food supplement therapy, Commercial food medical food supplement therapy  Goal: consume >75% of Ensure plus HP TID (for an additional 350 kcals, 20 gm protein each)  Additional Interventions: consume >75% of Magic cup TID (for an additional 290 kcals, 9 gm protein each)      Education Documentation  No documentation found.     N/A     Nutrition Monitoring and Evaluation   Food/Nutrient Related History Monitoring  Monitoring and Evaluation Plan: Estimated Energy Intake, Fluid intake, Intake / amount of food  Estimated Energy Intake: Energy intake 50 -75% of estimated energy needs  Fluid Intake: Estimated fluid intake  Intake / Amount of food: Consumes at least 75% or more of meals/snacks/supplements, Meets > 75% estimated energy needs  Additional Plans: Monitor for GOC discussions    Anthropometric Measurements  Monitoring and Evaluation Plan: Body weight  Body Weight: Body weight - Maintain stable weight    Biochemical Data, Medical Tests and Procedures  Monitoring and Evaluation Plan: Electrolyte/renal panel, Glucose/endocrine profile  Electrolyte and Renal Panel: Electrolytes within normal limits, Sodium, Other (Comment) (LFTs)  Glucose/Endocrine Profile: Glucose within normal limits ( mg/dL)    Physical Exam Findings  Monitoring and Evaluation Plan: Skin, Mouth, Digestive System  Digestive System Finding: Loose stool  Criteria: formed BM at least every 2-3 days  Skin Finding: Impaired wound healing - Improved wound healing, Impaired wound healing - Skin to heal  Mouth Finding: Chewing difficulty  Criteria: tolerate LRD    Goal Status: New goal(s) identified    Time  Spent (min): 30 minutes

## 2025-04-08 LAB
ALBUMIN SERPL BCP-MCNC: 1.8 G/DL (ref 3.4–5)
ALP SERPL-CCNC: 458 U/L (ref 33–136)
ALT SERPL W P-5'-P-CCNC: 43 U/L (ref 7–45)
ANION GAP SERPL CALC-SCNC: 10 MMOL/L (ref 10–20)
AST SERPL W P-5'-P-CCNC: 50 U/L (ref 9–39)
BASOPHILS # BLD AUTO: 0.02 X10*3/UL (ref 0–0.1)
BASOPHILS NFR BLD AUTO: 0.2 %
BILIRUB SERPL-MCNC: 0.6 MG/DL (ref 0–1.2)
BUN SERPL-MCNC: 30 MG/DL (ref 6–23)
CALCIUM SERPL-MCNC: 7.2 MG/DL (ref 8.6–10.3)
CHLORIDE SERPL-SCNC: 104 MMOL/L (ref 98–107)
CO2 SERPL-SCNC: 25 MMOL/L (ref 21–32)
CREAT SERPL-MCNC: 0.84 MG/DL (ref 0.5–1.05)
EGFRCR SERPLBLD CKD-EPI 2021: 67 ML/MIN/1.73M*2
EOSINOPHIL # BLD AUTO: 0.02 X10*3/UL (ref 0–0.4)
EOSINOPHIL NFR BLD AUTO: 0.2 %
ERYTHROCYTE [DISTWIDTH] IN BLOOD BY AUTOMATED COUNT: 19.5 % (ref 11.5–14.5)
GLUCOSE BLD MANUAL STRIP-MCNC: 103 MG/DL (ref 74–99)
GLUCOSE BLD MANUAL STRIP-MCNC: 130 MG/DL (ref 74–99)
GLUCOSE BLD MANUAL STRIP-MCNC: 99 MG/DL (ref 74–99)
GLUCOSE SERPL-MCNC: 102 MG/DL (ref 74–99)
HCT VFR BLD AUTO: 32.3 % (ref 36–46)
HGB BLD-MCNC: 10 G/DL (ref 12–16)
IMM GRANULOCYTES # BLD AUTO: 0.15 X10*3/UL (ref 0–0.5)
IMM GRANULOCYTES NFR BLD AUTO: 1.7 % (ref 0–0.9)
LYMPHOCYTES # BLD AUTO: 0.85 X10*3/UL (ref 0.8–3)
LYMPHOCYTES NFR BLD AUTO: 9.4 %
MCH RBC QN AUTO: 27.2 PG (ref 26–34)
MCHC RBC AUTO-ENTMCNC: 31 G/DL (ref 32–36)
MCV RBC AUTO: 88 FL (ref 80–100)
MONOCYTES # BLD AUTO: 0.73 X10*3/UL (ref 0.05–0.8)
MONOCYTES NFR BLD AUTO: 8 %
NEUTROPHILS # BLD AUTO: 7.3 X10*3/UL (ref 1.6–5.5)
NEUTROPHILS NFR BLD AUTO: 80.5 %
NRBC BLD-RTO: 0 /100 WBCS (ref 0–0)
PLATELET # BLD AUTO: 92 X10*3/UL (ref 150–450)
POTASSIUM SERPL-SCNC: 3.9 MMOL/L (ref 3.5–5.3)
PROT SERPL-MCNC: 4.9 G/DL (ref 6.4–8.2)
RBC # BLD AUTO: 3.68 X10*6/UL (ref 4–5.2)
SODIUM SERPL-SCNC: 135 MMOL/L (ref 136–145)
WBC # BLD AUTO: 9.1 X10*3/UL (ref 4.4–11.3)

## 2025-04-08 PROCEDURE — 2500000001 HC RX 250 WO HCPCS SELF ADMINISTERED DRUGS (ALT 637 FOR MEDICARE OP): Performed by: NURSE PRACTITIONER

## 2025-04-08 PROCEDURE — 1100000001 HC PRIVATE ROOM DAILY

## 2025-04-08 PROCEDURE — 2500000004 HC RX 250 GENERAL PHARMACY W/ HCPCS (ALT 636 FOR OP/ED): Performed by: NURSE PRACTITIONER

## 2025-04-08 PROCEDURE — 36415 COLL VENOUS BLD VENIPUNCTURE: CPT | Performed by: INTERNAL MEDICINE

## 2025-04-08 PROCEDURE — 2500000004 HC RX 250 GENERAL PHARMACY W/ HCPCS (ALT 636 FOR OP/ED): Performed by: INTERNAL MEDICINE

## 2025-04-08 PROCEDURE — 80053 COMPREHEN METABOLIC PANEL: CPT | Performed by: INTERNAL MEDICINE

## 2025-04-08 PROCEDURE — 82947 ASSAY GLUCOSE BLOOD QUANT: CPT

## 2025-04-08 PROCEDURE — 2500000002 HC RX 250 W HCPCS SELF ADMINISTERED DRUGS (ALT 637 FOR MEDICARE OP, ALT 636 FOR OP/ED): Performed by: INTERNAL MEDICINE

## 2025-04-08 PROCEDURE — 2500000001 HC RX 250 WO HCPCS SELF ADMINISTERED DRUGS (ALT 637 FOR MEDICARE OP): Performed by: INTERNAL MEDICINE

## 2025-04-08 PROCEDURE — 85025 COMPLETE CBC W/AUTO DIFF WBC: CPT | Performed by: INTERNAL MEDICINE

## 2025-04-08 RX ORDER — ZINC SULFATE 50(220)MG
50 CAPSULE ORAL DAILY
Status: DISCONTINUED | OUTPATIENT
Start: 2025-04-08 | End: 2025-04-11 | Stop reason: HOSPADM

## 2025-04-08 RX ORDER — LANOLIN ALCOHOL/MO/W.PET/CERES
100 CREAM (GRAM) TOPICAL DAILY
Status: DISCONTINUED | OUTPATIENT
Start: 2025-04-08 | End: 2025-04-11 | Stop reason: HOSPADM

## 2025-04-08 RX ADMIN — CEFAZOLIN SODIUM 2 G: 2 INJECTION, SOLUTION INTRAVENOUS at 17:15

## 2025-04-08 RX ADMIN — TAMSULOSIN HYDROCHLORIDE 0.4 MG: 0.4 CAPSULE ORAL at 10:23

## 2025-04-08 RX ADMIN — OXYCODONE HYDROCHLORIDE AND ACETAMINOPHEN 1 TABLET: 5; 325 TABLET ORAL at 10:24

## 2025-04-08 RX ADMIN — PHENAZOPYRIDINE 100 MG: 100 TABLET ORAL at 17:15

## 2025-04-08 RX ADMIN — DICLOFENAC SODIUM 4 G: 10 GEL TOPICAL at 17:16

## 2025-04-08 RX ADMIN — MIRTAZAPINE 7.5 MG: 15 TABLET, FILM COATED ORAL at 20:52

## 2025-04-08 RX ADMIN — SENNOSIDES AND DOCUSATE SODIUM 1 TABLET: 50; 8.6 TABLET ORAL at 20:53

## 2025-04-08 RX ADMIN — PHENAZOPYRIDINE 100 MG: 100 TABLET ORAL at 10:24

## 2025-04-08 RX ADMIN — Medication 50 MG OF ELEMENTAL ZINC: at 17:15

## 2025-04-08 RX ADMIN — POLYETHYLENE GLYCOL 3350 17 G: 17 POWDER, FOR SOLUTION ORAL at 10:23

## 2025-04-08 RX ADMIN — CEFAZOLIN SODIUM 2 G: 2 INJECTION, SOLUTION INTRAVENOUS at 10:23

## 2025-04-08 RX ADMIN — DICLOFENAC SODIUM 4 G: 10 GEL TOPICAL at 13:11

## 2025-04-08 RX ADMIN — APIXABAN 2.5 MG: 2.5 TABLET, FILM COATED ORAL at 10:22

## 2025-04-08 RX ADMIN — CEFAZOLIN SODIUM 2 G: 2 INJECTION, SOLUTION INTRAVENOUS at 01:59

## 2025-04-08 RX ADMIN — THIAMINE HCL TAB 100 MG 100 MG: 100 TAB at 17:16

## 2025-04-08 RX ADMIN — PHENAZOPYRIDINE 100 MG: 100 TABLET ORAL at 13:11

## 2025-04-08 RX ADMIN — DICLOFENAC SODIUM 4 G: 10 GEL TOPICAL at 10:25

## 2025-04-08 RX ADMIN — TRIAMCINOLONE ACETONIDE: 1 OINTMENT TOPICAL at 10:24

## 2025-04-08 ASSESSMENT — COGNITIVE AND FUNCTIONAL STATUS - GENERAL
TOILETING: A LITTLE
MOVING FROM LYING ON BACK TO SITTING ON SIDE OF FLAT BED WITH BEDRAILS: A LITTLE
MOBILITY SCORE: 15
HELP NEEDED FOR BATHING: A LITTLE
STANDING UP FROM CHAIR USING ARMS: A LITTLE
WALKING IN HOSPITAL ROOM: A LOT
CLIMB 3 TO 5 STEPS WITH RAILING: TOTAL
MOVING TO AND FROM BED TO CHAIR: A LITTLE
DRESSING REGULAR UPPER BODY CLOTHING: A LITTLE
TURNING FROM BACK TO SIDE WHILE IN FLAT BAD: A LITTLE
EATING MEALS: A LITTLE
DRESSING REGULAR LOWER BODY CLOTHING: A LITTLE
PERSONAL GROOMING: A LITTLE
DAILY ACTIVITIY SCORE: 18

## 2025-04-08 ASSESSMENT — PAIN - FUNCTIONAL ASSESSMENT
PAIN_FUNCTIONAL_ASSESSMENT: 0-10
PAIN_FUNCTIONAL_ASSESSMENT: 0-10

## 2025-04-08 ASSESSMENT — PAIN SCALES - GENERAL
PAINLEVEL_OUTOF10: 3
PAINLEVEL_OUTOF10: 7
PAINLEVEL_OUTOF10: 0 - NO PAIN

## 2025-04-08 NOTE — PROGRESS NOTES
Melissa Tse is a 89 y.o. female on day 5 of admission presenting with Renal infarct (Multi).      Subjective   No significant events overnight. Patient seen and evaluated at bedside. Breathing on room air. States she feels her pain is well controlled today.        Objective     Last Recorded Vitals  /71 (BP Location: Right arm, Patient Position: Lying)   Pulse 93   Temp 36.1 °C (97 °F) (Temporal)   Resp 16   Wt 81.6 kg (179 lb 14.3 oz)   SpO2 93%   Intake/Output last 3 Shifts:    Intake/Output Summary (Last 24 hours) at 4/8/2025 1057  Last data filed at 4/8/2025 1023  Gross per 24 hour   Intake 280 ml   Output 3850 ml   Net -3570 ml       Admission Weight  Weight: 81.6 kg (180 lb) (04/02/25 2207)    Daily Weight  04/04/25 : 81.6 kg (179 lb 14.3 oz)    Image Results  Electrocardiogram, 12-lead PRN ACS symptoms  Unknown rhythm, irregular rate  Probable lateral infarct, old    See ED provider note for full interpretation and clinical correlation  Confirmed by Rivka Manzano ([id number]) on 4/5/2025 10:05:09 AM      Physical Exam        Tio Lugo MD   Physician  Internal Medicine     Progress Notes     Signed     Date of Service: 4/7/2025  3:03 PM     Signed       Expand All Collapse All    Melissa Tse is a 89 y.o. female on day 4 of admission presenting with Renal infarct (Multi).           Subjective  No significant events overnight. Patient seen and evaluated at bedside.               Objective[]Expand by Default  Last Recorded Vitals  BP 92/55 (BP Location: Left arm, Patient Position: Lying)   Pulse (!) 112   Temp 35.5 °C (95.9 °F) (Temporal)   Resp 18   Wt 81.6 kg (179 lb 14.3 oz)   SpO2 92%   Intake/Output last 3 Shifts:     Intake/Output Summary (Last 24 hours) at 4/7/2025 1503  Last data filed at 4/7/2025 1257      Gross per 24 hour   Intake 300 ml   Output 500 ml   Net -200 ml         Admission Weight  Weight: 81.6 kg (180 lb) (04/02/25 2207)     Daily Weight  04/04/25 : 81.6 kg (179 lb  14.3 oz)     Image Results  Electrocardiogram, 12-lead PRN ACS symptoms  Unknown rhythm, irregular rate  Probable lateral infarct, old     See ED provider note for full interpretation and clinical correlation  Confirmed by Rivka Manzano ([id number]) on 4/5/2025 10:05:09 AM        Physical Exam     MD Tio Hodges MD    Physician  Internal Medicine     Progress Notes   This note has been shared with the patient  Signed     Date of Service: 4/5/2025  3:59 PM  Signed   Expand All Collapse All     Melissa Tse is a 89 y.o. female on day 2 of admission presenting with Renal infarct (Multi).           Subjective  Patient fully evaluated  04/05for    Problem List Items Addressed This Visit         * (Principal) Renal infarct (Multi) - Primary     Other Visit Diagnoses         Metastatic malignant neoplasm, unspecified site (Multi)             Patient seen resting in bed with head of bed elevated, no s/s or c/o acute difficulties at this time. Patient still with significant pain noted, medications adjusted, percocet added, will monitor overnight and repeat labs in the AM.  Vital signs for last 24 hours Temp:  [35.6 °C (96.1 °F)-36.5 °C (97.7 °F)] 36.1 °C (97 °F)Heart Rate:  [96-98] 96Resp:  [18] 18BP: (114-148)/(55-67) 148/67  I/O this shift:In: 220.8 [I.V.:220.8]Out: - Patient still requiring frequent cardiac and SPO2 monitoring. Continue aggressive pulmonary hygiene and oral hygiene. Off loading as tolerated for skin integrity. Medications and labs reviewed-   Results for orders placed or performed during the hospital encounter of 04/02/25 (from the past 24 hours)  POCT GLUCOSE  Result  Value  Ref Range     POCT Glucose  175 (H)  74 - 99 mg/dL  POCT GLUCOSE  Result  Value  Ref Range     POCT Glucose  167 (H)  74 - 99 mg/dL  POCT GLUCOSE  Result  Value  Ref Range     POCT Glucose  148 (H)  74 - 99 mg/dL  POCT GLUCOSE  Result  Value  Ref Range     POCT Glucose  140 (H)  74 - 99 mg/dL  CBC and Auto  Differential  Result  Value  Ref Range     WBC  12.8 (H)  4.4 - 11.3 x10*3/uL     nRBC  0.0  0.0 - 0.0 /100 WBCs     RBC  3.96 (L)  4.00 - 5.20 x10*6/uL     Hemoglobin  10.8 (L)  12.0 - 16.0 g/dL     Hematocrit  36.5  36.0 - 46.0 %     MCV  92  80 - 100 fL     MCH  27.3  26.0 - 34.0 pg     MCHC  29.6 (L)  32.0 - 36.0 g/dL     RDW  20.3 (H)  11.5 - 14.5 %     Platelets  173  150 - 450 x10*3/uL     Neutrophils %  84.7  40.0 - 80.0 %     Immature Granulocytes %, Automated  0.5  0.0 - 0.9 %     Lymphocytes %  7.9  13.0 - 44.0 %     Monocytes %  6.7  2.0 - 10.0 %     Eosinophils %  0.1  0.0 - 6.0 %     Basophils %  0.1  0.0 - 2.0 %     Neutrophils Absolute  10.86 (H)  1.60 - 5.50 x10*3/uL     Immature Granulocytes Absolute, Automated  0.06  0.00 - 0.50 x10*3/uL     Lymphocytes Absolute  1.01  0.80 - 3.00 x10*3/uL     Monocytes Absolute  0.86 (H)  0.05 - 0.80 x10*3/uL     Eosinophils Absolute  0.01  0.00 - 0.40 x10*3/uL     Basophils Absolute  0.01  0.00 - 0.10 x10*3/uL  Comprehensive Metabolic Panel  Result  Value  Ref Range     Glucose  134 (H)  74 - 99 mg/dL     Sodium  131 (L)  136 - 145 mmol/L     Potassium  5.2  3.5 - 5.3 mmol/L     Chloride  102  98 - 107 mmol/L     Bicarbonate  20 (L)  21 - 32 mmol/L     Anion Gap  14  10 - 20 mmol/L     Urea Nitrogen  26 (H)  6 - 23 mg/dL     Creatinine  0.85  0.50 - 1.05 mg/dL     eGFR  66  >60 mL/min/1.73m*2     Calcium  7.7 (L)  8.6 - 10.3 mg/dL     Albumin  2.2 (L)  3.4 - 5.0 g/dL     Alkaline Phosphatase  155 (H)  33 - 136 U/L     Total Protein  5.7 (L)  6.4 - 8.2 g/dL     AST  23  9 - 39 U/L     Bilirubin, Total  0.9  0.0 - 1.2 mg/dL     ALT  8  7 - 45 U/L  Morphology  Result  Value  Ref Range     RBC Morphology  See Below        Polychromasia  Mild        Ovalocytes  Few        Selam Cells  Few     POCT GLUCOSE  Result  Value  Ref Range     POCT Glucose  173 (H)  74 - 99 mg/dL     Patient recently received an antibiotic (last 12 hours)         None         Plan discussed  with interdisciplinary team, will continue to monitor pain levels and monitor overnight. Will continue current and repeat labs in the AM.  Discharge planning discussed with patient and care team. Therapy evaluations ordered. Anticipate HHC/SNF at discharge. Patient aware and agreeable to current plan, continue plan as above.  I spent a total of 50 minutes on the date of the service which included preparing to see the patient, face-to-face patient care, completing clinical documentation, obtaining and/or reviewing separately obtained history, performing a medically appropriate examination, counseling and educating the patient/family/caregiver, ordering medications, tests, or procedures, communicating with other HCPs (not separately reported), independently interpreting results (not separately reported), communicating results to the patient/family/caregiver, and care coordination (not separately reported).             ObjectiveExpand by Default  Last Recorded Vitals  /67 (BP Location: Right leg, Patient Position: Lying)   Pulse 96   Temp 36.1 °C (97 °F) (Temporal)   Resp 18   Wt 81.6 kg (179 lb 14.3 oz)   SpO2 93%   Intake/Output last 3 Shifts:     Intake/Output Summary (Last 24 hours) at 4/5/2025 1559  Last data filed at 4/5/2025 1301  Gross per 24 hour  Intake  1036.67 ml  Output  200 ml  Net  836.67 ml        Admission Weight  Weight: 81.6 kg (180 lb) (04/02/25 2207)     Daily Weight  04/04/25 : 81.6 kg (179 lb 14.3 oz)     Image Results  Electrocardiogram, 12-lead PRN ACS symptoms  Unknown rhythm, irregular rate  Probable lateral infarct, old     See ED provider note for full interpretation and clinical correlation  Confirmed by Rivka Manzano ([id number]) on 4/5/2025 10:05:09 AM        Physical Exam     Relevant Results                       Assessment/Plan     Assessment & Plan  Renal infarct (Multi)     Hyponatremia     Hyponatremia/renal infarct  Inpatient admission to Dr. Lugo  See imaging results  above  DNRCCA  N.p.o.  Consult nephrology and appreciate input  Consult vascular surgery and appreciate input  Coag screen ordered  Continue heparin drip  Continue gentle IV fluids  Repeat labs  Monitor heparin assay  Morphine/Zofran in ED  Dilaudid IV as needed  Tigan IM as needed 2/2 prolonged Qtc     T2DM/breast cancer/colon cancer/anemia/HLD/HTN  #Chronic conditions  Insulin sliding scale for n.p.o.  Hypoglycemia protocol  Continue home MiraLAX and Colace  Continue home lisinopril daily  Continue home amlodipine daily  Continue home Estrace tablet     DVT Ppx  SCDs  Continue heparin drip  Bedrest/up to chair as tolerated  Patient fully evaluated on April 3.  Continue analgesics and await nephrology consultation.  Appreciate vascular surgery consultation.  Patient with significant metastatic cancer which comfort measures are in place.        MD Tio Hodges MD untitled image  Physician  Internal Medicine     Progress Notes   This note has been shared with the patient  Addendum     Date of Service: 4/3/2025  1:49 PM     Addendum     Expand All Collapse All  untitled image  Patient fully evaluated on 4/3. Patient resting in bed, breathing comfortably on 3L O2 via nasal cannula. Accompanied by daughter in the room. Evaluated today by nephrology, lisinopril discontinued d/t jr renal artery stenosis, flomax added. Hypertension will be treated with prn medications to metoprolol/apresoline IV. Continue heparin drip. Also evaluated by vascular surgery, no surgery recommended at this time. Recommended consult to heme-onc, family declined patient being seen by heme-onc - not wishing to pursue aggressive chemotherapy treatment at this time. Labs reviewed. Mild hyponatremia noted, continue sodium chloride drip. Being given comfort measures for pain, currently receiving dilaudid, morphine.     Problem List Items Addressed This Visit                     Genitourinary and Reproductive     * (Principal)  Renal infarct (Multi) - Primary     Other Visit Diagnoses         Metastatic malignant neoplasm, unspecified site (Multi)               Patient seen resting in bed with head of bed elevated, no s/s or c/o acute difficulties at this time.  Vital signs for last 24 hours Temp:  [36.5 °C (97.7 °F)-36.7 °C (98.1 °F)] 36.7 °C (98.1 °F)  Heart Rate:  [] 100  Resp:  [16-20] 18  BP: (125-175)/(60-74) 126/64    I/O this shift:  In: 551.8 [I.V.:551.8]  Out: -   Patient still requiring frequent cardiac and SPO2 monitoring. Continue aggressive pulmonary hygiene and oral hygiene. Off loading as tolerated for skin integrity. Medications and labs reviewed-               Results for orders placed or performed during the hospital encounter of 04/02/25 (from the past 24 hours)  CBC and Auto Differential  Result  Value  Ref Range     WBC  11.1  4.4 - 11.3 x10*3/uL     nRBC  0.0  0.0 - 0.0 /100 WBCs     RBC  4.36  4.00 - 5.20 x10*6/uL     Hemoglobin  11.5 (L)  12.0 - 16.0 g/dL     Hematocrit  38.8  36.0 - 46.0 %     MCV  89  80 - 100 fL     MCH  26.4  26.0 - 34.0 pg     MCHC  29.6 (L)  32.0 - 36.0 g/dL     RDW  19.9 (H)  11.5 - 14.5 %     Platelets  198  150 - 450 x10*3/uL     Neutrophils %  84.1  40.0 - 80.0 %     Immature Granulocytes %, Automated  0.6  0.0 - 0.9 %     Lymphocytes %  9.5  13.0 - 44.0 %     Monocytes %  5.6  2.0 - 10.0 %     Eosinophils %  0.0  0.0 - 6.0 %     Basophils %  0.2  0.0 - 2.0 %     Neutrophils Absolute  9.34 (H)  1.60 - 5.50 x10*3/uL     Immature Granulocytes Absolute, Automated  0.07  0.00 - 0.50 x10*3/uL     Lymphocytes Absolute  1.06  0.80 - 3.00 x10*3/uL     Monocytes Absolute  0.62  0.05 - 0.80 x10*3/uL     Eosinophils Absolute  0.00  0.00 - 0.40 x10*3/uL     Basophils Absolute  0.02  0.00 - 0.10 x10*3/uL  Comprehensive metabolic panel  Result  Value  Ref Range     Glucose  131 (H)  74 - 99 mg/dL     Sodium  131 (L)  136 - 145 mmol/L     Potassium  4.6  3.5 - 5.3 mmol/L     Chloride  96 (L)  98 -  107 mmol/L     Bicarbonate  22  21 - 32 mmol/L     Anion Gap  18  10 - 20 mmol/L     Urea Nitrogen  21  6 - 23 mg/dL     Creatinine  0.89  0.50 - 1.05 mg/dL     eGFR  62  >60 mL/min/1.73m*2     Calcium  8.4 (L)  8.6 - 10.3 mg/dL     Albumin  2.9 (L)  3.4 - 5.0 g/dL     Alkaline Phosphatase  258 (H)  33 - 136 U/L     Total Protein  6.9  6.4 - 8.2 g/dL     AST  22  9 - 39 U/L     Bilirubin, Total  0.7  0.0 - 1.2 mg/dL     ALT  16  7 - 45 U/L  Lipase  Result  Value  Ref Range     Lipase  7 (L)  9 - 82 U/L  Lactate  Result  Value  Ref Range     Lactate  1.2  0.4 - 2.0 mmol/L  Magnesium  Result  Value  Ref Range     Magnesium  1.90  1.60 - 2.40 mg/dL  POCT GLUCOSE  Result  Value  Ref Range     POCT Glucose  129 (H)  74 - 99 mg/dL  POCT GLUCOSE  Result  Value  Ref Range     POCT Glucose  119 (H)  74 - 99 mg/dL  Protime-INR  Result  Value  Ref Range     Protime  17.4 (H)  9.8 - 12.4 seconds     INR  1.6 (H)  0.9 - 1.1  CBC  Result  Value  Ref Range     WBC  11.5 (H)  4.4 - 11.3 x10*3/uL     nRBC  0.0  0.0 - 0.0 /100 WBCs     RBC  4.08  4.00 - 5.20 x10*6/uL     Hemoglobin  10.9 (L)  12.0 - 16.0 g/dL     Hematocrit  36.7  36.0 - 46.0 %     MCV  90  80 - 100 fL     MCH  26.7  26.0 - 34.0 pg     MCHC  29.7 (L)  32.0 - 36.0 g/dL     RDW  19.9 (H)  11.5 - 14.5 %     Platelets  181  150 - 450 x10*3/uL  Basic Metabolic Panel  Result  Value  Ref Range     Glucose  129 (H)  74 - 99 mg/dL     Sodium  130 (L)  136 - 145 mmol/L     Potassium  4.2  3.5 - 5.3 mmol/L     Chloride  98  98 - 107 mmol/L     Bicarbonate  21  21 - 32 mmol/L     Anion Gap  15  10 - 20 mmol/L     Urea Nitrogen  19  6 - 23 mg/dL     Creatinine  0.85  0.50 - 1.05 mg/dL     eGFR  66  >60 mL/min/1.73m*2     Calcium  8.0 (L)  8.6 - 10.3 mg/dL  Heparin Assay, UFH  Result  Value  Ref Range     Heparin Unfractionated  1.1 (HH)  See Comment Below for Therapeutic Ranges IU/mL  POCT GLUCOSE  Result  Value  Ref Range     POCT Glucose  123 (H)  74 - 99 mg/dL     Patient  recently received an antibiotic (last 12 hours)         None              Plan discussed with interdisciplinary team, continue current and repeat labs in the AM.      Patient aware and agreeable to current plan, continue plan as above.      I spent a total of 50 minutes on the date of the service which included preparing to see the patient, face-to-face patient care, completing clinical documentation, obtaining and/or reviewing separately obtained history, performing a medically appropriate examination, counseling and educating the patient/family/caregiver, ordering medications, tests, or procedures, communicating with other HCPs (not separately reported), independently interpreting results (not separately reported), communicating results to the patient/family/caregiver, and care coordination (not separately reported). Melissa Tse is a 89 y.o. female on day 0 of admission presenting with Renal infarct (Multi).           Subjective     MD Tio Hodges MD untitled image  Physician  Internal Medicine     H&P   This note has been shared with the patient  Signed     Date of Service: 4/3/2025 12:43 PM     Signed     Expand All Collapse All  untitled image  History Of Present Illness  Melissa Tse is a 89 y.o. female with a past medical history significant for breast cancer and stage IV colon cancer presenting to emergency department from [facility] for evaluation of abdominal pain.  Patient also reports an episode of vomiting this morning and states she has been unable to keep anything down today and complains of  a pressure in the mid to lower abdomen that radiates to her right flank.  Patient reports that her doctor will only give her Tylenol for pain and that is not working.  Patient states that her last bowel movement was 4/1/2025 without difficulty.  Patient denies chest pain or dizziness.  Patient denies recent illness, fever, or chills.  Patient denies shortness of  breath.     In ED, lipase 7, glucose 131, sodium 131, chloride 96, calcium 8.4, albumin 2.9, alk phos 258, hemoglobin 11.5.  EKG completed showing normal sinus rhythm at a rate of 88 without ST elevation or depression with a prolonged QTc of 507 per ED physician review.  A CT of the abdomen and pelvis was completed showing hypo-enhancement of the left renal parenchyma compatible with extensive renal cortical infarcts as well as fluid in the endometrial canal concerning for cervical stenosis and worsening of a retroperitoneal metastatic lymphadenopathy as well as progression of hepatic  metastasis per radiology review.  Patient medicated with Zofran, morphine, LR x 1 L bolus in ED.  Blood pressure 149/62, heart rate 92, respirations 20, temperature 36.5 °C, SpO2 95% on room air.  Consult placed to vascular surgery and nephrology.  Patient started on a heparin drip.  Inpatient admission under the care of Dr. Lugo who will continue to follow.  I was asked to do H&P and place initial admission orders.     Prior medical records reviewed.     Past Medical History  Breast cancer, stage IV colon cancer, T2DM, anemia, hypertension, hyperlipidemia  Surgical History  Colonoscopy, tubal ligation, hip replacement, cataract surgery      Social History  Never smoker, no drug use, no alcohol use  Family History  Cataract, diabetes     Allergies  Penicillin, Penicillins, and Statins-hmg-coa reductase inhibitors     Review of Systems  A 10 point review of systems was completed and is negative except what is listed in HPI  Physical Exam  Constitutional:       Appearance: She is ill-appearing.   HENT:      Mouth/Throat:      Mouth: Mucous membranes are dry.      Pharynx: Oropharynx is clear.   Eyes:      Pupils: Pupils are equal, round, and reactive to light.   Cardiovascular:      Rate and Rhythm: Normal rate and regular rhythm.   Pulmonary:      Effort: Pulmonary effort is normal.      Breath sounds: Normal breath sounds.  "  Abdominal:      Palpations: Abdomen is soft.      Tenderness: There is abdominal tenderness in the periumbilical area and suprapubic area.      Comments: Right flank tenderness   Musculoskeletal:      Cervical back: Normal range of motion.   Skin:     General: Skin is warm and dry.   Neurological:      General: No focal deficit present.      Mental Status: She is alert.   Psychiatric:         Mood and Affect: Mood normal.         Behavior: Behavior normal.            Last Recorded Vitals  Blood pressure 126/64, pulse 100, temperature 36.7 °C (98.1 °F), resp. rate 18, height 1.626 m (5' 4\"), weight 81.6 kg (180 lb), SpO2 99%.     Relevant Results  CT abdomen pelvis w IV contrast     Result Date: 4/3/2025  Interpreted By:  Faraz Link, STUDY: CT ABDOMEN PELVIS W IV CONTRAST; ;  4/3/2025 12:30 am   INDICATION: Signs/Symptoms:stage IV colon cancer, now with R abd pain.     COMPARISON: 01/17/2025.   ACCESSION NUMBER(S): [id number]   ORDERING CLINICIAN: ELYSE KLERMAN   TECHNIQUE: Axial CT images of the abdomen and pelvis with coronal and sagittal reconstructed images performed after intravenous administration of 80 cc Omnipaque 350.   FINDINGS: LOWER CHEST: No acute abnormality of the lung bases. BONES: No acute osseous abnormality. Osteopenia. Right hip arthroplasty in standard alignment. Similar appearance of mild-to-moderate chronic compression of the T11 vertebral body. Stable grade 1 degenerative anterolisthesis at L4-L5 and moderate to severe multilevel degenerative changes of the imaged spine. ABDOMINAL WALL: Within normal limits.   ABDOMEN:   LIVER: There is a large ill-defined region of hypoenhancement in the right hepatic lobe measuring up to 10.8 cm in transaxial diameter, up to at least 10.1 cm in CC diameter, suspicious for interval progression of the hepatic metastasis. There is a new hypoattenuating lesion inferolaterally in the inferior tip of the right hepatic lobe (series 601, images 57-63), " measuring up to approximately 2.2 cm in transaxial diameter, suspicious for new metastatic lesion. Liver is enlarged, similar to before. BILE DUCTS: Normal caliber. GALLBLADDER: Cholelithiasis. No wall thickening or pericholecystic fluid. PANCREAS:Atrophic with pancreatic ductal dilation and irregularity and parenchymal calcifications in keeping with sequelae of chronic pancreatitis. SPLEEN: Within normal limits. ADRENALS: Within normal limits. KIDNEYS and URETERS: New extensive hypoenhancement/non enhancement of the left renal parenchyma compatible with extensive renal cortical infarcts. Right kidney and ureter appear normal.     VESSELS: Severe atherosclerosis, with critical narrowing or occlusion of the left renal artery. Severe narrowing of the right renal artery also noted. RETROPERITONEUM: Interval enlargement of celiac axis and portocaval nodes, suspicious for worsened metastatic disease.   PELVIS:   REPRODUCTIVE ORGANS: Endometrial fluid suggests cervical stenosis and is new from the prior exam. BLADDER: Within normal limits.   BOWEL: When allowing for differences in patient positioning in slice selection, there is no substantial change in the very large mass encompassing the mid-transverse colon and several adjacent loops of small bowel and adjacent omentum and mesocolon, compatible with patient's known advanced malignancy. No dilated bowel to suggest obstruction. Stomach appears grossly normal. No definite additional abnormal bowel wall thickening. Normal appendix. PERITONEUM: No ascites or free air, no fluid collection.        New extensive hypoenhancement/non enhancement of the left renal parenchyma compatible with extensive renal cortical infarcts. Right kidney and ureter appear normal.   There is a large ill-defined region of hypoenhancement in the right hepatic lobe measuring up to 10.8 cm in transaxial diameter, up to at least 10.1 cm in CC diameter, suspicious for interval progression of the hepatic  metastasis. There is a new hypoattenuating lesion inferolaterally in the inferior tip of the right hepatic lobe (series 601, images 57-63), measuring up to approximately 2.2 cm in transaxial diameter, suspicious for new metastatic lesion. Liver is enlarged, similar to before.   Interval worsening of retroperitoneal metastatic lymphadenopathy.   Cholelithiasis without convincing evidence of acute cholecystitis.   New fluid in the endometrial canal suggesting cervical stenosis.   Additional findings as discussed above.   MACRO: None   Signed by: Faraz Link 4/3/2025 12:52 AM Dictation workstation:   [id number]               Results for orders placed or performed during the hospital encounter of 04/02/25 (from the past 24 hours)  CBC and Auto Differential  Result  Value  Ref Range     WBC  11.1  4.4 - 11.3 x10*3/uL     nRBC  0.0  0.0 - 0.0 /100 WBCs     RBC  4.36  4.00 - 5.20 x10*6/uL     Hemoglobin  11.5 (L)  12.0 - 16.0 g/dL     Hematocrit  38.8  36.0 - 46.0 %     MCV  89  80 - 100 fL     MCH  26.4  26.0 - 34.0 pg     MCHC  29.6 (L)  32.0 - 36.0 g/dL     RDW  19.9 (H)  11.5 - 14.5 %     Platelets  198  150 - 450 x10*3/uL     Neutrophils %  84.1  40.0 - 80.0 %     Immature Granulocytes %, Automated  0.6  0.0 - 0.9 %     Lymphocytes %  9.5  13.0 - 44.0 %     Monocytes %  5.6  2.0 - 10.0 %     Eosinophils %  0.0  0.0 - 6.0 %     Basophils %  0.2  0.0 - 2.0 %     Neutrophils Absolute  9.34 (H)  1.60 - 5.50 x10*3/uL     Immature Granulocytes Absolute, Automated  0.07  0.00 - 0.50 x10*3/uL     Lymphocytes Absolute  1.06  0.80 - 3.00 x10*3/uL     Monocytes Absolute  0.62  0.05 - 0.80 x10*3/uL     Eosinophils Absolute  0.00  0.00 - 0.40 x10*3/uL     Basophils Absolute  0.02  0.00 - 0.10 x10*3/uL  Comprehensive metabolic panel  Result  Value  Ref Range     Glucose  131 (H)  74 - 99 mg/dL     Sodium  131 (L)  136 - 145 mmol/L     Potassium  4.6  3.5 - 5.3 mmol/L     Chloride  96 (L)  98 - 107 mmol/L     Bicarbonate  22  21 -  32 mmol/L     Anion Gap  18  10 - 20 mmol/L     Urea Nitrogen  21  6 - 23 mg/dL     Creatinine  0.89  0.50 - 1.05 mg/dL     eGFR  62  >60 mL/min/1.73m*2     Calcium  8.4 (L)  8.6 - 10.3 mg/dL     Albumin  2.9 (L)  3.4 - 5.0 g/dL     Alkaline Phosphatase  258 (H)  33 - 136 U/L     Total Protein  6.9  6.4 - 8.2 g/dL     AST  22  9 - 39 U/L     Bilirubin, Total  0.7  0.0 - 1.2 mg/dL     ALT  16  7 - 45 U/L  Lipase  Result  Value  Ref Range     Lipase  7 (L)  9 - 82 U/L  Lactate  Result  Value  Ref Range     Lactate  1.2  0.4 - 2.0 mmol/L  Magnesium  Result  Value  Ref Range     Magnesium  1.90  1.60 - 2.40 mg/dL  POCT GLUCOSE  Result  Value  Ref Range     POCT Glucose  129 (H)  74 - 99 mg/dL  POCT GLUCOSE  Result  Value  Ref Range     POCT Glucose  119 (H)  74 - 99 mg/dL  Protime-INR  Result  Value  Ref Range     Protime  17.4 (H)  9.8 - 12.4 seconds     INR  1.6 (H)  0.9 - 1.1  CBC  Result  Value  Ref Range     WBC  11.5 (H)  4.4 - 11.3 x10*3/uL     nRBC  0.0  0.0 - 0.0 /100 WBCs     RBC  4.08  4.00 - 5.20 x10*6/uL     Hemoglobin  10.9 (L)  12.0 - 16.0 g/dL     Hematocrit  36.7  36.0 - 46.0 %     MCV  90  80 - 100 fL     MCH  26.7  26.0 - 34.0 pg     MCHC  29.7 (L)  32.0 - 36.0 g/dL     RDW  19.9 (H)  11.5 - 14.5 %     Platelets  181  150 - 450 x10*3/uL  Basic Metabolic Panel  Result  Value  Ref Range     Glucose  129 (H)  74 - 99 mg/dL     Sodium  130 (L)  136 - 145 mmol/L     Potassium  4.2  3.5 - 5.3 mmol/L     Chloride  98  98 - 107 mmol/L     Bicarbonate  21  21 - 32 mmol/L     Anion Gap  15  10 - 20 mmol/L     Urea Nitrogen  19  6 - 23 mg/dL     Creatinine  0.85  0.50 - 1.05 mg/dL     eGFR  66  >60 mL/min/1.73m*2     Calcium  8.0 (L)  8.6 - 10.3 mg/dL  Heparin Assay, UFH  Result  Value  Ref Range     Heparin Unfractionated  1.1 (HH)  See Comment Below for Therapeutic Ranges IU/mL  POCT GLUCOSE  Result  Value  Ref Range     POCT Glucose  123 (H)  74 - 99 mg/dL           Assessment/Plan  Melissa is an 89-year-old  female patient presenting to emergency department from [facility] nursing facility for evaluation of abdominal pain and vomiting.  Patient states that she developed an 8 out of 10 mid to lower abdominal pain radiating to her right flank.  Patient with a history of breast cancer and colon cancer.  CT imaging showing extensive left renal cortical infarcts as well as worsening of retroperitoneal metastatic lymphadenopathy and progression of hepatic metastasis per radiology review.  Consults placed to nephrology and vascular surgery, patient started on a heparin drip, medicated with morphine and admitted for further medical management.     Assessment & Planuntitled image  untitled imageRenal infarct (Multi)     Hyponatremia     Hyponatremia/renal infarct  Inpatient admission to Dr. Lugo  See imaging results above  DNRCCA  N.p.o.  Consult nephrology and appreciate input  Consult vascular surgery and appreciate input  Coag screen ordered  Continue heparin drip  Continue gentle IV fluids  Repeat labs  Monitor heparin assay  Morphine/Zofran in ED  Dilaudid IV as needed  Tigan IM as needed 2/2 prolonged Qtc     T2DM/breast cancer/colon cancer/anemia/HLD/HTN  #Chronic conditions  Insulin sliding scale for n.p.o.  Hypoglycemia protocol  Continue home MiraLAX and Colace  Continue home lisinopril daily  Continue home amlodipine daily  Continue home Estrace tablet     DVT Ppx  SCDs  Continue heparin drip  Bedrest/up to chair as tolerated  Patient fully evaluated on April 3.  Continue analgesics and await nephrology consultation.  Appreciate vascular surgery consultation.  Patient with significant metastatic cancer which comfort measures are in place.        I spent 60 minutes in the professional and overall care of this patient.  Tio Lugo MD                       Objective  Last Recorded Vitals  /64   Pulse 100   Temp 36.7 °C (98.1 °F)   Resp 18   Wt 81.6 kg (180 lb)   SpO2 99%   Intake/Output last 3 Shifts:      Intake/Output Summary (Last 24 hours) at 4/3/2025 1349  Last data filed at 4/3/2025 1000        Gross per 24 hour  Intake  551.83 ml  Output  --  Net  551.83 ml        Admission Weight  Weight: 81.6 kg (180 lb) (04/02/25 2207)     Daily Weight  04/02/25 : 81.6 kg (180 lb)     Image Results  CT abdomen pelvis w IV contrast  Narrative: Interpreted By:  Faraz Link,   STUDY:  CT ABDOMEN PELVIS W IV CONTRAST; ;  4/3/2025 12:30 am      INDICATION:  Signs/Symptoms:stage IV colon cancer, now with R abd pain.          COMPARISON:  01/17/2025.      ACCESSION NUMBER(S):  [id number]      ORDERING CLINICIAN:  ELYSE KLERMAN      TECHNIQUE:  Axial CT images of the abdomen and pelvis with coronal and sagittal  reconstructed images performed after intravenous administration of 80  cc Omnipaque 350.      FINDINGS:  LOWER CHEST: No acute abnormality of the lung bases.  BONES: No acute osseous abnormality. Osteopenia. Right hip  arthroplasty in standard alignment. Similar appearance of  mild-to-moderate chronic compression of the T11 vertebral body.  Stable grade 1 degenerative anterolisthesis at L4-L5 and moderate to  severe multilevel degenerative changes of the imaged spine. ABDOMINAL  WALL: Within normal limits.      ABDOMEN:      LIVER: There is a large ill-defined region of hypoenhancement in the  right hepatic lobe measuring up to 10.8 cm in transaxial diameter, up  to at least 10.1 cm in CC diameter, suspicious for interval  progression of the hepatic metastasis. There is a new hypoattenuating  lesion inferolaterally in the inferior tip of the right hepatic lobe  (series 601, images 57-63), measuring up to approximately 2.2 cm in  transaxial diameter, suspicious for new metastatic lesion. Liver is  enlarged, similar to before. BILE DUCTS: Normal caliber.  GALLBLADDER: Cholelithiasis. No wall thickening or pericholecystic  fluid. PANCREAS:Atrophic with pancreatic ductal dilation and  irregularity and parenchymal  calcifications in keeping with sequelae  of chronic pancreatitis. SPLEEN: Within normal limits.  ADRENALS: Within normal limits.  KIDNEYS and URETERS: New extensive hypoenhancement/non enhancement of  the left renal parenchyma compatible with extensive renal cortical  infarcts. Right kidney and ureter appear normal.          VESSELS: Severe atherosclerosis, with critical narrowing or occlusion  of the left renal artery. Severe narrowing of the right renal artery  also noted. RETROPERITONEUM: Interval enlargement of celiac axis and  portocaval nodes, suspicious for worsened metastatic disease.      PELVIS:      REPRODUCTIVE ORGANS: Endometrial fluid suggests cervical stenosis and  is new from the prior exam. BLADDER: Within normal limits.      BOWEL: When allowing for differences in patient positioning in slice  selection, there is no substantial change in the very large mass  encompassing the mid-transverse colon and several adjacent loops of  small bowel and adjacent omentum and mesocolon, compatible with  patient's known advanced malignancy. No dilated bowel to suggest  obstruction. Stomach appears grossly normal. No definite additional  abnormal bowel wall thickening. Normal appendix. PERITONEUM: No  ascites or free air, no fluid collection.      Impression: New extensive hypoenhancement/non enhancement of the left renal  parenchyma compatible with extensive renal cortical infarcts. Right  kidney and ureter appear normal.      There is a large ill-defined region of hypoenhancement in the right  hepatic lobe measuring up to 10.8 cm in transaxial diameter, up to at  least 10.1 cm in CC diameter, suspicious for interval progression of  the hepatic metastasis. There is a new hypoattenuating lesion  inferolaterally in the inferior tip of the right hepatic lobe (series  601, images 57-63), measuring up to approximately 2.2 cm in  transaxial diameter, suspicious for new metastatic lesion. Liver is  enlarged, similar to  before.      Interval worsening of retroperitoneal metastatic lymphadenopathy.      Cholelithiasis without convincing evidence of acute cholecystitis.      New fluid in the endometrial canal suggesting cervical stenosis.      Additional findings as discussed above.      MACRO:  None      Signed by: Faraz Link 4/3/2025 12:52 AM  Dictation workstation:   [id number]        Physical Exam  Constitutional:       Appearance: She is ill-appearing.   HENT:      Mouth/Throat:      Mouth: Mucous membranes are dry.      Pharynx: Oropharynx is clear.   Eyes:      Pupils: Pupils are equal, round, and reactive to light.   Cardiovascular:      Rate and Rhythm: Normal rate and regular rhythm.   Pulmonary:      Effort: Pulmonary effort is normal.      Breath sounds: Normal breath sounds.   Abdominal:      Palpations: Abdomen is soft.      Tenderness: There is abdominal tenderness in the periumbilical area and suprapubic area.      Comments: Right flank tenderness   Musculoskeletal:      Cervical back: Normal range of motion.   Skin:     General: Skin is warm and dry.   Neurological:      General: No focal deficit present.      Mental Status: She is alert.   Psychiatric:         Mood and Affect: Mood normal.         Behavior: Behavior normal.         Relevant Results  ?         ?               Assessment/Plan                       Assessment & Planuntitled image  untitled imageRenal infarct (Multi)     Hyponatremia     Hyponatremia/renal infarct  Inpatient admission to Dr. Lugo  See imaging results above  DNRCCA  N.p.o.  Consult nephrology and appreciate input  Consult vascular surgery and appreciate input  Coag screen ordered  Continue heparin drip  Continue gentle IV fluids  Repeat labs  Monitor heparin assay  Morphine/Zofran in ED  Dilaudid IV as needed  Tigan IM as needed 2/2 prolonged Qtc     T2DM/breast cancer/colon cancer/anemia/HLD/HTN  #Chronic conditions  Insulin sliding scale for n.p.o.  Hypoglycemia protocol  Continue  home MiraLAX and Colace  Continue home lisinopril daily  Continue home amlodipine daily  Continue home Estrace tablet     DVT Ppx  SCDs  Continue heparin drip  Bedrest/up to chair as tolerated  Patient fully evaluated on April 3.  Continue analgesics and await nephrology consultation.  Appreciate vascular surgery consultation.  Patient with significant metastatic cancer which comfort measures are in place.      Patient fully evaluated on 4/4, resting in bed breathing comfortably on room air. Labs and medications reviewed. Estrase discontinued due to coagulation risk. Switched from heparin to Eliquis 2.5 BID for outpatient anticoagulation. Evaluated by vascular surgery, no surgery recommended at this time. Family declined heme-onc consult, not wishing to pursue aggressive chemotherapy treatment at this time. Continue comfort care for pain. Pyridium ordered for suprapubic pain. Per nursing staff, patient does not like Tigan, order switched to Zofran. Patient medically cleared for discharge to SNF today.              Tio Lugo MD   Physician  Internal Medicine     Progress Notes     Addendum     Date of Service: 4/7/2025  3:03 PM     Addendum       Expand All Collapse All    Melissa Tse is a 89 y.o. female on day 4 of admission presenting with Renal infarct (Multi).           Subjective  No significant events overnight. Patient seen and evaluated at bedside.               Objective  Last Recorded Vitals  BP 92/55 (BP Location: Left arm, Patient Position: Lying)   Pulse (!) 112   Temp 35.5 °C (95.9 °F) (Temporal)   Resp 18   Wt 81.6 kg (179 lb 14.3 oz)   SpO2 92%   Intake/Output last 3 Shifts:     Intake/Output Summary (Last 24 hours) at 4/7/2025 1503  Last data filed at 4/7/2025 1257      Gross per 24 hour   Intake 300 ml   Output 500 ml   Net -200 ml         Admission Weight  Weight: 81.6 kg (180 lb) (04/02/25 2207)     Daily Weight  04/04/25 : 81.6 kg (179 lb 14.3 oz)     Image  Results  Electrocardiogram, 12-lead PRN ACS symptoms  Unknown rhythm, irregular rate  Probable lateral infarct, old     See ED provider note for full interpretation and clinical correlation  Confirmed by Rivka Manzano ([id number]) on 4/5/2025 10:05:09 AM        Physical Exam     MD Tio Hodges MD    Physician  Internal Medicine     Progress Notes   This note has been shared with the patient  Signed     Date of Service: 4/5/2025  3:59 PM  Signed   Expand All Collapse All     Melissa Tse is a 89 y.o. female on day 2 of admission presenting with Renal infarct (Multi).           Subjective  Patient fully evaluated  04/05for    Problem List Items Addressed This Visit         * (Principal) Renal infarct (Multi) - Primary     Other Visit Diagnoses         Metastatic malignant neoplasm, unspecified site (Multi)             Patient seen resting in bed with head of bed elevated, no s/s or c/o acute difficulties at this time. Patient still with significant pain noted, medications adjusted, percocet added, will monitor overnight and repeat labs in the AM.  Vital signs for last 24 hours Temp:  [35.6 °C (96.1 °F)-36.5 °C (97.7 °F)] 36.1 °C (97 °F)Heart Rate:  [96-98] 96Resp:  [18] 18BP: (114-148)/(55-67) 148/67  I/O this shift:In: 220.8 [I.V.:220.8]Out: - Patient still requiring frequent cardiac and SPO2 monitoring. Continue aggressive pulmonary hygiene and oral hygiene. Off loading as tolerated for skin integrity. Medications and labs reviewed-   Results for orders placed or performed during the hospital encounter of 04/02/25 (from the past 24 hours)  POCT GLUCOSE  Result  Value  Ref Range     POCT Glucose  175 (H)  74 - 99 mg/dL  POCT GLUCOSE  Result  Value  Ref Range     POCT Glucose  167 (H)  74 - 99 mg/dL  POCT GLUCOSE  Result  Value  Ref Range     POCT Glucose  148 (H)  74 - 99 mg/dL  POCT GLUCOSE  Result  Value  Ref Range     POCT Glucose  140 (H)  74 - 99 mg/dL  CBC and Auto  with interdisciplinary team, will continue to monitor pain levels and monitor overnight. Will continue current and repeat labs in the AM.  Discharge planning discussed with patient and care team. Therapy evaluations ordered. Anticipate HHC/SNF at discharge. Patient aware and agreeable to current plan, continue plan as above.  I spent a total of 50 minutes on the date of the service which included preparing to see the patient, face-to-face patient care, completing clinical documentation, obtaining and/or reviewing separately obtained history, performing a medically appropriate examination, counseling and educating the patient/family/caregiver, ordering medications, tests, or procedures, communicating with other HCPs (not separately reported), independently interpreting results (not separately reported), communicating results to the patient/family/caregiver, and care coordination (not separately reported).             ObjectiveExpand by Default  Last Recorded Vitals  /67 (BP Location: Right leg, Patient Position: Lying)   Pulse 96   Temp 36.1 °C (97 °F) (Temporal)   Resp 18   Wt 81.6 kg (179 lb 14.3 oz)   SpO2 93%   Intake/Output last 3 Shifts:     Intake/Output Summary (Last 24 hours) at 4/5/2025 1559  Last data filed at 4/5/2025 1301  Gross per 24 hour  Intake  1036.67 ml  Output  200 ml  Net  836.67 ml        Admission Weight  Weight: 81.6 kg (180 lb) (04/02/25 2207)     Daily Weight  04/04/25 : 81.6 kg (179 lb 14.3 oz)     Image Results  Electrocardiogram, 12-lead PRN ACS symptoms  Unknown rhythm, irregular rate  Probable lateral infarct, old     See ED provider note for full interpretation and clinical correlation  Confirmed by Rivka Manzano ([id number]) on 4/5/2025 10:05:09 AM        Physical Exam     Relevant Results                       Assessment/Plan     Assessment & Plan  Renal infarct (Multi)     Hyponatremia     Hyponatremia/renal infarct  Inpatient admission to Dr. Lugo  See imaging results  recently received an antibiotic (last 12 hours)         None              Plan discussed with interdisciplinary team, continue current and repeat labs in the AM.      Patient aware and agreeable to current plan, continue plan as above.      I spent a total of 50 minutes on the date of the service which included preparing to see the patient, face-to-face patient care, completing clinical documentation, obtaining and/or reviewing separately obtained history, performing a medically appropriate examination, counseling and educating the patient/family/caregiver, ordering medications, tests, or procedures, communicating with other HCPs (not separately reported), independently interpreting results (not separately reported), communicating results to the patient/family/caregiver, and care coordination (not separately reported). Melissa Tse is a 89 y.o. female on day 0 of admission presenting with Renal infarct (Multi).           Subjective     MD Tio Hodges MD untitled image  Physician  Internal Medicine     H&P   This note has been shared with the patient  Signed     Date of Service: 4/3/2025 12:43 PM     Signed     Expand All Collapse All  untitled image  History Of Present Illness  Melissa Tse is a 89 y.o. female with a past medical history significant for breast cancer and stage IV colon cancer presenting to emergency department from [facility] for evaluation of abdominal pain.  Patient also reports an episode of vomiting this morning and states she has been unable to keep anything down today and complains of  a pressure in the mid to lower abdomen that radiates to her right flank.  Patient reports that her doctor will only give her Tylenol for pain and that is not working.  Patient states that her last bowel movement was 4/1/2025 without difficulty.  Patient denies chest pain or dizziness.  Patient denies recent illness, fever, or chills.  Patient denies shortness of  "  Abdominal:      Palpations: Abdomen is soft.      Tenderness: There is abdominal tenderness in the periumbilical area and suprapubic area.      Comments: Right flank tenderness   Musculoskeletal:      Cervical back: Normal range of motion.   Skin:     General: Skin is warm and dry.   Neurological:      General: No focal deficit present.      Mental Status: She is alert.   Psychiatric:         Mood and Affect: Mood normal.         Behavior: Behavior normal.            Last Recorded Vitals  Blood pressure 126/64, pulse 100, temperature 36.7 °C (98.1 °F), resp. rate 18, height 1.626 m (5' 4\"), weight 81.6 kg (180 lb), SpO2 99%.     Relevant Results  CT abdomen pelvis w IV contrast     Result Date: 4/3/2025  Interpreted By:  Faraz Link, STUDY: CT ABDOMEN PELVIS W IV CONTRAST; ;  4/3/2025 12:30 am   INDICATION: Signs/Symptoms:stage IV colon cancer, now with R abd pain.     COMPARISON: 01/17/2025.   ACCESSION NUMBER(S): [id number]   ORDERING CLINICIAN: ELYSE KLERMAN   TECHNIQUE: Axial CT images of the abdomen and pelvis with coronal and sagittal reconstructed images performed after intravenous administration of 80 cc Omnipaque 350.   FINDINGS: LOWER CHEST: No acute abnormality of the lung bases. BONES: No acute osseous abnormality. Osteopenia. Right hip arthroplasty in standard alignment. Similar appearance of mild-to-moderate chronic compression of the T11 vertebral body. Stable grade 1 degenerative anterolisthesis at L4-L5 and moderate to severe multilevel degenerative changes of the imaged spine. ABDOMINAL WALL: Within normal limits.   ABDOMEN:   LIVER: There is a large ill-defined region of hypoenhancement in the right hepatic lobe measuring up to 10.8 cm in transaxial diameter, up to at least 10.1 cm in CC diameter, suspicious for interval progression of the hepatic metastasis. There is a new hypoattenuating lesion inferolaterally in the inferior tip of the right hepatic lobe (series 601, images 57-63), " metastasis. There is a new hypoattenuating lesion inferolaterally in the inferior tip of the right hepatic lobe (series 601, images 57-63), measuring up to approximately 2.2 cm in transaxial diameter, suspicious for new metastatic lesion. Liver is enlarged, similar to before.   Interval worsening of retroperitoneal metastatic lymphadenopathy.   Cholelithiasis without convincing evidence of acute cholecystitis.   New fluid in the endometrial canal suggesting cervical stenosis.   Additional findings as discussed above.   MACRO: None   Signed by: Faraz Link 4/3/2025 12:52 AM Dictation workstation:   [id number]               Results for orders placed or performed during the hospital encounter of 04/02/25 (from the past 24 hours)  CBC and Auto Differential  Result  Value  Ref Range     WBC  11.1  4.4 - 11.3 x10*3/uL     nRBC  0.0  0.0 - 0.0 /100 WBCs     RBC  4.36  4.00 - 5.20 x10*6/uL     Hemoglobin  11.5 (L)  12.0 - 16.0 g/dL     Hematocrit  38.8  36.0 - 46.0 %     MCV  89  80 - 100 fL     MCH  26.4  26.0 - 34.0 pg     MCHC  29.6 (L)  32.0 - 36.0 g/dL     RDW  19.9 (H)  11.5 - 14.5 %     Platelets  198  150 - 450 x10*3/uL     Neutrophils %  84.1  40.0 - 80.0 %     Immature Granulocytes %, Automated  0.6  0.0 - 0.9 %     Lymphocytes %  9.5  13.0 - 44.0 %     Monocytes %  5.6  2.0 - 10.0 %     Eosinophils %  0.0  0.0 - 6.0 %     Basophils %  0.2  0.0 - 2.0 %     Neutrophils Absolute  9.34 (H)  1.60 - 5.50 x10*3/uL     Immature Granulocytes Absolute, Automated  0.07  0.00 - 0.50 x10*3/uL     Lymphocytes Absolute  1.06  0.80 - 3.00 x10*3/uL     Monocytes Absolute  0.62  0.05 - 0.80 x10*3/uL     Eosinophils Absolute  0.00  0.00 - 0.40 x10*3/uL     Basophils Absolute  0.02  0.00 - 0.10 x10*3/uL  Comprehensive metabolic panel  Result  Value  Ref Range     Glucose  131 (H)  74 - 99 mg/dL     Sodium  131 (L)  136 - 145 mmol/L     Potassium  4.6  3.5 - 5.3 mmol/L     Chloride  96 (L)  98 - 107 mmol/L     Bicarbonate  22  21 -  female patient presenting to emergency department from [facility] nursing facility for evaluation of abdominal pain and vomiting.  Patient states that she developed an 8 out of 10 mid to lower abdominal pain radiating to her right flank.  Patient with a history of breast cancer and colon cancer.  CT imaging showing extensive left renal cortical infarcts as well as worsening of retroperitoneal metastatic lymphadenopathy and progression of hepatic metastasis per radiology review.  Consults placed to nephrology and vascular surgery, patient started on a heparin drip, medicated with morphine and admitted for further medical management.     Assessment & Planuntitled image  untitled imageRenal infarct (Multi)     Hyponatremia     Hyponatremia/renal infarct  Inpatient admission to Dr. Lugo  See imaging results above  DNRCCA  N.p.o.  Consult nephrology and appreciate input  Consult vascular surgery and appreciate input  Coag screen ordered  Continue heparin drip  Continue gentle IV fluids  Repeat labs  Monitor heparin assay  Morphine/Zofran in ED  Dilaudid IV as needed  Tigan IM as needed 2/2 prolonged Qtc     T2DM/breast cancer/colon cancer/anemia/HLD/HTN  #Chronic conditions  Insulin sliding scale for n.p.o.  Hypoglycemia protocol  Continue home MiraLAX and Colace  Continue home lisinopril daily  Continue home amlodipine daily  Continue home Estrace tablet     DVT Ppx  SCDs  Continue heparin drip  Bedrest/up to chair as tolerated  Patient fully evaluated on April 3.  Continue analgesics and await nephrology consultation.  Appreciate vascular surgery consultation.  Patient with significant metastatic cancer which comfort measures are in place.        I spent 60 minutes in the professional and overall care of this patient.  Tio Lugo MD                       Objective  Last Recorded Vitals  /64   Pulse 100   Temp 36.7 °C (98.1 °F)   Resp 18   Wt 81.6 kg (180 lb)   SpO2 99%   Intake/Output last 3 Shifts:      Intake/Output Summary (Last 24 hours) at 4/3/2025 1349  Last data filed at 4/3/2025 1000        Gross per 24 hour  Intake  551.83 ml  Output  --  Net  551.83 ml        Admission Weight  Weight: 81.6 kg (180 lb) (04/02/25 2207)     Daily Weight  04/02/25 : 81.6 kg (180 lb)     Image Results  CT abdomen pelvis w IV contrast  Narrative: Interpreted By:  Faraz Link,   STUDY:  CT ABDOMEN PELVIS W IV CONTRAST; ;  4/3/2025 12:30 am      INDICATION:  Signs/Symptoms:stage IV colon cancer, now with R abd pain.          COMPARISON:  01/17/2025.      ACCESSION NUMBER(S):  [id number]      ORDERING CLINICIAN:  ELYSE KLERMAN      TECHNIQUE:  Axial CT images of the abdomen and pelvis with coronal and sagittal  reconstructed images performed after intravenous administration of 80  cc Omnipaque 350.      FINDINGS:  LOWER CHEST: No acute abnormality of the lung bases.  BONES: No acute osseous abnormality. Osteopenia. Right hip  arthroplasty in standard alignment. Similar appearance of  mild-to-moderate chronic compression of the T11 vertebral body.  Stable grade 1 degenerative anterolisthesis at L4-L5 and moderate to  severe multilevel degenerative changes of the imaged spine. ABDOMINAL  WALL: Within normal limits.      ABDOMEN:      LIVER: There is a large ill-defined region of hypoenhancement in the  right hepatic lobe measuring up to 10.8 cm in transaxial diameter, up  to at least 10.1 cm in CC diameter, suspicious for interval  progression of the hepatic metastasis. There is a new hypoattenuating  lesion inferolaterally in the inferior tip of the right hepatic lobe  (series 601, images [id number]), measuring up to approximately 2.2 cm in  transaxial diameter, suspicious for new metastatic lesion. Liver is  enlarged, similar to before. BILE DUCTS: Normal caliber.  GALLBLADDER: Cholelithiasis. No wall thickening or pericholecystic  fluid. PANCREAS:Atrophic with pancreatic ductal dilation and  irregularity and parenchymal  before.      Interval worsening of retroperitoneal metastatic lymphadenopathy.      Cholelithiasis without convincing evidence of acute cholecystitis.      New fluid in the endometrial canal suggesting cervical stenosis.      Additional findings as discussed above.      MACRO:  None      Signed by: Faraz Link 4/3/2025 12:52 AM  Dictation workstation:   [id number]        Physical Exam  Constitutional:       Appearance: She is ill-appearing.   HENT:      Mouth/Throat:      Mouth: Mucous membranes are dry.      Pharynx: Oropharynx is clear.   Eyes:      Pupils: Pupils are equal, round, and reactive to light.   Cardiovascular:      Rate and Rhythm: Normal rate and regular rhythm.   Pulmonary:      Effort: Pulmonary effort is normal.      Breath sounds: Normal breath sounds.   Abdominal:      Palpations: Abdomen is soft.      Tenderness: There is abdominal tenderness in the periumbilical area and suprapubic area.      Comments: Right flank tenderness   Musculoskeletal:      Cervical back: Normal range of motion.   Skin:     General: Skin is warm and dry.   Neurological:      General: No focal deficit present.      Mental Status: She is alert.   Psychiatric:         Mood and Affect: Mood normal.         Behavior: Behavior normal.         Relevant Results  ?         ?               Assessment/Plan                       Assessment & Planuntitled image  untitled imageRenal infarct (Multi)     Hyponatremia     Hyponatremia/renal infarct  Inpatient admission to Dr. Lugo  See imaging results above  DNRCCA  N.p.o.  Consult nephrology and appreciate input  Consult vascular surgery and appreciate input  Coag screen ordered  Continue heparin drip  Continue gentle IV fluids  Repeat labs  Monitor heparin assay  Morphine/Zofran in ED  Dilaudid IV as needed  Tigan IM as needed 2/2 prolonged Qtc     T2DM/breast cancer/colon cancer/anemia/HLD/HTN  #Chronic conditions  Insulin sliding scale for n.p.o.  Hypoglycemia protocol  Continue  home MiraLAX and Colace  Continue home lisinopril daily  Continue home amlodipine daily  Continue home Estrace tablet     DVT Ppx  SCDs  Continue heparin drip  Bedrest/up to chair as tolerated  Patient fully evaluated on April 3.  Continue analgesics and await nephrology consultation.  Appreciate vascular surgery consultation.  Patient with significant metastatic cancer which comfort measures are in place.      Patient fully evaluated on 4/4, resting in bed breathing comfortably on room air. Labs and medications reviewed. Estrase discontinued due to coagulation risk. Switched from heparin to Eliquis 2.5 BID for outpatient anticoagulation. Evaluated by vascular surgery, no surgery recommended at this time. Family declined heme-onc consult, not wishing to pursue aggressive chemotherapy treatment at this time. Continue comfort care for pain. Pyridium ordered for suprapubic pain. Per nursing staff, patient does not like Tigan, order switched to Zofran. Patient medically cleared for discharge to SNF today.                              Assessment/Plan                       Assessment & Plan  Renal infarct (Multi)     Hyponatremia     Hyponatremia/renal infarct  Inpatient admission to Dr. Lugo  See imaging results above  DNRCCA  N.p.o.  Consult nephrology and appreciate input  Consult vascular surgery and appreciate input  Coag screen ordered  Continue heparin drip  Continue gentle IV fluids  Repeat labs  Monitor heparin assay  Morphine/Zofran in ED  Dilaudid IV as needed  Tigan IM as needed 2/2 prolonged Qtc     T2DM/breast cancer/colon cancer/anemia/HLD/HTN  #Chronic conditions  Insulin sliding scale for n.p.o.  Hypoglycemia protocol  Continue home MiraLAX and Colace  Continue home lisinopril daily  Continue home amlodipine daily  Continue home Estrace tablet     DVT Ppx  SCDs  Continue heparin drip  Bedrest/up to chair as tolerated     Patient fully evaluated  04/05  for    Problem List Items Addressed This Visit          * (Principal) Renal infarct (Multi) - Primary      Other Visit Diagnoses         Metastatic malignant neoplasm, unspecified site (Multi)                 Patient seen resting in bed with head of bed elevated, no s/s or c/o acute difficulties at this time. Patient still with significant pain noted, medications adjusted, percocet added, will monitor overnight and repeat labs in the AM.      Vital signs for last 24 hours Temp:  [35.6 °C (96.1 °F)-36.5 °C (97.7 °F)] 36.1 °C (97 °F)  Heart Rate:  [96-98] 96  Resp:  [18] 18  BP: (114-148)/(55-67) 148/67    I/O this shift:  In: 220.8 [I.V.:220.8]  Out: -   Patient still requiring frequent cardiac and SPO2 monitoring. Continue aggressive pulmonary hygiene and oral hygiene. Off loading as tolerated for skin integrity. Medications and labs reviewed-             Results for orders placed or performed during the hospital encounter of 04/02/25 (from the past 24 hours)   POCT GLUCOSE   Result Value Ref Range     POCT Glucose 175 (H) 74 - 99 mg/dL   POCT GLUCOSE   Result Value Ref Range     POCT Glucose 167 (H) 74 - 99 mg/dL   POCT GLUCOSE   Result Value Ref Range     POCT Glucose 148 (H) 74 - 99 mg/dL   POCT GLUCOSE   Result Value Ref Range     POCT Glucose 140 (H) 74 - 99 mg/dL   CBC and Auto Differential   Result Value Ref Range     WBC 12.8 (H) 4.4 - 11.3 x10*3/uL     nRBC 0.0 0.0 - 0.0 /100 WBCs     RBC 3.96 (L) 4.00 - 5.20 x10*6/uL     Hemoglobin 10.8 (L) 12.0 - 16.0 g/dL     Hematocrit 36.5 36.0 - 46.0 %     MCV 92 80 - 100 fL     MCH 27.3 26.0 - 34.0 pg     MCHC 29.6 (L) 32.0 - 36.0 g/dL     RDW 20.3 (H) 11.5 - 14.5 %     Platelets 173 150 - 450 x10*3/uL     Neutrophils % 84.7 40.0 - 80.0 %     Immature Granulocytes %, Automated 0.5 0.0 - 0.9 %     Lymphocytes % 7.9 13.0 - 44.0 %     Monocytes % 6.7 2.0 - 10.0 %     Eosinophils % 0.1 0.0 - 6.0 %     Basophils % 0.1 0.0 - 2.0 %     Neutrophils Absolute 10.86 (H) 1.60 - 5.50 x10*3/uL     Immature Granulocytes Absolute,  Automated 0.06 0.00 - 0.50 x10*3/uL     Lymphocytes Absolute 1.01 0.80 - 3.00 x10*3/uL     Monocytes Absolute 0.86 (H) 0.05 - 0.80 x10*3/uL     Eosinophils Absolute 0.01 0.00 - 0.40 x10*3/uL     Basophils Absolute 0.01 0.00 - 0.10 x10*3/uL   Comprehensive Metabolic Panel   Result Value Ref Range     Glucose 134 (H) 74 - 99 mg/dL     Sodium 131 (L) 136 - 145 mmol/L     Potassium 5.2 3.5 - 5.3 mmol/L     Chloride 102 98 - 107 mmol/L     Bicarbonate 20 (L) 21 - 32 mmol/L     Anion Gap 14 10 - 20 mmol/L     Urea Nitrogen 26 (H) 6 - 23 mg/dL     Creatinine 0.85 0.50 - 1.05 mg/dL     eGFR 66 >60 mL/min/1.73m*2     Calcium 7.7 (L) 8.6 - 10.3 mg/dL     Albumin 2.2 (L) 3.4 - 5.0 g/dL     Alkaline Phosphatase 155 (H) 33 - 136 U/L     Total Protein 5.7 (L) 6.4 - 8.2 g/dL     AST 23 9 - 39 U/L     Bilirubin, Total 0.9 0.0 - 1.2 mg/dL     ALT 8 7 - 45 U/L   Morphology   Result Value Ref Range     RBC Morphology See Below       Polychromasia Mild       Ovalocytes Few       [place] Cells Few     POCT GLUCOSE   Result Value Ref Range     POCT Glucose 173 (H) 74 - 99 mg/dL      Patient recently received an antibiotic (last 12 hours)         None             Plan discussed with interdisciplinary team, will continue to monitor pain levels and monitor overnight. Will continue current and repeat labs in the AM.      Discharge planning discussed with patient and care team. Therapy evaluations ordered. Anticipate HHC/SNF at discharge. Patient aware and agreeable to current plan, continue plan as above.      I spent a total of 50 minutes on the date of the service which included preparing to see the patient, face-to-face patient care, completing clinical documentation, obtaining and/or reviewing separately obtained history, performing a medically appropriate examination, counseling and educating the patient/family/caregiver, ordering medications, tests, or procedures, communicating with other HCPs (not separately reported), independently  interpreting results (not separately reported), communicating results to the patient/family/caregiver, and care coordination (not separately reported).                    Revision History       Patient fully evaluated on 4/7. Evaluated by nephrology, cyproheptadine added for appetite. Cyproheptadine discontinued today to avoid oversedation as patient is already on mirtazapine for appetite. LFTs elevated today, continue to monitor. No leukocytosis. Patient's family feels her pain is well-controlled with current regimen. Continuing hematuria noted in wall suction canister with flank pain, cannot rule out UTI. Cefazolin IV ordered. Urology consult placed. Appreciate recs re: possible bladder irrigation. Per daughter, patient also having hallucinations of seeing and hearing people who are not there. Plan to keep at least another day for monitoring.                 Relevant Results               Assessment/Plan        Assessment & Plan  Renal infarct (Multi)    Hyponatremia    Hyponatremia/renal infarct  Inpatient admission to Dr. Lugo  See imaging results above  DNRCCA  N.p.o.  Consult nephrology and appreciate input  Consult vascular surgery and appreciate input  Coag screen ordered  Continue heparin drip  Continue gentle IV fluids  Repeat labs  Monitor heparin assay  Morphine/Zofran in ED  Dilaudid IV as needed  Tigan IM as needed 2/2 prolonged Qtc    T2DM/breast cancer/colon cancer/anemia/HLD/HTN  #Chronic conditions  Insulin sliding scale for n.p.o.  Hypoglycemia protocol  Continue home MiraLAX and Colace  Continue home lisinopril daily  Continue home amlodipine daily  Continue home Estrace tablet    DVT Ppx  SCDs  Continue heparin drip  Bedrest/up to chair as tolerated    Patient fully evaluated on 4/8. Underwent successful bladder irrigation yesterday, evacuated 200 ml old bloody urine per urology note. Urine orange from pyridium, but remains clear. Collins maintained overnight, will remove and trial voiding  today. Recommending outpatient cystoscopy if family wishes to pursue. Continue cefazolin IV. LFTs still elevated, but improved from yesterday. Thrombocytopenia noted, continue to monitor. Hemoglobin dropped to 10.0 from 11.4 yesterday, likely result of hematuria. Pain continues to be controlled with current regimen. Plan to stay at least one more day to monitor urine and labs.               CARLOS MOY

## 2025-04-08 NOTE — CARE PLAN
The patient's goals for the shift include      The clinical goals for the shift include not to fall    Problem: Pain - Adult  Goal: Verbalizes/displays adequate comfort level or baseline comfort level  Outcome: Progressing     Problem: Safety - Adult  Goal: Free from fall injury  Outcome: Progressing     Problem: Discharge Planning  Goal: Discharge to home or other facility with appropriate resources  Outcome: Progressing     Problem: Chronic Conditions and Co-morbidities  Goal: Patient's chronic conditions and co-morbidity symptoms are monitored and maintained or improved  Outcome: Progressing     Problem: Nutrition  Goal: Nutrient intake appropriate for maintaining nutritional needs  Outcome: Progressing     Problem: Fall/Injury  Goal: Not fall by end of shift  Outcome: Progressing  Goal: Be free from injury by end of the shift  Outcome: Progressing  Goal: Verbalize understanding of personal risk factors for fall in the hospital  Outcome: Progressing  Goal: Verbalize understanding of risk factor reduction measures to prevent injury from fall in the home  Outcome: Progressing  Goal: Use assistive devices by end of the shift  Outcome: Progressing  Goal: Pace activities to prevent fatigue by end of the shift  Outcome: Progressing     Problem: Skin  Goal: Decreased wound size/increased tissue granulation at next dressing change  Outcome: Progressing  Flowsheets (Taken 4/8/2025 1131)  Decreased wound size/increased tissue granulation at next dressing change: Promote sleep for wound healing  Goal: Participates in plan/prevention/treatment measures  Outcome: Progressing  Flowsheets (Taken 4/8/2025 1131)  Participates in plan/prevention/treatment measures: Elevate heels  Goal: Prevent/manage excess moisture  Outcome: Progressing  Flowsheets (Taken 4/8/2025 1131)  Prevent/manage excess moisture: Cleanse incontinence/protect with barrier cream  Goal: Prevent/minimize sheer/friction injuries  Outcome: Progressing  Flowsheets  (Taken 4/8/2025 1131)  Prevent/minimize sheer/friction injuries:   Use pull sheet   Turn/reposition every 2 hours/use positioning/transfer devices  Goal: Promote/optimize nutrition  Outcome: Progressing  Flowsheets (Taken 4/8/2025 1131)  Promote/optimize nutrition: Consume > 50% meals/supplements  Goal: Promote skin healing  Outcome: Progressing  Flowsheets (Taken 4/8/2025 1131)  Promote skin healing: Turn/reposition every 2 hours/use positioning/transfer devices     Problem: Diabetes  Goal: Achieve decreasing blood glucose levels by end of shift  Outcome: Progressing  Goal: Increase stability of blood glucose readings by end of shift  Outcome: Progressing  Goal: Decrease in ketones present in urine by end of shift  Outcome: Progressing  Goal: Maintain electrolyte levels within acceptable range throughout shift  Outcome: Progressing  Goal: Maintain glucose levels >70mg/dl to <250mg/dl throughout shift  Outcome: Progressing  Goal: No changes in neurological exam by end of shift  Outcome: Progressing  Goal: Learn about and adhere to nutrition recommendations by end of shift  Outcome: Progressing  Goal: Vital signs within normal range for age by end of shift  Outcome: Progressing  Goal: Increase self care and/or family involovement by end of shift  Outcome: Progressing  Goal: Receive DSME education by end of shift  Outcome: Progressing

## 2025-04-08 NOTE — CARE PLAN
The patient's goals for the shift include      The clinical goals for the shift include pt. comfort    Over the shift, the patient did not make progress toward the following goals. Barriers to progression include . Recommendations to address these barriers include .

## 2025-04-08 NOTE — CONSULTS
"Reason For Consult  Hematuria    History Of Present Illness  Melissa Tse is a 89 y.o. female presenting with past medical history significant for breast cancer and stage IV colon cancer presenting to emergency department from [facility] for evaluation of abdominal pain.  We have been asked to see her as she was having gross hematuria.  I had ordered last night to have a three-way Collins catheter placed and continuous bladder irrigation.  Per the nursing staff after they placed the Collins catheter and immediately the after starting the continuous bladder irrigation her urine turned clear yellow.  She had approximately 200 cc of old dark bloody urine.  And has remained with clear urine since that time.  She denies any pain or discomfort.     Past Medical History  She has no past medical history on file.    Surgical History  She has a past surgical history that includes Other surgical history (02/25/2022).     Social History  She reports that she has never smoked. She has never been exposed to tobacco smoke. She has never used smokeless tobacco. She reports that she does not drink alcohol and does not use drugs.    Family History  No family history on file.     Allergies  Penicillin, Penicillins, and Statins-hmg-coa reductase inhibitors    Review of Systems  I reviewed the H&P there is been no change     Physical Exam  Alert in no acute distress  Abdomen soft nontender no organomegaly appreciated  Collins's to continuous drainage with urine clear.     Last Recorded Vitals  Blood pressure 125/71, pulse 93, temperature 36.1 °C (97 °F), temperature source Temporal, resp. rate 16, height 1.626 m (5' 4.02\"), weight 81.6 kg (179 lb 14.3 oz), SpO2 93%.    Relevant Results      .apixaban, 2.5 mg, oral, q12h  ceFAZolin, 2 g, intravenous, q8h  diclofenac sodium, 4 g, Topical, 4x daily  insulin lispro, 0-5 Units, subcutaneous, q6h  mirtazapine, 7.5 mg, oral, Nightly  phenazopyridine, 100 mg, oral, " TID  polyethylene glycol, 17 g, oral, Daily  sennosides-docusate sodium, 1 tablet, oral, Nightly  tamsulosin, 0.4 mg, oral, Daily  triamcinolone, , Topical, BID     .  Results for orders placed or performed during the hospital encounter of 04/02/25 (from the past 24 hours)   POCT GLUCOSE   Result Value Ref Range    POCT Glucose 127 (H) 74 - 99 mg/dL   POCT GLUCOSE   Result Value Ref Range    POCT Glucose 112 (H) 74 - 99 mg/dL   POCT GLUCOSE   Result Value Ref Range    POCT Glucose 107 (H) 74 - 99 mg/dL   POCT GLUCOSE   Result Value Ref Range    POCT Glucose 99 74 - 99 mg/dL   CBC and Auto Differential   Result Value Ref Range    WBC 9.1 4.4 - 11.3 x10*3/uL    nRBC 0.0 0.0 - 0.0 /100 WBCs    RBC 3.68 (L) 4.00 - 5.20 x10*6/uL    Hemoglobin 10.0 (L) 12.0 - 16.0 g/dL    Hematocrit 32.3 (L) 36.0 - 46.0 %    MCV 88 80 - 100 fL    MCH 27.2 26.0 - 34.0 pg    MCHC 31.0 (L) 32.0 - 36.0 g/dL    RDW 19.5 (H) 11.5 - 14.5 %    Platelets 92 (L) 150 - 450 x10*3/uL    Neutrophils % 80.5 40.0 - 80.0 %    Immature Granulocytes %, Automated 1.7 (H) 0.0 - 0.9 %    Lymphocytes % 9.4 13.0 - 44.0 %    Monocytes % 8.0 2.0 - 10.0 %    Eosinophils % 0.2 0.0 - 6.0 %    Basophils % 0.2 0.0 - 2.0 %    Neutrophils Absolute 7.30 (H) 1.60 - 5.50 x10*3/uL    Immature Granulocytes Absolute, Automated 0.15 0.00 - 0.50 x10*3/uL    Lymphocytes Absolute 0.85 0.80 - 3.00 x10*3/uL    Monocytes Absolute 0.73 0.05 - 0.80 x10*3/uL    Eosinophils Absolute 0.02 0.00 - 0.40 x10*3/uL    Basophils Absolute 0.02 0.00 - 0.10 x10*3/uL   Comprehensive Metabolic Panel   Result Value Ref Range    Glucose 102 (H) 74 - 99 mg/dL    Sodium 135 (L) 136 - 145 mmol/L    Potassium 3.9 3.5 - 5.3 mmol/L    Chloride 104 98 - 107 mmol/L    Bicarbonate 25 21 - 32 mmol/L    Anion Gap 10 10 - 20 mmol/L    Urea Nitrogen 30 (H) 6 - 23 mg/dL    Creatinine 0.84 0.50 - 1.05 mg/dL    eGFR 67 >60 mL/min/1.73m*2    Calcium 7.2 (L) 8.6 - 10.3 mg/dL    Albumin 1.8 (L) 3.4 - 5.0 g/dL    Alkaline  Phosphatase 458 (H) 33 - 136 U/L    Total Protein 4.9 (L) 6.4 - 8.2 g/dL    AST 50 (H) 9 - 39 U/L    Bilirubin, Total 0.6 0.0 - 1.2 mg/dL    ALT 43 7 - 45 U/L        .Electrocardiogram, 12-lead PRN ACS symptoms    Result Date: 4/5/2025  Unknown rhythm, irregular rate Probable lateral infarct, old See ED provider note for full interpretation and clinical correlation Confirmed by Rivka Manzano ([id number]) on 4/5/2025 10:05:09 AM    CT abdomen pelvis w IV contrast    Result Date: 4/3/2025  Interpreted By:  Faraz Link, STUDY: CT ABDOMEN PELVIS W IV CONTRAST; ;  4/3/2025 12:30 am   INDICATION: Signs/Symptoms:stage IV colon cancer, now with R abd pain.     COMPARISON: 01/17/2025.   ACCESSION NUMBER(S): [id number]   ORDERING CLINICIAN: ELYSE KLERMAN   TECHNIQUE: Axial CT images of the abdomen and pelvis with coronal and sagittal reconstructed images performed after intravenous administration of 80 cc Omnipaque 350.   FINDINGS: LOWER CHEST: No acute abnormality of the lung bases. BONES: No acute osseous abnormality. Osteopenia. Right hip arthroplasty in standard alignment. Similar appearance of mild-to-moderate chronic compression of the T11 vertebral body. Stable grade 1 degenerative anterolisthesis at L4-L5 and moderate to severe multilevel degenerative changes of the imaged spine. ABDOMINAL WALL: Within normal limits.   ABDOMEN:   LIVER: There is a large ill-defined region of hypoenhancement in the right hepatic lobe measuring up to 10.8 cm in transaxial diameter, up to at least 10.1 cm in CC diameter, suspicious for interval progression of the hepatic metastasis. There is a new hypoattenuating lesion inferolaterally in the inferior tip of the right hepatic lobe (series 601, images [id number]), measuring up to approximately 2.2 cm in transaxial diameter, suspicious for new metastatic lesion. Liver is enlarged, similar to before. BILE DUCTS: Normal caliber. GALLBLADDER: Cholelithiasis. No wall thickening or pericholecystic  fluid. PANCREAS:Atrophic with pancreatic ductal dilation and irregularity and parenchymal calcifications in keeping with sequelae of chronic pancreatitis. SPLEEN: Within normal limits. ADRENALS: Within normal limits. KIDNEYS and URETERS: New extensive hypoenhancement/non enhancement of the left renal parenchyma compatible with extensive renal cortical infarcts. Right kidney and ureter appear normal.     VESSELS: Severe atherosclerosis, with critical narrowing or occlusion of the left renal artery. Severe narrowing of the right renal artery also noted. RETROPERITONEUM: Interval enlargement of celiac axis and portocaval nodes, suspicious for worsened metastatic disease.   PELVIS:   REPRODUCTIVE ORGANS: Endometrial fluid suggests cervical stenosis and is new from the prior exam. BLADDER: Within normal limits.   BOWEL: When allowing for differences in patient positioning in slice selection, there is no substantial change in the very large mass encompassing the mid-transverse colon and several adjacent loops of small bowel and adjacent omentum and mesocolon, compatible with patient's known advanced malignancy. No dilated bowel to suggest obstruction. Stomach appears grossly normal. No definite additional abnormal bowel wall thickening. Normal appendix. PERITONEUM: No ascites or free air, no fluid collection.       New extensive hypoenhancement/non enhancement of the left renal parenchyma compatible with extensive renal cortical infarcts. Right kidney and ureter appear normal.   There is a large ill-defined region of hypoenhancement in the right hepatic lobe measuring up to 10.8 cm in transaxial diameter, up to at least 10.1 cm in CC diameter, suspicious for interval progression of the hepatic metastasis. There is a new hypoattenuating lesion inferolaterally in the inferior tip of the right hepatic lobe (series 601, images 57-63), measuring up to approximately 2.2 cm in transaxial diameter, suspicious for new metastatic  lesion. Liver is enlarged, similar to before.   Interval worsening of retroperitoneal metastatic lymphadenopathy.   Cholelithiasis without convincing evidence of acute cholecystitis.   New fluid in the endometrial canal suggesting cervical stenosis.   Additional findings as discussed above.   MACRO: None   Signed by: Faraz Link 4/3/2025 12:52 AM Dictation workstation:   [id number]       Assessment/Plan     89-year-old female with gross hematuria.  - We stopped the continuous bladder irrigation as her urine was crystal-clear.  After she evacuated approximate 200 cc of old bloody urine.  - She will most likely need an outpatient cystoscopy to further evaluate.  Will maintain the Collins catheter overnight if there urine stays clear we will DC the Collins catheter and do a trial of void tomorrow.    Thank you for allowing us to participate in her care    I spent 30 minutes in the professional and overall care of this patient.      Pool Walker PA-C

## 2025-04-08 NOTE — PROGRESS NOTES
Melissa Tse is a 89 y.o. female on day 5 of admission presenting with Renal infarct (Multi).      Subjective   Patient had an episode of hematuria now resolved.  Complaining of having no appetite and decreased taste  Renal chemistries are stable hemoglobin drop to 10 from hematuria  Objective          Vitals 24HR  Heart Rate:  []   Temp:  [35.2 °C (95.4 °F)-36.4 °C (97.5 °F)]   Resp:  [16-18]   BP: ()/(51-81)   SpO2:  [92 %-95 %]         Intake/Output last 3 Shifts:    Intake/Output Summary (Last 24 hours) at 4/8/2025 1312  Last data filed at 4/8/2025 1023  Gross per 24 hour   Intake 220 ml   Output 3850 ml   Net -3630 ml       Physical Exam  General appearance; chronically ill looking  Skin pallor   HEENT; pupils react to light and accommodation  Neck; no JVD no bruit no thyromegaly  Right shoulder; there is swelling and tenderness in the anterior shoulder bursa with diminished range of motion  Lungs; inspiratory rhonchi  Heart; regular rate no gallop no rubs  Pulses are equal  Abdomen; active peristalsis no rebound or guarding is mild epigastric tenderness  ; mild left CVA tenderness  Extremities; decreased muscle tone poor skin turgor  Neurological; pathological reflexes are absent    Relevant Results  Results for orders placed or performed during the hospital encounter of 04/02/25 (from the past 24 hours)   POCT GLUCOSE   Result Value Ref Range    POCT Glucose 112 (H) 74 - 99 mg/dL   POCT GLUCOSE   Result Value Ref Range    POCT Glucose 107 (H) 74 - 99 mg/dL   POCT GLUCOSE   Result Value Ref Range    POCT Glucose 99 74 - 99 mg/dL   CBC and Auto Differential   Result Value Ref Range    WBC 9.1 4.4 - 11.3 x10*3/uL    nRBC 0.0 0.0 - 0.0 /100 WBCs    RBC 3.68 (L) 4.00 - 5.20 x10*6/uL    Hemoglobin 10.0 (L) 12.0 - 16.0 g/dL    Hematocrit 32.3 (L) 36.0 - 46.0 %    MCV 88 80 - 100 fL    MCH 27.2 26.0 - 34.0 pg    MCHC 31.0 (L) 32.0 - 36.0 g/dL    RDW 19.5 (H) 11.5 - 14.5 %    Platelets 92 (L) 150 - 450  x10*3/uL    Neutrophils % 80.5 40.0 - 80.0 %    Immature Granulocytes %, Automated 1.7 (H) 0.0 - 0.9 %    Lymphocytes % 9.4 13.0 - 44.0 %    Monocytes % 8.0 2.0 - 10.0 %    Eosinophils % 0.2 0.0 - 6.0 %    Basophils % 0.2 0.0 - 2.0 %    Neutrophils Absolute 7.30 (H) 1.60 - 5.50 x10*3/uL    Immature Granulocytes Absolute, Automated 0.15 0.00 - 0.50 x10*3/uL    Lymphocytes Absolute 0.85 0.80 - 3.00 x10*3/uL    Monocytes Absolute 0.73 0.05 - 0.80 x10*3/uL    Eosinophils Absolute 0.02 0.00 - 0.40 x10*3/uL    Basophils Absolute 0.02 0.00 - 0.10 x10*3/uL   Comprehensive Metabolic Panel   Result Value Ref Range    Glucose 102 (H) 74 - 99 mg/dL    Sodium 135 (L) 136 - 145 mmol/L    Potassium 3.9 3.5 - 5.3 mmol/L    Chloride 104 98 - 107 mmol/L    Bicarbonate 25 21 - 32 mmol/L    Anion Gap 10 10 - 20 mmol/L    Urea Nitrogen 30 (H) 6 - 23 mg/dL    Creatinine 0.84 0.50 - 1.05 mg/dL    eGFR 67 >60 mL/min/1.73m*2    Calcium 7.2 (L) 8.6 - 10.3 mg/dL    Albumin 1.8 (L) 3.4 - 5.0 g/dL    Alkaline Phosphatase 458 (H) 33 - 136 U/L    Total Protein 4.9 (L) 6.4 - 8.2 g/dL    AST 50 (H) 9 - 39 U/L    Bilirubin, Total 0.6 0.0 - 1.2 mg/dL    ALT 43 7 - 45 U/L   POCT GLUCOSE   Result Value Ref Range    POCT Glucose 130 (H) 74 - 99 mg/dL                  Assessment/Plan      Left renal cortical infarct  Right shoulder bursitis  Anorexia  Dysgeusia  Bilateral renal artery stenosis  Carcinoma with metastasis to the liver  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    Continue to monitor renal chemistries  Labs tomorrow  Assessment & Plan  Renal infarct (Multi)    Hyponatremia  Thank You For allowing me to participate on this patient's care         I spent 20 minutes in the professional and overall care of this patient.      Garry Pacheco MD

## 2025-04-09 ENCOUNTER — APPOINTMENT (OUTPATIENT)
Dept: RADIOLOGY | Facility: HOSPITAL | Age: 89
DRG: 699 | End: 2025-04-09
Payer: MEDICARE

## 2025-04-09 LAB
ALBUMIN SERPL BCP-MCNC: 1.9 G/DL (ref 3.4–5)
ALP SERPL-CCNC: 603 U/L (ref 33–136)
ALT SERPL W P-5'-P-CCNC: 19 U/L (ref 7–45)
ANION GAP SERPL CALC-SCNC: 9 MMOL/L (ref 10–20)
AST SERPL W P-5'-P-CCNC: 35 U/L (ref 9–39)
BASOPHILS # BLD AUTO: 0.03 X10*3/UL (ref 0–0.1)
BASOPHILS NFR BLD AUTO: 0.3 %
BILIRUB SERPL-MCNC: 0.5 MG/DL (ref 0–1.2)
BUN SERPL-MCNC: 29 MG/DL (ref 6–23)
CALCIUM SERPL-MCNC: 7.1 MG/DL (ref 8.6–10.3)
CHLORIDE SERPL-SCNC: 101 MMOL/L (ref 98–107)
CO2 SERPL-SCNC: 27 MMOL/L (ref 21–32)
CREAT SERPL-MCNC: 0.83 MG/DL (ref 0.5–1.05)
EGFRCR SERPLBLD CKD-EPI 2021: 68 ML/MIN/1.73M*2
EOSINOPHIL # BLD AUTO: 0.06 X10*3/UL (ref 0–0.4)
EOSINOPHIL NFR BLD AUTO: 0.7 %
ERYTHROCYTE [DISTWIDTH] IN BLOOD BY AUTOMATED COUNT: 19.2 % (ref 11.5–14.5)
GLUCOSE BLD MANUAL STRIP-MCNC: 128 MG/DL (ref 74–99)
GLUCOSE BLD MANUAL STRIP-MCNC: 138 MG/DL (ref 74–99)
GLUCOSE BLD MANUAL STRIP-MCNC: 139 MG/DL (ref 74–99)
GLUCOSE SERPL-MCNC: 134 MG/DL (ref 74–99)
HCT VFR BLD AUTO: 34.6 % (ref 36–46)
HGB BLD-MCNC: 10.3 G/DL (ref 12–16)
IMM GRANULOCYTES # BLD AUTO: 0.27 X10*3/UL (ref 0–0.5)
IMM GRANULOCYTES NFR BLD AUTO: 3 % (ref 0–0.9)
LYMPHOCYTES # BLD AUTO: 1.09 X10*3/UL (ref 0.8–3)
LYMPHOCYTES NFR BLD AUTO: 12.1 %
MCH RBC QN AUTO: 26.7 PG (ref 26–34)
MCHC RBC AUTO-ENTMCNC: 29.8 G/DL (ref 32–36)
MCV RBC AUTO: 90 FL (ref 80–100)
MONOCYTES # BLD AUTO: 0.73 X10*3/UL (ref 0.05–0.8)
MONOCYTES NFR BLD AUTO: 8.1 %
NEUTROPHILS # BLD AUTO: 6.85 X10*3/UL (ref 1.6–5.5)
NEUTROPHILS NFR BLD AUTO: 75.8 %
NRBC BLD-RTO: 0 /100 WBCS (ref 0–0)
PLATELET # BLD AUTO: 78 X10*3/UL (ref 150–450)
POTASSIUM SERPL-SCNC: 3.7 MMOL/L (ref 3.5–5.3)
PROT SERPL-MCNC: 4.9 G/DL (ref 6.4–8.2)
RBC # BLD AUTO: 3.86 X10*6/UL (ref 4–5.2)
SODIUM SERPL-SCNC: 133 MMOL/L (ref 136–145)
WBC # BLD AUTO: 9 X10*3/UL (ref 4.4–11.3)

## 2025-04-09 PROCEDURE — 2500000001 HC RX 250 WO HCPCS SELF ADMINISTERED DRUGS (ALT 637 FOR MEDICARE OP): Performed by: NURSE PRACTITIONER

## 2025-04-09 PROCEDURE — 82947 ASSAY GLUCOSE BLOOD QUANT: CPT

## 2025-04-09 PROCEDURE — 2500000001 HC RX 250 WO HCPCS SELF ADMINISTERED DRUGS (ALT 637 FOR MEDICARE OP): Performed by: INTERNAL MEDICINE

## 2025-04-09 PROCEDURE — 2500000004 HC RX 250 GENERAL PHARMACY W/ HCPCS (ALT 636 FOR OP/ED): Performed by: INTERNAL MEDICINE

## 2025-04-09 PROCEDURE — 71046 X-RAY EXAM CHEST 2 VIEWS: CPT

## 2025-04-09 PROCEDURE — 85025 COMPLETE CBC W/AUTO DIFF WBC: CPT | Performed by: INTERNAL MEDICINE

## 2025-04-09 PROCEDURE — 1100000001 HC PRIVATE ROOM DAILY

## 2025-04-09 PROCEDURE — 71046 X-RAY EXAM CHEST 2 VIEWS: CPT | Performed by: RADIOLOGY

## 2025-04-09 PROCEDURE — 36415 COLL VENOUS BLD VENIPUNCTURE: CPT | Performed by: INTERNAL MEDICINE

## 2025-04-09 PROCEDURE — 2500000002 HC RX 250 W HCPCS SELF ADMINISTERED DRUGS (ALT 637 FOR MEDICARE OP, ALT 636 FOR OP/ED): Performed by: INTERNAL MEDICINE

## 2025-04-09 PROCEDURE — 80053 COMPREHEN METABOLIC PANEL: CPT | Performed by: INTERNAL MEDICINE

## 2025-04-09 RX ADMIN — CEFAZOLIN SODIUM 2 G: 2 INJECTION, SOLUTION INTRAVENOUS at 00:46

## 2025-04-09 RX ADMIN — APIXABAN 2.5 MG: 2.5 TABLET, FILM COATED ORAL at 20:02

## 2025-04-09 RX ADMIN — ONDANSETRON 4 MG: 2 INJECTION INTRAMUSCULAR; INTRAVENOUS at 09:43

## 2025-04-09 RX ADMIN — SENNOSIDES AND DOCUSATE SODIUM 1 TABLET: 50; 8.6 TABLET ORAL at 20:06

## 2025-04-09 RX ADMIN — THIAMINE HCL TAB 100 MG 100 MG: 100 TAB at 09:07

## 2025-04-09 RX ADMIN — DICLOFENAC SODIUM 4 G: 10 GEL TOPICAL at 20:02

## 2025-04-09 RX ADMIN — PHENAZOPYRIDINE 100 MG: 100 TABLET ORAL at 12:38

## 2025-04-09 RX ADMIN — TAMSULOSIN HYDROCHLORIDE 0.4 MG: 0.4 CAPSULE ORAL at 09:06

## 2025-04-09 RX ADMIN — CEFAZOLIN SODIUM 2 G: 2 INJECTION, SOLUTION INTRAVENOUS at 09:08

## 2025-04-09 RX ADMIN — DICLOFENAC SODIUM 4 G: 10 GEL TOPICAL at 12:40

## 2025-04-09 RX ADMIN — PHENAZOPYRIDINE 100 MG: 100 TABLET ORAL at 18:24

## 2025-04-09 RX ADMIN — MIRTAZAPINE 7.5 MG: 15 TABLET, FILM COATED ORAL at 20:04

## 2025-04-09 RX ADMIN — TRIAMCINOLONE ACETONIDE: 1 OINTMENT TOPICAL at 20:06

## 2025-04-09 RX ADMIN — DICLOFENAC SODIUM 4 G: 10 GEL TOPICAL at 18:24

## 2025-04-09 RX ADMIN — CEFAZOLIN SODIUM 2 G: 2 INJECTION, SOLUTION INTRAVENOUS at 19:59

## 2025-04-09 RX ADMIN — OXYCODONE HYDROCHLORIDE AND ACETAMINOPHEN 1 TABLET: 5; 325 TABLET ORAL at 14:09

## 2025-04-09 RX ADMIN — Medication 50 MG OF ELEMENTAL ZINC: at 09:07

## 2025-04-09 RX ADMIN — TRIAMCINOLONE ACETONIDE: 1 OINTMENT TOPICAL at 09:07

## 2025-04-09 RX ADMIN — PHENAZOPYRIDINE 100 MG: 100 TABLET ORAL at 09:06

## 2025-04-09 RX ADMIN — OXYCODONE HYDROCHLORIDE AND ACETAMINOPHEN 1 TABLET: 5; 325 TABLET ORAL at 00:47

## 2025-04-09 RX ADMIN — APIXABAN 2.5 MG: 2.5 TABLET, FILM COATED ORAL at 09:06

## 2025-04-09 RX ADMIN — ONDANSETRON 4 MG: 2 INJECTION INTRAMUSCULAR; INTRAVENOUS at 20:59

## 2025-04-09 ASSESSMENT — COGNITIVE AND FUNCTIONAL STATUS - GENERAL
DRESSING REGULAR LOWER BODY CLOTHING: A LITTLE
MOVING FROM LYING ON BACK TO SITTING ON SIDE OF FLAT BED WITH BEDRAILS: A LITTLE
WALKING IN HOSPITAL ROOM: A LOT
EATING MEALS: A LITTLE
PERSONAL GROOMING: A LITTLE
STANDING UP FROM CHAIR USING ARMS: A LITTLE
DAILY ACTIVITIY SCORE: 18
CLIMB 3 TO 5 STEPS WITH RAILING: TOTAL
HELP NEEDED FOR BATHING: A LITTLE
MOBILITY SCORE: 15
TURNING FROM BACK TO SIDE WHILE IN FLAT BAD: A LITTLE
MOVING TO AND FROM BED TO CHAIR: A LITTLE
DRESSING REGULAR UPPER BODY CLOTHING: A LITTLE
TOILETING: A LITTLE

## 2025-04-09 ASSESSMENT — PAIN SCALES - GENERAL
PAINLEVEL_OUTOF10: 9
PAINLEVEL_OUTOF10: 2
PAINLEVEL_OUTOF10: 0 - NO PAIN
PAINLEVEL_OUTOF10: 0 - NO PAIN
PAINLEVEL_OUTOF10: 7
PAINLEVEL_OUTOF10: 0 - NO PAIN
PAINLEVEL_OUTOF10: 9

## 2025-04-09 ASSESSMENT — PAIN DESCRIPTION - ORIENTATION
ORIENTATION: RIGHT
ORIENTATION: RIGHT

## 2025-04-09 ASSESSMENT — PAIN - FUNCTIONAL ASSESSMENT
PAIN_FUNCTIONAL_ASSESSMENT: 0-10

## 2025-04-09 ASSESSMENT — PAIN DESCRIPTION - LOCATION
LOCATION: SHOULDER
LOCATION: SHOULDER

## 2025-04-09 ASSESSMENT — PAIN SCALES - WONG BAKER: WONGBAKER_NUMERICALRESPONSE: HURTS LITTLE BIT

## 2025-04-09 NOTE — DISCHARGE SUMMARY
Discharge Diagnosis  Renal infarct (Multi)    Issues Requiring Follow-Up  Patient fully evaluated 04/09 for   Assessment & Plan  Renal infarct (Multi)    Hyponatremia    Hyponatremia/renal infarct  Inpatient admission   See imaging results above  DNRCCA  N.p.o.  Consult nephrology and appreciate input  Consult vascular surgery and appreciate input  Coag screen ordered  Continue heparin drip  Continue gentle IV fluids  Repeat labs  Monitor heparin assay  Morphine/Zofran in ED  Dilaudid IV as needed  Tigan IM as needed 2/2 prolonged Qtc    T2DM/breast cancer/colon cancer/anemia/HLD/HTN  #Chronic conditions  Insulin sliding scale for n.p.o.  Hypoglycemia protocol  Continue home MiraLAX and Colace  Continue home lisinopril daily  Continue home amlodipine daily  Continue home Estrace tablet    DVT Ppx  SCDs  Continue heparin drip  Bedrest/up to chair as tolerated    Patient with significant clinical improvement noted, patient medically cleared for discharge today. Patient seen resting in bed with head of bed elevated, no s/s or c/o acute difficulties at this time. Vital signs for last 24 hours:  Temp:  [35 °C (95 °F)-35.8 °C (96.4 °F)] 35 °C (95 °F)  Heart Rate:  [71-97] 77  Resp:  [18-20] 18  BP: (108-142)/(56-67) 129/59 Medications and labs reviewed-   Results for orders placed or performed during the hospital encounter of 04/02/25 (from the past 24 hours)   POCT GLUCOSE   Result Value Ref Range    POCT Glucose 103 (H) 74 - 99 mg/dL   CBC and Auto Differential   Result Value Ref Range    WBC 9.0 4.4 - 11.3 x10*3/uL    nRBC 0.0 0.0 - 0.0 /100 WBCs    RBC 3.86 (L) 4.00 - 5.20 x10*6/uL    Hemoglobin 10.3 (L) 12.0 - 16.0 g/dL    Hematocrit 34.6 (L) 36.0 - 46.0 %    MCV 90 80 - 100 fL    MCH 26.7 26.0 - 34.0 pg    MCHC 29.8 (L) 32.0 - 36.0 g/dL    RDW 19.2 (H) 11.5 - 14.5 %    Platelets 78 (L) 150 - 450 x10*3/uL    Neutrophils % 75.8 40.0 - 80.0 %    Immature Granulocytes %, Automated 3.0 (H) 0.0 - 0.9 %    Lymphocytes %  12.1 13.0 - 44.0 %    Monocytes % 8.1 2.0 - 10.0 %    Eosinophils % 0.7 0.0 - 6.0 %    Basophils % 0.3 0.0 - 2.0 %    Neutrophils Absolute 6.85 (H) 1.60 - 5.50 x10*3/uL    Immature Granulocytes Absolute, Automated 0.27 0.00 - 0.50 x10*3/uL    Lymphocytes Absolute 1.09 0.80 - 3.00 x10*3/uL    Monocytes Absolute 0.73 0.05 - 0.80 x10*3/uL    Eosinophils Absolute 0.06 0.00 - 0.40 x10*3/uL    Basophils Absolute 0.03 0.00 - 0.10 x10*3/uL   Comprehensive Metabolic Panel   Result Value Ref Range    Glucose 134 (H) 74 - 99 mg/dL    Sodium 133 (L) 136 - 145 mmol/L    Potassium 3.7 3.5 - 5.3 mmol/L    Chloride 101 98 - 107 mmol/L    Bicarbonate 27 21 - 32 mmol/L    Anion Gap 9 (L) 10 - 20 mmol/L    Urea Nitrogen 29 (H) 6 - 23 mg/dL    Creatinine 0.83 0.50 - 1.05 mg/dL    eGFR 68 >60 mL/min/1.73m*2    Calcium 7.1 (L) 8.6 - 10.3 mg/dL    Albumin 1.9 (L) 3.4 - 5.0 g/dL    Alkaline Phosphatase 603 (H) 33 - 136 U/L    Total Protein 4.9 (L) 6.4 - 8.2 g/dL    AST 35 9 - 39 U/L    Bilirubin, Total 0.5 0.0 - 1.2 mg/dL    ALT 19 7 - 45 U/L   POCT GLUCOSE   Result Value Ref Range    POCT Glucose 128 (H) 74 - 99 mg/dL   POCT GLUCOSE   Result Value Ref Range    POCT Glucose 138 (H) 74 - 99 mg/dL      Patient recently received an antibiotic (last 12 hours)       Date/Time Action Medication Dose    04/09/25 0908 New Bag    ceFAZolin (Ancef) 2 g in dextrose (iso)  mL 2 g    04/09/25 0046 New Bag    ceFAZolin (Ancef) 2 g in dextrose (iso)  mL 2 g           Susceptibility data from last 90 days.  Collected Specimen Info Organism Ampicillin Cefazolin Cefazolin (uncomplicated UTIs only) Ciprofloxacin Gentamicin Nitrofurantoin Piperacillin/Tazobactam Trimethoprim/Sulfamethoxazole   01/10/25 Urine from Clean Catch/Voided Escherichia coli  S  S  S  S  S  S  S  S        Continue aggressive pulmonary hygiene and oral hygiene. Off loading as tolerated for skin integrity.  Plan discussed with interdisciplinary team, renal chemistries  improved and stable, shoulder pain improved with voltaren cream- will continue, no further hematuria noted. Patient without acute events overnight, patient denies chest pain, worsening SOB at this time. Ok to discharge with return to [facility], will continue current and repeat labs in the AM if patient still hospitalized. Patient aware and agreeable to current plan, continue plan as above.     I spent 30 minutes on the date of the service which included preparing to see the patient, face-to-face patient care, completing clinical documentation, obtaining and/or reviewing separately obtained history, performing a medically appropriate examination, counseling and educating the patient/family/caregiver, ordering medications, tests, or procedures, communicating with other HCPs (not separately reported), independently interpreting results (not separately reported), communicating results to the patient/family/caregiver, and care coordination (not separately reported    Discharge Meds     Medication List      ASK your doctor about these medications     acetaminophen 325 mg tablet; Commonly known as: Tylenol; Take 2 tablets   (650 mg) by mouth every 4 hours if needed for mild pain (1 - 3).   amLODIPine 2.5 mg tablet; Commonly known as: Norvasc   Basaglar KwikPen U-100 Insulin 100 unit/mL (3 mL) pen; Generic drug:   insulin glargine   cetirizine 10 mg chewable tablet; Commonly known as: ZyrTEC   docusate sodium 100 mg capsule; Commonly known as: Colace   enoxaparin 40 mg/0.4 mL syringe; Commonly known as: Lovenox; Inject 0.4   mL (40 mg) under the skin once every 24 hours.   estradiol 1 mg tablet; Commonly known as: Estrace; Take 1 tablet (1 mg)   by mouth once daily.   ferrous sulfate 325 (65 Fe) mg EC tablet   insulin lispro 100 unit/mL injection; Inject 0-5 Units under the skin 3   times a day before meals. Take as directed per insulin instructions.   lactobacillus acidophilus & bulgar 1 million cell  chewable tablet;   Commonly known as: Lactinex   lidocaine 5 % patch; Commonly known as: Lidoderm   lisinopril 10 mg tablet   meloxicam 7.5 mg tablet; Commonly known as: Mobic   metoclopramide 5 mg tablet; Commonly known as: Reglan   mirtazapine 7.5 mg tablet; Commonly known as: Remeron   ondansetron 4 mg tablet; Commonly known as: Zofran   polyethylene glycol 17 gram packet; Commonly known as: Glycolax,   Miralax; Take 17 g by mouth once daily.   sennosides-docusate sodium 8.6-50 mg tablet; Commonly known as:   Johana-Colace; Take 1 tablet by mouth once daily at bedtime.   sodium chloride 1,000 mg tablet   trolamine salicylate 10 % cream; Commonly known as: Aspercreme       Test Results Pending At Discharge  Pending Labs       No current pending labs.            Hospital Course         Tio Lugo MD   Physician  Internal Medicine     Progress Notes     Addendum     Date of Service: 4/8/2025 10:57 AM     Addendum       Expand All Collapse All    Melissa Tse is a 89 y.o. female on day 5 of admission presenting with Renal infarct (Multi).           Subjective  No significant events overnight. Patient seen and evaluated at bedside. Breathing on room air. States she feels her pain is well controlled today.               Objective  Last Recorded Vitals  /71 (BP Location: Right arm, Patient Position: Lying)   Pulse 93   Temp 36.1 °C (97 °F) (Temporal)   Resp 16   Wt 81.6 kg (179 lb 14.3 oz)   SpO2 93%   Intake/Output last 3 Shifts:     Intake/Output Summary (Last 24 hours) at 4/8/2025 1057  Last data filed at 4/8/2025 1023      Gross per 24 hour   Intake 280 ml   Output 3850 ml   Net -3570 ml         Admission Weight  Weight: 81.6 kg (180 lb) (04/02/25 2207)     Daily Weight  04/04/25 : 81.6 kg (179 lb 14.3 oz)     Image Results  Electrocardiogram, 12-lead PRN ACS symptoms  Unknown rhythm, irregular rate  Probable lateral infarct, old     See ED provider note for full interpretation and clinical  correlation  Confirmed by Rivka Manzano ([id number]) on 4/5/2025 10:05:09 AM        Physical Exam        Tio Lugo MD   Physician  Internal Medicine     Progress Notes     Signed     Date of Service: 4/7/2025  3:03 PM      Signed        Expand All Collapse Kraig Tse is a 89 y.o. female on day 4 of admission presenting with Renal infarct (Multi).           Subjective  No significant events overnight. Patient seen and evaluated at bedside.               Objective[]Expand by Default  Last Recorded Vitals  BP 92/55 (BP Location: Left arm, Patient Position: Lying)   Pulse (!) 112   Temp 35.5 °C (95.9 °F) (Temporal)   Resp 18   Wt 81.6 kg (179 lb 14.3 oz)   SpO2 92%   Intake/Output last 3 Shifts:     Intake/Output Summary (Last 24 hours) at 4/7/2025 1503  Last data filed at 4/7/2025 1257        Gross per 24 hour   Intake 300 ml   Output 500 ml   Net -200 ml         Admission Weight  Weight: 81.6 kg (180 lb) (04/02/25 2207)     Daily Weight  04/04/25 : 81.6 kg (179 lb 14.3 oz)     Image Results  Electrocardiogram, 12-lead PRN ACS symptoms  Unknown rhythm, irregular rate  Probable lateral infarct, old     See ED provider note for full interpretation and clinical correlation  Confirmed by Rivka Manzano ([id number]) on 4/5/2025 10:05:09 AM        Physical Exam     MD Tio Hodges MD    Physician  Internal Medicine     Progress Notes   This note has been shared with the patient  Signed     Date of Service: 4/5/2025  3:59 PM  Signed   Expand All Collapse Kraig Tse is a 89 y.o. female on day 2 of admission presenting with Renal infarct (Multi).           Subjective  Patient fully evaluated  04/05for    Problem List Items Addressed This Visit         * (Principal) Renal infarct (Multi) - Primary     Other Visit Diagnoses         Metastatic malignant neoplasm, unspecified site (Multi)             Patient seen resting in bed with head of bed elevated, no s/s or c/o acute difficulties at  this time. Patient still with significant pain noted, medications adjusted, percocet added, will monitor overnight and repeat labs in the AM.  Vital signs for last 24 hours Temp:  [35.6 °C (96.1 °F)-36.5 °C (97.7 °F)] 36.1 °C (97 °F)Heart Rate:  [96-98] 96Resp:  [18] 18BP: (114-148)/(55-67) 148/67  I/O this shift:In: 220.8 [I.V.:220.8]Out: - Patient still requiring frequent cardiac and SPO2 monitoring. Continue aggressive pulmonary hygiene and oral hygiene. Off loading as tolerated for skin integrity. Medications and labs reviewed-   Results for orders placed or performed during the hospital encounter of 04/02/25 (from the past 24 hours)  POCT GLUCOSE  Result  Value  Ref Range     POCT Glucose  175 (H)  74 - 99 mg/dL  POCT GLUCOSE  Result  Value  Ref Range     POCT Glucose  167 (H)  74 - 99 mg/dL  POCT GLUCOSE  Result  Value  Ref Range     POCT Glucose  148 (H)  74 - 99 mg/dL  POCT GLUCOSE  Result  Value  Ref Range     POCT Glucose  140 (H)  74 - 99 mg/dL  CBC and Auto Differential  Result  Value  Ref Range     WBC  12.8 (H)  4.4 - 11.3 x10*3/uL     nRBC  0.0  0.0 - 0.0 /100 WBCs     RBC  3.96 (L)  4.00 - 5.20 x10*6/uL     Hemoglobin  10.8 (L)  12.0 - 16.0 g/dL     Hematocrit  36.5  36.0 - 46.0 %     MCV  92  80 - 100 fL     MCH  27.3  26.0 - 34.0 pg     MCHC  29.6 (L)  32.0 - 36.0 g/dL     RDW  20.3 (H)  11.5 - 14.5 %     Platelets  173  150 - 450 x10*3/uL     Neutrophils %  84.7  40.0 - 80.0 %     Immature Granulocytes %, Automated  0.5  0.0 - 0.9 %     Lymphocytes %  7.9  13.0 - 44.0 %     Monocytes %  6.7  2.0 - 10.0 %     Eosinophils %  0.1  0.0 - 6.0 %     Basophils %  0.1  0.0 - 2.0 %     Neutrophils Absolute  10.86 (H)  1.60 - 5.50 x10*3/uL     Immature Granulocytes Absolute, Automated  0.06  0.00 - 0.50 x10*3/uL     Lymphocytes Absolute  1.01  0.80 - 3.00 x10*3/uL     Monocytes Absolute  0.86 (H)  0.05 - 0.80 x10*3/uL     Eosinophils Absolute  0.01  0.00 - 0.40 x10*3/uL     Basophils Absolute  0.01  0.00 -  0.10 x10*3/uL  Comprehensive Metabolic Panel  Result  Value  Ref Range     Glucose  134 (H)  74 - 99 mg/dL     Sodium  131 (L)  136 - 145 mmol/L     Potassium  5.2  3.5 - 5.3 mmol/L     Chloride  102  98 - 107 mmol/L     Bicarbonate  20 (L)  21 - 32 mmol/L     Anion Gap  14  10 - 20 mmol/L     Urea Nitrogen  26 (H)  6 - 23 mg/dL     Creatinine  0.85  0.50 - 1.05 mg/dL     eGFR  66  >60 mL/min/1.73m*2     Calcium  7.7 (L)  8.6 - 10.3 mg/dL     Albumin  2.2 (L)  3.4 - 5.0 g/dL     Alkaline Phosphatase  155 (H)  33 - 136 U/L     Total Protein  5.7 (L)  6.4 - 8.2 g/dL     AST  23  9 - 39 U/L     Bilirubin, Total  0.9  0.0 - 1.2 mg/dL     ALT  8  7 - 45 U/L  Morphology  Result  Value  Ref Range     RBC Morphology  See Below        Polychromasia  Mild        Ovalocytes  Few        Selam Cells  Few     POCT GLUCOSE  Result  Value  Ref Range     POCT Glucose  173 (H)  74 - 99 mg/dL     Patient recently received an antibiotic (last 12 hours)         None         Plan discussed with interdisciplinary team, will continue to monitor pain levels and monitor overnight. Will continue current and repeat labs in the AM.  Discharge planning discussed with patient and care team. Therapy evaluations ordered. Anticipate HHC/SNF at discharge. Patient aware and agreeable to current plan, continue plan as above.  I spent a total of 50 minutes on the date of the service which included preparing to see the patient, face-to-face patient care, completing clinical documentation, obtaining and/or reviewing separately obtained history, performing a medically appropriate examination, counseling and educating the patient/family/caregiver, ordering medications, tests, or procedures, communicating with other HCPs (not separately reported), independently interpreting results (not separately reported), communicating results to the patient/family/caregiver, and care coordination (not separately reported).             ObjectiveExpand by Default  Last  Recorded Vitals  /67 (BP Location: Right leg, Patient Position: Lying)   Pulse 96   Temp 36.1 °C (97 °F) (Temporal)   Resp 18   Wt 81.6 kg (179 lb 14.3 oz)   SpO2 93%   Intake/Output last 3 Shifts:     Intake/Output Summary (Last 24 hours) at 4/5/2025 1559  Last data filed at 4/5/2025 1301  Gross per 24 hour  Intake  1036.67 ml  Output  200 ml  Net  836.67 ml        Admission Weight  Weight: 81.6 kg (180 lb) (04/02/25 2207)     Daily Weight  04/04/25 : 81.6 kg (179 lb 14.3 oz)     Image Results  Electrocardiogram, 12-lead PRN ACS symptoms  Unknown rhythm, irregular rate  Probable lateral infarct, old     See ED provider note for full interpretation and clinical correlation  Confirmed by Rivka Manzano ([id number]) on 4/5/2025 10:05:09 AM        Physical Exam     Relevant Results                       Assessment/Plan     Assessment & Plan  Renal infarct (Multi)     Hyponatremia     Hyponatremia/renal infarct  Inpatient admission to Dr. Lugo  See imaging results above  DNRCCA  N.p.o.  Consult nephrology and appreciate input  Consult vascular surgery and appreciate input  Coag screen ordered  Continue heparin drip  Continue gentle IV fluids  Repeat labs  Monitor heparin assay  Morphine/Zofran in ED  Dilaudid IV as needed  Tigan IM as needed 2/2 prolonged Qtc     T2DM/breast cancer/colon cancer/anemia/HLD/HTN  #Chronic conditions  Insulin sliding scale for n.p.o.  Hypoglycemia protocol  Continue home MiraLAX and Colace  Continue home lisinopril daily  Continue home amlodipine daily  Continue home Estrace tablet     DVT Ppx  SCDs  Continue heparin drip  Bedrest/up to chair as tolerated  Patient fully evaluated on April 3.  Continue analgesics and await nephrology consultation.  Appreciate vascular surgery consultation.  Patient with significant metastatic cancer which comfort measures are in place.        MD Tio Hodges MD untitled image  Physician  Internal Medicine     Progress Notes   This  note has been shared with the patient  Addendum     Date of Service: 4/3/2025  1:49 PM     Addendum     Expand All Collapse All  untitled image  Patient fully evaluated on 4/3. Patient resting in bed, breathing comfortably on 3L O2 via nasal cannula. Accompanied by daughter in the room. Evaluated today by nephrology, lisinopril discontinued d/t jr renal artery stenosis, flomax added. Hypertension will be treated with prn medications to metoprolol/apresoline IV. Continue heparin drip. Also evaluated by vascular surgery, no surgery recommended at this time. Recommended consult to heme-onc, family declined patient being seen by heme-onc - not wishing to pursue aggressive chemotherapy treatment at this time. Labs reviewed. Mild hyponatremia noted, continue sodium chloride drip. Being given comfort measures for pain, currently receiving dilaudid, morphine.     Problem List Items Addressed This Visit                     Genitourinary and Reproductive     * (Principal) Renal infarct (Multi) - Primary     Other Visit Diagnoses         Metastatic malignant neoplasm, unspecified site (Multi)               Patient seen resting in bed with head of bed elevated, no s/s or c/o acute difficulties at this time.  Vital signs for last 24 hours Temp:  [36.5 °C (97.7 °F)-36.7 °C (98.1 °F)] 36.7 °C (98.1 °F)  Heart Rate:  [] 100  Resp:  [16-20] 18  BP: (125-175)/(60-74) 126/64    I/O this shift:  In: 551.8 [I.V.:551.8]  Out: -   Patient still requiring frequent cardiac and SPO2 monitoring. Continue aggressive pulmonary hygiene and oral hygiene. Off loading as tolerated for skin integrity. Medications and labs reviewed-               Results for orders placed or performed during the hospital encounter of 04/02/25 (from the past 24 hours)  CBC and Auto Differential  Result  Value  Ref Range     WBC  11.1  4.4 - 11.3 x10*3/uL     nRBC  0.0  0.0 - 0.0 /100 WBCs     RBC  4.36  4.00 - 5.20 x10*6/uL     Hemoglobin  11.5 (L)  12.0 - 16.0  g/dL     Hematocrit  38.8  36.0 - 46.0 %     MCV  89  80 - 100 fL     MCH  26.4  26.0 - 34.0 pg     MCHC  29.6 (L)  32.0 - 36.0 g/dL     RDW  19.9 (H)  11.5 - 14.5 %     Platelets  198  150 - 450 x10*3/uL     Neutrophils %  84.1  40.0 - 80.0 %     Immature Granulocytes %, Automated  0.6  0.0 - 0.9 %     Lymphocytes %  9.5  13.0 - 44.0 %     Monocytes %  5.6  2.0 - 10.0 %     Eosinophils %  0.0  0.0 - 6.0 %     Basophils %  0.2  0.0 - 2.0 %     Neutrophils Absolute  9.34 (H)  1.60 - 5.50 x10*3/uL     Immature Granulocytes Absolute, Automated  0.07  0.00 - 0.50 x10*3/uL     Lymphocytes Absolute  1.06  0.80 - 3.00 x10*3/uL     Monocytes Absolute  0.62  0.05 - 0.80 x10*3/uL     Eosinophils Absolute  0.00  0.00 - 0.40 x10*3/uL     Basophils Absolute  0.02  0.00 - 0.10 x10*3/uL  Comprehensive metabolic panel  Result  Value  Ref Range     Glucose  131 (H)  74 - 99 mg/dL     Sodium  131 (L)  136 - 145 mmol/L     Potassium  4.6  3.5 - 5.3 mmol/L     Chloride  96 (L)  98 - 107 mmol/L     Bicarbonate  22  21 - 32 mmol/L     Anion Gap  18  10 - 20 mmol/L     Urea Nitrogen  21  6 - 23 mg/dL     Creatinine  0.89  0.50 - 1.05 mg/dL     eGFR  62  >60 mL/min/1.73m*2     Calcium  8.4 (L)  8.6 - 10.3 mg/dL     Albumin  2.9 (L)  3.4 - 5.0 g/dL     Alkaline Phosphatase  258 (H)  33 - 136 U/L     Total Protein  6.9  6.4 - 8.2 g/dL     AST  22  9 - 39 U/L     Bilirubin, Total  0.7  0.0 - 1.2 mg/dL     ALT  16  7 - 45 U/L  Lipase  Result  Value  Ref Range     Lipase  7 (L)  9 - 82 U/L  Lactate  Result  Value  Ref Range     Lactate  1.2  0.4 - 2.0 mmol/L  Magnesium  Result  Value  Ref Range     Magnesium  1.90  1.60 - 2.40 mg/dL  POCT GLUCOSE  Result  Value  Ref Range     POCT Glucose  129 (H)  74 - 99 mg/dL  POCT GLUCOSE  Result  Value  Ref Range     POCT Glucose  119 (H)  74 - 99 mg/dL  Protime-INR  Result  Value  Ref Range     Protime  17.4 (H)  9.8 - 12.4 seconds     INR  1.6 (H)  0.9 - 1.1  CBC  Result  Value  Ref Range     WBC  11.5  (H)  4.4 - 11.3 x10*3/uL     nRBC  0.0  0.0 - 0.0 /100 WBCs     RBC  4.08  4.00 - 5.20 x10*6/uL     Hemoglobin  10.9 (L)  12.0 - 16.0 g/dL     Hematocrit  36.7  36.0 - 46.0 %     MCV  90  80 - 100 fL     MCH  26.7  26.0 - 34.0 pg     MCHC  29.7 (L)  32.0 - 36.0 g/dL     RDW  19.9 (H)  11.5 - 14.5 %     Platelets  181  150 - 450 x10*3/uL  Basic Metabolic Panel  Result  Value  Ref Range     Glucose  129 (H)  74 - 99 mg/dL     Sodium  130 (L)  136 - 145 mmol/L     Potassium  4.2  3.5 - 5.3 mmol/L     Chloride  98  98 - 107 mmol/L     Bicarbonate  21  21 - 32 mmol/L     Anion Gap  15  10 - 20 mmol/L     Urea Nitrogen  19  6 - 23 mg/dL     Creatinine  0.85  0.50 - 1.05 mg/dL     eGFR  66  >60 mL/min/1.73m*2     Calcium  8.0 (L)  8.6 - 10.3 mg/dL  Heparin Assay, UFH  Result  Value  Ref Range     Heparin Unfractionated  1.1 (HH)  See Comment Below for Therapeutic Ranges IU/mL  POCT GLUCOSE  Result  Value  Ref Range     POCT Glucose  123 (H)  74 - 99 mg/dL     Patient recently received an antibiotic (last 12 hours)         None              Plan discussed with interdisciplinary team, continue current and repeat labs in the AM.      Patient aware and agreeable to current plan, continue plan as above.      I spent a total of 50 minutes on the date of the service which included preparing to see the patient, face-to-face patient care, completing clinical documentation, obtaining and/or reviewing separately obtained history, performing a medically appropriate examination, counseling and educating the patient/family/caregiver, ordering medications, tests, or procedures, communicating with other HCPs (not separately reported), independently interpreting results (not separately reported), communicating results to the patient/family/caregiver, and care coordination (not separately reported). Melissa Tse is a 89 y.o. female on day 0 of admission presenting with Renal infarct (Multi).           Subjective     Tio Lugo MD    Tio Lugo MD untitled image  Physician  Internal Medicine     H&P   This note has been shared with the patient  Signed     Date of Service: 4/3/2025 12:43 PM     Signed     Expand All Collapse All  untitled image  History Of Present Illness  Melissa Tse is a 89 y.o. female with a past medical history significant for breast cancer and stage IV colon cancer presenting to emergency department from [facility] for evaluation of abdominal pain.  Patient also reports an episode of vomiting this morning and states she has been unable to keep anything down today and complains of  a pressure in the mid to lower abdomen that radiates to her right flank.  Patient reports that her doctor will only give her Tylenol for pain and that is not working.  Patient states that her last bowel movement was 4/1/2025 without difficulty.  Patient denies chest pain or dizziness.  Patient denies recent illness, fever, or chills.  Patient denies shortness of breath.     In ED, lipase 7, glucose 131, sodium 131, chloride 96, calcium 8.4, albumin 2.9, alk phos 258, hemoglobin 11.5.  EKG completed showing normal sinus rhythm at a rate of 88 without ST elevation or depression with a prolonged QTc of 507 per ED physician review.  A CT of the abdomen and pelvis was completed showing hypo-enhancement of the left renal parenchyma compatible with extensive renal cortical infarcts as well as fluid in the endometrial canal concerning for cervical stenosis and worsening of a retroperitoneal metastatic lymphadenopathy as well as progression of hepatic  metastasis per radiology review.  Patient medicated with Zofran, morphine, LR x 1 L bolus in ED.  Blood pressure 149/62, heart rate 92, respirations 20, temperature 36.5 °C, SpO2 95% on room air.  Consult placed to vascular surgery and nephrology.  Patient started on a heparin drip.  Inpatient admission under the care of Dr. Lugo who will continue to follow.  I was asked to do  "H&P and place initial admission orders.     Prior medical records reviewed.     Past Medical History  Breast cancer, stage IV colon cancer, T2DM, anemia, hypertension, hyperlipidemia  Surgical History  Colonoscopy, tubal ligation, hip replacement, cataract surgery      Social History  Never smoker, no drug use, no alcohol use  Family History  Cataract, diabetes     Allergies  Penicillin, Penicillins, and Statins-hmg-coa reductase inhibitors     Review of Systems  A 10 point review of systems was completed and is negative except what is listed in HPI  Physical Exam  Constitutional:       Appearance: She is ill-appearing.   HENT:      Mouth/Throat:      Mouth: Mucous membranes are dry.      Pharynx: Oropharynx is clear.   Eyes:      Pupils: Pupils are equal, round, and reactive to light.   Cardiovascular:      Rate and Rhythm: Normal rate and regular rhythm.   Pulmonary:      Effort: Pulmonary effort is normal.      Breath sounds: Normal breath sounds.   Abdominal:      Palpations: Abdomen is soft.      Tenderness: There is abdominal tenderness in the periumbilical area and suprapubic area.      Comments: Right flank tenderness   Musculoskeletal:      Cervical back: Normal range of motion.   Skin:     General: Skin is warm and dry.   Neurological:      General: No focal deficit present.      Mental Status: She is alert.   Psychiatric:         Mood and Affect: Mood normal.         Behavior: Behavior normal.            Last Recorded Vitals  Blood pressure 126/64, pulse 100, temperature 36.7 °C (98.1 °F), resp. rate 18, height 1.626 m (5' 4\"), weight 81.6 kg (180 lb), SpO2 99%.     Relevant Results  CT abdomen pelvis w IV contrast     Result Date: 4/3/2025  Interpreted By:  Faraz Link, STUDY: CT ABDOMEN PELVIS W IV CONTRAST; ;  4/3/2025 12:30 am   INDICATION: Signs/Symptoms:stage IV colon cancer, now with R abd pain.     COMPARISON: 01/17/2025.   ACCESSION NUMBER(S): [id number]   ORDERING CLINICIAN: ELYSE KLERMAN " TECHNIQUE: Axial CT images of the abdomen and pelvis with coronal and sagittal reconstructed images performed after intravenous administration of 80 cc Omnipaque 350.   FINDINGS: LOWER CHEST: No acute abnormality of the lung bases. BONES: No acute osseous abnormality. Osteopenia. Right hip arthroplasty in standard alignment. Similar appearance of mild-to-moderate chronic compression of the T11 vertebral body. Stable grade 1 degenerative anterolisthesis at L4-L5 and moderate to severe multilevel degenerative changes of the imaged spine. ABDOMINAL WALL: Within normal limits.   ABDOMEN:   LIVER: There is a large ill-defined region of hypoenhancement in the right hepatic lobe measuring up to 10.8 cm in transaxial diameter, up to at least 10.1 cm in CC diameter, suspicious for interval progression of the hepatic metastasis. There is a new hypoattenuating lesion inferolaterally in the inferior tip of the right hepatic lobe (series 601, images 57-63), measuring up to approximately 2.2 cm in transaxial diameter, suspicious for new metastatic lesion. Liver is enlarged, similar to before. BILE DUCTS: Normal caliber. GALLBLADDER: Cholelithiasis. No wall thickening or pericholecystic fluid. PANCREAS:Atrophic with pancreatic ductal dilation and irregularity and parenchymal calcifications in keeping with sequelae of chronic pancreatitis. SPLEEN: Within normal limits. ADRENALS: Within normal limits. KIDNEYS and URETERS: New extensive hypoenhancement/non enhancement of the left renal parenchyma compatible with extensive renal cortical infarcts. Right kidney and ureter appear normal.     VESSELS: Severe atherosclerosis, with critical narrowing or occlusion of the left renal artery. Severe narrowing of the right renal artery also noted. RETROPERITONEUM: Interval enlargement of celiac axis and portocaval nodes, suspicious for worsened metastatic disease.   PELVIS:   REPRODUCTIVE ORGANS: Endometrial fluid suggests cervical stenosis and  is new from the prior exam. BLADDER: Within normal limits.   BOWEL: When allowing for differences in patient positioning in slice selection, there is no substantial change in the very large mass encompassing the mid-transverse colon and several adjacent loops of small bowel and adjacent omentum and mesocolon, compatible with patient's known advanced malignancy. No dilated bowel to suggest obstruction. Stomach appears grossly normal. No definite additional abnormal bowel wall thickening. Normal appendix. PERITONEUM: No ascites or free air, no fluid collection.        New extensive hypoenhancement/non enhancement of the left renal parenchyma compatible with extensive renal cortical infarcts. Right kidney and ureter appear normal.   There is a large ill-defined region of hypoenhancement in the right hepatic lobe measuring up to 10.8 cm in transaxial diameter, up to at least 10.1 cm in CC diameter, suspicious for interval progression of the hepatic metastasis. There is a new hypoattenuating lesion inferolaterally in the inferior tip of the right hepatic lobe (series 601, images 57-63), measuring up to approximately 2.2 cm in transaxial diameter, suspicious for new metastatic lesion. Liver is enlarged, similar to before.   Interval worsening of retroperitoneal metastatic lymphadenopathy.   Cholelithiasis without convincing evidence of acute cholecystitis.   New fluid in the endometrial canal suggesting cervical stenosis.   Additional findings as discussed above.   MACRO: None   Signed by: Faraz Link 4/3/2025 12:52 AM Dictation workstation:   [id number]               Results for orders placed or performed during the hospital encounter of 04/02/25 (from the past 24 hours)  CBC and Auto Differential  Result  Value  Ref Range     WBC  11.1  4.4 - 11.3 x10*3/uL     nRBC  0.0  0.0 - 0.0 /100 WBCs     RBC  4.36  4.00 - 5.20 x10*6/uL     Hemoglobin  11.5 (L)  12.0 - 16.0 g/dL     Hematocrit  38.8  36.0 - 46.0 %     MCV  89  80  - 100 fL     MCH  26.4  26.0 - 34.0 pg     MCHC  29.6 (L)  32.0 - 36.0 g/dL     RDW  19.9 (H)  11.5 - 14.5 %     Platelets  198  150 - 450 x10*3/uL     Neutrophils %  84.1  40.0 - 80.0 %     Immature Granulocytes %, Automated  0.6  0.0 - 0.9 %     Lymphocytes %  9.5  13.0 - 44.0 %     Monocytes %  5.6  2.0 - 10.0 %     Eosinophils %  0.0  0.0 - 6.0 %     Basophils %  0.2  0.0 - 2.0 %     Neutrophils Absolute  9.34 (H)  1.60 - 5.50 x10*3/uL     Immature Granulocytes Absolute, Automated  0.07  0.00 - 0.50 x10*3/uL     Lymphocytes Absolute  1.06  0.80 - 3.00 x10*3/uL     Monocytes Absolute  0.62  0.05 - 0.80 x10*3/uL     Eosinophils Absolute  0.00  0.00 - 0.40 x10*3/uL     Basophils Absolute  0.02  0.00 - 0.10 x10*3/uL  Comprehensive metabolic panel  Result  Value  Ref Range     Glucose  131 (H)  74 - 99 mg/dL     Sodium  131 (L)  136 - 145 mmol/L     Potassium  4.6  3.5 - 5.3 mmol/L     Chloride  96 (L)  98 - 107 mmol/L     Bicarbonate  22  21 - 32 mmol/L     Anion Gap  18  10 - 20 mmol/L     Urea Nitrogen  21  6 - 23 mg/dL     Creatinine  0.89  0.50 - 1.05 mg/dL     eGFR  62  >60 mL/min/1.73m*2     Calcium  8.4 (L)  8.6 - 10.3 mg/dL     Albumin  2.9 (L)  3.4 - 5.0 g/dL     Alkaline Phosphatase  258 (H)  33 - 136 U/L     Total Protein  6.9  6.4 - 8.2 g/dL     AST  22  9 - 39 U/L     Bilirubin, Total  0.7  0.0 - 1.2 mg/dL     ALT  16  7 - 45 U/L  Lipase  Result  Value  Ref Range     Lipase  7 (L)  9 - 82 U/L  Lactate  Result  Value  Ref Range     Lactate  1.2  0.4 - 2.0 mmol/L  Magnesium  Result  Value  Ref Range     Magnesium  1.90  1.60 - 2.40 mg/dL  POCT GLUCOSE  Result  Value  Ref Range     POCT Glucose  129 (H)  74 - 99 mg/dL  POCT GLUCOSE  Result  Value  Ref Range     POCT Glucose  119 (H)  74 - 99 mg/dL  Protime-INR  Result  Value  Ref Range     Protime  17.4 (H)  9.8 - 12.4 seconds     INR  1.6 (H)  0.9 - 1.1  CBC  Result  Value  Ref Range     WBC  11.5 (H)  4.4 - 11.3 x10*3/uL     nRBC  0.0  0.0 - 0.0 /100  WBCs     RBC  4.08  4.00 - 5.20 x10*6/uL     Hemoglobin  10.9 (L)  12.0 - 16.0 g/dL     Hematocrit  36.7  36.0 - 46.0 %     MCV  90  80 - 100 fL     MCH  26.7  26.0 - 34.0 pg     MCHC  29.7 (L)  32.0 - 36.0 g/dL     RDW  19.9 (H)  11.5 - 14.5 %     Platelets  181  150 - 450 x10*3/uL  Basic Metabolic Panel  Result  Value  Ref Range     Glucose  129 (H)  74 - 99 mg/dL     Sodium  130 (L)  136 - 145 mmol/L     Potassium  4.2  3.5 - 5.3 mmol/L     Chloride  98  98 - 107 mmol/L     Bicarbonate  21  21 - 32 mmol/L     Anion Gap  15  10 - 20 mmol/L     Urea Nitrogen  19  6 - 23 mg/dL     Creatinine  0.85  0.50 - 1.05 mg/dL     eGFR  66  >60 mL/min/1.73m*2     Calcium  8.0 (L)  8.6 - 10.3 mg/dL  Heparin Assay, UFH  Result  Value  Ref Range     Heparin Unfractionated  1.1 (HH)  See Comment Below for Therapeutic Ranges IU/mL  POCT GLUCOSE  Result  Value  Ref Range     POCT Glucose  123 (H)  74 - 99 mg/dL           Assessment/Plan  Melissa is an 89-year-old female patient presenting to emergency department from [facility] nursing [facility] for evaluation of abdominal pain and vomiting.  Patient states that she developed an 8 out of 10 mid to lower abdominal pain radiating to her right flank.  Patient with a history of breast cancer and colon cancer.  CT imaging showing extensive left renal cortical infarcts as well as worsening of retroperitoneal metastatic lymphadenopathy and progression of hepatic metastasis per radiology review.  Consults placed to nephrology and vascular surgery, patient started on a heparin drip, medicated with morphine and admitted for further medical management.     Assessment & Planuntitled image  untitled imageRenal infarct (Multi)     Hyponatremia     Hyponatremia/renal infarct  Inpatient admission to Dr. Lugo  See imaging results above  DNRCCA  N.p.o.  Consult nephrology and appreciate input  Consult vascular surgery and appreciate input  Coag screen ordered  Continue heparin drip  Continue gentle IV  fluids  Repeat labs  Monitor heparin assay  Morphine/Zofran in ED  Dilaudid IV as needed  Tigan IM as needed 2/2 prolonged Qtc     T2DM/breast cancer/colon cancer/anemia/HLD/HTN  #Chronic conditions  Insulin sliding scale for n.p.o.  Hypoglycemia protocol  Continue home MiraLAX and Colace  Continue home lisinopril daily  Continue home amlodipine daily  Continue home Estrace tablet     DVT Ppx  SCDs  Continue heparin drip  Bedrest/up to chair as tolerated  Patient fully evaluated on April 3.  Continue analgesics and await nephrology consultation.  Appreciate vascular surgery consultation.  Patient with significant metastatic cancer which comfort measures are in place.        I spent 60 minutes in the professional and overall care of this patient.  Tio Lugo MD                       Objective  Last Recorded Vitals  /64   Pulse 100   Temp 36.7 °C (98.1 °F)   Resp 18   Wt 81.6 kg (180 lb)   SpO2 99%   Intake/Output last 3 Shifts:     Intake/Output Summary (Last 24 hours) at 4/3/2025 1349  Last data filed at 4/3/2025 1000        Gross per 24 hour  Intake  551.83 ml  Output  --  Net  551.83 ml        Admission Weight  Weight: 81.6 kg (180 lb) (04/02/25 2207)     Daily Weight  04/02/25 : 81.6 kg (180 lb)     Image Results  CT abdomen pelvis w IV contrast  Narrative: Interpreted By:  Faraz Link,   STUDY:  CT ABDOMEN PELVIS W IV CONTRAST; ;  4/3/2025 12:30 am      INDICATION:  Signs/Symptoms:stage IV colon cancer, now with R abd pain.          COMPARISON:  01/17/2025.      ACCESSION NUMBER(S):  [id number]      ORDERING CLINICIAN:  ELYSE KLERMAN      TECHNIQUE:  Axial CT images of the abdomen and pelvis with coronal and sagittal  reconstructed images performed after intravenous administration of 80  cc Omnipaque 350.      FINDINGS:  LOWER CHEST: No acute abnormality of the lung bases.  BONES: No acute osseous abnormality. Osteopenia. Right hip  arthroplasty in standard alignment. Similar appearance  of  mild-to-moderate chronic compression of the T11 vertebral body.  Stable grade 1 degenerative anterolisthesis at L4-L5 and moderate to  severe multilevel degenerative changes of the imaged spine. ABDOMINAL  WALL: Within normal limits.      ABDOMEN:      LIVER: There is a large ill-defined region of hypoenhancement in the  right hepatic lobe measuring up to 10.8 cm in transaxial diameter, up  to at least 10.1 cm in CC diameter, suspicious for interval  progression of the hepatic metastasis. There is a new hypoattenuating  lesion inferolaterally in the inferior tip of the right hepatic lobe  (series 601, images 57-63), measuring up to approximately 2.2 cm in  transaxial diameter, suspicious for new metastatic lesion. Liver is  enlarged, similar to before. BILE DUCTS: Normal caliber.  GALLBLADDER: Cholelithiasis. No wall thickening or pericholecystic  fluid. PANCREAS:Atrophic with pancreatic ductal dilation and  irregularity and parenchymal calcifications in keeping with sequelae  of chronic pancreatitis. SPLEEN: Within normal limits.  ADRENALS: Within normal limits.  KIDNEYS and URETERS: New extensive hypoenhancement/non enhancement of  the left renal parenchyma compatible with extensive renal cortical  infarcts. Right kidney and ureter appear normal.          VESSELS: Severe atherosclerosis, with critical narrowing or occlusion  of the left renal artery. Severe narrowing of the right renal artery  also noted. RETROPERITONEUM: Interval enlargement of celiac axis and  portocaval nodes, suspicious for worsened metastatic disease.      PELVIS:      REPRODUCTIVE ORGANS: Endometrial fluid suggests cervical stenosis and  is new from the prior exam. BLADDER: Within normal limits.      BOWEL: When allowing for differences in patient positioning in slice  selection, there is no substantial change in the very large mass  encompassing the mid-transverse colon and several adjacent loops of  small bowel and adjacent omentum and  mesocolon, compatible with  patient's known advanced malignancy. No dilated bowel to suggest  obstruction. Stomach appears grossly normal. No definite additional  abnormal bowel wall thickening. Normal appendix. PERITONEUM: No  ascites or free air, no fluid collection.      Impression: New extensive hypoenhancement/non enhancement of the left renal  parenchyma compatible with extensive renal cortical infarcts. Right  kidney and ureter appear normal.      There is a large ill-defined region of hypoenhancement in the right  hepatic lobe measuring up to 10.8 cm in transaxial diameter, up to at  least 10.1 cm in CC diameter, suspicious for interval progression of  the hepatic metastasis. There is a new hypoattenuating lesion  inferolaterally in the inferior tip of the right hepatic lobe (series  601, images 57-63), measuring up to approximately 2.2 cm in  transaxial diameter, suspicious for new metastatic lesion. Liver is  enlarged, similar to before.      Interval worsening of retroperitoneal metastatic lymphadenopathy.      Cholelithiasis without convincing evidence of acute cholecystitis.      New fluid in the endometrial canal suggesting cervical stenosis.      Additional findings as discussed above.      MACRO:  None      Signed by: Faraz Link 4/3/2025 12:52 AM  Dictation workstation:   [id number]        Physical Exam  Constitutional:       Appearance: She is ill-appearing.   HENT:      Mouth/Throat:      Mouth: Mucous membranes are dry.      Pharynx: Oropharynx is clear.   Eyes:      Pupils: Pupils are equal, round, and reactive to light.   Cardiovascular:      Rate and Rhythm: Normal rate and regular rhythm.   Pulmonary:      Effort: Pulmonary effort is normal.      Breath sounds: Normal breath sounds.   Abdominal:      Palpations: Abdomen is soft.      Tenderness: There is abdominal tenderness in the periumbilical area and suprapubic area.      Comments: Right flank tenderness   Musculoskeletal:      Cervical  back: Normal range of motion.   Skin:     General: Skin is warm and dry.   Neurological:      General: No focal deficit present.      Mental Status: She is alert.   Psychiatric:         Mood and Affect: Mood normal.         Behavior: Behavior normal.         Relevant Results  ?         ?               Assessment/Plan                       Assessment & Planuntitled image  untitled imageRenal infarct (Multi)     Hyponatremia     Hyponatremia/renal infarct  Inpatient admission to Dr. Lugo  See imaging results above  DNRCCA  N.p.o.  Consult nephrology and appreciate input  Consult vascular surgery and appreciate input  Coag screen ordered  Continue heparin drip  Continue gentle IV fluids  Repeat labs  Monitor heparin assay  Morphine/Zofran in ED  Dilaudid IV as needed  Tigan IM as needed 2/2 prolonged Qtc     T2DM/breast cancer/colon cancer/anemia/HLD/HTN  #Chronic conditions  Insulin sliding scale for n.p.o.  Hypoglycemia protocol  Continue home MiraLAX and Colace  Continue home lisinopril daily  Continue home amlodipine daily  Continue home Estrace tablet     DVT Ppx  SCDs  Continue heparin drip  Bedrest/up to chair as tolerated  Patient fully evaluated on April 3.  Continue analgesics and await nephrology consultation.  Appreciate vascular surgery consultation.  Patient with significant metastatic cancer which comfort measures are in place.      Patient fully evaluated on 4/4, resting in bed breathing comfortably on room air. Labs and medications reviewed. Estrase discontinued due to coagulation risk. Switched from heparin to Eliquis 2.5 BID for outpatient anticoagulation. Evaluated by vascular surgery, no surgery recommended at this time. Family declined heme-onc consult, not wishing to pursue aggressive chemotherapy treatment at this time. Continue comfort care for pain. Pyridium ordered for suprapubic pain. Per nursing staff, patient does not like Tigan, order switched to Zofran. Patient medically cleared for  discharge to SNF today.              Tio Lugo MD   Physician  Internal Medicine     Progress Notes     Addendum     Date of Service: 4/7/2025  3:03 PM      Addendum        Expand All Collapse Kraig Tse is a 89 y.o. female on day 4 of admission presenting with Renal infarct (Multi).           Subjective  No significant events overnight. Patient seen and evaluated at bedside.               Objective  Last Recorded Vitals  BP 92/55 (BP Location: Left arm, Patient Position: Lying)   Pulse (!) 112   Temp 35.5 °C (95.9 °F) (Temporal)   Resp 18   Wt 81.6 kg (179 lb 14.3 oz)   SpO2 92%   Intake/Output last 3 Shifts:     Intake/Output Summary (Last 24 hours) at 4/7/2025 1503  Last data filed at 4/7/2025 1257        Gross per 24 hour   Intake 300 ml   Output 500 ml   Net -200 ml         Admission Weight  Weight: 81.6 kg (180 lb) (04/02/25 2207)     Daily Weight  04/04/25 : 81.6 kg (179 lb 14.3 oz)     Image Results  Electrocardiogram, 12-lead PRN ACS symptoms  Unknown rhythm, irregular rate  Probable lateral infarct, old     See ED provider note for full interpretation and clinical correlation  Confirmed by Rivka Manzano ([id number]) on 4/5/2025 10:05:09 AM        Physical Exam     MD Tio Hodges MD    Physician  Internal Medicine     Progress Notes   This note has been shared with the patient  Signed     Date of Service: 4/5/2025  3:59 PM  Signed   Expand All Collapse Kraig Tse is a 89 y.o. female on day 2 of admission presenting with Renal infarct (Multi).           Subjective  Patient fully evaluated  04/05for    Problem List Items Addressed This Visit         * (Principal) Renal infarct (Multi) - Primary     Other Visit Diagnoses         Metastatic malignant neoplasm, unspecified site (Multi)             Patient seen resting in bed with head of bed elevated, no s/s or c/o acute difficulties at this time. Patient still with significant pain noted, medications adjusted,  percocet added, will monitor overnight and repeat labs in the AM.  Vital signs for last 24 hours Temp:  [35.6 °C (96.1 °F)-36.5 °C (97.7 °F)] 36.1 °C (97 °F)Heart Rate:  [96-98] 96Resp:  [18] 18BP: (114-148)/(55-67) 148/67  I/O this shift:In: 220.8 [I.V.:220.8]Out: - Patient still requiring frequent cardiac and SPO2 monitoring. Continue aggressive pulmonary hygiene and oral hygiene. Off loading as tolerated for skin integrity. Medications and labs reviewed-   Results for orders placed or performed during the hospital encounter of 04/02/25 (from the past 24 hours)  POCT GLUCOSE  Result  Value  Ref Range     POCT Glucose  175 (H)  74 - 99 mg/dL  POCT GLUCOSE  Result  Value  Ref Range     POCT Glucose  167 (H)  74 - 99 mg/dL  POCT GLUCOSE  Result  Value  Ref Range     POCT Glucose  148 (H)  74 - 99 mg/dL  POCT GLUCOSE  Result  Value  Ref Range     POCT Glucose  140 (H)  74 - 99 mg/dL  CBC and Auto Differential  Result  Value  Ref Range     WBC  12.8 (H)  4.4 - 11.3 x10*3/uL     nRBC  0.0  0.0 - 0.0 /100 WBCs     RBC  3.96 (L)  4.00 - 5.20 x10*6/uL     Hemoglobin  10.8 (L)  12.0 - 16.0 g/dL     Hematocrit  36.5  36.0 - 46.0 %     MCV  92  80 - 100 fL     MCH  27.3  26.0 - 34.0 pg     MCHC  29.6 (L)  32.0 - 36.0 g/dL     RDW  20.3 (H)  11.5 - 14.5 %     Platelets  173  150 - 450 x10*3/uL     Neutrophils %  84.7  40.0 - 80.0 %     Immature Granulocytes %, Automated  0.5  0.0 - 0.9 %     Lymphocytes %  7.9  13.0 - 44.0 %     Monocytes %  6.7  2.0 - 10.0 %     Eosinophils %  0.1  0.0 - 6.0 %     Basophils %  0.1  0.0 - 2.0 %     Neutrophils Absolute  10.86 (H)  1.60 - 5.50 x10*3/uL     Immature Granulocytes Absolute, Automated  0.06  0.00 - 0.50 x10*3/uL     Lymphocytes Absolute  1.01  0.80 - 3.00 x10*3/uL     Monocytes Absolute  0.86 (H)  0.05 - 0.80 x10*3/uL     Eosinophils Absolute  0.01  0.00 - 0.40 x10*3/uL     Basophils Absolute  0.01  0.00 - 0.10 x10*3/uL  Comprehensive Metabolic Panel  Result  Value  Ref Range      Glucose  134 (H)  74 - 99 mg/dL     Sodium  131 (L)  136 - 145 mmol/L     Potassium  5.2  3.5 - 5.3 mmol/L     Chloride  102  98 - 107 mmol/L     Bicarbonate  20 (L)  21 - 32 mmol/L     Anion Gap  14  10 - 20 mmol/L     Urea Nitrogen  26 (H)  6 - 23 mg/dL     Creatinine  0.85  0.50 - 1.05 mg/dL     eGFR  66  >60 mL/min/1.73m*2     Calcium  7.7 (L)  8.6 - 10.3 mg/dL     Albumin  2.2 (L)  3.4 - 5.0 g/dL     Alkaline Phosphatase  155 (H)  33 - 136 U/L     Total Protein  5.7 (L)  6.4 - 8.2 g/dL     AST  23  9 - 39 U/L     Bilirubin, Total  0.9  0.0 - 1.2 mg/dL     ALT  8  7 - 45 U/L  Morphology  Result  Value  Ref Range     RBC Morphology  See Below        Polychromasia  Mild        Ovalocytes  Few        Selam Cells  Few     POCT GLUCOSE  Result  Value  Ref Range     POCT Glucose  173 (H)  74 - 99 mg/dL     Patient recently received an antibiotic (last 12 hours)         None         Plan discussed with interdisciplinary team, will continue to monitor pain levels and monitor overnight. Will continue current and repeat labs in the AM.  Discharge planning discussed with patient and care team. Therapy evaluations ordered. Anticipate HHC/SNF at discharge. Patient aware and agreeable to current plan, continue plan as above.  I spent a total of 50 minutes on the date of the service which included preparing to see the patient, face-to-face patient care, completing clinical documentation, obtaining and/or reviewing separately obtained history, performing a medically appropriate examination, counseling and educating the patient/family/caregiver, ordering medications, tests, or procedures, communicating with other HCPs (not separately reported), independently interpreting results (not separately reported), communicating results to the patient/family/caregiver, and care coordination (not separately reported).             ObjectiveExpand by Default  Last Recorded Vitals  /67 (BP Location: Right leg, Patient Position: Lying)    Pulse 96   Temp 36.1 °C (97 °F) (Temporal)   Resp 18   Wt 81.6 kg (179 lb 14.3 oz)   SpO2 93%   Intake/Output last 3 Shifts:     Intake/Output Summary (Last 24 hours) at 4/5/2025 1559  Last data filed at 4/5/2025 1301  Gross per 24 hour  Intake  1036.67 ml  Output  200 ml  Net  836.67 ml        Admission Weight  Weight: 81.6 kg (180 lb) (04/02/25 2207)     Daily Weight  04/04/25 : 81.6 kg (179 lb 14.3 oz)     Image Results  Electrocardiogram, 12-lead PRN ACS symptoms  Unknown rhythm, irregular rate  Probable lateral infarct, old     See ED provider note for full interpretation and clinical correlation  Confirmed by Rivka Manzano ([id number]) on 4/5/2025 10:05:09 AM        Physical Exam     Relevant Results                       Assessment/Plan     Assessment & Plan  Renal infarct (Multi)     Hyponatremia     Hyponatremia/renal infarct  Inpatient admission to Dr. Lugo  See imaging results above  DNRCCA  N.p.o.  Consult nephrology and appreciate input  Consult vascular surgery and appreciate input  Coag screen ordered  Continue heparin drip  Continue gentle IV fluids  Repeat labs  Monitor heparin assay  Morphine/Zofran in ED  Dilaudid IV as needed  Tigan IM as needed 2/2 prolonged Qtc     T2DM/breast cancer/colon cancer/anemia/HLD/HTN  #Chronic conditions  Insulin sliding scale for n.p.o.  Hypoglycemia protocol  Continue home MiraLAX and Colace  Continue home lisinopril daily  Continue home amlodipine daily  Continue home Estrace tablet     DVT Ppx  SCDs  Continue heparin drip  Bedrest/up to chair as tolerated  Patient fully evaluated on April 3.  Continue analgesics and await nephrology consultation.  Appreciate vascular surgery consultation.  Patient with significant metastatic cancer which comfort measures are in place.        MD Tio Hodges MD untitled image  Physician  Internal Medicine     Progress Notes   This note has been shared with the patient  Addendum     Date of Service: 4/3/2025   1:49 PM     Addendum     Expand All Collapse All  untitled image  Patient fully evaluated on 4/3. Patient resting in bed, breathing comfortably on 3L O2 via nasal cannula. Accompanied by daughter in the room. Evaluated today by nephrology, lisinopril discontinued d/t jr renal artery stenosis, flomax added. Hypertension will be treated with prn medications to metoprolol/apresoline IV. Continue heparin drip. Also evaluated by vascular surgery, no surgery recommended at this time. Recommended consult to heme-onc, family declined patient being seen by heme-onc - not wishing to pursue aggressive chemotherapy treatment at this time. Labs reviewed. Mild hyponatremia noted, continue sodium chloride drip. Being given comfort measures for pain, currently receiving dilaudid, morphine.     Problem List Items Addressed This Visit                     Genitourinary and Reproductive     * (Principal) Renal infarct (Multi) - Primary     Other Visit Diagnoses         Metastatic malignant neoplasm, unspecified site (Multi)               Patient seen resting in bed with head of bed elevated, no s/s or c/o acute difficulties at this time.  Vital signs for last 24 hours Temp:  [36.5 °C (97.7 °F)-36.7 °C (98.1 °F)] 36.7 °C (98.1 °F)  Heart Rate:  [] 100  Resp:  [16-20] 18  BP: (125-175)/(60-74) 126/64    I/O this shift:  In: 551.8 [I.V.:551.8]  Out: -   Patient still requiring frequent cardiac and SPO2 monitoring. Continue aggressive pulmonary hygiene and oral hygiene. Off loading as tolerated for skin integrity. Medications and labs reviewed-               Results for orders placed or performed during the hospital encounter of 04/02/25 (from the past 24 hours)  CBC and Auto Differential  Result  Value  Ref Range     WBC  11.1  4.4 - 11.3 x10*3/uL     nRBC  0.0  0.0 - 0.0 /100 WBCs     RBC  4.36  4.00 - 5.20 x10*6/uL     Hemoglobin  11.5 (L)  12.0 - 16.0 g/dL     Hematocrit  38.8  36.0 - 46.0 %     MCV  89  80 - 100 fL      MCH  26.4  26.0 - 34.0 pg     MCHC  29.6 (L)  32.0 - 36.0 g/dL     RDW  19.9 (H)  11.5 - 14.5 %     Platelets  198  150 - 450 x10*3/uL     Neutrophils %  84.1  40.0 - 80.0 %     Immature Granulocytes %, Automated  0.6  0.0 - 0.9 %     Lymphocytes %  9.5  13.0 - 44.0 %     Monocytes %  5.6  2.0 - 10.0 %     Eosinophils %  0.0  0.0 - 6.0 %     Basophils %  0.2  0.0 - 2.0 %     Neutrophils Absolute  9.34 (H)  1.60 - 5.50 x10*3/uL     Immature Granulocytes Absolute, Automated  0.07  0.00 - 0.50 x10*3/uL     Lymphocytes Absolute  1.06  0.80 - 3.00 x10*3/uL     Monocytes Absolute  0.62  0.05 - 0.80 x10*3/uL     Eosinophils Absolute  0.00  0.00 - 0.40 x10*3/uL     Basophils Absolute  0.02  0.00 - 0.10 x10*3/uL  Comprehensive metabolic panel  Result  Value  Ref Range     Glucose  131 (H)  74 - 99 mg/dL     Sodium  131 (L)  136 - 145 mmol/L     Potassium  4.6  3.5 - 5.3 mmol/L     Chloride  96 (L)  98 - 107 mmol/L     Bicarbonate  22  21 - 32 mmol/L     Anion Gap  18  10 - 20 mmol/L     Urea Nitrogen  21  6 - 23 mg/dL     Creatinine  0.89  0.50 - 1.05 mg/dL     eGFR  62  >60 mL/min/1.73m*2     Calcium  8.4 (L)  8.6 - 10.3 mg/dL     Albumin  2.9 (L)  3.4 - 5.0 g/dL     Alkaline Phosphatase  258 (H)  33 - 136 U/L     Total Protein  6.9  6.4 - 8.2 g/dL     AST  22  9 - 39 U/L     Bilirubin, Total  0.7  0.0 - 1.2 mg/dL     ALT  16  7 - 45 U/L  Lipase  Result  Value  Ref Range     Lipase  7 (L)  9 - 82 U/L  Lactate  Result  Value  Ref Range     Lactate  1.2  0.4 - 2.0 mmol/L  Magnesium  Result  Value  Ref Range     Magnesium  1.90  1.60 - 2.40 mg/dL  POCT GLUCOSE  Result  Value  Ref Range     POCT Glucose  129 (H)  74 - 99 mg/dL  POCT GLUCOSE  Result  Value  Ref Range     POCT Glucose  119 (H)  74 - 99 mg/dL  Protime-INR  Result  Value  Ref Range     Protime  17.4 (H)  9.8 - 12.4 seconds     INR  1.6 (H)  0.9 - 1.1  CBC  Result  Value  Ref Range     WBC  11.5 (H)  4.4 - 11.3 x10*3/uL     nRBC  0.0  0.0 - 0.0 /100 WBCs      RBC  4.08  4.00 - 5.20 x10*6/uL     Hemoglobin  10.9 (L)  12.0 - 16.0 g/dL     Hematocrit  36.7  36.0 - 46.0 %     MCV  90  80 - 100 fL     MCH  26.7  26.0 - 34.0 pg     MCHC  29.7 (L)  32.0 - 36.0 g/dL     RDW  19.9 (H)  11.5 - 14.5 %     Platelets  181  150 - 450 x10*3/uL  Basic Metabolic Panel  Result  Value  Ref Range     Glucose  129 (H)  74 - 99 mg/dL     Sodium  130 (L)  136 - 145 mmol/L     Potassium  4.2  3.5 - 5.3 mmol/L     Chloride  98  98 - 107 mmol/L     Bicarbonate  21  21 - 32 mmol/L     Anion Gap  15  10 - 20 mmol/L     Urea Nitrogen  19  6 - 23 mg/dL     Creatinine  0.85  0.50 - 1.05 mg/dL     eGFR  66  >60 mL/min/1.73m*2     Calcium  8.0 (L)  8.6 - 10.3 mg/dL  Heparin Assay, UFH  Result  Value  Ref Range     Heparin Unfractionated  1.1 (HH)  See Comment Below for Therapeutic Ranges IU/mL  POCT GLUCOSE  Result  Value  Ref Range     POCT Glucose  123 (H)  74 - 99 mg/dL     Patient recently received an antibiotic (last 12 hours)         None              Plan discussed with interdisciplinary team, continue current and repeat labs in the AM.      Patient aware and agreeable to current plan, continue plan as above.      I spent a total of 50 minutes on the date of the service which included preparing to see the patient, face-to-face patient care, completing clinical documentation, obtaining and/or reviewing separately obtained history, performing a medically appropriate examination, counseling and educating the patient/family/caregiver, ordering medications, tests, or procedures, communicating with other HCPs (not separately reported), independently interpreting results (not separately reported), communicating results to the patient/family/caregiver, and care coordination (not separately reported). Melissa Tse is a 89 y.o. female on day 0 of admission presenting with Renal infarct (Multi).           Subjective     MD Tio Hodges MD untitled image  Physician  Internal  Medicine     H&P   This note has been shared with the patient  Signed     Date of Service: 4/3/2025 12:43 PM     Signed     Expand All Collapse All  untitled image  History Of Present Illness  Melissa Tse is a 89 y.o. female with a past medical history significant for breast cancer and stage IV colon cancer presenting to emergency department from [facility] for evaluation of abdominal pain.  Patient also reports an episode of vomiting this morning and states she has been unable to keep anything down today and complains of  a pressure in the mid to lower abdomen that radiates to her right flank.  Patient reports that her doctor will only give her Tylenol for pain and that is not working.  Patient states that her last bowel movement was 4/1/2025 without difficulty.  Patient denies chest pain or dizziness.  Patient denies recent illness, fever, or chills.  Patient denies shortness of breath.     In ED, lipase 7, glucose 131, sodium 131, chloride 96, calcium 8.4, albumin 2.9, alk phos 258, hemoglobin 11.5.  EKG completed showing normal sinus rhythm at a rate of 88 without ST elevation or depression with a prolonged QTc of 507 per ED physician review.  A CT of the abdomen and pelvis was completed showing hypo-enhancement of the left renal parenchyma compatible with extensive renal cortical infarcts as well as fluid in the endometrial canal concerning for cervical stenosis and worsening of a retroperitoneal metastatic lymphadenopathy as well as progression of hepatic  metastasis per radiology review.  Patient medicated with Zofran, morphine, LR x 1 L bolus in ED.  Blood pressure 149/62, heart rate 92, respirations 20, temperature 36.5 °C, SpO2 95% on room air.  Consult placed to vascular surgery and nephrology.  Patient started on a heparin drip.  Inpatient admission under the care of Dr. Lugo who will continue to follow.  I was asked to do H&P and place initial admission orders.     Prior medical  "records reviewed.     Past Medical History  Breast cancer, stage IV colon cancer, T2DM, anemia, hypertension, hyperlipidemia  Surgical History  Colonoscopy, tubal ligation, hip replacement, cataract surgery      Social History  Never smoker, no drug use, no alcohol use  Family History  Cataract, diabetes     Allergies  Penicillin, Penicillins, and Statins-hmg-coa reductase inhibitors     Review of Systems  A 10 point review of systems was completed and is negative except what is listed in HPI  Physical Exam  Constitutional:       Appearance: She is ill-appearing.   HENT:      Mouth/Throat:      Mouth: Mucous membranes are dry.      Pharynx: Oropharynx is clear.   Eyes:      Pupils: Pupils are equal, round, and reactive to light.   Cardiovascular:      Rate and Rhythm: Normal rate and regular rhythm.   Pulmonary:      Effort: Pulmonary effort is normal.      Breath sounds: Normal breath sounds.   Abdominal:      Palpations: Abdomen is soft.      Tenderness: There is abdominal tenderness in the periumbilical area and suprapubic area.      Comments: Right flank tenderness   Musculoskeletal:      Cervical back: Normal range of motion.   Skin:     General: Skin is warm and dry.   Neurological:      General: No focal deficit present.      Mental Status: She is alert.   Psychiatric:         Mood and Affect: Mood normal.         Behavior: Behavior normal.            Last Recorded Vitals  Blood pressure 126/64, pulse 100, temperature 36.7 °C (98.1 °F), resp. rate 18, height 1.626 m (5' 4\"), weight 81.6 kg (180 lb), SpO2 99%.     Relevant Results  CT abdomen pelvis w IV contrast     Result Date: 4/3/2025  Interpreted By:  Faraz Link, STUDY: CT ABDOMEN PELVIS W IV CONTRAST; ;  4/3/2025 12:30 am   INDICATION: Signs/Symptoms:stage IV colon cancer, now with R abd pain.     COMPARISON: 01/17/2025.   ACCESSION NUMBER(S): [id number]   ORDERING CLINICIAN: ELYSE KLERMAN   TECHNIQUE: Axial CT images of the abdomen and pelvis with " coronal and sagittal reconstructed images performed after intravenous administration of 80 cc Omnipaque 350.   FINDINGS: LOWER CHEST: No acute abnormality of the lung bases. BONES: No acute osseous abnormality. Osteopenia. Right hip arthroplasty in standard alignment. Similar appearance of mild-to-moderate chronic compression of the T11 vertebral body. Stable grade 1 degenerative anterolisthesis at L4-L5 and moderate to severe multilevel degenerative changes of the imaged spine. ABDOMINAL WALL: Within normal limits.   ABDOMEN:   LIVER: There is a large ill-defined region of hypoenhancement in the right hepatic lobe measuring up to 10.8 cm in transaxial diameter, up to at least 10.1 cm in CC diameter, suspicious for interval progression of the hepatic metastasis. There is a new hypoattenuating lesion inferolaterally in the inferior tip of the right hepatic lobe (series 601, images 57-63), measuring up to approximately 2.2 cm in transaxial diameter, suspicious for new metastatic lesion. Liver is enlarged, similar to before. BILE DUCTS: Normal caliber. GALLBLADDER: Cholelithiasis. No wall thickening or pericholecystic fluid. PANCREAS:Atrophic with pancreatic ductal dilation and irregularity and parenchymal calcifications in keeping with sequelae of chronic pancreatitis. SPLEEN: Within normal limits. ADRENALS: Within normal limits. KIDNEYS and URETERS: New extensive hypoenhancement/non enhancement of the left renal parenchyma compatible with extensive renal cortical infarcts. Right kidney and ureter appear normal.     VESSELS: Severe atherosclerosis, with critical narrowing or occlusion of the left renal artery. Severe narrowing of the right renal artery also noted. RETROPERITONEUM: Interval enlargement of celiac axis and portocaval nodes, suspicious for worsened metastatic disease.   PELVIS:   REPRODUCTIVE ORGANS: Endometrial fluid suggests cervical stenosis and is new from the prior exam. BLADDER: Within normal  limits.   BOWEL: When allowing for differences in patient positioning in slice selection, there is no substantial change in the very large mass encompassing the mid-transverse colon and several adjacent loops of small bowel and adjacent omentum and mesocolon, compatible with patient's known advanced malignancy. No dilated bowel to suggest obstruction. Stomach appears grossly normal. No definite additional abnormal bowel wall thickening. Normal appendix. PERITONEUM: No ascites or free air, no fluid collection.        New extensive hypoenhancement/non enhancement of the left renal parenchyma compatible with extensive renal cortical infarcts. Right kidney and ureter appear normal.   There is a large ill-defined region of hypoenhancement in the right hepatic lobe measuring up to 10.8 cm in transaxial diameter, up to at least 10.1 cm in CC diameter, suspicious for interval progression of the hepatic metastasis. There is a new hypoattenuating lesion inferolaterally in the inferior tip of the right hepatic lobe (series 601, images 57-63), measuring up to approximately 2.2 cm in transaxial diameter, suspicious for new metastatic lesion. Liver is enlarged, similar to before.   Interval worsening of retroperitoneal metastatic lymphadenopathy.   Cholelithiasis without convincing evidence of acute cholecystitis.   New fluid in the endometrial canal suggesting cervical stenosis.   Additional findings as discussed above.   MACRO: None   Signed by: Faraz Link 4/3/2025 12:52 AM Dictation workstation:   [id number]               Results for orders placed or performed during the hospital encounter of 04/02/25 (from the past 24 hours)  CBC and Auto Differential  Result  Value  Ref Range     WBC  11.1  4.4 - 11.3 x10*3/uL     nRBC  0.0  0.0 - 0.0 /100 WBCs     RBC  4.36  4.00 - 5.20 x10*6/uL     Hemoglobin  11.5 (L)  12.0 - 16.0 g/dL     Hematocrit  38.8  36.0 - 46.0 %     MCV  89  80 - 100 fL     MCH  26.4  26.0 - 34.0 pg      MCHC  29.6 (L)  32.0 - 36.0 g/dL     RDW  19.9 (H)  11.5 - 14.5 %     Platelets  198  150 - 450 x10*3/uL     Neutrophils %  84.1  40.0 - 80.0 %     Immature Granulocytes %, Automated  0.6  0.0 - 0.9 %     Lymphocytes %  9.5  13.0 - 44.0 %     Monocytes %  5.6  2.0 - 10.0 %     Eosinophils %  0.0  0.0 - 6.0 %     Basophils %  0.2  0.0 - 2.0 %     Neutrophils Absolute  9.34 (H)  1.60 - 5.50 x10*3/uL     Immature Granulocytes Absolute, Automated  0.07  0.00 - 0.50 x10*3/uL     Lymphocytes Absolute  1.06  0.80 - 3.00 x10*3/uL     Monocytes Absolute  0.62  0.05 - 0.80 x10*3/uL     Eosinophils Absolute  0.00  0.00 - 0.40 x10*3/uL     Basophils Absolute  0.02  0.00 - 0.10 x10*3/uL  Comprehensive metabolic panel  Result  Value  Ref Range     Glucose  131 (H)  74 - 99 mg/dL     Sodium  131 (L)  136 - 145 mmol/L     Potassium  4.6  3.5 - 5.3 mmol/L     Chloride  96 (L)  98 - 107 mmol/L     Bicarbonate  22  21 - 32 mmol/L     Anion Gap  18  10 - 20 mmol/L     Urea Nitrogen  21  6 - 23 mg/dL     Creatinine  0.89  0.50 - 1.05 mg/dL     eGFR  62  >60 mL/min/1.73m*2     Calcium  8.4 (L)  8.6 - 10.3 mg/dL     Albumin  2.9 (L)  3.4 - 5.0 g/dL     Alkaline Phosphatase  258 (H)  33 - 136 U/L     Total Protein  6.9  6.4 - 8.2 g/dL     AST  22  9 - 39 U/L     Bilirubin, Total  0.7  0.0 - 1.2 mg/dL     ALT  16  7 - 45 U/L  Lipase  Result  Value  Ref Range     Lipase  7 (L)  9 - 82 U/L  Lactate  Result  Value  Ref Range     Lactate  1.2  0.4 - 2.0 mmol/L  Magnesium  Result  Value  Ref Range     Magnesium  1.90  1.60 - 2.40 mg/dL  POCT GLUCOSE  Result  Value  Ref Range     POCT Glucose  129 (H)  74 - 99 mg/dL  POCT GLUCOSE  Result  Value  Ref Range     POCT Glucose  119 (H)  74 - 99 mg/dL  Protime-INR  Result  Value  Ref Range     Protime  17.4 (H)  9.8 - 12.4 seconds     INR  1.6 (H)  0.9 - 1.1  CBC  Result  Value  Ref Range     WBC  11.5 (H)  4.4 - 11.3 x10*3/uL     nRBC  0.0  0.0 - 0.0 /100 WBCs     RBC  4.08  4.00 - 5.20 x10*6/uL      Hemoglobin  10.9 (L)  12.0 - 16.0 g/dL     Hematocrit  36.7  36.0 - 46.0 %     MCV  90  80 - 100 fL     MCH  26.7  26.0 - 34.0 pg     MCHC  29.7 (L)  32.0 - 36.0 g/dL     RDW  19.9 (H)  11.5 - 14.5 %     Platelets  181  150 - 450 x10*3/uL  Basic Metabolic Panel  Result  Value  Ref Range     Glucose  129 (H)  74 - 99 mg/dL     Sodium  130 (L)  136 - 145 mmol/L     Potassium  4.2  3.5 - 5.3 mmol/L     Chloride  98  98 - 107 mmol/L     Bicarbonate  21  21 - 32 mmol/L     Anion Gap  15  10 - 20 mmol/L     Urea Nitrogen  19  6 - 23 mg/dL     Creatinine  0.85  0.50 - 1.05 mg/dL     eGFR  66  >60 mL/min/1.73m*2     Calcium  8.0 (L)  8.6 - 10.3 mg/dL  Heparin Assay, UFH  Result  Value  Ref Range     Heparin Unfractionated  1.1 (HH)  See Comment Below for Therapeutic Ranges IU/mL  POCT GLUCOSE  Result  Value  Ref Range     POCT Glucose  123 (H)  74 - 99 mg/dL           Assessment/Plan  Melissa is an 89-year-old female patient presenting to emergency department from skilled nursing facility for evaluation of abdominal pain and vomiting.  Patient states that she developed an 8 out of 10 mid to lower abdominal pain radiating to her right flank.  Patient with a history of breast cancer and colon cancer.  CT imaging showing extensive left renal cortical infarcts as well as worsening of retroperitoneal metastatic lymphadenopathy and progression of hepatic metastasis per radiology review.  Consults placed to nephrology and vascular surgery, patient started on a heparin drip, medicated with morphine and admitted for further medical management.     Assessment & Planuntitled image  untitled imageRenal infarct (Multi)     Hyponatremia     Hyponatremia/renal infarct  Inpatient admission to Dr. Lugo  See imaging results above  DNRCCA  N.p.o.  Consult nephrology and appreciate input  Consult vascular surgery and appreciate input  Coag screen ordered  Continue heparin drip  Continue gentle IV fluids  Repeat labs  Monitor heparin  assay  Morphine/Zofran in ED  Dilaudid IV as needed  Tigan IM as needed 2/2 prolonged Qtc     T2DM/breast cancer/colon cancer/anemia/HLD/HTN  #Chronic conditions  Insulin sliding scale for n.p.o.  Hypoglycemia protocol  Continue home MiraLAX and Colace  Continue home lisinopril daily  Continue home amlodipine daily  Continue home Estrace tablet     DVT Ppx  SCDs  Continue heparin drip  Bedrest/up to chair as tolerated  Patient fully evaluated on April 3.  Continue analgesics and await nephrology consultation.  Appreciate vascular surgery consultation.  Patient with significant metastatic cancer which comfort measures are in place.        I spent 60 minutes in the professional and overall care of this patient.  Tio Lugo MD                       Objective  Last Recorded Vitals  /64   Pulse 100   Temp 36.7 °C (98.1 °F)   Resp 18   Wt 81.6 kg (180 lb)   SpO2 99%   Intake/Output last 3 Shifts:     Intake/Output Summary (Last 24 hours) at 4/3/2025 1349  Last data filed at 4/3/2025 1000        Gross per 24 hour  Intake  551.83 ml  Output  --  Net  551.83 ml        Admission Weight  Weight: 81.6 kg (180 lb) (04/02/25 2207)     Daily Weight  04/02/25 : 81.6 kg (180 lb)     Image Results  CT abdomen pelvis w IV contrast  Narrative: Interpreted By:  Faraz Link,   STUDY:  CT ABDOMEN PELVIS W IV CONTRAST; ;  4/3/2025 12:30 am      INDICATION:  Signs/Symptoms:stage IV colon cancer, now with R abd pain.          COMPARISON:  01/17/2025.      ACCESSION NUMBER(S):  [id number]      ORDERING CLINICIAN:  ELYSE KLERMAN      TECHNIQUE:  Axial CT images of the abdomen and pelvis with coronal and sagittal  reconstructed images performed after intravenous administration of 80  cc Omnipaque 350.      FINDINGS:  LOWER CHEST: No acute abnormality of the lung bases.  BONES: No acute osseous abnormality. Osteopenia. Right hip  arthroplasty in standard alignment. Similar appearance of  mild-to-moderate chronic compression  of the T11 vertebral body.  Stable grade 1 degenerative anterolisthesis at L4-L5 and moderate to  severe multilevel degenerative changes of the imaged spine. ABDOMINAL  WALL: Within normal limits.      ABDOMEN:      LIVER: There is a large ill-defined region of hypoenhancement in the  right hepatic lobe measuring up to 10.8 cm in transaxial diameter, up  to at least 10.1 cm in CC diameter, suspicious for interval  progression of the hepatic metastasis. There is a new hypoattenuating  lesion inferolaterally in the inferior tip of the right hepatic lobe  (series 601, images 57-63), measuring up to approximately 2.2 cm in  transaxial diameter, suspicious for new metastatic lesion. Liver is  enlarged, similar to before. BILE DUCTS: Normal caliber.  GALLBLADDER: Cholelithiasis. No wall thickening or pericholecystic  fluid. PANCREAS:Atrophic with pancreatic ductal dilation and  irregularity and parenchymal calcifications in keeping with sequelae  of chronic pancreatitis. SPLEEN: Within normal limits.  ADRENALS: Within normal limits.  KIDNEYS and URETERS: New extensive hypoenhancement/non enhancement of  the left renal parenchyma compatible with extensive renal cortical  infarcts. Right kidney and ureter appear normal.          VESSELS: Severe atherosclerosis, with critical narrowing or occlusion  of the left renal artery. Severe narrowing of the right renal artery  also noted. RETROPERITONEUM: Interval enlargement of celiac axis and  portocaval nodes, suspicious for worsened metastatic disease.      PELVIS:      REPRODUCTIVE ORGANS: Endometrial fluid suggests cervical stenosis and  is new from the prior exam. BLADDER: Within normal limits.      BOWEL: When allowing for differences in patient positioning in slice  selection, there is no substantial change in the very large mass  encompassing the mid-transverse colon and several adjacent loops of  small bowel and adjacent omentum and mesocolon, compatible with  patient's  known advanced malignancy. No dilated bowel to suggest  obstruction. Stomach appears grossly normal. No definite additional  abnormal bowel wall thickening. Normal appendix. PERITONEUM: No  ascites or free air, no fluid collection.      Impression: New extensive hypoenhancement/non enhancement of the left renal  parenchyma compatible with extensive renal cortical infarcts. Right  kidney and ureter appear normal.      There is a large ill-defined region of hypoenhancement in the right  hepatic lobe measuring up to 10.8 cm in transaxial diameter, up to at  least 10.1 cm in CC diameter, suspicious for interval progression of  the hepatic metastasis. There is a new hypoattenuating lesion  inferolaterally in the inferior tip of the right hepatic lobe (series  601, images 57-63), measuring up to approximately 2.2 cm in  transaxial diameter, suspicious for new metastatic lesion. Liver is  enlarged, similar to before.      Interval worsening of retroperitoneal metastatic lymphadenopathy.      Cholelithiasis without convincing evidence of acute cholecystitis.      New fluid in the endometrial canal suggesting cervical stenosis.      Additional findings as discussed above.      MACRO:  None      Signed by: Faraz Link 4/3/2025 12:52 AM  Dictation workstation:   [id number]        Physical Exam  Constitutional:       Appearance: She is ill-appearing.   HENT:      Mouth/Throat:      Mouth: Mucous membranes are dry.      Pharynx: Oropharynx is clear.   Eyes:      Pupils: Pupils are equal, round, and reactive to light.   Cardiovascular:      Rate and Rhythm: Normal rate and regular rhythm.   Pulmonary:      Effort: Pulmonary effort is normal.      Breath sounds: Normal breath sounds.   Abdominal:      Palpations: Abdomen is soft.      Tenderness: There is abdominal tenderness in the periumbilical area and suprapubic area.      Comments: Right flank tenderness   Musculoskeletal:      Cervical back: Normal range of motion.   Skin:      General: Skin is warm and dry.   Neurological:      General: No focal deficit present.      Mental Status: She is alert.   Psychiatric:         Mood and Affect: Mood normal.         Behavior: Behavior normal.         Relevant Results  ?         ?               Assessment/Plan                       Assessment & Planuntitled image  untitled imageRenal infarct (Multi)     Hyponatremia     Hyponatremia/renal infarct  Inpatient admission to Dr. Lugo  See imaging results above  DNRCCA  N.p.o.  Consult nephrology and appreciate input  Consult vascular surgery and appreciate input  Coag screen ordered  Continue heparin drip  Continue gentle IV fluids  Repeat labs  Monitor heparin assay  Morphine/Zofran in ED  Dilaudid IV as needed  Tigan IM as needed 2/2 prolonged Qtc     T2DM/breast cancer/colon cancer/anemia/HLD/HTN  #Chronic conditions  Insulin sliding scale for n.p.o.  Hypoglycemia protocol  Continue home MiraLAX and Colace  Continue home lisinopril daily  Continue home amlodipine daily  Continue home Estrace tablet     DVT Ppx  SCDs  Continue heparin drip  Bedrest/up to chair as tolerated  Patient fully evaluated on April 3.  Continue analgesics and await nephrology consultation.  Appreciate vascular surgery consultation.  Patient with significant metastatic cancer which comfort measures are in place.      Patient fully evaluated on 4/4, resting in bed breathing comfortably on room air. Labs and medications reviewed. Estrase discontinued due to coagulation risk. Switched from heparin to Eliquis 2.5 BID for outpatient anticoagulation. Evaluated by vascular surgery, no surgery recommended at this time. Family declined heme-onc consult, not wishing to pursue aggressive chemotherapy treatment at this time. Continue comfort care for pain. Pyridium ordered for suprapubic pain. Per nursing staff, patient does not like Tigan, order switched to Zofran. Patient medically cleared for discharge to SNF today.                               Assessment/Plan                       Assessment & Plan  Renal infarct (Multi)     Hyponatremia     Hyponatremia/renal infarct  Inpatient admission to Dr. Lugo  See imaging results above  DNRCCA  N.p.o.  Consult nephrology and appreciate input  Consult vascular surgery and appreciate input  Coag screen ordered  Continue heparin drip  Continue gentle IV fluids  Repeat labs  Monitor heparin assay  Morphine/Zofran in ED  Dilaudid IV as needed  Tigan IM as needed 2/2 prolonged Qtc     T2DM/breast cancer/colon cancer/anemia/HLD/HTN  #Chronic conditions  Insulin sliding scale for n.p.o.  Hypoglycemia protocol  Continue home MiraLAX and Colace  Continue home lisinopril daily  Continue home amlodipine daily  Continue home Estrace tablet     DVT Ppx  SCDs  Continue heparin drip  Bedrest/up to chair as tolerated     Patient fully evaluated  04/05  for    Problem List Items Addressed This Visit         * (Principal) Renal infarct (Multi) - Primary      Other Visit Diagnoses         Metastatic malignant neoplasm, unspecified site (Multi)                 Patient seen resting in bed with head of bed elevated, no s/s or c/o acute difficulties at this time. Patient still with significant pain noted, medications adjusted, percocet added, will monitor overnight and repeat labs in the AM.      Vital signs for last 24 hours Temp:  [35.6 °C (96.1 °F)-36.5 °C (97.7 °F)] 36.1 °C (97 °F)  Heart Rate:  [96-98] 96  Resp:  [18] 18  BP: (114-148)/(55-67) 148/67    I/O this shift:  In: 220.8 [I.V.:220.8]  Out: -   Patient still requiring frequent cardiac and SPO2 monitoring. Continue aggressive pulmonary hygiene and oral hygiene. Off loading as tolerated for skin integrity. Medications and labs reviewed-             Results for orders placed or performed during the hospital encounter of 04/02/25 (from the past 24 hours)   POCT GLUCOSE   Result Value Ref Range     POCT Glucose 175 (H) 74 - 99 mg/dL   POCT GLUCOSE   Result Value Ref  Range     POCT Glucose 167 (H) 74 - 99 mg/dL   POCT GLUCOSE   Result Value Ref Range     POCT Glucose 148 (H) 74 - 99 mg/dL   POCT GLUCOSE   Result Value Ref Range     POCT Glucose 140 (H) 74 - 99 mg/dL   CBC and Auto Differential   Result Value Ref Range     WBC 12.8 (H) 4.4 - 11.3 x10*3/uL     nRBC 0.0 0.0 - 0.0 /100 WBCs     RBC 3.96 (L) 4.00 - 5.20 x10*6/uL     Hemoglobin 10.8 (L) 12.0 - 16.0 g/dL     Hematocrit 36.5 36.0 - 46.0 %     MCV 92 80 - 100 fL     MCH 27.3 26.0 - 34.0 pg     MCHC 29.6 (L) 32.0 - 36.0 g/dL     RDW 20.3 (H) 11.5 - 14.5 %     Platelets 173 150 - 450 x10*3/uL     Neutrophils % 84.7 40.0 - 80.0 %     Immature Granulocytes %, Automated 0.5 0.0 - 0.9 %     Lymphocytes % 7.9 13.0 - 44.0 %     Monocytes % 6.7 2.0 - 10.0 %     Eosinophils % 0.1 0.0 - 6.0 %     Basophils % 0.1 0.0 - 2.0 %     Neutrophils Absolute 10.86 (H) 1.60 - 5.50 x10*3/uL     Immature Granulocytes Absolute, Automated 0.06 0.00 - 0.50 x10*3/uL     Lymphocytes Absolute 1.01 0.80 - 3.00 x10*3/uL     Monocytes Absolute 0.86 (H) 0.05 - 0.80 x10*3/uL     Eosinophils Absolute 0.01 0.00 - 0.40 x10*3/uL     Basophils Absolute 0.01 0.00 - 0.10 x10*3/uL   Comprehensive Metabolic Panel   Result Value Ref Range     Glucose 134 (H) 74 - 99 mg/dL     Sodium 131 (L) 136 - 145 mmol/L     Potassium 5.2 3.5 - 5.3 mmol/L     Chloride 102 98 - 107 mmol/L     Bicarbonate 20 (L) 21 - 32 mmol/L     Anion Gap 14 10 - 20 mmol/L     Urea Nitrogen 26 (H) 6 - 23 mg/dL     Creatinine 0.85 0.50 - 1.05 mg/dL     eGFR 66 >60 mL/min/1.73m*2     Calcium 7.7 (L) 8.6 - 10.3 mg/dL     Albumin 2.2 (L) 3.4 - 5.0 g/dL     Alkaline Phosphatase 155 (H) 33 - 136 U/L     Total Protein 5.7 (L) 6.4 - 8.2 g/dL     AST 23 9 - 39 U/L     Bilirubin, Total 0.9 0.0 - 1.2 mg/dL     ALT 8 7 - 45 U/L   Morphology   Result Value Ref Range     RBC Morphology See Below       Polychromasia Mild       Ovalocytes Few       [place] Cells Few     POCT GLUCOSE   Result Value Ref Range      POCT Glucose 173 (H) 74 - 99 mg/dL      Patient recently received an antibiotic (last 12 hours)         None             Plan discussed with interdisciplinary team, will continue to monitor pain levels and monitor overnight. Will continue current and repeat labs in the AM.      Discharge planning discussed with patient and care team. Therapy evaluations ordered. Anticipate HHC/SNF at discharge. Patient aware and agreeable to current plan, continue plan as above.      I spent a total of 50 minutes on the date of the service which included preparing to see the patient, face-to-face patient care, completing clinical documentation, obtaining and/or reviewing separately obtained history, performing a medically appropriate examination, counseling and educating the patient/family/caregiver, ordering medications, tests, or procedures, communicating with other HCPs (not separately reported), independently interpreting results (not separately reported), communicating results to the patient/family/caregiver, and care coordination (not separately reported).                      Revision History       Patient fully evaluated on 4/7. Evaluated by nephrology, cyproheptadine added for appetite. Cyproheptadine discontinued today to avoid oversedation as patient is already on mirtazapine for appetite. LFTs elevated today, continue to monitor. No leukocytosis. Patient's family feels her pain is well-controlled with current regimen. Continuing hematuria noted in wall suction canister with flank pain, cannot rule out UTI. Cefazolin IV ordered. Urology consult placed. Appreciate recs re: possible bladder irrigation. Per daughter, patient also having hallucinations of seeing and hearing people who are not there. Plan to keep at least another day for monitoring.                   Relevant Results                          Assessment/Plan        Assessment & Plan  Renal infarct (Multi)     Hyponatremia     Hyponatremia/renal  infarct  Inpatient admission to Dr. Lugo  See imaging results above  DNRCCA  N.p.o.  Consult nephrology and appreciate input  Consult vascular surgery and appreciate input  Coag screen ordered  Continue heparin drip  Continue gentle IV fluids  Repeat labs  Monitor heparin assay  Morphine/Zofran in ED  Dilaudid IV as needed  Tigan IM as needed 2/2 prolonged Qtc     T2DM/breast cancer/colon cancer/anemia/HLD/HTN  #Chronic conditions  Insulin sliding scale for n.p.o.  Hypoglycemia protocol  Continue home MiraLAX and Colace  Continue home lisinopril daily  Continue home amlodipine daily  Continue home Estrace tablet     DVT Ppx  SCDs  Continue heparin drip  Bedrest/up to chair as tolerated     Patient fully evaluated on 4/8. Underwent successful bladder irrigation yesterday, evacuated 200 ml old bloody urine per urology note. Urine orange from pyridium, but remains clear. Collins maintained overnight, will remove and trial voiding today. Recommending outpatient cystoscopy if family wishes to pursue. Continue cefazolin IV. LFTs still elevated, but improved from yesterday. Thrombocytopenia noted, continue to monitor. Hemoglobin dropped to 10.0 from 11.4 yesterday, likely result of hematuria. Pain continues to be controlled with current regimen. Plan to stay at least one more day to monitor urine and labs.            CARLOS MOY                      Revision History         Pertinent Physical Exam At Time of Discharge  Physical Exam  Vitals and nursing note reviewed.       no acute events overnight, patient denies chest pain, worsening SOB at this time.  Outpatient Follow-Up  No future appointments.      Jo Dominique

## 2025-04-09 NOTE — PROGRESS NOTES
04/09/25 1015   Discharge Planning   Living Arrangements Other (Comment)  (From [facility])   Support Systems Children   Assistance Needed Patient is A&OX3, needs assistance with ADLs and uses a whelchair for mobility. PCP:Tio Lugo   Type of Residence Nursing home/residential care   Do you have animals or pets at home? No   Who is requesting discharge planning? Provider   Home or Post Acute Services Post acute facilities (Rehab/SNF/etc)   Type of Post Acute Facility Services Long term care   Expected Discharge Disposition Inter  (4/9-Return to [facility], no precert (needs confirmed with patient).)   Does the patient need discharge transport arranged? Yes   RoundTrip coordination needed? Yes   Has discharge transport been arranged? No   Patient Choice   Patient / Family choosing to utilize agency / facility established prior to hospitalization Yes   Stroke Family Assessment   Stroke Family Assessment Needed No   Intensity of Service   Intensity of Service 0-30 min   4/9/25: TCC working remote.

## 2025-04-09 NOTE — CARE PLAN
Problem: Pain - Adult  Goal: Verbalizes/displays adequate comfort level or baseline comfort level  Outcome: Progressing     Problem: Safety - Adult  Goal: Free from fall injury  Outcome: Progressing     Problem: Discharge Planning  Goal: Discharge to home or other facility with appropriate resources  Outcome: Progressing     Problem: Chronic Conditions and Co-morbidities  Goal: Patient's chronic conditions and co-morbidity symptoms are monitored and maintained or improved  Outcome: Progressing     Problem: Nutrition  Goal: Nutrient intake appropriate for maintaining nutritional needs  Outcome: Progressing     Problem: Fall/Injury  Goal: Not fall by end of shift  Outcome: Progressing  Goal: Be free from injury by end of the shift  Outcome: Progressing  Goal: Verbalize understanding of personal risk factors for fall in the hospital  Outcome: Progressing  Goal: Verbalize understanding of risk factor reduction measures to prevent injury from fall in the home  Outcome: Progressing  Goal: Use assistive devices by end of the shift  Outcome: Progressing  Goal: Pace activities to prevent fatigue by end of the shift  Outcome: Progressing     Problem: Skin  Goal: Decreased wound size/increased tissue granulation at next dressing change  Outcome: Progressing  Goal: Participates in plan/prevention/treatment measures  Outcome: Progressing  Goal: Prevent/manage excess moisture  Outcome: Progressing  Goal: Prevent/minimize sheer/friction injuries  Outcome: Progressing  Goal: Promote/optimize nutrition  Outcome: Progressing  Goal: Promote skin healing  Outcome: Progressing     Problem: Diabetes  Goal: Achieve decreasing blood glucose levels by end of shift  Outcome: Progressing  Goal: Increase stability of blood glucose readings by end of shift  Outcome: Progressing  Goal: Decrease in ketones present in urine by end of shift  Outcome: Progressing  Goal: Maintain electrolyte levels within acceptable range throughout shift  Outcome:  Progressing  Goal: Maintain glucose levels >70mg/dl to <250mg/dl throughout shift  Outcome: Progressing  Goal: No changes in neurological exam by end of shift  Outcome: Progressing  Goal: Learn about and adhere to nutrition recommendations by end of shift  Outcome: Progressing  Goal: Vital signs within normal range for age by end of shift  Outcome: Progressing  Goal: Increase self care and/or family involovement by end of shift  Outcome: Progressing  Goal: Receive DSME education by end of shift  Outcome: Progressing

## 2025-04-09 NOTE — PROGRESS NOTES
Subjective   Patient hematuria resolved.  Denies any voiced complaint  States shoulder pain has improved with Voltaren cream  CBC and renal chemistries are stable  Objective          Vitals 24HR  Heart Rate:  [71-97]   Temp:  [35 °C (95 °F)-35.8 °C (96.4 °F)]   Resp:  [18-20]   BP: (108-142)/(56-67)   SpO2:  [94 %-98 %]         Intake/Output last 3 Shifts:    Intake/Output Summary (Last 24 hours) at 4/9/2025 1217  Last data filed at 4/9/2025 0943  Gross per 24 hour   Intake 620 ml   Output 475 ml   Net 145 ml       Physical Exam  General appearance; chronically ill looking  Skin pallor   HEENT; pupils react to light and accommodation  Neck; no JVD no bruit no thyromegaly  Lungs; clear to auscultation and percussion  Heart; regular rate no gallop no rubs  Pulses are equal  Abdomen; active peristalsis no rebound or guarding is mild epigastric tenderness  ; mild left CVA tenderness  Extremities; decreased muscle tone poor skin turgor  Neurological; pathological reflexes are absent    Relevant Results  Results for orders placed or performed during the hospital encounter of 04/02/25 (from the past 24 hours)   POCT GLUCOSE   Result Value Ref Range    POCT Glucose 103 (H) 74 - 99 mg/dL   CBC and Auto Differential   Result Value Ref Range    WBC 9.0 4.4 - 11.3 x10*3/uL    nRBC 0.0 0.0 - 0.0 /100 WBCs    RBC 3.86 (L) 4.00 - 5.20 x10*6/uL    Hemoglobin 10.3 (L) 12.0 - 16.0 g/dL    Hematocrit 34.6 (L) 36.0 - 46.0 %    MCV 90 80 - 100 fL    MCH 26.7 26.0 - 34.0 pg    MCHC 29.8 (L) 32.0 - 36.0 g/dL    RDW 19.2 (H) 11.5 - 14.5 %    Platelets 78 (L) 150 - 450 x10*3/uL    Neutrophils % 75.8 40.0 - 80.0 %    Immature Granulocytes %, Automated 3.0 (H) 0.0 - 0.9 %    Lymphocytes % 12.1 13.0 - 44.0 %    Monocytes % 8.1 2.0 - 10.0 %    Eosinophils % 0.7 0.0 - 6.0 %    Basophils % 0.3 0.0 - 2.0 %    Neutrophils Absolute 6.85 (H) 1.60 - 5.50 x10*3/uL    Immature Granulocytes Absolute, Automated 0.27 0.00 - 0.50 x10*3/uL     Lymphocytes Absolute 1.09 0.80 - 3.00 x10*3/uL    Monocytes Absolute 0.73 0.05 - 0.80 x10*3/uL    Eosinophils Absolute 0.06 0.00 - 0.40 x10*3/uL    Basophils Absolute 0.03 0.00 - 0.10 x10*3/uL   Comprehensive Metabolic Panel   Result Value Ref Range    Glucose 134 (H) 74 - 99 mg/dL    Sodium 133 (L) 136 - 145 mmol/L    Potassium 3.7 3.5 - 5.3 mmol/L    Chloride 101 98 - 107 mmol/L    Bicarbonate 27 21 - 32 mmol/L    Anion Gap 9 (L) 10 - 20 mmol/L    Urea Nitrogen 29 (H) 6 - 23 mg/dL    Creatinine 0.83 0.50 - 1.05 mg/dL    eGFR 68 >60 mL/min/1.73m*2    Calcium 7.1 (L) 8.6 - 10.3 mg/dL    Albumin 1.9 (L) 3.4 - 5.0 g/dL    Alkaline Phosphatase 603 (H) 33 - 136 U/L    Total Protein 4.9 (L) 6.4 - 8.2 g/dL    AST 35 9 - 39 U/L    Bilirubin, Total 0.5 0.0 - 1.2 mg/dL    ALT 19 7 - 45 U/L   POCT GLUCOSE   Result Value Ref Range    POCT Glucose 128 (H) 74 - 99 mg/dL   POCT GLUCOSE   Result Value Ref Range    POCT Glucose 138 (H) 74 - 99 mg/dL                  Assessment/Plan      Left renal cortical infarct  Anorexia  Dysgeusia  Bilateral renal artery stenosis  Carcinoma with metastasis to the liver  Thrombophilia either acquired or secondary to cancer  Hyponatremia  Metabolic acidosis    Plan  Continue current therapy  Continue to monitor renal chemistries  Labs tomorrow  Assessment & Plan  Renal infarct (Multi)    Hyponatremia  Thank You For allowing me to participate on this patient's care         I spent 20 minutes in the professional and overall care of this patient.      Garry Pacheco MD

## 2025-04-09 NOTE — PROGRESS NOTES
"Melissa Tse is a 89 y.o. female on day 6 of admission presenting with Renal infarct (Multi).    Subjective   Follow-up hematuria  Her urine remained clear and the Collins catheter was removed early this morning.  Her postvoid residual is 0.       Objective     Physical Exam    Last Recorded Vitals  Blood pressure 129/59, pulse 77, temperature 35 °C (95 °F), temperature source Temporal, resp. rate 18, height 1.626 m (5' 4.02\"), weight 81.6 kg (179 lb 14.3 oz), SpO2 98%.  Intake/Output last 3 Shifts:  I/O last 3 completed shifts:  In: 460 (5.6 mL/kg) [P.O.:360; IV Piggyback:100]  Out: 3700 (45.3 mL/kg) [Urine:3700 (1.3 mL/kg/hr)]  Dosing Weight: 81.6 kg     Relevant Results  Scheduled medications  apixaban, 2.5 mg, oral, q12h  ceFAZolin, 2 g, intravenous, q8h  diclofenac sodium, 4 g, Topical, 4x daily  insulin lispro, 0-5 Units, subcutaneous, q6h  mirtazapine, 7.5 mg, oral, Nightly  phenazopyridine, 100 mg, oral, TID  polyethylene glycol, 17 g, oral, Daily  sennosides-docusate sodium, 1 tablet, oral, Nightly  tamsulosin, 0.4 mg, oral, Daily  thiamine, 100 mg, oral, Daily  triamcinolone, , Topical, BID  zinc sulfate, 50 mg of elemental zinc, oral, Daily      Continuous medications     PRN medications  PRN medications: cyclobenzaprine, dextrose, dextrose, glucagon, glucagon, hydrALAZINE, ondansetron, oxyCODONE-acetaminophen    Results for orders placed or performed during the hospital encounter of 04/02/25 (from the past 24 hours)   POCT GLUCOSE   Result Value Ref Range    POCT Glucose 130 (H) 74 - 99 mg/dL   POCT GLUCOSE   Result Value Ref Range    POCT Glucose 103 (H) 74 - 99 mg/dL   CBC and Auto Differential   Result Value Ref Range    WBC 9.0 4.4 - 11.3 x10*3/uL    nRBC 0.0 0.0 - 0.0 /100 WBCs    RBC 3.86 (L) 4.00 - 5.20 x10*6/uL    Hemoglobin 10.3 (L) 12.0 - 16.0 g/dL    Hematocrit 34.6 (L) 36.0 - 46.0 %    MCV 90 80 - 100 fL    MCH 26.7 26.0 - 34.0 pg    MCHC 29.8 (L) 32.0 - 36.0 g/dL    RDW 19.2 (H) 11.5 - 14.5 " %    Platelets 78 (L) 150 - 450 x10*3/uL    Neutrophils % 75.8 40.0 - 80.0 %    Immature Granulocytes %, Automated 3.0 (H) 0.0 - 0.9 %    Lymphocytes % 12.1 13.0 - 44.0 %    Monocytes % 8.1 2.0 - 10.0 %    Eosinophils % 0.7 0.0 - 6.0 %    Basophils % 0.3 0.0 - 2.0 %    Neutrophils Absolute 6.85 (H) 1.60 - 5.50 x10*3/uL    Immature Granulocytes Absolute, Automated 0.27 0.00 - 0.50 x10*3/uL    Lymphocytes Absolute 1.09 0.80 - 3.00 x10*3/uL    Monocytes Absolute 0.73 0.05 - 0.80 x10*3/uL    Eosinophils Absolute 0.06 0.00 - 0.40 x10*3/uL    Basophils Absolute 0.03 0.00 - 0.10 x10*3/uL   Comprehensive Metabolic Panel   Result Value Ref Range    Glucose 134 (H) 74 - 99 mg/dL    Sodium 133 (L) 136 - 145 mmol/L    Potassium 3.7 3.5 - 5.3 mmol/L    Chloride 101 98 - 107 mmol/L    Bicarbonate 27 21 - 32 mmol/L    Anion Gap 9 (L) 10 - 20 mmol/L    Urea Nitrogen 29 (H) 6 - 23 mg/dL    Creatinine 0.83 0.50 - 1.05 mg/dL    eGFR 68 >60 mL/min/1.73m*2    Calcium 7.1 (L) 8.6 - 10.3 mg/dL    Albumin 1.9 (L) 3.4 - 5.0 g/dL    Alkaline Phosphatase 603 (H) 33 - 136 U/L    Total Protein 4.9 (L) 6.4 - 8.2 g/dL    AST 35 9 - 39 U/L    Bilirubin, Total 0.5 0.0 - 1.2 mg/dL    ALT 19 7 - 45 U/L   POCT GLUCOSE   Result Value Ref Range    POCT Glucose 128 (H) 74 - 99 mg/dL       Imaging  CT abdomen pelvis w IV contrast    Result Date: 4/3/2025  New extensive hypoenhancement/non enhancement of the left renal parenchyma compatible with extensive renal cortical infarcts. Right kidney and ureter appear normal.   There is a large ill-defined region of hypoenhancement in the right hepatic lobe measuring up to 10.8 cm in transaxial diameter, up to at least 10.1 cm in CC diameter, suspicious for interval progression of the hepatic metastasis. There is a new hypoattenuating lesion inferolaterally in the inferior tip of the right hepatic lobe (series 601, images [id number]), measuring up to approximately 2.2 cm in transaxial diameter, suspicious for new  metastatic lesion. Liver is enlarged, similar to before.   Interval worsening of retroperitoneal metastatic lymphadenopathy.   Cholelithiasis without convincing evidence of acute cholecystitis.   New fluid in the endometrial canal suggesting cervical stenosis.   Additional findings as discussed above.   MACRO: None   Signed by: Faraz Link 4/3/2025 12:52 AM Dictation workstation:   [id number]     Cardiology, Vascular, and Other Imaging  Electrocardiogram, 12-lead PRN ACS symptoms    Result Date: 4/5/2025  Unknown rhythm, irregular rate Probable lateral infarct, old See ED provider note for full interpretation and clinical correlation Confirmed by Rivka Manzano ([id number]) on 4/5/2025 10:05:09 AM                              Assessment & Plan  Renal infarct (Multi)    Hyponatremia    Follow-up hematuria  - The gross hematuria has resolved.  Plan is for outpatient cystoscopy.  - Recommend following up in 1 to 2 weeks after discharge with [facility] urology      I spent 20 minutes in the professional and overall care of this patient.      Pool Walker PA-C

## 2025-04-09 NOTE — ASSESSMENT & PLAN NOTE
Hyponatremia/renal infarct  Inpatient admission   See imaging results above  DNRCCA  N.p.o.  Consult nephrology and appreciate input  Consult vascular surgery and appreciate input  Coag screen ordered  Continue heparin drip  Continue gentle IV fluids  Repeat labs  Monitor heparin assay  Morphine/Zofran in ED  Dilaudid IV as needed  Tigan IM as needed 2/2 prolonged Qtc    T2DM/breast cancer/colon cancer/anemia/HLD/HTN  #Chronic conditions  Insulin sliding scale for n.p.o.  Hypoglycemia protocol  Continue home MiraLAX and Colace  Continue home lisinopril daily  Continue home amlodipine daily  Continue home Estrace tablet    DVT Ppx  SCDs  Continue heparin drip  Bedrest/up to chair as tolerated

## 2025-04-09 NOTE — PROGRESS NOTES
Melissa Tse is a 89 y.o. female on day 6 of admission presenting with Renal infarct (Multi).      Subjective   No significant events overnight. Patient seen and evaluated at bedside. Breathing on room air. States she feels her pain is well controlled today.        Objective     Last Recorded Vitals  /66   Pulse 77   Temp 35 °C (95 °F) (Temporal)   Resp 18   Wt 81.6 kg (179 lb 14.3 oz)   SpO2 97%   Intake/Output last 3 Shifts:    Intake/Output Summary (Last 24 hours) at 4/9/2025 1305  Last data filed at 4/9/2025 0943  Gross per 24 hour   Intake 620 ml   Output 475 ml   Net 145 ml       Admission Weight  Weight: 81.6 kg (180 lb) (04/02/25 2207)    Daily Weight  04/04/25 : 81.6 kg (179 lb 14.3 oz)    Image Results  Electrocardiogram, 12-lead PRN ACS symptoms  Unknown rhythm, irregular rate  Probable lateral infarct, old    See ED provider note for full interpretation and clinical correlation  Confirmed by Rivka Manazno ([id number]) on 4/5/2025 10:05:09 AM      Physical Exam        Tio Lugo MD   Physician  Internal Medicine     Progress Notes     Signed     Date of Service: 4/7/2025  3:03 PM     Signed       Expand All Collapse All    Melissa Tse is a 89 y.o. female on day 4 of admission presenting with Renal infarct (Multi).           Subjective  No significant events overnight. Patient seen and evaluated at bedside.               Objective[]Expand by Default  Last Recorded Vitals  BP 92/55 (BP Location: Left arm, Patient Position: Lying)   Pulse (!) 112   Temp 35.5 °C (95.9 °F) (Temporal)   Resp 18   Wt 81.6 kg (179 lb 14.3 oz)   SpO2 92%   Intake/Output last 3 Shifts:     Intake/Output Summary (Last 24 hours) at 4/7/2025 1503  Last data filed at 4/7/2025 1257      Gross per 24 hour   Intake 300 ml   Output 500 ml   Net -200 ml         Admission Weight  Weight: 81.6 kg (180 lb) (04/02/25 2207)     Daily Weight  04/04/25 : 81.6 kg (179 lb 14.3 oz)     Image Results  Electrocardiogram, 12-lead PRN  ACS symptoms  Unknown rhythm, irregular rate  Probable lateral infarct, old     See ED provider note for full interpretation and clinical correlation  Confirmed by Rivka Manzano ([id number]) on 4/5/2025 10:05:09 AM        Physical Exam     MD Tio Hodges MD    Physician  Internal Medicine     Progress Notes   This note has been shared with the patient  Signed     Date of Service: 4/5/2025  3:59 PM  Signed   Expand All Collapse All     Melissa Tse is a 89 y.o. female on day 2 of admission presenting with Renal infarct (Multi).           Subjective  Patient fully evaluated  04/05for    Problem List Items Addressed This Visit         * (Principal) Renal infarct (Multi) - Primary     Other Visit Diagnoses         Metastatic malignant neoplasm, unspecified site (Multi)             Patient seen resting in bed with head of bed elevated, no s/s or c/o acute difficulties at this time. Patient still with significant pain noted, medications adjusted, percocet added, will monitor overnight and repeat labs in the AM.  Vital signs for last 24 hours Temp:  [35.6 °C (96.1 °F)-36.5 °C (97.7 °F)] 36.1 °C (97 °F)Heart Rate:  [96-98] 96Resp:  [18] 18BP: (114-148)/(55-67) 148/67  I/O this shift:In: 220.8 [I.V.:220.8]Out: - Patient still requiring frequent cardiac and SPO2 monitoring. Continue aggressive pulmonary hygiene and oral hygiene. Off loading as tolerated for skin integrity. Medications and labs reviewed-   Results for orders placed or performed during the hospital encounter of 04/02/25 (from the past 24 hours)  POCT GLUCOSE  Result  Value  Ref Range     POCT Glucose  175 (H)  74 - 99 mg/dL  POCT GLUCOSE  Result  Value  Ref Range     POCT Glucose  167 (H)  74 - 99 mg/dL  POCT GLUCOSE  Result  Value  Ref Range     POCT Glucose  148 (H)  74 - 99 mg/dL  POCT GLUCOSE  Result  Value  Ref Range     POCT Glucose  140 (H)  74 - 99 mg/dL  CBC and Auto Differential  Result  Value  Ref Range     WBC  12.8 (H)  4.4 -  11.3 x10*3/uL     nRBC  0.0  0.0 - 0.0 /100 WBCs     RBC  3.96 (L)  4.00 - 5.20 x10*6/uL     Hemoglobin  10.8 (L)  12.0 - 16.0 g/dL     Hematocrit  36.5  36.0 - 46.0 %     MCV  92  80 - 100 fL     MCH  27.3  26.0 - 34.0 pg     MCHC  29.6 (L)  32.0 - 36.0 g/dL     RDW  20.3 (H)  11.5 - 14.5 %     Platelets  173  150 - 450 x10*3/uL     Neutrophils %  84.7  40.0 - 80.0 %     Immature Granulocytes %, Automated  0.5  0.0 - 0.9 %     Lymphocytes %  7.9  13.0 - 44.0 %     Monocytes %  6.7  2.0 - 10.0 %     Eosinophils %  0.1  0.0 - 6.0 %     Basophils %  0.1  0.0 - 2.0 %     Neutrophils Absolute  10.86 (H)  1.60 - 5.50 x10*3/uL     Immature Granulocytes Absolute, Automated  0.06  0.00 - 0.50 x10*3/uL     Lymphocytes Absolute  1.01  0.80 - 3.00 x10*3/uL     Monocytes Absolute  0.86 (H)  0.05 - 0.80 x10*3/uL     Eosinophils Absolute  0.01  0.00 - 0.40 x10*3/uL     Basophils Absolute  0.01  0.00 - 0.10 x10*3/uL  Comprehensive Metabolic Panel  Result  Value  Ref Range     Glucose  134 (H)  74 - 99 mg/dL     Sodium  131 (L)  136 - 145 mmol/L     Potassium  5.2  3.5 - 5.3 mmol/L     Chloride  102  98 - 107 mmol/L     Bicarbonate  20 (L)  21 - 32 mmol/L     Anion Gap  14  10 - 20 mmol/L     Urea Nitrogen  26 (H)  6 - 23 mg/dL     Creatinine  0.85  0.50 - 1.05 mg/dL     eGFR  66  >60 mL/min/1.73m*2     Calcium  7.7 (L)  8.6 - 10.3 mg/dL     Albumin  2.2 (L)  3.4 - 5.0 g/dL     Alkaline Phosphatase  155 (H)  33 - 136 U/L     Total Protein  5.7 (L)  6.4 - 8.2 g/dL     AST  23  9 - 39 U/L     Bilirubin, Total  0.9  0.0 - 1.2 mg/dL     ALT  8  7 - 45 U/L  Morphology  Result  Value  Ref Range     RBC Morphology  See Below        Polychromasia  Mild        Ovalocytes  Few        N[place]omis Cells  Few     POCT GLUCOSE  Result  Value  Ref Range     POCT Glucose  173 (H)  74 - 99 mg/dL     Patient recently received an antibiotic (last 12 hours)         None         Plan discussed with interdisciplinary team, will continue to monitor pain levels  and monitor overnight. Will continue current and repeat labs in the AM.  Discharge planning discussed with patient and care team. Therapy evaluations ordered. Anticipate HHC/SNF at discharge. Patient aware and agreeable to current plan, continue plan as above.  I spent a total of 50 minutes on the date of the service which included preparing to see the patient, face-to-face patient care, completing clinical documentation, obtaining and/or reviewing separately obtained history, performing a medically appropriate examination, counseling and educating the patient/family/caregiver, ordering medications, tests, or procedures, communicating with other HCPs (not separately reported), independently interpreting results (not separately reported), communicating results to the patient/family/caregiver, and care coordination (not separately reported).             ObjectiveExpand by Default  Last Recorded Vitals  /67 (BP Location: Right leg, Patient Position: Lying)   Pulse 96   Temp 36.1 °C (97 °F) (Temporal)   Resp 18   Wt 81.6 kg (179 lb 14.3 oz)   SpO2 93%   Intake/Output last 3 Shifts:     Intake/Output Summary (Last 24 hours) at 4/5/2025 1559  Last data filed at 4/5/2025 1301  Gross per 24 hour  Intake  1036.67 ml  Output  200 ml  Net  836.67 ml        Admission Weight  Weight: 81.6 kg (180 lb) (04/02/25 2207)     Daily Weight  04/04/25 : 81.6 kg (179 lb 14.3 oz)     Image Results  Electrocardiogram, 12-lead PRN ACS symptoms  Unknown rhythm, irregular rate  Probable lateral infarct, old     See ED provider note for full interpretation and clinical correlation  Confirmed by Rivka Manzano ([id number]) on 4/5/2025 10:05:09 AM        Physical Exam     Relevant Results                       Assessment/Plan     Assessment & Plan  Renal infarct (Multi)     Hyponatremia     Hyponatremia/renal infarct  Inpatient admission to Dr. Lugo  See imaging results above  DNRCCA  N.p.o.  Consult nephrology and appreciate input  Consult  vascular surgery and appreciate input  Coag screen ordered  Continue heparin drip  Continue gentle IV fluids  Repeat labs  Monitor heparin assay  Morphine/Zofran in ED  Dilaudid IV as needed  Tigan IM as needed 2/2 prolonged Qtc     T2DM/breast cancer/colon cancer/anemia/HLD/HTN  #Chronic conditions  Insulin sliding scale for n.p.o.  Hypoglycemia protocol  Continue home MiraLAX and Colace  Continue home lisinopril daily  Continue home amlodipine daily  Continue home Estrace tablet     DVT Ppx  SCDs  Continue heparin drip  Bedrest/up to chair as tolerated  Patient fully evaluated on April 3.  Continue analgesics and await nephrology consultation.  Appreciate vascular surgery consultation.  Patient with significant metastatic cancer which comfort measures are in place.        MD Tio Hodges MD untitled image  Physician  Internal Medicine     Progress Notes   This note has been shared with the patient  Addendum     Date of Service: 4/3/2025  1:49 PM     Addendum     Expand All Collapse All  untitled image  Patient fully evaluated on 4/3. Patient resting in bed, breathing comfortably on 3L O2 via nasal cannula. Accompanied by daughter in the room. Evaluated today by nephrology, lisinopril discontinued d/t jr renal artery stenosis, flomax added. Hypertension will be treated with prn medications to metoprolol/apresoline IV. Continue heparin drip. Also evaluated by vascular surgery, no surgery recommended at this time. Recommended consult to heme-onc, family declined patient being seen by heme-onc - not wishing to pursue aggressive chemotherapy treatment at this time. Labs reviewed. Mild hyponatremia noted, continue sodium chloride drip. Being given comfort measures for pain, currently receiving dilaudid, morphine.     Problem List Items Addressed This Visit                     Genitourinary and Reproductive     * (Principal) Renal infarct (Multi) - Primary     Other Visit Diagnoses          Metastatic malignant neoplasm, unspecified site (Multi)               Patient seen resting in bed with head of bed elevated, no s/s or c/o acute difficulties at this time.  Vital signs for last 24 hours Temp:  [36.5 °C (97.7 °F)-36.7 °C (98.1 °F)] 36.7 °C (98.1 °F)  Heart Rate:  [] 100  Resp:  [16-20] 18  BP: (125-175)/(60-74) 126/64    I/O this shift:  In: 551.8 [I.V.:551.8]  Out: -   Patient still requiring frequent cardiac and SPO2 monitoring. Continue aggressive pulmonary hygiene and oral hygiene. Off loading as tolerated for skin integrity. Medications and labs reviewed-               Results for orders placed or performed during the hospital encounter of 04/02/25 (from the past 24 hours)  CBC and Auto Differential  Result  Value  Ref Range     WBC  11.1  4.4 - 11.3 x10*3/uL     nRBC  0.0  0.0 - 0.0 /100 WBCs     RBC  4.36  4.00 - 5.20 x10*6/uL     Hemoglobin  11.5 (L)  12.0 - 16.0 g/dL     Hematocrit  38.8  36.0 - 46.0 %     MCV  89  80 - 100 fL     MCH  26.4  26.0 - 34.0 pg     MCHC  29.6 (L)  32.0 - 36.0 g/dL     RDW  19.9 (H)  11.5 - 14.5 %     Platelets  198  150 - 450 x10*3/uL     Neutrophils %  84.1  40.0 - 80.0 %     Immature Granulocytes %, Automated  0.6  0.0 - 0.9 %     Lymphocytes %  9.5  13.0 - 44.0 %     Monocytes %  5.6  2.0 - 10.0 %     Eosinophils %  0.0  0.0 - 6.0 %     Basophils %  0.2  0.0 - 2.0 %     Neutrophils Absolute  9.34 (H)  1.60 - 5.50 x10*3/uL     Immature Granulocytes Absolute, Automated  0.07  0.00 - 0.50 x10*3/uL     Lymphocytes Absolute  1.06  0.80 - 3.00 x10*3/uL     Monocytes Absolute  0.62  0.05 - 0.80 x10*3/uL     Eosinophils Absolute  0.00  0.00 - 0.40 x10*3/uL     Basophils Absolute  0.02  0.00 - 0.10 x10*3/uL  Comprehensive metabolic panel  Result  Value  Ref Range     Glucose  131 (H)  74 - 99 mg/dL     Sodium  131 (L)  136 - 145 mmol/L     Potassium  4.6  3.5 - 5.3 mmol/L     Chloride  96 (L)  98 - 107 mmol/L     Bicarbonate  22  21 - 32 mmol/L     Anion  Gap  18  10 - 20 mmol/L     Urea Nitrogen  21  6 - 23 mg/dL     Creatinine  0.89  0.50 - 1.05 mg/dL     eGFR  62  >60 mL/min/1.73m*2     Calcium  8.4 (L)  8.6 - 10.3 mg/dL     Albumin  2.9 (L)  3.4 - 5.0 g/dL     Alkaline Phosphatase  258 (H)  33 - 136 U/L     Total Protein  6.9  6.4 - 8.2 g/dL     AST  22  9 - 39 U/L     Bilirubin, Total  0.7  0.0 - 1.2 mg/dL     ALT  16  7 - 45 U/L  Lipase  Result  Value  Ref Range     Lipase  7 (L)  9 - 82 U/L  Lactate  Result  Value  Ref Range     Lactate  1.2  0.4 - 2.0 mmol/L  Magnesium  Result  Value  Ref Range     Magnesium  1.90  1.60 - 2.40 mg/dL  POCT GLUCOSE  Result  Value  Ref Range     POCT Glucose  129 (H)  74 - 99 mg/dL  POCT GLUCOSE  Result  Value  Ref Range     POCT Glucose  119 (H)  74 - 99 mg/dL  Protime-INR  Result  Value  Ref Range     Protime  17.4 (H)  9.8 - 12.4 seconds     INR  1.6 (H)  0.9 - 1.1  CBC  Result  Value  Ref Range     WBC  11.5 (H)  4.4 - 11.3 x10*3/uL     nRBC  0.0  0.0 - 0.0 /100 WBCs     RBC  4.08  4.00 - 5.20 x10*6/uL     Hemoglobin  10.9 (L)  12.0 - 16.0 g/dL     Hematocrit  36.7  36.0 - 46.0 %     MCV  90  80 - 100 fL     MCH  26.7  26.0 - 34.0 pg     MCHC  29.7 (L)  32.0 - 36.0 g/dL     RDW  19.9 (H)  11.5 - 14.5 %     Platelets  181  150 - 450 x10*3/uL  Basic Metabolic Panel  Result  Value  Ref Range     Glucose  129 (H)  74 - 99 mg/dL     Sodium  130 (L)  136 - 145 mmol/L     Potassium  4.2  3.5 - 5.3 mmol/L     Chloride  98  98 - 107 mmol/L     Bicarbonate  21  21 - 32 mmol/L     Anion Gap  15  10 - 20 mmol/L     Urea Nitrogen  19  6 - 23 mg/dL     Creatinine  0.85  0.50 - 1.05 mg/dL     eGFR  66  >60 mL/min/1.73m*2     Calcium  8.0 (L)  8.6 - 10.3 mg/dL  Heparin Assay, UFH  Result  Value  Ref Range     Heparin Unfractionated  1.1 (HH)  See Comment Below for Therapeutic Ranges IU/mL  POCT GLUCOSE  Result  Value  Ref Range     POCT Glucose  123 (H)  74 - 99 mg/dL     Patient recently received an antibiotic (last 12 hours)          None              Plan discussed with interdisciplinary team, continue current and repeat labs in the AM.      Patient aware and agreeable to current plan, continue plan as above.      I spent a total of 50 minutes on the date of the service which included preparing to see the patient, face-to-face patient care, completing clinical documentation, obtaining and/or reviewing separately obtained history, performing a medically appropriate examination, counseling and educating the patient/family/caregiver, ordering medications, tests, or procedures, communicating with other HCPs (not separately reported), independently interpreting results (not separately reported), communicating results to the patient/family/caregiver, and care coordination (not separately reported). Melissa Tse is a 89 y.o. female on day 0 of admission presenting with Renal infarct (Multi).           Subjective     MD Tio Hodges MD untitled image  Physician  Internal Medicine     H&P   This note has been shared with the patient  Signed     Date of Service: 4/3/2025 12:43 PM     Signed     Expand All Collapse All  untitled image  History Of Present Illness  Melissa Tse is a 89 y.o. female with a past medical history significant for breast cancer and stage IV colon cancer presenting to emergency department from [facility] for evaluation of abdominal pain.  Patient also reports an episode of vomiting this morning and states she has been unable to keep anything down today and complains of  a pressure in the mid to lower abdomen that radiates to her right flank.  Patient reports that her doctor will only give her Tylenol for pain and that is not working.  Patient states that her last bowel movement was 4/1/2025 without difficulty.  Patient denies chest pain or dizziness.  Patient denies recent illness, fever, or chills.  Patient denies shortness of breath.     In ED, lipase 7, glucose 131, sodium 131,  chloride 96, calcium 8.4, albumin 2.9, alk phos 258, hemoglobin 11.5.  EKG completed showing normal sinus rhythm at a rate of 88 without ST elevation or depression with a prolonged QTc of 507 per ED physician review.  A CT of the abdomen and pelvis was completed showing hypo-enhancement of the left renal parenchyma compatible with extensive renal cortical infarcts as well as fluid in the endometrial canal concerning for cervical stenosis and worsening of a retroperitoneal metastatic lymphadenopathy as well as progression of hepatic  metastasis per radiology review.  Patient medicated with Zofran, morphine, LR x 1 L bolus in ED.  Blood pressure 149/62, heart rate 92, respirations 20, temperature 36.5 °C, SpO2 95% on room air.  Consult placed to vascular surgery and nephrology.  Patient started on a heparin drip.  Inpatient admission under the care of Dr. Lugo who will continue to follow.  I was asked to do H&P and place initial admission orders.     Prior medical records reviewed.     Past Medical History  Breast cancer, stage IV colon cancer, T2DM, anemia, hypertension, hyperlipidemia  Surgical History  Colonoscopy, tubal ligation, hip replacement, cataract surgery      Social History  Never smoker, no drug use, no alcohol use  Family History  Cataract, diabetes     Allergies  Penicillin, Penicillins, and Statins-hmg-coa reductase inhibitors     Review of Systems  A 10 point review of systems was completed and is negative except what is listed in HPI  Physical Exam  Constitutional:       Appearance: She is ill-appearing.   HENT:      Mouth/Throat:      Mouth: Mucous membranes are dry.      Pharynx: Oropharynx is clear.   Eyes:      Pupils: Pupils are equal, round, and reactive to light.   Cardiovascular:      Rate and Rhythm: Normal rate and regular rhythm.   Pulmonary:      Effort: Pulmonary effort is normal.      Breath sounds: Normal breath sounds.   Abdominal:      Palpations: Abdomen is soft.      Tenderness:  "There is abdominal tenderness in the periumbilical area and suprapubic area.      Comments: Right flank tenderness   Musculoskeletal:      Cervical back: Normal range of motion.   Skin:     General: Skin is warm and dry.   Neurological:      General: No focal deficit present.      Mental Status: She is alert.   Psychiatric:         Mood and Affect: Mood normal.         Behavior: Behavior normal.            Last Recorded Vitals  Blood pressure 126/64, pulse 100, temperature 36.7 °C (98.1 °F), resp. rate 18, height 1.626 m (5' 4\"), weight 81.6 kg (180 lb), SpO2 99%.     Relevant Results  CT abdomen pelvis w IV contrast     Result Date: 4/3/2025  Interpreted By:  Faraz Link, STUDY: CT ABDOMEN PELVIS W IV CONTRAST; ;  4/3/2025 12:30 am   INDICATION: Signs/Symptoms:stage IV colon cancer, now with R abd pain.     COMPARISON: 01/17/2025.   ACCESSION NUMBER(S): [id number]   ORDERING CLINICIAN: ELYSE KLERMAN   TECHNIQUE: Axial CT images of the abdomen and pelvis with coronal and sagittal reconstructed images performed after intravenous administration of 80 cc Omnipaque 350.   FINDINGS: LOWER CHEST: No acute abnormality of the lung bases. BONES: No acute osseous abnormality. Osteopenia. Right hip arthroplasty in standard alignment. Similar appearance of mild-to-moderate chronic compression of the T11 vertebral body. Stable grade 1 degenerative anterolisthesis at L4-L5 and moderate to severe multilevel degenerative changes of the imaged spine. ABDOMINAL WALL: Within normal limits.   ABDOMEN:   LIVER: There is a large ill-defined region of hypoenhancement in the right hepatic lobe measuring up to 10.8 cm in transaxial diameter, up to at least 10.1 cm in CC diameter, suspicious for interval progression of the hepatic metastasis. There is a new hypoattenuating lesion inferolaterally in the inferior tip of the right hepatic lobe (series 601, images 57-63), measuring up to approximately 2.2 cm in transaxial diameter, " suspicious for new metastatic lesion. Liver is enlarged, similar to before. BILE DUCTS: Normal caliber. GALLBLADDER: Cholelithiasis. No wall thickening or pericholecystic fluid. PANCREAS:Atrophic with pancreatic ductal dilation and irregularity and parenchymal calcifications in keeping with sequelae of chronic pancreatitis. SPLEEN: Within normal limits. ADRENALS: Within normal limits. KIDNEYS and URETERS: New extensive hypoenhancement/non enhancement of the left renal parenchyma compatible with extensive renal cortical infarcts. Right kidney and ureter appear normal.     VESSELS: Severe atherosclerosis, with critical narrowing or occlusion of the left renal artery. Severe narrowing of the right renal artery also noted. RETROPERITONEUM: Interval enlargement of celiac axis and portocaval nodes, suspicious for worsened metastatic disease.   PELVIS:   REPRODUCTIVE ORGANS: Endometrial fluid suggests cervical stenosis and is new from the prior exam. BLADDER: Within normal limits.   BOWEL: When allowing for differences in patient positioning in slice selection, there is no substantial change in the very large mass encompassing the mid-transverse colon and several adjacent loops of small bowel and adjacent omentum and mesocolon, compatible with patient's known advanced malignancy. No dilated bowel to suggest obstruction. Stomach appears grossly normal. No definite additional abnormal bowel wall thickening. Normal appendix. PERITONEUM: No ascites or free air, no fluid collection.        New extensive hypoenhancement/non enhancement of the left renal parenchyma compatible with extensive renal cortical infarcts. Right kidney and ureter appear normal.   There is a large ill-defined region of hypoenhancement in the right hepatic lobe measuring up to 10.8 cm in transaxial diameter, up to at least 10.1 cm in CC diameter, suspicious for interval progression of the hepatic metastasis. There is a new hypoattenuating lesion  inferolaterally in the inferior tip of the right hepatic lobe (series 601, images 57-63), measuring up to approximately 2.2 cm in transaxial diameter, suspicious for new metastatic lesion. Liver is enlarged, similar to before.   Interval worsening of retroperitoneal metastatic lymphadenopathy.   Cholelithiasis without convincing evidence of acute cholecystitis.   New fluid in the endometrial canal suggesting cervical stenosis.   Additional findings as discussed above.   MACRO: None   Signed by: Faraz Link 4/3/2025 12:52 AM Dictation workstation:   [id number]               Results for orders placed or performed during the hospital encounter of 04/02/25 (from the past 24 hours)  CBC and Auto Differential  Result  Value  Ref Range     WBC  11.1  4.4 - 11.3 x10*3/uL     nRBC  0.0  0.0 - 0.0 /100 WBCs     RBC  4.36  4.00 - 5.20 x10*6/uL     Hemoglobin  11.5 (L)  12.0 - 16.0 g/dL     Hematocrit  38.8  36.0 - 46.0 %     MCV  89  80 - 100 fL     MCH  26.4  26.0 - 34.0 pg     MCHC  29.6 (L)  32.0 - 36.0 g/dL     RDW  19.9 (H)  11.5 - 14.5 %     Platelets  198  150 - 450 x10*3/uL     Neutrophils %  84.1  40.0 - 80.0 %     Immature Granulocytes %, Automated  0.6  0.0 - 0.9 %     Lymphocytes %  9.5  13.0 - 44.0 %     Monocytes %  5.6  2.0 - 10.0 %     Eosinophils %  0.0  0.0 - 6.0 %     Basophils %  0.2  0.0 - 2.0 %     Neutrophils Absolute  9.34 (H)  1.60 - 5.50 x10*3/uL     Immature Granulocytes Absolute, Automated  0.07  0.00 - 0.50 x10*3/uL     Lymphocytes Absolute  1.06  0.80 - 3.00 x10*3/uL     Monocytes Absolute  0.62  0.05 - 0.80 x10*3/uL     Eosinophils Absolute  0.00  0.00 - 0.40 x10*3/uL     Basophils Absolute  0.02  0.00 - 0.10 x10*3/uL  Comprehensive metabolic panel  Result  Value  Ref Range     Glucose  131 (H)  74 - 99 mg/dL     Sodium  131 (L)  136 - 145 mmol/L     Potassium  4.6  3.5 - 5.3 mmol/L     Chloride  96 (L)  98 - 107 mmol/L     Bicarbonate  22  21 - 32 mmol/L     Anion Gap  18  10 - 20 mmol/L      Urea Nitrogen  21  6 - 23 mg/dL     Creatinine  0.89  0.50 - 1.05 mg/dL     eGFR  62  >60 mL/min/1.73m*2     Calcium  8.4 (L)  8.6 - 10.3 mg/dL     Albumin  2.9 (L)  3.4 - 5.0 g/dL     Alkaline Phosphatase  258 (H)  33 - 136 U/L     Total Protein  6.9  6.4 - 8.2 g/dL     AST  22  9 - 39 U/L     Bilirubin, Total  0.7  0.0 - 1.2 mg/dL     ALT  16  7 - 45 U/L  Lipase  Result  Value  Ref Range     Lipase  7 (L)  9 - 82 U/L  Lactate  Result  Value  Ref Range     Lactate  1.2  0.4 - 2.0 mmol/L  Magnesium  Result  Value  Ref Range     Magnesium  1.90  1.60 - 2.40 mg/dL  POCT GLUCOSE  Result  Value  Ref Range     POCT Glucose  129 (H)  74 - 99 mg/dL  POCT GLUCOSE  Result  Value  Ref Range     POCT Glucose  119 (H)  74 - 99 mg/dL  Protime-INR  Result  Value  Ref Range     Protime  17.4 (H)  9.8 - 12.4 seconds     INR  1.6 (H)  0.9 - 1.1  CBC  Result  Value  Ref Range     WBC  11.5 (H)  4.4 - 11.3 x10*3/uL     nRBC  0.0  0.0 - 0.0 /100 WBCs     RBC  4.08  4.00 - 5.20 x10*6/uL     Hemoglobin  10.9 (L)  12.0 - 16.0 g/dL     Hematocrit  36.7  36.0 - 46.0 %     MCV  90  80 - 100 fL     MCH  26.7  26.0 - 34.0 pg     MCHC  29.7 (L)  32.0 - 36.0 g/dL     RDW  19.9 (H)  11.5 - 14.5 %     Platelets  181  150 - 450 x10*3/uL  Basic Metabolic Panel  Result  Value  Ref Range     Glucose  129 (H)  74 - 99 mg/dL     Sodium  130 (L)  136 - 145 mmol/L     Potassium  4.2  3.5 - 5.3 mmol/L     Chloride  98  98 - 107 mmol/L     Bicarbonate  21  21 - 32 mmol/L     Anion Gap  15  10 - 20 mmol/L     Urea Nitrogen  19  6 - 23 mg/dL     Creatinine  0.85  0.50 - 1.05 mg/dL     eGFR  66  >60 mL/min/1.73m*2     Calcium  8.0 (L)  8.6 - 10.3 mg/dL  Heparin Assay, UFH  Result  Value  Ref Range     Heparin Unfractionated  1.1 (HH)  See Comment Below for Therapeutic Ranges IU/mL  POCT GLUCOSE  Result  Value  Ref Range     POCT Glucose  123 (H)  74 - 99 mg/dL           Assessment/Plan  Melissa is an 89-year-old female patient presenting to emergency  department from [facility] for evaluation of abdominal pain and vomiting.  Patient states that she developed an 8 out of 10 mid to lower abdominal pain radiating to her right flank.  Patient with a history of breast cancer and colon cancer.  CT imaging showing extensive left renal cortical infarcts as well as worsening of retroperitoneal metastatic lymphadenopathy and progression of hepatic metastasis per radiology review.  Consults placed to nephrology and vascular surgery, patient started on a heparin drip, medicated with morphine and admitted for further medical management.     Assessment & Planuntitled image  untitled imageRenal infarct (Multi)     Hyponatremia     Hyponatremia/renal infarct  Inpatient admission to Dr. Lugo  See imaging results above  DNRCCA  N.p.o.  Consult nephrology and appreciate input  Consult vascular surgery and appreciate input  Coag screen ordered  Continue heparin drip  Continue gentle IV fluids  Repeat labs  Monitor heparin assay  Morphine/Zofran in ED  Dilaudid IV as needed  Tigan IM as needed 2/2 prolonged Qtc     T2DM/breast cancer/colon cancer/anemia/HLD/HTN  #Chronic conditions  Insulin sliding scale for n.p.o.  Hypoglycemia protocol  Continue home MiraLAX and Colace  Continue home lisinopril daily  Continue home amlodipine daily  Continue home Estrace tablet     DVT Ppx  SCDs  Continue heparin drip  Bedrest/up to chair as tolerated  Patient fully evaluated on April 3.  Continue analgesics and await nephrology consultation.  Appreciate vascular surgery consultation.  Patient with significant metastatic cancer which comfort measures are in place.        I spent 60 minutes in the professional and overall care of this patient.  Tio Lugo MD                       Objective  Last Recorded Vitals  /64   Pulse 100   Temp 36.7 °C (98.1 °F)   Resp 18   Wt 81.6 kg (180 lb)   SpO2 99%   Intake/Output last 3 Shifts:     Intake/Output Summary (Last 24 hours)  at 4/3/2025 1349  Last data filed at 4/3/2025 1000        Gross per 24 hour  Intake  551.83 ml  Output  --  Net  551.83 ml        Admission Weight  Weight: 81.6 kg (180 lb) (04/02/25 2207)     Daily Weight  04/02/25 : 81.6 kg (180 lb)     Image Results  CT abdomen pelvis w IV contrast  Narrative: Interpreted By:  Faraz Link,   STUDY:  CT ABDOMEN PELVIS W IV CONTRAST; ;  4/3/2025 12:30 am      INDICATION:  Signs/Symptoms:stage IV colon cancer, now with R abd pain.          COMPARISON:  01/17/2025.      ACCESSION NUMBER(S):  [id number]      ORDERING CLINICIAN:  ELYSE KLERMAN      TECHNIQUE:  Axial CT images of the abdomen and pelvis with coronal and sagittal  reconstructed images performed after intravenous administration of 80  cc Omnipaque 350.      FINDINGS:  LOWER CHEST: No acute abnormality of the lung bases.  BONES: No acute osseous abnormality. Osteopenia. Right hip  arthroplasty in standard alignment. Similar appearance of  mild-to-moderate chronic compression of the T11 vertebral body.  Stable grade 1 degenerative anterolisthesis at L4-L5 and moderate to  severe multilevel degenerative changes of the imaged spine. ABDOMINAL  WALL: Within normal limits.      ABDOMEN:      LIVER: There is a large ill-defined region of hypoenhancement in the  right hepatic lobe measuring up to 10.8 cm in transaxial diameter, up  to at least 10.1 cm in CC diameter, suspicious for interval  progression of the hepatic metastasis. There is a new hypoattenuating  lesion inferolaterally in the inferior tip of the right hepatic lobe  (series 601, images 57-63), measuring up to approximately 2.2 cm in  transaxial diameter, suspicious for new metastatic lesion. Liver is  enlarged, similar to before. BILE DUCTS: Normal caliber.  GALLBLADDER: Cholelithiasis. No wall thickening or pericholecystic  fluid. PANCREAS:Atrophic with pancreatic ductal dilation and  irregularity and parenchymal calcifications in keeping with sequelae  of  chronic pancreatitis. SPLEEN: Within normal limits.  ADRENALS: Within normal limits.  KIDNEYS and URETERS: New extensive hypoenhancement/non enhancement of  the left renal parenchyma compatible with extensive renal cortical  infarcts. Right kidney and ureter appear normal.          VESSELS: Severe atherosclerosis, with critical narrowing or occlusion  of the left renal artery. Severe narrowing of the right renal artery  also noted. RETROPERITONEUM: Interval enlargement of celiac axis and  portocaval nodes, suspicious for worsened metastatic disease.      PELVIS:      REPRODUCTIVE ORGANS: Endometrial fluid suggests cervical stenosis and  is new from the prior exam. BLADDER: Within normal limits.      BOWEL: When allowing for differences in patient positioning in slice  selection, there is no substantial change in the very large mass  encompassing the mid-transverse colon and several adjacent loops of  small bowel and adjacent omentum and mesocolon, compatible with  patient's known advanced malignancy. No dilated bowel to suggest  obstruction. Stomach appears grossly normal. No definite additional  abnormal bowel wall thickening. Normal appendix. PERITONEUM: No  ascites or free air, no fluid collection.      Impression: New extensive hypoenhancement/non enhancement of the left renal  parenchyma compatible with extensive renal cortical infarcts. Right  kidney and ureter appear normal.      There is a large ill-defined region of hypoenhancement in the right  hepatic lobe measuring up to 10.8 cm in transaxial diameter, up to at  least 10.1 cm in CC diameter, suspicious for interval progression of  the hepatic metastasis. There is a new hypoattenuating lesion  inferolaterally in the inferior tip of the right hepatic lobe (series  601, images 57-63), measuring up to approximately 2.2 cm in  transaxial diameter, suspicious for new metastatic lesion. Liver is  enlarged, similar to before.      Interval worsening of  retroperitoneal metastatic lymphadenopathy.      Cholelithiasis without convincing evidence of acute cholecystitis.      New fluid in the endometrial canal suggesting cervical stenosis.      Additional findings as discussed above.      MACRO:  None      Signed by: Faraz Link 4/3/2025 12:52 AM  Dictation workstation:   [id number]        Physical Exam  Constitutional:       Appearance: She is ill-appearing.   HENT:      Mouth/Throat:      Mouth: Mucous membranes are dry.      Pharynx: Oropharynx is clear.   Eyes:      Pupils: Pupils are equal, round, and reactive to light.   Cardiovascular:      Rate and Rhythm: Normal rate and regular rhythm.   Pulmonary:      Effort: Pulmonary effort is normal.      Breath sounds: Normal breath sounds.   Abdominal:      Palpations: Abdomen is soft.      Tenderness: There is abdominal tenderness in the periumbilical area and suprapubic area.      Comments: Right flank tenderness   Musculoskeletal:      Cervical back: Normal range of motion.   Skin:     General: Skin is warm and dry.   Neurological:      General: No focal deficit present.      Mental Status: She is alert.   Psychiatric:         Mood and Affect: Mood normal.         Behavior: Behavior normal.         Relevant Results  ?         ?               Assessment/Plan                       Assessment & Planuntitled image  untitled imageRenal infarct (Multi)     Hyponatremia     Hyponatremia/renal infarct  Inpatient admission to Dr. Lugo  See imaging results above  DNRCCA  N.p.o.  Consult nephrology and appreciate input  Consult vascular surgery and appreciate input  Coag screen ordered  Continue heparin drip  Continue gentle IV fluids  Repeat labs  Monitor heparin assay  Morphine/Zofran in ED  Dilaudid IV as needed  Tigan IM as needed 2/2 prolonged Qtc     T2DM/breast cancer/colon cancer/anemia/HLD/HTN  #Chronic conditions  Insulin sliding scale for n.p.o.  Hypoglycemia protocol  Continue home MiraLAX and Colace  Continue  home lisinopril daily  Continue home amlodipine daily  Continue home Estrace tablet     DVT Ppx  SCDs  Continue heparin drip  Bedrest/up to chair as tolerated  Patient fully evaluated on April 3.  Continue analgesics and await nephrology consultation.  Appreciate vascular surgery consultation.  Patient with significant metastatic cancer which comfort measures are in place.      Patient fully evaluated on 4/4, resting in bed breathing comfortably on room air. Labs and medications reviewed. Estrase discontinued due to coagulation risk. Switched from heparin to Eliquis 2.5 BID for outpatient anticoagulation. Evaluated by vascular surgery, no surgery recommended at this time. Family declined heme-onc consult, not wishing to pursue aggressive chemotherapy treatment at this time. Continue comfort care for pain. Pyridium ordered for suprapubic pain. Per nursing staff, patient does not like Tigan, order switched to Zofran. Patient medically cleared for discharge to SNF today.              Tio Lugo MD   Physician  Internal Medicine     Progress Notes     Addendum     Date of Service: 4/7/2025  3:03 PM     Addendum       Expand All Collapse All    Melissa Tse is a 89 y.o. female on day 4 of admission presenting with Renal infarct (Multi).           Subjective  No significant events overnight. Patient seen and evaluated at bedside.               Objective  Last Recorded Vitals  BP 92/55 (BP Location: Left arm, Patient Position: Lying)   Pulse (!) 112   Temp 35.5 °C (95.9 °F) (Temporal)   Resp 18   Wt 81.6 kg (179 lb 14.3 oz)   SpO2 92%   Intake/Output last 3 Shifts:     Intake/Output Summary (Last 24 hours) at 4/7/2025 1503  Last data filed at 4/7/2025 1257      Gross per 24 hour   Intake 300 ml   Output 500 ml   Net -200 ml         Admission Weight  Weight: 81.6 kg (180 lb) (04/02/25 2207)     Daily Weight  04/04/25 : 81.6 kg (179 lb 14.3 oz)     Image Results  Electrocardiogram, 12-lead PRN ACS  symptoms  Unknown rhythm, irregular rate  Probable lateral infarct, old     See ED provider note for full interpretation and clinical correlation  Confirmed by Rivka Manzano ([id number]) on 4/5/2025 10:05:09 AM        Physical Exam     MD Tio Hodges MD    Physician  Internal Medicine     Progress Notes   This note has been shared with the patient  Signed     Date of Service: 4/5/2025  3:59 PM  Signed   Expand All Collapse All     Melissa Tse is a 89 y.o. female on day 2 of admission presenting with Renal infarct (Multi).           Subjective  Patient fully evaluated  04/05for    Problem List Items Addressed This Visit         * (Principal) Renal infarct (Multi) - Primary     Other Visit Diagnoses         Metastatic malignant neoplasm, unspecified site (Multi)             Patient seen resting in bed with head of bed elevated, no s/s or c/o acute difficulties at this time. Patient still with significant pain noted, medications adjusted, percocet added, will monitor overnight and repeat labs in the AM.  Vital signs for last 24 hours Temp:  [35.6 °C (96.1 °F)-36.5 °C (97.7 °F)] 36.1 °C (97 °F)Heart Rate:  [96-98] 96Resp:  [18] 18BP: (114-148)/(55-67) 148/67  I/O this shift:In: 220.8 [I.V.:220.8]Out: - Patient still requiring frequent cardiac and SPO2 monitoring. Continue aggressive pulmonary hygiene and oral hygiene. Off loading as tolerated for skin integrity. Medications and labs reviewed-   Results for orders placed or performed during the hospital encounter of 04/02/25 (from the past 24 hours)  POCT GLUCOSE  Result  Value  Ref Range     POCT Glucose  175 (H)  74 - 99 mg/dL  POCT GLUCOSE  Result  Value  Ref Range     POCT Glucose  167 (H)  74 - 99 mg/dL  POCT GLUCOSE  Result  Value  Ref Range     POCT Glucose  148 (H)  74 - 99 mg/dL  POCT GLUCOSE  Result  Value  Ref Range     POCT Glucose  140 (H)  74 - 99 mg/dL  CBC and Auto Differential  Result  Value  Ref Range     WBC  12.8 (H)  4.4 - 11.3  x10*3/uL     nRBC  0.0  0.0 - 0.0 /100 WBCs     RBC  3.96 (L)  4.00 - 5.20 x10*6/uL     Hemoglobin  10.8 (L)  12.0 - 16.0 g/dL     Hematocrit  36.5  36.0 - 46.0 %     MCV  92  80 - 100 fL     MCH  27.3  26.0 - 34.0 pg     MCHC  29.6 (L)  32.0 - 36.0 g/dL     RDW  20.3 (H)  11.5 - 14.5 %     Platelets  173  150 - 450 x10*3/uL     Neutrophils %  84.7  40.0 - 80.0 %     Immature Granulocytes %, Automated  0.5  0.0 - 0.9 %     Lymphocytes %  7.9  13.0 - 44.0 %     Monocytes %  6.7  2.0 - 10.0 %     Eosinophils %  0.1  0.0 - 6.0 %     Basophils %  0.1  0.0 - 2.0 %     Neutrophils Absolute  10.86 (H)  1.60 - 5.50 x10*3/uL     Immature Granulocytes Absolute, Automated  0.06  0.00 - 0.50 x10*3/uL     Lymphocytes Absolute  1.01  0.80 - 3.00 x10*3/uL     Monocytes Absolute  0.86 (H)  0.05 - 0.80 x10*3/uL     Eosinophils Absolute  0.01  0.00 - 0.40 x10*3/uL     Basophils Absolute  0.01  0.00 - 0.10 x10*3/uL  Comprehensive Metabolic Panel  Result  Value  Ref Range     Glucose  134 (H)  74 - 99 mg/dL     Sodium  131 (L)  136 - 145 mmol/L     Potassium  5.2  3.5 - 5.3 mmol/L     Chloride  102  98 - 107 mmol/L     Bicarbonate  20 (L)  21 - 32 mmol/L     Anion Gap  14  10 - 20 mmol/L     Urea Nitrogen  26 (H)  6 - 23 mg/dL     Creatinine  0.85  0.50 - 1.05 mg/dL     eGFR  66  >60 mL/min/1.73m*2     Calcium  7.7 (L)  8.6 - 10.3 mg/dL     Albumin  2.2 (L)  3.4 - 5.0 g/dL     Alkaline Phosphatase  155 (H)  33 - 136 U/L     Total Protein  5.7 (L)  6.4 - 8.2 g/dL     AST  23  9 - 39 U/L     Bilirubin, Total  0.9  0.0 - 1.2 mg/dL     ALT  8  7 - 45 U/L  Morphology  Result  Value  Ref Range     RBC Morphology  See Below        Polychromasia  Mild        Ovalocytes  Few        [place] Cells  Few     POCT GLUCOSE  Result  Value  Ref Range     POCT Glucose  173 (H)  74 - 99 mg/dL     Patient recently received an antibiotic (last 12 hours)         None         Plan discussed with interdisciplinary team, will continue to monitor pain levels and  monitor overnight. Will continue current and repeat labs in the AM.  Discharge planning discussed with patient and care team. Therapy evaluations ordered. Anticipate HHC/SNF at discharge. Patient aware and agreeable to current plan, continue plan as above.  I spent a total of 50 minutes on the date of the service which included preparing to see the patient, face-to-face patient care, completing clinical documentation, obtaining and/or reviewing separately obtained history, performing a medically appropriate examination, counseling and educating the patient/family/caregiver, ordering medications, tests, or procedures, communicating with other HCPs (not separately reported), independently interpreting results (not separately reported), communicating results to the patient/family/caregiver, and care coordination (not separately reported).             ObjectiveExpand by Default  Last Recorded Vitals  /67 (BP Location: Right leg, Patient Position: Lying)   Pulse 96   Temp 36.1 °C (97 °F) (Temporal)   Resp 18   Wt 81.6 kg (179 lb 14.3 oz)   SpO2 93%   Intake/Output last 3 Shifts:     Intake/Output Summary (Last 24 hours) at 4/5/2025 1559  Last data filed at 4/5/2025 1301  Gross per 24 hour  Intake  1036.67 ml  Output  200 ml  Net  836.67 ml        Admission Weight  Weight: 81.6 kg (180 lb) (04/02/25 2207)     Daily Weight  04/04/25 : 81.6 kg (179 lb 14.3 oz)     Image Results  Electrocardiogram, 12-lead PRN ACS symptoms  Unknown rhythm, irregular rate  Probable lateral infarct, old     See ED provider note for full interpretation and clinical correlation  Confirmed by Rivka Manzano ([id number]) on 4/5/2025 10:05:09 AM        Physical Exam     Relevant Results                       Assessment/Plan     Assessment & Plan  Renal infarct (Multi)     Hyponatremia     Hyponatremia/renal infarct  Inpatient admission to Dr. Lugo  See imaging results above  DNRCCA  N.p.o.  Consult nephrology and appreciate input  Consult    None              Plan discussed with interdisciplinary team, continue current and repeat labs in the AM.      Patient aware and agreeable to current plan, continue plan as above.      I spent a total of 50 minutes on the date of the service which included preparing to see the patient, face-to-face patient care, completing clinical documentation, obtaining and/or reviewing separately obtained history, performing a medically appropriate examination, counseling and educating the patient/family/caregiver, ordering medications, tests, or procedures, communicating with other HCPs (not separately reported), independently interpreting results (not separately reported), communicating results to the patient/family/caregiver, and care coordination (not separately reported). Melissa Tse is a 89 y.o. female on day 0 of admission presenting with Renal infarct (Multi).           Subjective     MD Tio Hodges MD untitled image  Physician  Internal Medicine     H&P   This note has been shared with the patient  Signed     Date of Service: 4/3/2025 12:43 PM     Signed     Expand All Collapse All  untitled image  History Of Present Illness  Melissa Tse is a 89 y.o. female with a past medical history significant for breast cancer and stage IV colon cancer presenting to emergency department from [facility] for evaluation of abdominal pain.  Patient also reports an episode of vomiting this morning and states she has been unable to keep anything down today and complains of  a pressure in the mid to lower abdomen that radiates to her right flank.  Patient reports that her doctor will only give her Tylenol for pain and that is not working.  Patient states that her last bowel movement was 4/1/2025 without difficulty.  Patient denies chest pain or dizziness.  Patient denies recent illness, fever, or chills.  Patient denies shortness of breath.     In ED, lipase 7, glucose 131, sodium 131,  "There is abdominal tenderness in the periumbilical area and suprapubic area.      Comments: Right flank tenderness   Musculoskeletal:      Cervical back: Normal range of motion.   Skin:     General: Skin is warm and dry.   Neurological:      General: No focal deficit present.      Mental Status: She is alert.   Psychiatric:         Mood and Affect: Mood normal.         Behavior: Behavior normal.            Last Recorded Vitals  Blood pressure 126/64, pulse 100, temperature 36.7 °C (98.1 °F), resp. rate 18, height 1.626 m (5' 4\"), weight 81.6 kg (180 lb), SpO2 99%.     Relevant Results  CT abdomen pelvis w IV contrast     Result Date: 4/3/2025  Interpreted By:  Faraz Link, STUDY: CT ABDOMEN PELVIS W IV CONTRAST; ;  4/3/2025 12:30 am   INDICATION: Signs/Symptoms:stage IV colon cancer, now with R abd pain.     COMPARISON: 01/17/2025.   ACCESSION NUMBER(S): [id number]   ORDERING CLINICIAN: ELYSE KLERMAN   TECHNIQUE: Axial CT images of the abdomen and pelvis with coronal and sagittal reconstructed images performed after intravenous administration of 80 cc Omnipaque 350.   FINDINGS: LOWER CHEST: No acute abnormality of the lung bases. BONES: No acute osseous abnormality. Osteopenia. Right hip arthroplasty in standard alignment. Similar appearance of mild-to-moderate chronic compression of the T11 vertebral body. Stable grade 1 degenerative anterolisthesis at L4-L5 and moderate to severe multilevel degenerative changes of the imaged spine. ABDOMINAL WALL: Within normal limits.   ABDOMEN:   LIVER: There is a large ill-defined region of hypoenhancement in the right hepatic lobe measuring up to 10.8 cm in transaxial diameter, up to at least 10.1 cm in CC diameter, suspicious for interval progression of the hepatic metastasis. There is a new hypoattenuating lesion inferolaterally in the inferior tip of the right hepatic lobe (series 601, images 57-63), measuring up to approximately 2.2 cm in transaxial diameter, " inferolaterally in the inferior tip of the right hepatic lobe (series 601, images 57-63), measuring up to approximately 2.2 cm in transaxial diameter, suspicious for new metastatic lesion. Liver is enlarged, similar to before.   Interval worsening of retroperitoneal metastatic lymphadenopathy.   Cholelithiasis without convincing evidence of acute cholecystitis.   New fluid in the endometrial canal suggesting cervical stenosis.   Additional findings as discussed above.   MACRO: None   Signed by: Faraz Link 4/3/2025 12:52 AM Dictation workstation:   [id number]               Results for orders placed or performed during the hospital encounter of 04/02/25 (from the past 24 hours)  CBC and Auto Differential  Result  Value  Ref Range     WBC  11.1  4.4 - 11.3 x10*3/uL     nRBC  0.0  0.0 - 0.0 /100 WBCs     RBC  4.36  4.00 - 5.20 x10*6/uL     Hemoglobin  11.5 (L)  12.0 - 16.0 g/dL     Hematocrit  38.8  36.0 - 46.0 %     MCV  89  80 - 100 fL     MCH  26.4  26.0 - 34.0 pg     MCHC  29.6 (L)  32.0 - 36.0 g/dL     RDW  19.9 (H)  11.5 - 14.5 %     Platelets  198  150 - 450 x10*3/uL     Neutrophils %  84.1  40.0 - 80.0 %     Immature Granulocytes %, Automated  0.6  0.0 - 0.9 %     Lymphocytes %  9.5  13.0 - 44.0 %     Monocytes %  5.6  2.0 - 10.0 %     Eosinophils %  0.0  0.0 - 6.0 %     Basophils %  0.2  0.0 - 2.0 %     Neutrophils Absolute  9.34 (H)  1.60 - 5.50 x10*3/uL     Immature Granulocytes Absolute, Automated  0.07  0.00 - 0.50 x10*3/uL     Lymphocytes Absolute  1.06  0.80 - 3.00 x10*3/uL     Monocytes Absolute  0.62  0.05 - 0.80 x10*3/uL     Eosinophils Absolute  0.00  0.00 - 0.40 x10*3/uL     Basophils Absolute  0.02  0.00 - 0.10 x10*3/uL  Comprehensive metabolic panel  Result  Value  Ref Range     Glucose  131 (H)  74 - 99 mg/dL     Sodium  131 (L)  136 - 145 mmol/L     Potassium  4.6  3.5 - 5.3 mmol/L     Chloride  96 (L)  98 - 107 mmol/L     Bicarbonate  22  21 - 32 mmol/L     Anion Gap  18  10 - 20 mmol/L    department from [facility] for evaluation of abdominal pain and vomiting.  Patient states that she developed an 8 out of 10 mid to lower abdominal pain radiating to her right flank.  Patient with a history of breast cancer and colon cancer.  CT imaging showing extensive left renal cortical infarcts as well as worsening of retroperitoneal metastatic lymphadenopathy and progression of hepatic metastasis per radiology review.  Consults placed to nephrology and vascular surgery, patient started on a heparin drip, medicated with morphine and admitted for further medical management.     Assessment & Planuntitled image  untitled imageRenal infarct (Multi)     Hyponatremia     Hyponatremia/renal infarct  Inpatient admission to Dr. Lugo  See imaging results above  DNRCCA  N.p.o.  Consult nephrology and appreciate input  Consult vascular surgery and appreciate input  Coag screen ordered  Continue heparin drip  Continue gentle IV fluids  Repeat labs  Monitor heparin assay  Morphine/Zofran in ED  Dilaudid IV as needed  Tigan IM as needed 2/2 prolonged Qtc     T2DM/breast cancer/colon cancer/anemia/HLD/HTN  #Chronic conditions  Insulin sliding scale for n.p.o.  Hypoglycemia protocol  Continue home MiraLAX and Colace  Continue home lisinopril daily  Continue home amlodipine daily  Continue home Estrace tablet     DVT Ppx  SCDs  Continue heparin drip  Bedrest/up to chair as tolerated  Patient fully evaluated on April 3.  Continue analgesics and await nephrology consultation.  Appreciate vascular surgery consultation.  Patient with significant metastatic cancer which comfort measures are in place.        I spent 60 minutes in the professional and overall care of this patient.  Tio Lugo MD                       Objective  Last Recorded Vitals  /64   Pulse 100   Temp 36.7 °C (98.1 °F)   Resp 18   Wt 81.6 kg (180 lb)   SpO2 99%   Intake/Output last 3 Shifts:     Intake/Output Summary (Last 24 hours)  at 4/3/2025 1349  Last data filed at 4/3/2025 1000        Gross per 24 hour  Intake  551.83 ml  Output  --  Net  551.83 ml        Admission Weight  Weight: 81.6 kg (180 lb) (04/02/25 2207)     Daily Weight  04/02/25 : 81.6 kg (180 lb)     Image Results  CT abdomen pelvis w IV contrast  Narrative: Interpreted By:  Faraz Link,   STUDY:  CT ABDOMEN PELVIS W IV CONTRAST; ;  4/3/2025 12:30 am      INDICATION:  Signs/Symptoms:stage IV colon cancer, now with R abd pain.          COMPARISON:  01/17/2025.      ACCESSION NUMBER(S):  [id number]      ORDERING CLINICIAN:  ELYSE KLERMAN      TECHNIQUE:  Axial CT images of the abdomen and pelvis with coronal and sagittal  reconstructed images performed after intravenous administration of 80  cc Omnipaque 350.      FINDINGS:  LOWER CHEST: No acute abnormality of the lung bases.  BONES: No acute osseous abnormality. Osteopenia. Right hip  arthroplasty in standard alignment. Similar appearance of  mild-to-moderate chronic compression of the T11 vertebral body.  Stable grade 1 degenerative anterolisthesis at L4-L5 and moderate to  severe multilevel degenerative changes of the imaged spine. ABDOMINAL  WALL: Within normal limits.      ABDOMEN:      LIVER: There is a large ill-defined region of hypoenhancement in the  right hepatic lobe measuring up to 10.8 cm in transaxial diameter, up  to at least 10.1 cm in CC diameter, suspicious for interval  progression of the hepatic metastasis. There is a new hypoattenuating  lesion inferolaterally in the inferior tip of the right hepatic lobe  (series 601, images 57-63), measuring up to approximately 2.2 cm in  transaxial diameter, suspicious for new metastatic lesion. Liver is  enlarged, similar to before. BILE DUCTS: Normal caliber.  GALLBLADDER: Cholelithiasis. No wall thickening or pericholecystic  fluid. PANCREAS:Atrophic with pancreatic ductal dilation and  irregularity and parenchymal calcifications in keeping with sequelae  of  retroperitoneal metastatic lymphadenopathy.      Cholelithiasis without convincing evidence of acute cholecystitis.      New fluid in the endometrial canal suggesting cervical stenosis.      Additional findings as discussed above.      MACRO:  None      Signed by: Faraz Link 4/3/2025 12:52 AM  Dictation workstation:   [id number]        Physical Exam  Constitutional:       Appearance: She is ill-appearing.   HENT:      Mouth/Throat:      Mouth: Mucous membranes are dry.      Pharynx: Oropharynx is clear.   Eyes:      Pupils: Pupils are equal, round, and reactive to light.   Cardiovascular:      Rate and Rhythm: Normal rate and regular rhythm.   Pulmonary:      Effort: Pulmonary effort is normal.      Breath sounds: Normal breath sounds.   Abdominal:      Palpations: Abdomen is soft.      Tenderness: There is abdominal tenderness in the periumbilical area and suprapubic area.      Comments: Right flank tenderness   Musculoskeletal:      Cervical back: Normal range of motion.   Skin:     General: Skin is warm and dry.   Neurological:      General: No focal deficit present.      Mental Status: She is alert.   Psychiatric:         Mood and Affect: Mood normal.         Behavior: Behavior normal.         Relevant Results  ?         ?               Assessment/Plan                       Assessment & Planuntitled image  untitled imageRenal infarct (Multi)     Hyponatremia     Hyponatremia/renal infarct  Inpatient admission to Dr. Lugo  See imaging results above  DNRCCA  N.p.o.  Consult nephrology and appreciate input  Consult vascular surgery and appreciate input  Coag screen ordered  Continue heparin drip  Continue gentle IV fluids  Repeat labs  Monitor heparin assay  Morphine/Zofran in ED  Dilaudid IV as needed  Tigan IM as needed 2/2 prolonged Qtc     T2DM/breast cancer/colon cancer/anemia/HLD/HTN  #Chronic conditions  Insulin sliding scale for n.p.o.  Hypoglycemia protocol  Continue home MiraLAX and Colace  Continue  home lisinopril daily  Continue home amlodipine daily  Continue home Estrace tablet     DVT Ppx  SCDs  Continue heparin drip  Bedrest/up to chair as tolerated  Patient fully evaluated on April 3.  Continue analgesics and await nephrology consultation.  Appreciate vascular surgery consultation.  Patient with significant metastatic cancer which comfort measures are in place.      Patient fully evaluated on 4/4, resting in bed breathing comfortably on room air. Labs and medications reviewed. Estrase discontinued due to coagulation risk. Switched from heparin to Eliquis 2.5 BID for outpatient anticoagulation. Evaluated by vascular surgery, no surgery recommended at this time. Family declined heme-onc consult, not wishing to pursue aggressive chemotherapy treatment at this time. Continue comfort care for pain. Pyridium ordered for suprapubic pain. Per nursing staff, patient does not like Tigan, order switched to Zofran. Patient medically cleared for discharge to SNF today.                              Assessment/Plan                       Assessment & Plan  Renal infarct (Multi)     Hyponatremia     Hyponatremia/renal infarct  Inpatient admission to Dr. Lugo  See imaging results above  DNRCCA  N.p.o.  Consult nephrology and appreciate input  Consult vascular surgery and appreciate input  Coag screen ordered  Continue heparin drip  Continue gentle IV fluids  Repeat labs  Monitor heparin assay  Morphine/Zofran in ED  Dilaudid IV as needed  Tigan IM as needed 2/2 prolonged Qtc     T2DM/breast cancer/colon cancer/anemia/HLD/HTN  #Chronic conditions  Insulin sliding scale for n.p.o.  Hypoglycemia protocol  Continue home MiraLAX and Colace  Continue home lisinopril daily  Continue home amlodipine daily  Continue home Estrace tablet     DVT Ppx  SCDs  Continue heparin drip  Bedrest/up to chair as tolerated     Patient fully evaluated  04/05  for    Problem List Items Addressed This Visit         * (Principal) Renal infarct  (Multi) - Primary      Other Visit Diagnoses         Metastatic malignant neoplasm, unspecified site (Multi)                 Patient seen resting in bed with head of bed elevated, no s/s or c/o acute difficulties at this time. Patient still with significant pain noted, medications adjusted, percocet added, will monitor overnight and repeat labs in the AM.      Vital signs for last 24 hours Temp:  [35.6 °C (96.1 °F)-36.5 °C (97.7 °F)] 36.1 °C (97 °F)  Heart Rate:  [96-98] 96  Resp:  [18] 18  BP: (114-148)/(55-67) 148/67    I/O this shift:  In: 220.8 [I.V.:220.8]  Out: -   Patient still requiring frequent cardiac and SPO2 monitoring. Continue aggressive pulmonary hygiene and oral hygiene. Off loading as tolerated for skin integrity. Medications and labs reviewed-             Results for orders placed or performed during the hospital encounter of 04/02/25 (from the past 24 hours)   POCT GLUCOSE   Result Value Ref Range     POCT Glucose 175 (H) 74 - 99 mg/dL   POCT GLUCOSE   Result Value Ref Range     POCT Glucose 167 (H) 74 - 99 mg/dL   POCT GLUCOSE   Result Value Ref Range     POCT Glucose 148 (H) 74 - 99 mg/dL   POCT GLUCOSE   Result Value Ref Range     POCT Glucose 140 (H) 74 - 99 mg/dL   CBC and Auto Differential   Result Value Ref Range     WBC 12.8 (H) 4.4 - 11.3 x10*3/uL     nRBC 0.0 0.0 - 0.0 /100 WBCs     RBC 3.96 (L) 4.00 - 5.20 x10*6/uL     Hemoglobin 10.8 (L) 12.0 - 16.0 g/dL     Hematocrit 36.5 36.0 - 46.0 %     MCV 92 80 - 100 fL     MCH 27.3 26.0 - 34.0 pg     MCHC 29.6 (L) 32.0 - 36.0 g/dL     RDW 20.3 (H) 11.5 - 14.5 %     Platelets 173 150 - 450 x10*3/uL     Neutrophils % 84.7 40.0 - 80.0 %     Immature Granulocytes %, Automated 0.5 0.0 - 0.9 %     Lymphocytes % 7.9 13.0 - 44.0 %     Monocytes % 6.7 2.0 - 10.0 %     Eosinophils % 0.1 0.0 - 6.0 %     Basophils % 0.1 0.0 - 2.0 %     Neutrophils Absolute 10.86 (H) 1.60 - 5.50 x10*3/uL     Immature Granulocytes Absolute, Automated 0.06 0.00 - 0.50  x10*3/uL     Lymphocytes Absolute 1.01 0.80 - 3.00 x10*3/uL     Monocytes Absolute 0.86 (H) 0.05 - 0.80 x10*3/uL     Eosinophils Absolute 0.01 0.00 - 0.40 x10*3/uL     Basophils Absolute 0.01 0.00 - 0.10 x10*3/uL   Comprehensive Metabolic Panel   Result Value Ref Range     Glucose 134 (H) 74 - 99 mg/dL     Sodium 131 (L) 136 - 145 mmol/L     Potassium 5.2 3.5 - 5.3 mmol/L     Chloride 102 98 - 107 mmol/L     Bicarbonate 20 (L) 21 - 32 mmol/L     Anion Gap 14 10 - 20 mmol/L     Urea Nitrogen 26 (H) 6 - 23 mg/dL     Creatinine 0.85 0.50 - 1.05 mg/dL     eGFR 66 >60 mL/min/1.73m*2     Calcium 7.7 (L) 8.6 - 10.3 mg/dL     Albumin 2.2 (L) 3.4 - 5.0 g/dL     Alkaline Phosphatase 155 (H) 33 - 136 U/L     Total Protein 5.7 (L) 6.4 - 8.2 g/dL     AST 23 9 - 39 U/L     Bilirubin, Total 0.9 0.0 - 1.2 mg/dL     ALT 8 7 - 45 U/L   Morphology   Result Value Ref Range     RBC Morphology See Below       Polychromasia Mild       Ovalocytes Few       Selam Cells Few     POCT GLUCOSE   Result Value Ref Range     POCT Glucose 173 (H) 74 - 99 mg/dL      Patient recently received an antibiotic (last 12 hours)         None             Plan discussed with interdisciplinary team, will continue to monitor pain levels and monitor overnight. Will continue current and repeat labs in the AM.      Discharge planning discussed with patient and care team. Therapy evaluations ordered. Anticipate HHC/SNF at discharge. Patient aware and agreeable to current plan, continue plan as above.      I spent a total of 50 minutes on the date of the service which included preparing to see the patient, face-to-face patient care, completing clinical documentation, obtaining and/or reviewing separately obtained history, performing a medically appropriate examination, counseling and educating the patient/family/caregiver, ordering medications, tests, or procedures, communicating with other HCPs (not separately reported), independently interpreting results (not  separately reported), communicating results to the patient/family/caregiver, and care coordination (not separately reported).                    Revision History       Patient fully evaluated on 4/7. Evaluated by nephrology, cyproheptadine added for appetite. Cyproheptadine discontinued today to avoid oversedation as patient is already on mirtazapine for appetite. LFTs elevated today, continue to monitor. No leukocytosis. Patient's family feels her pain is well-controlled with current regimen. Continuing hematuria noted in wall suction canister with flank pain, cannot rule out UTI. Cefazolin IV ordered. Urology consult placed. Appreciate recs re: possible bladder irrigation. Per daughter, patient also having hallucinations of seeing and hearing people who are not there. Plan to keep at least another day for monitoring.                 Relevant Results               Assessment/Plan        Assessment & Plan  Renal infarct (Multi)    Hyponatremia    Hyponatremia/renal infarct  Inpatient admission to Dr. Lugo  See imaging results above  DNRCCA  N.p.o.  Consult nephrology and appreciate input  Consult vascular surgery and appreciate input  Coag screen ordered  Continue heparin drip  Continue gentle IV fluids  Repeat labs  Monitor heparin assay  Morphine/Zofran in ED  Dilaudid IV as needed  Tigan IM as needed 2/2 prolonged Qtc    T2DM/breast cancer/colon cancer/anemia/HLD/HTN  #Chronic conditions  Insulin sliding scale for n.p.o.  Hypoglycemia protocol  Continue home MiraLAX and Colace  Continue home lisinopril daily  Continue home amlodipine daily  Continue home Estrace tablet    DVT Ppx  SCDs  Continue heparin drip  Bedrest/up to chair as tolerated    Patient fully evaluated on 4/8. Underwent successful bladder irrigation yesterday, evacuated 200 ml old bloody urine per urology note. Urine orange from pyridium, but remains clear. Collins maintained overnight, will remove and trial voiding today. Recommending outpatient  cystoscopy if family wishes to pursue. Continue cefazolin IV. LFTs still elevated, but improved from yesterday. Thrombocytopenia noted, continue to monitor. Hemoglobin dropped to 10.0 from 11.4 yesterday, likely result of hematuria. Pain continues to be controlled with current regimen. Plan to stay at least one more day to monitor urine and labs.       Patient fully evaluated  04/09  for    Problem List Items Addressed This Visit       * (Principal) Renal infarct (Multi) - Primary     Other Visit Diagnoses       Metastatic malignant neoplasm, unspecified site (Multi)              Patient seen resting in bed with head of bed elevated, no s/s or c/o acute difficulties at this time.  Vital signs for last 24 hours Temp:  [35 °C (95 °F)-35.8 °C (96.4 °F)] 35 °C (95 °F)  Heart Rate:  [71-97] 77  Resp:  [18-20] 18  BP: (108-146)/(56-67) 146/66    I/O this shift:  In: 500 [IV Piggyback:500]  Out: 475 [Urine:475]  Patient still requiring frequent cardiac and SPO2 monitoring. Continue aggressive pulmonary hygiene and oral hygiene. Off loading as tolerated for skin integrity. Medications and labs reviewed-   Results for orders placed or performed during the hospital encounter of 04/02/25 (from the past 24 hours)   POCT GLUCOSE   Result Value Ref Range    POCT Glucose 103 (H) 74 - 99 mg/dL   CBC and Auto Differential   Result Value Ref Range    WBC 9.0 4.4 - 11.3 x10*3/uL    nRBC 0.0 0.0 - 0.0 /100 WBCs    RBC 3.86 (L) 4.00 - 5.20 x10*6/uL    Hemoglobin 10.3 (L) 12.0 - 16.0 g/dL    Hematocrit 34.6 (L) 36.0 - 46.0 %    MCV 90 80 - 100 fL    MCH 26.7 26.0 - 34.0 pg    MCHC 29.8 (L) 32.0 - 36.0 g/dL    RDW 19.2 (H) 11.5 - 14.5 %    Platelets 78 (L) 150 - 450 x10*3/uL    Neutrophils % 75.8 40.0 - 80.0 %    Immature Granulocytes %, Automated 3.0 (H) 0.0 - 0.9 %    Lymphocytes % 12.1 13.0 - 44.0 %    Monocytes % 8.1 2.0 - 10.0 %    Eosinophils % 0.7 0.0 - 6.0 %    Basophils % 0.3 0.0 - 2.0 %    Neutrophils Absolute 6.85 (H) 1.60 -  5.50 x10*3/uL    Immature Granulocytes Absolute, Automated 0.27 0.00 - 0.50 x10*3/uL    Lymphocytes Absolute 1.09 0.80 - 3.00 x10*3/uL    Monocytes Absolute 0.73 0.05 - 0.80 x10*3/uL    Eosinophils Absolute 0.06 0.00 - 0.40 x10*3/uL    Basophils Absolute 0.03 0.00 - 0.10 x10*3/uL   Comprehensive Metabolic Panel   Result Value Ref Range    Glucose 134 (H) 74 - 99 mg/dL    Sodium 133 (L) 136 - 145 mmol/L    Potassium 3.7 3.5 - 5.3 mmol/L    Chloride 101 98 - 107 mmol/L    Bicarbonate 27 21 - 32 mmol/L    Anion Gap 9 (L) 10 - 20 mmol/L    Urea Nitrogen 29 (H) 6 - 23 mg/dL    Creatinine 0.83 0.50 - 1.05 mg/dL    eGFR 68 >60 mL/min/1.73m*2    Calcium 7.1 (L) 8.6 - 10.3 mg/dL    Albumin 1.9 (L) 3.4 - 5.0 g/dL    Alkaline Phosphatase 603 (H) 33 - 136 U/L    Total Protein 4.9 (L) 6.4 - 8.2 g/dL    AST 35 9 - 39 U/L    Bilirubin, Total 0.5 0.0 - 1.2 mg/dL    ALT 19 7 - 45 U/L   POCT GLUCOSE   Result Value Ref Range    POCT Glucose 128 (H) 74 - 99 mg/dL   POCT GLUCOSE   Result Value Ref Range    POCT Glucose 138 (H) 74 - 99 mg/dL      Patient recently received an antibiotic (last 12 hours)       Date/Time Action Medication Dose    04/09/25 0908 New Bag    ceFAZolin (Ancef) 2 g in dextrose (iso)  mL 2 g           Plan discussed with interdisciplinary team, renal chemistries improving, shoulder pain improved with voltaren, will continue. Patient with increased SOB and congestion today, will repeat chest xray, aspiration precautions in place, will monitor overnight, continue current and repeat labs in the AM.     Discharge planning discussed with patient and care team. Patient aware and agreeable to current plan, continue plan as above.     I spent a total of 50 minutes on the date of the service which included preparing to see the patient, face-to-face patient care, completing clinical documentation, obtaining and/or reviewing separately obtained history, performing a medically appropriate examination, counseling and  educating the patient/family/caregiver, ordering medications, tests, or procedures, communicating with other HCPs (not separately reported), independently interpreting results (not separately reported), communicating results to the patient/family/caregiver, and care coordination (not separately reported).             Jo Dominique

## 2025-04-10 DIAGNOSIS — E87.1 HYPONATREMIA: ICD-10-CM

## 2025-04-10 LAB
ALBUMIN SERPL BCP-MCNC: 2.1 G/DL (ref 3.4–5)
ALP SERPL-CCNC: 542 U/L (ref 33–136)
ALT SERPL W P-5'-P-CCNC: 4 U/L (ref 7–45)
ANION GAP SERPL CALC-SCNC: 10 MMOL/L (ref 10–20)
AST SERPL W P-5'-P-CCNC: 23 U/L (ref 9–39)
BASOPHILS # BLD AUTO: 0.05 X10*3/UL (ref 0–0.1)
BASOPHILS NFR BLD AUTO: 0.6 %
BILIRUB SERPL-MCNC: 0.5 MG/DL (ref 0–1.2)
BUN SERPL-MCNC: 22 MG/DL (ref 6–23)
CALCIUM SERPL-MCNC: 7.6 MG/DL (ref 8.6–10.3)
CHLORIDE SERPL-SCNC: 101 MMOL/L (ref 98–107)
CO2 SERPL-SCNC: 26 MMOL/L (ref 21–32)
CREAT SERPL-MCNC: 0.8 MG/DL (ref 0.5–1.05)
EGFRCR SERPLBLD CKD-EPI 2021: 71 ML/MIN/1.73M*2
EOSINOPHIL # BLD AUTO: 0.06 X10*3/UL (ref 0–0.4)
EOSINOPHIL NFR BLD AUTO: 0.7 %
ERYTHROCYTE [DISTWIDTH] IN BLOOD BY AUTOMATED COUNT: 19 % (ref 11.5–14.5)
GLUCOSE BLD MANUAL STRIP-MCNC: 120 MG/DL (ref 74–99)
GLUCOSE BLD MANUAL STRIP-MCNC: 125 MG/DL (ref 74–99)
GLUCOSE BLD MANUAL STRIP-MCNC: 148 MG/DL (ref 74–99)
GLUCOSE BLD MANUAL STRIP-MCNC: 153 MG/DL (ref 74–99)
GLUCOSE BLD MANUAL STRIP-MCNC: 166 MG/DL (ref 74–99)
GLUCOSE SERPL-MCNC: 119 MG/DL (ref 74–99)
HCT VFR BLD AUTO: 39.2 % (ref 36–46)
HGB BLD-MCNC: 11.6 G/DL (ref 12–16)
IMM GRANULOCYTES # BLD AUTO: 0.41 X10*3/UL (ref 0–0.5)
IMM GRANULOCYTES NFR BLD AUTO: 4.9 % (ref 0–0.9)
LYMPHOCYTES # BLD AUTO: 1.02 X10*3/UL (ref 0.8–3)
LYMPHOCYTES NFR BLD AUTO: 12.1 %
MCH RBC QN AUTO: 27 PG (ref 26–34)
MCHC RBC AUTO-ENTMCNC: 29.6 G/DL (ref 32–36)
MCV RBC AUTO: 91 FL (ref 80–100)
MONOCYTES # BLD AUTO: 0.64 X10*3/UL (ref 0.05–0.8)
MONOCYTES NFR BLD AUTO: 7.6 %
NEUTROPHILS # BLD AUTO: 6.23 X10*3/UL (ref 1.6–5.5)
NEUTROPHILS NFR BLD AUTO: 74.1 %
NRBC BLD-RTO: 0 /100 WBCS (ref 0–0)
PLATELET # BLD AUTO: 84 X10*3/UL (ref 150–450)
POTASSIUM SERPL-SCNC: 4 MMOL/L (ref 3.5–5.3)
PROT SERPL-MCNC: 5.7 G/DL (ref 6.4–8.2)
RBC # BLD AUTO: 4.3 X10*6/UL (ref 4–5.2)
SODIUM SERPL-SCNC: 133 MMOL/L (ref 136–145)
WBC # BLD AUTO: 8.4 X10*3/UL (ref 4.4–11.3)

## 2025-04-10 PROCEDURE — 2500000001 HC RX 250 WO HCPCS SELF ADMINISTERED DRUGS (ALT 637 FOR MEDICARE OP): Performed by: INTERNAL MEDICINE

## 2025-04-10 PROCEDURE — 1210000001 HC SEMI-PRIVATE ROOM DAILY

## 2025-04-10 PROCEDURE — 2500000004 HC RX 250 GENERAL PHARMACY W/ HCPCS (ALT 636 FOR OP/ED): Performed by: NURSE PRACTITIONER

## 2025-04-10 PROCEDURE — 82947 ASSAY GLUCOSE BLOOD QUANT: CPT

## 2025-04-10 PROCEDURE — 85025 COMPLETE CBC W/AUTO DIFF WBC: CPT | Performed by: INTERNAL MEDICINE

## 2025-04-10 PROCEDURE — 80053 COMPREHEN METABOLIC PANEL: CPT | Performed by: INTERNAL MEDICINE

## 2025-04-10 PROCEDURE — 2500000001 HC RX 250 WO HCPCS SELF ADMINISTERED DRUGS (ALT 637 FOR MEDICARE OP): Performed by: NURSE PRACTITIONER

## 2025-04-10 PROCEDURE — 2500000002 HC RX 250 W HCPCS SELF ADMINISTERED DRUGS (ALT 637 FOR MEDICARE OP, ALT 636 FOR OP/ED): Performed by: INTERNAL MEDICINE

## 2025-04-10 PROCEDURE — 36415 COLL VENOUS BLD VENIPUNCTURE: CPT | Performed by: INTERNAL MEDICINE

## 2025-04-10 PROCEDURE — 2500000004 HC RX 250 GENERAL PHARMACY W/ HCPCS (ALT 636 FOR OP/ED): Performed by: INTERNAL MEDICINE

## 2025-04-10 RX ORDER — DICLOFENAC SODIUM 10 MG/G
4 GEL TOPICAL 4 TIMES DAILY
Start: 2025-04-10

## 2025-04-10 RX ORDER — CEFAZOLIN SODIUM 2 G/100ML
2 INJECTION, SOLUTION INTRAVENOUS EVERY 8 HOURS
Qty: 1500 ML | Refills: 0 | Status: SHIPPED | OUTPATIENT
Start: 2025-04-10 | End: 2025-04-15

## 2025-04-10 RX ORDER — TAMSULOSIN HYDROCHLORIDE 0.4 MG/1
0.4 CAPSULE ORAL DAILY
Start: 2025-04-11

## 2025-04-10 RX ORDER — ZINC SULFATE 50(220)MG
50 CAPSULE ORAL DAILY
Start: 2025-04-11

## 2025-04-10 RX ORDER — CYCLOBENZAPRINE HCL 5 MG
5 TABLET ORAL 3 TIMES DAILY PRN
Start: 2025-04-10

## 2025-04-10 RX ORDER — PHENAZOPYRIDINE HYDROCHLORIDE 95 MG/1
95 TABLET ORAL
Start: 2025-04-10

## 2025-04-10 RX ORDER — OXYCODONE AND ACETAMINOPHEN 5; 325 MG/1; MG/1
1 TABLET ORAL EVERY 6 HOURS PRN
Start: 2025-04-10

## 2025-04-10 RX ORDER — TRIAMCINOLONE ACETONIDE 1 MG/G
OINTMENT TOPICAL 2 TIMES DAILY
Start: 2025-04-10

## 2025-04-10 RX ORDER — LANOLIN ALCOHOL/MO/W.PET/CERES
100 CREAM (GRAM) TOPICAL DAILY
Start: 2025-04-11

## 2025-04-10 RX ADMIN — CEFAZOLIN SODIUM 2 G: 2 INJECTION, SOLUTION INTRAVENOUS at 00:29

## 2025-04-10 RX ADMIN — PHENAZOPYRIDINE 100 MG: 100 TABLET ORAL at 08:50

## 2025-04-10 RX ADMIN — DICLOFENAC SODIUM 4 G: 10 GEL TOPICAL at 17:04

## 2025-04-10 RX ADMIN — TAMSULOSIN HYDROCHLORIDE 0.4 MG: 0.4 CAPSULE ORAL at 08:50

## 2025-04-10 RX ADMIN — POLYETHYLENE GLYCOL 3350 17 G: 17 POWDER, FOR SOLUTION ORAL at 08:50

## 2025-04-10 RX ADMIN — MIRTAZAPINE 7.5 MG: 15 TABLET, FILM COATED ORAL at 21:29

## 2025-04-10 RX ADMIN — THIAMINE HCL TAB 100 MG 100 MG: 100 TAB at 08:50

## 2025-04-10 RX ADMIN — APIXABAN 2.5 MG: 2.5 TABLET, FILM COATED ORAL at 08:50

## 2025-04-10 RX ADMIN — APIXABAN 2.5 MG: 2.5 TABLET, FILM COATED ORAL at 21:29

## 2025-04-10 RX ADMIN — CEFAZOLIN SODIUM 2 G: 2 INJECTION, SOLUTION INTRAVENOUS at 17:04

## 2025-04-10 RX ADMIN — OXYCODONE HYDROCHLORIDE AND ACETAMINOPHEN 1 TABLET: 5; 325 TABLET ORAL at 08:51

## 2025-04-10 RX ADMIN — Medication 50 MG OF ELEMENTAL ZINC: at 08:50

## 2025-04-10 RX ADMIN — CEFAZOLIN SODIUM 2 G: 2 INJECTION, SOLUTION INTRAVENOUS at 08:50

## 2025-04-10 RX ADMIN — TRIAMCINOLONE ACETONIDE: 1 OINTMENT TOPICAL at 08:51

## 2025-04-10 RX ADMIN — ONDANSETRON 4 MG: 2 INJECTION INTRAMUSCULAR; INTRAVENOUS at 08:58

## 2025-04-10 RX ADMIN — DICLOFENAC SODIUM 4 G: 10 GEL TOPICAL at 21:30

## 2025-04-10 RX ADMIN — TRIAMCINOLONE ACETONIDE: 1 OINTMENT TOPICAL at 21:30

## 2025-04-10 RX ADMIN — PHENAZOPYRIDINE 100 MG: 100 TABLET ORAL at 17:04

## 2025-04-10 RX ADMIN — SENNOSIDES AND DOCUSATE SODIUM 1 TABLET: 50; 8.6 TABLET ORAL at 21:29

## 2025-04-10 RX ADMIN — DICLOFENAC SODIUM 4 G: 10 GEL TOPICAL at 06:10

## 2025-04-10 ASSESSMENT — PAIN SCALES - WONG BAKER: WONGBAKER_NUMERICALRESPONSE: NO HURT

## 2025-04-10 ASSESSMENT — PAIN SCALES - GENERAL
PAINLEVEL_OUTOF10: 0 - NO PAIN
PAINLEVEL_OUTOF10: 5 - MODERATE PAIN
PAINLEVEL_OUTOF10: 0 - NO PAIN
PAINLEVEL_OUTOF10: 8

## 2025-04-10 ASSESSMENT — PAIN DESCRIPTION - LOCATION: LOCATION: ABDOMEN

## 2025-04-10 ASSESSMENT — PAIN - FUNCTIONAL ASSESSMENT: PAIN_FUNCTIONAL_ASSESSMENT: 0-10

## 2025-04-10 NOTE — PROGRESS NOTES
Subjective   No new voiced complaint  Labs are stable  Objective          Vitals 24HR  Heart Rate:  [83-93]   Temp:  [35.5 °C (95.9 °F)-35.9 °C (96.6 °F)]   Resp:  [16-18]   BP: (114-137)/(61-62)   SpO2:  [98 %-99 %]         Intake/Output last 3 Shifts:    Intake/Output Summary (Last 24 hours) at 4/10/2025 1251  Last data filed at 4/10/2025 0922  Gross per 24 hour   Intake 300 ml   Output 100 ml   Net 200 ml       Physical Exam  General appearance; chronically ill looking  Skin pallor   HEENT; pupils react to light and accommodation  Neck; no JVD no bruit no thyromegaly  Lungs; clear to auscultation and percussion  Heart; regular rate no gallop no rubs  Pulses are equal  Abdomen; active peristalsis no rebound or guarding is mild epigastric tenderness  ; mild left CVA tenderness  Extremities; decreased muscle tone poor skin turgor  Neurological; pathological reflexes are absent    Relevant Results  Results for orders placed or performed during the hospital encounter of 04/02/25 (from the past 24 hours)   POCT GLUCOSE   Result Value Ref Range    POCT Glucose 139 (H) 74 - 99 mg/dL   POCT GLUCOSE   Result Value Ref Range    POCT Glucose 120 (H) 74 - 99 mg/dL   POCT GLUCOSE   Result Value Ref Range    POCT Glucose 125 (H) 74 - 99 mg/dL   CBC and Auto Differential   Result Value Ref Range    WBC 8.4 4.4 - 11.3 x10*3/uL    nRBC 0.0 0.0 - 0.0 /100 WBCs    RBC 4.30 4.00 - 5.20 x10*6/uL    Hemoglobin 11.6 (L) 12.0 - 16.0 g/dL    Hematocrit 39.2 36.0 - 46.0 %    MCV 91 80 - 100 fL    MCH 27.0 26.0 - 34.0 pg    MCHC 29.6 (L) 32.0 - 36.0 g/dL    RDW 19.0 (H) 11.5 - 14.5 %    Platelets 84 (L) 150 - 450 x10*3/uL    Neutrophils % 74.1 40.0 - 80.0 %    Immature Granulocytes %, Automated 4.9 (H) 0.0 - 0.9 %    Lymphocytes % 12.1 13.0 - 44.0 %    Monocytes % 7.6 2.0 - 10.0 %    Eosinophils % 0.7 0.0 - 6.0 %    Basophils % 0.6 0.0 - 2.0 %    Neutrophils Absolute 6.23 (H) 1.60 - 5.50 x10*3/uL    Immature Granulocytes Absolute,  Automated 0.41 0.00 - 0.50 x10*3/uL    Lymphocytes Absolute 1.02 0.80 - 3.00 x10*3/uL    Monocytes Absolute 0.64 0.05 - 0.80 x10*3/uL    Eosinophils Absolute 0.06 0.00 - 0.40 x10*3/uL    Basophils Absolute 0.05 0.00 - 0.10 x10*3/uL   Comprehensive Metabolic Panel   Result Value Ref Range    Glucose 119 (H) 74 - 99 mg/dL    Sodium 133 (L) 136 - 145 mmol/L    Potassium 4.0 3.5 - 5.3 mmol/L    Chloride 101 98 - 107 mmol/L    Bicarbonate 26 21 - 32 mmol/L    Anion Gap 10 10 - 20 mmol/L    Urea Nitrogen 22 6 - 23 mg/dL    Creatinine 0.80 0.50 - 1.05 mg/dL    eGFR 71 >60 mL/min/1.73m*2    Calcium 7.6 (L) 8.6 - 10.3 mg/dL    Albumin 2.1 (L) 3.4 - 5.0 g/dL    Alkaline Phosphatase 542 (H) 33 - 136 U/L    Total Protein 5.7 (L) 6.4 - 8.2 g/dL    AST 23 9 - 39 U/L    Bilirubin, Total 0.5 0.0 - 1.2 mg/dL    ALT 4 (L) 7 - 45 U/L   POCT GLUCOSE   Result Value Ref Range    POCT Glucose 148 (H) 74 - 99 mg/dL                  Assessment/Plan      Left renal cortical infarct  Anorexia  Dysgeusia  Bilateral renal artery stenosis  Carcinoma with metastasis to the liver  Thrombophilia either acquired or secondary to cancer  Hyponatremia  Metabolic acidosis    Plan  Continue current therapy  Continue to monitor renal chemistries    Assessment & Plan  Renal infarct (Multi)    Hyponatremia  Thank You For allowing me to participate on this patient's care         I spent 20 minutes in the professional and overall care of this patient.      Garry Pacheco MD

## 2025-04-10 NOTE — CARE PLAN
The patient's goals for the shift include  none stated.    The clinical goals for the shift include Patient will be safe and comfortable throughout shift.

## 2025-04-10 NOTE — DISCHARGE SUMMARY
Discharge Diagnosis  Renal infarct (Multi)    Issues Requiring Follow-Up  Patient fully evaluated 04/09 for   Assessment & Plan  Renal infarct (Multi)    Hyponatremia    Hyponatremia/renal infarct  Inpatient admission to Dr. Lugo  See imaging results above  DNRCCA  N.p.o.  Consult nephrology and appreciate input  Consult vascular surgery and appreciate input  Coag screen ordered  Continue heparin drip  Continue gentle IV fluids  Repeat labs  Monitor heparin assay  Morphine/Zofran in ED  Dilaudid IV as needed  Tigan IM as needed 2/2 prolonged Qtc    T2DM/breast cancer/colon cancer/anemia/HLD/HTN  #Chronic conditions  Insulin sliding scale for n.p.o.  Hypoglycemia protocol  Continue home MiraLAX and Colace  Continue home lisinopril daily  Continue home amlodipine daily  Continue home Estrace tablet    DVT Ppx  SCDs  Continue heparin drip  Bedrest/up to chair as tolerated    Patient with significant clinical improvement noted, patient medically cleared for discharge today. Patient seen resting in bed with head of bed elevated, no s/s or c/o acute difficulties at this time. Vital signs for last 24 hours:  Temp:  [35.5 °C (95.9 °F)-35.9 °C (96.6 °F)] 35.8 °C (96.4 °F)  Heart Rate:  [83-93] 93  Resp:  [16-18] 18  BP: (114-137)/(61-62) 134/62 Medications and labs reviewed-   Results for orders placed or performed during the hospital encounter of 04/02/25 (from the past 24 hours)   POCT GLUCOSE   Result Value Ref Range    POCT Glucose 139 (H) 74 - 99 mg/dL   POCT GLUCOSE   Result Value Ref Range    POCT Glucose 120 (H) 74 - 99 mg/dL   POCT GLUCOSE   Result Value Ref Range    POCT Glucose 125 (H) 74 - 99 mg/dL   CBC and Auto Differential   Result Value Ref Range    WBC 8.4 4.4 - 11.3 x10*3/uL    nRBC 0.0 0.0 - 0.0 /100 WBCs    RBC 4.30 4.00 - 5.20 x10*6/uL    Hemoglobin 11.6 (L) 12.0 - 16.0 g/dL    Hematocrit 39.2 36.0 - 46.0 %    MCV 91 80 - 100 fL    MCH 27.0 26.0 - 34.0 pg    MCHC 29.6 (L) 32.0 - 36.0 g/dL    RDW 19.0  (H) 11.5 - 14.5 %    Platelets 84 (L) 150 - 450 x10*3/uL    Neutrophils % 74.1 40.0 - 80.0 %    Immature Granulocytes %, Automated 4.9 (H) 0.0 - 0.9 %    Lymphocytes % 12.1 13.0 - 44.0 %    Monocytes % 7.6 2.0 - 10.0 %    Eosinophils % 0.7 0.0 - 6.0 %    Basophils % 0.6 0.0 - 2.0 %    Neutrophils Absolute 6.23 (H) 1.60 - 5.50 x10*3/uL    Immature Granulocytes Absolute, Automated 0.41 0.00 - 0.50 x10*3/uL    Lymphocytes Absolute 1.02 0.80 - 3.00 x10*3/uL    Monocytes Absolute 0.64 0.05 - 0.80 x10*3/uL    Eosinophils Absolute 0.06 0.00 - 0.40 x10*3/uL    Basophils Absolute 0.05 0.00 - 0.10 x10*3/uL   Comprehensive Metabolic Panel   Result Value Ref Range    Glucose 119 (H) 74 - 99 mg/dL    Sodium 133 (L) 136 - 145 mmol/L    Potassium 4.0 3.5 - 5.3 mmol/L    Chloride 101 98 - 107 mmol/L    Bicarbonate 26 21 - 32 mmol/L    Anion Gap 10 10 - 20 mmol/L    Urea Nitrogen 22 6 - 23 mg/dL    Creatinine 0.80 0.50 - 1.05 mg/dL    eGFR 71 >60 mL/min/1.73m*2    Calcium 7.6 (L) 8.6 - 10.3 mg/dL    Albumin 2.1 (L) 3.4 - 5.0 g/dL    Alkaline Phosphatase 542 (H) 33 - 136 U/L    Total Protein 5.7 (L) 6.4 - 8.2 g/dL    AST 23 9 - 39 U/L    Bilirubin, Total 0.5 0.0 - 1.2 mg/dL    ALT 4 (L) 7 - 45 U/L   POCT GLUCOSE   Result Value Ref Range    POCT Glucose 148 (H) 74 - 99 mg/dL      Patient recently received an antibiotic (last 12 hours)       Date/Time Action Medication Dose    04/09/25 0908 New Bag    ceFAZolin (Ancef) 2 g in dextrose (iso)  mL 2 g    04/09/25 0046 New Bag    ceFAZolin (Ancef) 2 g in dextrose (iso)  mL 2 g           Susceptibility data from last 90 days.  Collected Specimen Info Organism Ampicillin Cefazolin Cefazolin (uncomplicated UTIs only) Ciprofloxacin Gentamicin Nitrofurantoin Piperacillin/Tazobactam Trimethoprim/Sulfamethoxazole   01/10/25 Urine from Clean Catch/Voided Escherichia coli  S  S  S  S  S  S  S  S        Continue aggressive pulmonary hygiene and oral hygiene. Off loading as tolerated for  skin integrity.  Plan discussed with interdisciplinary team, renal chemistries improved and stable, shoulder pain improved with voltaren cream- will continue, no further hematuria noted. Patient without acute events overnight, patient denies chest pain, worsening SOB at this time. Ok to discharge with return to [facility], will continue current and repeat labs in the AM if patient still hospitalized. Patient aware and agreeable to current plan, continue plan as above.     I spent 30 minutes on the date of the service which included preparing to see the patient, face-to-face patient care, completing clinical documentation, obtaining and/or reviewing separately obtained history, performing a medically appropriate examination, counseling and educating the patient/family/caregiver, ordering medications, tests, or procedures, communicating with other HCPs (not separately reported), independently interpreting results (not separately reported), communicating results to the patient/family/caregiver, and care coordination (not separately reported    Discharge Meds     Medication List      START taking these medications     apixaban 2.5 mg tablet; Commonly known as: Eliquis; Take 1 tablet (2.5   mg) by mouth every 12 hours.   ceFAZolin IVPB; Commonly known as: Ancef; Infuse 100 mL (2 g) over 30   minutes into a venous catheter every 8 hours for 5 days.   cyclobenzaprine 5 mg tablet; Commonly known as: Flexeril; Take 1 tablet   (5 mg) by mouth 3 times a day as needed for muscle spasms.   diclofenac sodium 1 % gel; Commonly known as: Voltaren; Apply 4.5 inches   (4 g) topically 4 times a day.   oxyCODONE-acetaminophen 5-325 mg tablet; Commonly known as: Percocet;   Take 1 tablet by mouth every 6 hours if needed for severe pain (7 - 10).   phenazopyridine 95 mg tablet; Commonly known as: Urinary Pain Relief;   Take 1 tablet (95 mg) by mouth 3 times daily (morning, midday, late   afternoon).   tamsulosin 0.4 mg  24 hr capsule; Commonly known as: Flomax; Take 1   capsule (0.4 mg) by mouth once daily.; Start taking on: April 11, 2025   thiamine 100 mg tablet; Commonly known as: Vitamin B-1; Take 1 tablet   (100 mg) by mouth once daily.; Start taking on: April 11, 2025   triamcinolone 0.1 % ointment; Commonly known as: Kenalog; Apply   topically 2 times a day.   zinc sulfate 220 (50 Zn) MG capsule; Commonly known as: Zincate; Take 1   capsule (50 mg of elemental zinc) by mouth once daily.; Start taking on:   April 11, 2025     CHANGE how you take these medications     insulin lispro 100 unit/mL injection; Inject 0-5 Units under the skin 3   times a day before meals. Take as directed per insulin instructions.   sennosides-docusate sodium 8.6-50 mg tablet; Commonly known as:   Johana-Colace; Take 1 tablet by mouth once daily at bedtime.     CONTINUE taking these medications     acetaminophen 325 mg tablet; Commonly known as: Tylenol; Take 2 tablets   (650 mg) by mouth every 4 hours if needed for mild pain (1 - 3).   Basaglar KwikPen U-100 Insulin 100 unit/mL (3 mL) pen; Generic drug:   insulin glargine   cetirizine 10 mg chewable tablet; Commonly known as: ZyrTEC   docusate sodium 100 mg capsule; Commonly known as: Colace   estradiol 1 mg tablet; Commonly known as: Estrace; Take 1 tablet (1 mg)   by mouth once daily.   ferrous sulfate 325 (65 Fe) mg EC tablet   lactobacillus acidophilus & bulgar 1 million cell chewable tablet;   Commonly known as: Lactinex   lidocaine 5 % patch; Commonly known as: Lidoderm   mirtazapine 7.5 mg tablet; Commonly known as: Remeron   ondansetron 4 mg tablet; Commonly known as: Zofran   polyethylene glycol 17 gram packet; Commonly known as: Glycolax,   Miralax; Take 17 g by mouth once daily.   sodium chloride 1,000 mg tablet   trolamine salicylate 10 % cream; Commonly known as: Aspercreme     STOP taking these medications     amLODIPine 2.5 mg tablet; Commonly known as: Norvasc   enoxaparin 40 mg/0.4  mL syringe; Commonly known as: Lovenox   lisinopril 10 mg tablet   meloxicam 7.5 mg tablet; Commonly known as: Mobic   metoclopramide 5 mg tablet; Commonly known as: Reglan       Test Results Pending At Discharge  Pending Labs       No current pending labs.            Hospital Course         Tio Lugo MD   Physician  Internal Medicine     Progress Notes     Addendum     Date of Service: 4/8/2025 10:57 AM     Addendum       Expand All Collapse Kraig Tse is a 89 y.o. female on day 5 of admission presenting with Renal infarct (Multi).           Subjective  No significant events overnight. Patient seen and evaluated at bedside. Breathing on room air. States she feels her pain is well controlled today.               Objective  Last Recorded Vitals  /71 (BP Location: Right arm, Patient Position: Lying)   Pulse 93   Temp 36.1 °C (97 °F) (Temporal)   Resp 16   Wt 81.6 kg (179 lb 14.3 oz)   SpO2 93%   Intake/Output last 3 Shifts:     Intake/Output Summary (Last 24 hours) at 4/8/2025 1057  Last data filed at 4/8/2025 1023      Gross per 24 hour   Intake 280 ml   Output 3850 ml   Net -3570 ml         Admission Weight  Weight: 81.6 kg (180 lb) (04/02/25 2207)     Daily Weight  04/04/25 : 81.6 kg (179 lb 14.3 oz)     Image Results  Electrocardiogram, 12-lead PRN ACS symptoms  Unknown rhythm, irregular rate  Probable lateral infarct, old     See ED provider note for full interpretation and clinical correlation  Confirmed by Rivka Manzano ([id number]) on 4/5/2025 10:05:09 AM        Physical Exam        Tio Lugo MD   Physician  Internal Medicine     Progress Notes     Signed     Date of Service: 4/7/2025  3:03 PM      Signed        Expand All Collapse Kraig Tse is a 89 y.o. female on day 4 of admission presenting with Renal infarct (Multi).           Subjective  No significant events overnight. Patient seen and evaluated at bedside.               Objective[]Expand by Default  Last Recorded  Vitals  BP 92/55 (BP Location: Left arm, Patient Position: Lying)   Pulse (!) 112   Temp 35.5 °C (95.9 °F) (Temporal)   Resp 18   Wt 81.6 kg (179 lb 14.3 oz)   SpO2 92%   Intake/Output last 3 Shifts:     Intake/Output Summary (Last 24 hours) at 4/7/2025 1503  Last data filed at 4/7/2025 1257        Gross per 24 hour   Intake 300 ml   Output 500 ml   Net -200 ml         Admission Weight  Weight: 81.6 kg (180 lb) (04/02/25 2207)     Daily Weight  04/04/25 : 81.6 kg (179 lb 14.3 oz)     Image Results  Electrocardiogram, 12-lead PRN ACS symptoms  Unknown rhythm, irregular rate  Probable lateral infarct, old     See ED provider note for full interpretation and clinical correlation  Confirmed by Rivka Manzano ([id number]) on 4/5/2025 10:05:09 AM        Physical Exam     MD Tio Hodges MD    Physician  Internal Medicine     Progress Notes   This note has been shared with the patient  Signed     Date of Service: 4/5/2025  3:59 PM  Signed   Expand All Collapse All     Melissa Tse is a 89 y.o. female on day 2 of admission presenting with Renal infarct (Multi).           Subjective  Patient fully evaluated  04/05for    Problem List Items Addressed This Visit         * (Principal) Renal infarct (Multi) - Primary     Other Visit Diagnoses         Metastatic malignant neoplasm, unspecified site (Multi)             Patient seen resting in bed with head of bed elevated, no s/s or c/o acute difficulties at this time. Patient still with significant pain noted, medications adjusted, percocet added, will monitor overnight and repeat labs in the AM.  Vital signs for last 24 hours Temp:  [35.6 °C (96.1 °F)-36.5 °C (97.7 °F)] 36.1 °C (97 °F)Heart Rate:  [96-98] 96Resp:  [18] 18BP: (114-148)/(55-67) 148/67  I/O this shift:In: 220.8 [I.V.:220.8]Out: - Patient still requiring frequent cardiac and SPO2 monitoring. Continue aggressive pulmonary hygiene and oral hygiene. Off loading as tolerated for skin integrity.  Medications and labs reviewed-   Results for orders placed or performed during the hospital encounter of 04/02/25 (from the past 24 hours)  POCT GLUCOSE  Result  Value  Ref Range     POCT Glucose  175 (H)  74 - 99 mg/dL  POCT GLUCOSE  Result  Value  Ref Range     POCT Glucose  167 (H)  74 - 99 mg/dL  POCT GLUCOSE  Result  Value  Ref Range     POCT Glucose  148 (H)  74 - 99 mg/dL  POCT GLUCOSE  Result  Value  Ref Range     POCT Glucose  140 (H)  74 - 99 mg/dL  CBC and Auto Differential  Result  Value  Ref Range     WBC  12.8 (H)  4.4 - 11.3 x10*3/uL     nRBC  0.0  0.0 - 0.0 /100 WBCs     RBC  3.96 (L)  4.00 - 5.20 x10*6/uL     Hemoglobin  10.8 (L)  12.0 - 16.0 g/dL     Hematocrit  36.5  36.0 - 46.0 %     MCV  92  80 - 100 fL     MCH  27.3  26.0 - 34.0 pg     MCHC  29.6 (L)  32.0 - 36.0 g/dL     RDW  20.3 (H)  11.5 - 14.5 %     Platelets  173  150 - 450 x10*3/uL     Neutrophils %  84.7  40.0 - 80.0 %     Immature Granulocytes %, Automated  0.5  0.0 - 0.9 %     Lymphocytes %  7.9  13.0 - 44.0 %     Monocytes %  6.7  2.0 - 10.0 %     Eosinophils %  0.1  0.0 - 6.0 %     Basophils %  0.1  0.0 - 2.0 %     Neutrophils Absolute  10.86 (H)  1.60 - 5.50 x10*3/uL     Immature Granulocytes Absolute, Automated  0.06  0.00 - 0.50 x10*3/uL     Lymphocytes Absolute  1.01  0.80 - 3.00 x10*3/uL     Monocytes Absolute  0.86 (H)  0.05 - 0.80 x10*3/uL     Eosinophils Absolute  0.01  0.00 - 0.40 x10*3/uL     Basophils Absolute  0.01  0.00 - 0.10 x10*3/uL  Comprehensive Metabolic Panel  Result  Value  Ref Range     Glucose  134 (H)  74 - 99 mg/dL     Sodium  131 (L)  136 - 145 mmol/L     Potassium  5.2  3.5 - 5.3 mmol/L     Chloride  102  98 - 107 mmol/L     Bicarbonate  20 (L)  21 - 32 mmol/L     Anion Gap  14  10 - 20 mmol/L     Urea Nitrogen  26 (H)  6 - 23 mg/dL     Creatinine  0.85  0.50 - 1.05 mg/dL     eGFR  66  >60 mL/min/1.73m*2     Calcium  7.7 (L)  8.6 - 10.3 mg/dL     Albumin  2.2 (L)  3.4 - 5.0 g/dL     Alkaline  Phosphatase  155 (H)  33 - 136 U/L     Total Protein  5.7 (L)  6.4 - 8.2 g/dL     AST  23  9 - 39 U/L     Bilirubin, Total  0.9  0.0 - 1.2 mg/dL     ALT  8  7 - 45 U/L  Morphology  Result  Value  Ref Range     RBC Morphology  See Below        Polychromasia  Mild        Ovalocytes  Few        [place] Cells  Few     POCT GLUCOSE  Result  Value  Ref Range     POCT Glucose  173 (H)  74 - 99 mg/dL     Patient recently received an antibiotic (last 12 hours)         None         Plan discussed with interdisciplinary team, will continue to monitor pain levels and monitor overnight. Will continue current and repeat labs in the AM.  Discharge planning discussed with patient and care team. Therapy evaluations ordered. Anticipate HHC/SNF at discharge. Patient aware and agreeable to current plan, continue plan as above.  I spent a total of 50 minutes on the date of the service which included preparing to see the patient, face-to-face patient care, completing clinical documentation, obtaining and/or reviewing separately obtained history, performing a medically appropriate examination, counseling and educating the patient/family/caregiver, ordering medications, tests, or procedures, communicating with other HCPs (not separately reported), independently interpreting results (not separately reported), communicating results to the patient/family/caregiver, and care coordination (not separately reported).             ObjectiveExpand by Default  Last Recorded Vitals  /67 (BP Location: Right leg, Patient Position: Lying)   Pulse 96   Temp 36.1 °C (97 °F) (Temporal)   Resp 18   Wt 81.6 kg (179 lb 14.3 oz)   SpO2 93%   Intake/Output last 3 Shifts:     Intake/Output Summary (Last 24 hours) at 4/5/2025 1559  Last data filed at 4/5/2025 1301  Gross per 24 hour  Intake  1036.67 ml  Output  200 ml  Net  836.67 ml        Admission Weight  Weight: 81.6 kg (180 lb) (04/02/25 2207)     Daily Weight  04/04/25 : 81.6 kg (179 lb 14.3 oz)      Image Results  Electrocardiogram, 12-lead PRN ACS symptoms  Unknown rhythm, irregular rate  Probable lateral infarct, old     See ED provider note for full interpretation and clinical correlation  Confirmed by Rivka Manzano ([id number]) on 4/5/2025 10:05:09 AM        Physical Exam     Relevant Results                       Assessment/Plan     Assessment & Plan  Renal infarct (Multi)     Hyponatremia     Hyponatremia/renal infarct  Inpatient admission to Dr. Lugo  See imaging results above  DNRCCA  N.p.o.  Consult nephrology and appreciate input  Consult vascular surgery and appreciate input  Coag screen ordered  Continue heparin drip  Continue gentle IV fluids  Repeat labs  Monitor heparin assay  Morphine/Zofran in ED  Dilaudid IV as needed  Tigan IM as needed 2/2 prolonged Qtc     T2DM/breast cancer/colon cancer/anemia/HLD/HTN  #Chronic conditions  Insulin sliding scale for n.p.o.  Hypoglycemia protocol  Continue home MiraLAX and Colace  Continue home lisinopril daily  Continue home amlodipine daily  Continue home Estrace tablet     DVT Ppx  SCDs  Continue heparin drip  Bedrest/up to chair as tolerated  Patient fully evaluated on April 3.  Continue analgesics and await nephrology consultation.  Appreciate vascular surgery consultation.  Patient with significant metastatic cancer which comfort measures are in place.        MD Tio Hodges MD untitled image  Physician  Internal Medicine     Progress Notes   This note has been shared with the patient  Addendum     Date of Service: 4/3/2025  1:49 PM     Addendum     Expand All Collapse All  untitled image  Patient fully evaluated on 4/3. Patient resting in bed, breathing comfortably on 3L O2 via nasal cannula. Accompanied by daughter in the room. Evaluated today by nephrology, lisinopril discontinued d/t jr renal artery stenosis, flomax added. Hypertension will be treated with prn medications to metoprolol/apresoline IV. Continue heparin drip. Also  evaluated by vascular surgery, no surgery recommended at this time. Recommended consult to heme-onc, family declined patient being seen by heme-onc - not wishing to pursue aggressive chemotherapy treatment at this time. Labs reviewed. Mild hyponatremia noted, continue sodium chloride drip. Being given comfort measures for pain, currently receiving dilaudid, morphine.     Problem List Items Addressed This Visit                     Genitourinary and Reproductive     * (Principal) Renal infarct (Multi) - Primary     Other Visit Diagnoses         Metastatic malignant neoplasm, unspecified site (Multi)               Patient seen resting in bed with head of bed elevated, no s/s or c/o acute difficulties at this time.  Vital signs for last 24 hours Temp:  [36.5 °C (97.7 °F)-36.7 °C (98.1 °F)] 36.7 °C (98.1 °F)  Heart Rate:  [] 100  Resp:  [16-20] 18  BP: (125-175)/(60-74) 126/64    I/O this shift:  In: 551.8 [I.V.:551.8]  Out: -   Patient still requiring frequent cardiac and SPO2 monitoring. Continue aggressive pulmonary hygiene and oral hygiene. Off loading as tolerated for skin integrity. Medications and labs reviewed-               Results for orders placed or performed during the hospital encounter of 04/02/25 (from the past 24 hours)  CBC and Auto Differential  Result  Value  Ref Range     WBC  11.1  4.4 - 11.3 x10*3/uL     nRBC  0.0  0.0 - 0.0 /100 WBCs     RBC  4.36  4.00 - 5.20 x10*6/uL     Hemoglobin  11.5 (L)  12.0 - 16.0 g/dL     Hematocrit  38.8  36.0 - 46.0 %     MCV  89  80 - 100 fL     MCH  26.4  26.0 - 34.0 pg     MCHC  29.6 (L)  32.0 - 36.0 g/dL     RDW  19.9 (H)  11.5 - 14.5 %     Platelets  198  150 - 450 x10*3/uL     Neutrophils %  84.1  40.0 - 80.0 %     Immature Granulocytes %, Automated  0.6  0.0 - 0.9 %     Lymphocytes %  9.5  13.0 - 44.0 %     Monocytes %  5.6  2.0 - 10.0 %     Eosinophils %  0.0  0.0 - 6.0 %     Basophils %  0.2  0.0 - 2.0 %     Neutrophils Absolute  9.34 (H)  1.60 - 5.50  x10*3/uL     Immature Granulocytes Absolute, Automated  0.07  0.00 - 0.50 x10*3/uL     Lymphocytes Absolute  1.06  0.80 - 3.00 x10*3/uL     Monocytes Absolute  0.62  0.05 - 0.80 x10*3/uL     Eosinophils Absolute  0.00  0.00 - 0.40 x10*3/uL     Basophils Absolute  0.02  0.00 - 0.10 x10*3/uL  Comprehensive metabolic panel  Result  Value  Ref Range     Glucose  131 (H)  74 - 99 mg/dL     Sodium  131 (L)  136 - 145 mmol/L     Potassium  4.6  3.5 - 5.3 mmol/L     Chloride  96 (L)  98 - 107 mmol/L     Bicarbonate  22  21 - 32 mmol/L     Anion Gap  18  10 - 20 mmol/L     Urea Nitrogen  21  6 - 23 mg/dL     Creatinine  0.89  0.50 - 1.05 mg/dL     eGFR  62  >60 mL/min/1.73m*2     Calcium  8.4 (L)  8.6 - 10.3 mg/dL     Albumin  2.9 (L)  3.4 - 5.0 g/dL     Alkaline Phosphatase  258 (H)  33 - 136 U/L     Total Protein  6.9  6.4 - 8.2 g/dL     AST  22  9 - 39 U/L     Bilirubin, Total  0.7  0.0 - 1.2 mg/dL     ALT  16  7 - 45 U/L  Lipase  Result  Value  Ref Range     Lipase  7 (L)  9 - 82 U/L  Lactate  Result  Value  Ref Range     Lactate  1.2  0.4 - 2.0 mmol/L  Magnesium  Result  Value  Ref Range     Magnesium  1.90  1.60 - 2.40 mg/dL  POCT GLUCOSE  Result  Value  Ref Range     POCT Glucose  129 (H)  74 - 99 mg/dL  POCT GLUCOSE  Result  Value  Ref Range     POCT Glucose  119 (H)  74 - 99 mg/dL  Protime-INR  Result  Value  Ref Range     Protime  17.4 (H)  9.8 - 12.4 seconds     INR  1.6 (H)  0.9 - 1.1  CBC  Result  Value  Ref Range     WBC  11.5 (H)  4.4 - 11.3 x10*3/uL     nRBC  0.0  0.0 - 0.0 /100 WBCs     RBC  4.08  4.00 - 5.20 x10*6/uL     Hemoglobin  10.9 (L)  12.0 - 16.0 g/dL     Hematocrit  36.7  36.0 - 46.0 %     MCV  90  80 - 100 fL     MCH  26.7  26.0 - 34.0 pg     MCHC  29.7 (L)  32.0 - 36.0 g/dL     RDW  19.9 (H)  11.5 - 14.5 %     Platelets  181  150 - 450 x10*3/uL  Basic Metabolic Panel  Result  Value  Ref Range     Glucose  129 (H)  74 - 99 mg/dL     Sodium  130 (L)  136 - 145 mmol/L     Potassium  4.2  3.5 - 5.3  mmol/L     Chloride  98  98 - 107 mmol/L     Bicarbonate  21  21 - 32 mmol/L     Anion Gap  15  10 - 20 mmol/L     Urea Nitrogen  19  6 - 23 mg/dL     Creatinine  0.85  0.50 - 1.05 mg/dL     eGFR  66  >60 mL/min/1.73m*2     Calcium  8.0 (L)  8.6 - 10.3 mg/dL  Heparin Assay, UFH  Result  Value  Ref Range     Heparin Unfractionated  1.1 (HH)  See Comment Below for Therapeutic Ranges IU/mL  POCT GLUCOSE  Result  Value  Ref Range     POCT Glucose  123 (H)  74 - 99 mg/dL     Patient recently received an antibiotic (last 12 hours)         None              Plan discussed with interdisciplinary team, continue current and repeat labs in the AM.      Patient aware and agreeable to current plan, continue plan as above.      I spent a total of 50 minutes on the date of the service which included preparing to see the patient, face-to-face patient care, completing clinical documentation, obtaining and/or reviewing separately obtained history, performing a medically appropriate examination, counseling and educating the patient/family/caregiver, ordering medications, tests, or procedures, communicating with other HCPs (not separately reported), independently interpreting results (not separately reported), communicating results to the patient/family/caregiver, and care coordination (not separately reported). Melissa Tse is a 89 y.o. female on day 0 of admission presenting with Renal infarct (Multi).           Subjective     MD Tio Hodges MD untitled image  Physician  Internal Medicine     H&P   This note has been shared with the patient  Signed     Date of Service: 4/3/2025 12:43 PM     Signed     Expand All Collapse All  untitled image  History Of Present Illness  Melissa Tse is a 89 y.o. female with a past medical history significant for breast cancer and stage IV colon cancer presenting to emergency department from [facility] for evaluation of abdominal pain.  Patient also reports  an episode of vomiting this morning and states she has been unable to keep anything down today and complains of  a pressure in the mid to lower abdomen that radiates to her right flank.  Patient reports that her doctor will only give her Tylenol for pain and that is not working.  Patient states that her last bowel movement was 4/1/2025 without difficulty.  Patient denies chest pain or dizziness.  Patient denies recent illness, fever, or chills.  Patient denies shortness of breath.     In ED, lipase 7, glucose 131, sodium 131, chloride 96, calcium 8.4, albumin 2.9, alk phos 258, hemoglobin 11.5.  EKG completed showing normal sinus rhythm at a rate of 88 without ST elevation or depression with a prolonged QTc of 507 per ED physician review.  A CT of the abdomen and pelvis was completed showing hypo-enhancement of the left renal parenchyma compatible with extensive renal cortical infarcts as well as fluid in the endometrial canal concerning for cervical stenosis and worsening of a retroperitoneal metastatic lymphadenopathy as well as progression of hepatic  metastasis per radiology review.  Patient medicated with Zofran, morphine, LR x 1 L bolus in ED.  Blood pressure 149/62, heart rate 92, respirations 20, temperature 36.5 °C, SpO2 95% on room air.  Consult placed to vascular surgery and nephrology.  Patient started on a heparin drip.  Inpatient admission under the care of Dr. Lugo who will continue to follow.  I was asked to do H&P and place initial admission orders.     Prior medical records reviewed.     Past Medical History  Breast cancer, stage IV colon cancer, T2DM, anemia, hypertension, hyperlipidemia  Surgical History  Colonoscopy, tubal ligation, hip replacement, cataract surgery      Social History  Never smoker, no drug use, no alcohol use  Family History  Cataract, diabetes     Allergies  Penicillin, Penicillins, and Statins-hmg-coa reductase inhibitors     Review of Systems  A 10 point review of systems  "was completed and is negative except what is listed in HPI  Physical Exam  Constitutional:       Appearance: She is ill-appearing.   HENT:      Mouth/Throat:      Mouth: Mucous membranes are dry.      Pharynx: Oropharynx is clear.   Eyes:      Pupils: Pupils are equal, round, and reactive to light.   Cardiovascular:      Rate and Rhythm: Normal rate and regular rhythm.   Pulmonary:      Effort: Pulmonary effort is normal.      Breath sounds: Normal breath sounds.   Abdominal:      Palpations: Abdomen is soft.      Tenderness: There is abdominal tenderness in the periumbilical area and suprapubic area.      Comments: Right flank tenderness   Musculoskeletal:      Cervical back: Normal range of motion.   Skin:     General: Skin is warm and dry.   Neurological:      General: No focal deficit present.      Mental Status: She is alert.   Psychiatric:         Mood and Affect: Mood normal.         Behavior: Behavior normal.            Last Recorded Vitals  Blood pressure 126/64, pulse 100, temperature 36.7 °C (98.1 °F), resp. rate 18, height 1.626 m (5' 4\"), weight 81.6 kg (180 lb), SpO2 99%.     Relevant Results  CT abdomen pelvis w IV contrast     Result Date: 4/3/2025  Interpreted By:  Faraz Link, STUDY: CT ABDOMEN PELVIS W IV CONTRAST; ;  4/3/2025 12:30 am   INDICATION: Signs/Symptoms:stage IV colon cancer, now with R abd pain.     COMPARISON: 01/17/2025.   ACCESSION NUMBER(S): [id number]   ORDERING CLINICIAN: ELYSE KLERMAN   TECHNIQUE: Axial CT images of the abdomen and pelvis with coronal and sagittal reconstructed images performed after intravenous administration of 80 cc Omnipaque 350.   FINDINGS: LOWER CHEST: No acute abnormality of the lung bases. BONES: No acute osseous abnormality. Osteopenia. Right hip arthroplasty in standard alignment. Similar appearance of mild-to-moderate chronic compression of the T11 vertebral body. Stable grade 1 degenerative anterolisthesis at L4-L5 and moderate to severe " multilevel degenerative changes of the imaged spine. ABDOMINAL WALL: Within normal limits.   ABDOMEN:   LIVER: There is a large ill-defined region of hypoenhancement in the right hepatic lobe measuring up to 10.8 cm in transaxial diameter, up to at least 10.1 cm in CC diameter, suspicious for interval progression of the hepatic metastasis. There is a new hypoattenuating lesion inferolaterally in the inferior tip of the right hepatic lobe (series 601, images 57-63), measuring up to approximately 2.2 cm in transaxial diameter, suspicious for new metastatic lesion. Liver is enlarged, similar to before. BILE DUCTS: Normal caliber. GALLBLADDER: Cholelithiasis. No wall thickening or pericholecystic fluid. PANCREAS:Atrophic with pancreatic ductal dilation and irregularity and parenchymal calcifications in keeping with sequelae of chronic pancreatitis. SPLEEN: Within normal limits. ADRENALS: Within normal limits. KIDNEYS and URETERS: New extensive hypoenhancement/non enhancement of the left renal parenchyma compatible with extensive renal cortical infarcts. Right kidney and ureter appear normal.     VESSELS: Severe atherosclerosis, with critical narrowing or occlusion of the left renal artery. Severe narrowing of the right renal artery also noted. RETROPERITONEUM: Interval enlargement of celiac axis and portocaval nodes, suspicious for worsened metastatic disease.   PELVIS:   REPRODUCTIVE ORGANS: Endometrial fluid suggests cervical stenosis and is new from the prior exam. BLADDER: Within normal limits.   BOWEL: When allowing for differences in patient positioning in slice selection, there is no substantial change in the very large mass encompassing the mid-transverse colon and several adjacent loops of small bowel and adjacent omentum and mesocolon, compatible with patient's known advanced malignancy. No dilated bowel to suggest obstruction. Stomach appears grossly normal. No definite additional abnormal bowel wall  thickening. Normal appendix. PERITONEUM: No ascites or free air, no fluid collection.        New extensive hypoenhancement/non enhancement of the left renal parenchyma compatible with extensive renal cortical infarcts. Right kidney and ureter appear normal.   There is a large ill-defined region of hypoenhancement in the right hepatic lobe measuring up to 10.8 cm in transaxial diameter, up to at least 10.1 cm in CC diameter, suspicious for interval progression of the hepatic metastasis. There is a new hypoattenuating lesion inferolaterally in the inferior tip of the right hepatic lobe (series 601, images 57-63), measuring up to approximately 2.2 cm in transaxial diameter, suspicious for new metastatic lesion. Liver is enlarged, similar to before.   Interval worsening of retroperitoneal metastatic lymphadenopathy.   Cholelithiasis without convincing evidence of acute cholecystitis.   New fluid in the endometrial canal suggesting cervical stenosis.   Additional findings as discussed above.   MACRO: None   Signed by: Faraz Link 4/3/2025 12:52 AM Dictation workstation:   [id number]               Results for orders placed or performed during the hospital encounter of 04/02/25 (from the past 24 hours)  CBC and Auto Differential  Result  Value  Ref Range     WBC  11.1  4.4 - 11.3 x10*3/uL     nRBC  0.0  0.0 - 0.0 /100 WBCs     RBC  4.36  4.00 - 5.20 x10*6/uL     Hemoglobin  11.5 (L)  12.0 - 16.0 g/dL     Hematocrit  38.8  36.0 - 46.0 %     MCV  89  80 - 100 fL     MCH  26.4  26.0 - 34.0 pg     MCHC  29.6 (L)  32.0 - 36.0 g/dL     RDW  19.9 (H)  11.5 - 14.5 %     Platelets  198  150 - 450 x10*3/uL     Neutrophils %  84.1  40.0 - 80.0 %     Immature Granulocytes %, Automated  0.6  0.0 - 0.9 %     Lymphocytes %  9.5  13.0 - 44.0 %     Monocytes %  5.6  2.0 - 10.0 %     Eosinophils %  0.0  0.0 - 6.0 %     Basophils %  0.2  0.0 - 2.0 %     Neutrophils Absolute  9.34 (H)  1.60 - 5.50 x10*3/uL     Immature Granulocytes Absolute,  Automated  0.07  0.00 - 0.50 x10*3/uL     Lymphocytes Absolute  1.06  0.80 - 3.00 x10*3/uL     Monocytes Absolute  0.62  0.05 - 0.80 x10*3/uL     Eosinophils Absolute  0.00  0.00 - 0.40 x10*3/uL     Basophils Absolute  0.02  0.00 - 0.10 x10*3/uL  Comprehensive metabolic panel  Result  Value  Ref Range     Glucose  131 (H)  74 - 99 mg/dL     Sodium  131 (L)  136 - 145 mmol/L     Potassium  4.6  3.5 - 5.3 mmol/L     Chloride  96 (L)  98 - 107 mmol/L     Bicarbonate  22  21 - 32 mmol/L     Anion Gap  18  10 - 20 mmol/L     Urea Nitrogen  21  6 - 23 mg/dL     Creatinine  0.89  0.50 - 1.05 mg/dL     eGFR  62  >60 mL/min/1.73m*2     Calcium  8.4 (L)  8.6 - 10.3 mg/dL     Albumin  2.9 (L)  3.4 - 5.0 g/dL     Alkaline Phosphatase  258 (H)  33 - 136 U/L     Total Protein  6.9  6.4 - 8.2 g/dL     AST  22  9 - 39 U/L     Bilirubin, Total  0.7  0.0 - 1.2 mg/dL     ALT  16  7 - 45 U/L  Lipase  Result  Value  Ref Range     Lipase  7 (L)  9 - 82 U/L  Lactate  Result  Value  Ref Range     Lactate  1.2  0.4 - 2.0 mmol/L  Magnesium  Result  Value  Ref Range     Magnesium  1.90  1.60 - 2.40 mg/dL  POCT GLUCOSE  Result  Value  Ref Range     POCT Glucose  129 (H)  74 - 99 mg/dL  POCT GLUCOSE  Result  Value  Ref Range     POCT Glucose  119 (H)  74 - 99 mg/dL  Protime-INR  Result  Value  Ref Range     Protime  17.4 (H)  9.8 - 12.4 seconds     INR  1.6 (H)  0.9 - 1.1  CBC  Result  Value  Ref Range     WBC  11.5 (H)  4.4 - 11.3 x10*3/uL     nRBC  0.0  0.0 - 0.0 /100 WBCs     RBC  4.08  4.00 - 5.20 x10*6/uL     Hemoglobin  10.9 (L)  12.0 - 16.0 g/dL     Hematocrit  36.7  36.0 - 46.0 %     MCV  90  80 - 100 fL     MCH  26.7  26.0 - 34.0 pg     MCHC  29.7 (L)  32.0 - 36.0 g/dL     RDW  19.9 (H)  11.5 - 14.5 %     Platelets  181  150 - 450 x10*3/uL  Basic Metabolic Panel  Result  Value  Ref Range     Glucose  129 (H)  74 - 99 mg/dL     Sodium  130 (L)  136 - 145 mmol/L     Potassium  4.2  3.5 - 5.3 mmol/L     Chloride  98  98 - 107 mmol/L      Bicarbonate  21  21 - 32 mmol/L     Anion Gap  15  10 - 20 mmol/L     Urea Nitrogen  19  6 - 23 mg/dL     Creatinine  0.85  0.50 - 1.05 mg/dL     eGFR  66  >60 mL/min/1.73m*2     Calcium  8.0 (L)  8.6 - 10.3 mg/dL  Heparin Assay, UFH  Result  Value  Ref Range     Heparin Unfractionated  1.1 (HH)  See Comment Below for Therapeutic Ranges IU/mL  POCT GLUCOSE  Result  Value  Ref Range     POCT Glucose  123 (H)  74 - 99 mg/dL           Assessment/Plan  Melissa is an 89-year-old female patient presenting to emergency department from [facility] for evaluation of abdominal pain and vomiting.  Patient states that she developed an 8 out of 10 mid to lower abdominal pain radiating to her right flank.  Patient with a history of breast cancer and colon cancer.  CT imaging showing extensive left renal cortical infarcts as well as worsening of retroperitoneal metastatic lymphadenopathy and progression of hepatic metastasis per radiology review.  Consults placed to nephrology and vascular surgery, patient started on a heparin drip, medicated with morphine and admitted for further medical management.     Assessment & Planuntitled image  untitled imageRenal infarct (Multi)     Hyponatremia     Hyponatremia/renal infarct  Inpatient admission to Dr. Lugo  See imaging results above  DNRCCA  N.p.o.  Consult nephrology and appreciate input  Consult vascular surgery and appreciate input  Coag screen ordered  Continue heparin drip  Continue gentle IV fluids  Repeat labs  Monitor heparin assay  Morphine/Zofran in ED  Dilaudid IV as needed  Tigan IM as needed 2/2 prolonged Qtc     T2DM/breast cancer/colon cancer/anemia/HLD/HTN  #Chronic conditions  Insulin sliding scale for n.p.o.  Hypoglycemia protocol  Continue home MiraLAX and Colace  Continue home lisinopril daily  Continue home amlodipine daily  Continue home Estrace tablet     DVT Ppx  SCDs  Continue heparin drip  Bedrest/up to chair as tolerated  Patient fully  evaluated on April 3.  Continue analgesics and await nephrology consultation.  Appreciate vascular surgery consultation.  Patient with significant metastatic cancer which comfort measures are in place.        I spent 60 minutes in the professional and overall care of this patient.  Tio Lugo MD                       Objective  Last Recorded Vitals  /64   Pulse 100   Temp 36.7 °C (98.1 °F)   Resp 18   Wt 81.6 kg (180 lb)   SpO2 99%   Intake/Output last 3 Shifts:     Intake/Output Summary (Last 24 hours) at 4/3/2025 1349  Last data filed at 4/3/2025 1000        Gross per 24 hour  Intake  551.83 ml  Output  --  Net  551.83 ml        Admission Weight  Weight: 81.6 kg (180 lb) (04/02/25 2207)     Daily Weight  04/02/25 : 81.6 kg (180 lb)     Image Results  CT abdomen pelvis w IV contrast  Narrative: Interpreted By:  Faraz Link,   STUDY:  CT ABDOMEN PELVIS W IV CONTRAST; ;  4/3/2025 12:30 am      INDICATION:  Signs/Symptoms:stage IV colon cancer, now with R abd pain.          COMPARISON:  01/17/2025.      ACCESSION NUMBER(S):  [id number]      ORDERING CLINICIAN:  ELYSE KLERMAN      TECHNIQUE:  Axial CT images of the abdomen and pelvis with coronal and sagittal  reconstructed images performed after intravenous administration of 80  cc Omnipaque 350.      FINDINGS:  LOWER CHEST: No acute abnormality of the lung bases.  BONES: No acute osseous abnormality. Osteopenia. Right hip  arthroplasty in standard alignment. Similar appearance of  mild-to-moderate chronic compression of the T11 vertebral body.  Stable grade 1 degenerative anterolisthesis at L4-L5 and moderate to  severe multilevel degenerative changes of the imaged spine. ABDOMINAL  WALL: Within normal limits.      ABDOMEN:      LIVER: There is a large ill-defined region of hypoenhancement in the  right hepatic lobe measuring up to 10.8 cm in transaxial diameter, up  to at least 10.1 cm in CC diameter, suspicious for interval  progression of the  hepatic metastasis. There is a new hypoattenuating  lesion inferolaterally in the inferior tip of the right hepatic lobe  (series 601, images 57-63), measuring up to approximately 2.2 cm in  transaxial diameter, suspicious for new metastatic lesion. Liver is  enlarged, similar to before. BILE DUCTS: Normal caliber.  GALLBLADDER: Cholelithiasis. No wall thickening or pericholecystic  fluid. PANCREAS:Atrophic with pancreatic ductal dilation and  irregularity and parenchymal calcifications in keeping with sequelae  of chronic pancreatitis. SPLEEN: Within normal limits.  ADRENALS: Within normal limits.  KIDNEYS and URETERS: New extensive hypoenhancement/non enhancement of  the left renal parenchyma compatible with extensive renal cortical  infarcts. Right kidney and ureter appear normal.          VESSELS: Severe atherosclerosis, with critical narrowing or occlusion  of the left renal artery. Severe narrowing of the right renal artery  also noted. RETROPERITONEUM: Interval enlargement of celiac axis and  portocaval nodes, suspicious for worsened metastatic disease.      PELVIS:      REPRODUCTIVE ORGANS: Endometrial fluid suggests cervical stenosis and  is new from the prior exam. BLADDER: Within normal limits.      BOWEL: When allowing for differences in patient positioning in slice  selection, there is no substantial change in the very large mass  encompassing the mid-transverse colon and several adjacent loops of  small bowel and adjacent omentum and mesocolon, compatible with  patient's known advanced malignancy. No dilated bowel to suggest  obstruction. Stomach appears grossly normal. No definite additional  abnormal bowel wall thickening. Normal appendix. PERITONEUM: No  ascites or free air, no fluid collection.      Impression: New extensive hypoenhancement/non enhancement of the left renal  parenchyma compatible with extensive renal cortical infarcts. Right  kidney and ureter appear normal.      There is a large  ill-defined region of hypoenhancement in the right  hepatic lobe measuring up to 10.8 cm in transaxial diameter, up to at  least 10.1 cm in CC diameter, suspicious for interval progression of  the hepatic metastasis. There is a new hypoattenuating lesion  inferolaterally in the inferior tip of the right hepatic lobe (series  601, images 57-63), measuring up to approximately 2.2 cm in  transaxial diameter, suspicious for new metastatic lesion. Liver is  enlarged, similar to before.      Interval worsening of retroperitoneal metastatic lymphadenopathy.      Cholelithiasis without convincing evidence of acute cholecystitis.      New fluid in the endometrial canal suggesting cervical stenosis.      Additional findings as discussed above.      MACRO:  None      Signed by: Faraz Link 4/3/2025 12:52 AM  Dictation workstation:   [id number]        Physical Exam  Constitutional:       Appearance: She is ill-appearing.   HENT:      Mouth/Throat:      Mouth: Mucous membranes are dry.      Pharynx: Oropharynx is clear.   Eyes:      Pupils: Pupils are equal, round, and reactive to light.   Cardiovascular:      Rate and Rhythm: Normal rate and regular rhythm.   Pulmonary:      Effort: Pulmonary effort is normal.      Breath sounds: Normal breath sounds.   Abdominal:      Palpations: Abdomen is soft.      Tenderness: There is abdominal tenderness in the periumbilical area and suprapubic area.      Comments: Right flank tenderness   Musculoskeletal:      Cervical back: Normal range of motion.   Skin:     General: Skin is warm and dry.   Neurological:      General: No focal deficit present.      Mental Status: She is alert.   Psychiatric:         Mood and Affect: Mood normal.         Behavior: Behavior normal.         Relevant Results  ?         ?               Assessment/Plan                       Assessment & Planuntitled image  untitled imageRenal infarct (Multi)     Hyponatremia     Hyponatremia/renal infarct  Inpatient  admission to Dr. Lugo  See imaging results above  DNRCCA  N.p.o.  Consult nephrology and appreciate input  Consult vascular surgery and appreciate input  Coag screen ordered  Continue heparin drip  Continue gentle IV fluids  Repeat labs  Monitor heparin assay  Morphine/Zofran in ED  Dilaudid IV as needed  Tigan IM as needed 2/2 prolonged Qtc     T2DM/breast cancer/colon cancer/anemia/HLD/HTN  #Chronic conditions  Insulin sliding scale for n.p.o.  Hypoglycemia protocol  Continue home MiraLAX and Colace  Continue home lisinopril daily  Continue home amlodipine daily  Continue home Estrace tablet     DVT Ppx  SCDs  Continue heparin drip  Bedrest/up to chair as tolerated  Patient fully evaluated on April 3.  Continue analgesics and await nephrology consultation.  Appreciate vascular surgery consultation.  Patient with significant metastatic cancer which comfort measures are in place.      Patient fully evaluated on 4/4, resting in bed breathing comfortably on room air. Labs and medications reviewed. Estrase discontinued due to coagulation risk. Switched from heparin to Eliquis 2.5 BID for outpatient anticoagulation. Evaluated by vascular surgery, no surgery recommended at this time. Family declined heme-onc consult, not wishing to pursue aggressive chemotherapy treatment at this time. Continue comfort care for pain. Pyridium ordered for suprapubic pain. Per nursing staff, patient does not like Tigan, order switched to Zofran. Patient medically cleared for discharge to SNF today.              Tio Lugo MD   Physician  Internal Medicine     Progress Notes     Addendum     Date of Service: 4/7/2025  3:03 PM      Addendum        Expand All Collapse All    Melissa Tse is a 89 y.o. female on day 4 of admission presenting with Renal infarct (Multi).           Subjective  No significant events overnight. Patient seen and evaluated at bedside.               Objective  Last Recorded Vitals  BP 92/55 (BP Location: Left  arm, Patient Position: Lying)   Pulse (!) 112   Temp 35.5 °C (95.9 °F) (Temporal)   Resp 18   Wt 81.6 kg (179 lb 14.3 oz)   SpO2 92%   Intake/Output last 3 Shifts:     Intake/Output Summary (Last 24 hours) at 4/7/2025 1503  Last data filed at 4/7/2025 1257        Gross per 24 hour   Intake 300 ml   Output 500 ml   Net -200 ml         Admission Weight  Weight: 81.6 kg (180 lb) (04/02/25 2207)     Daily Weight  04/04/25 : 81.6 kg (179 lb 14.3 oz)     Image Results  Electrocardiogram, 12-lead PRN ACS symptoms  Unknown rhythm, irregular rate  Probable lateral infarct, old     See ED provider note for full interpretation and clinical correlation  Confirmed by Rivka Manzano ([id number]) on 4/5/2025 10:05:09 AM        Physical Exam     MD Tio Hodges MD    Physician  Internal Medicine     Progress Notes   This note has been shared with the patient  Signed     Date of Service: 4/5/2025  3:59 PM  Signed   Expand All Collapse All     Melissa Tse is a 89 y.o. female on day 2 of admission presenting with Renal infarct (Multi).           Subjective  Patient fully evaluated  04/05for    Problem List Items Addressed This Visit         * (Principal) Renal infarct (Multi) - Primary     Other Visit Diagnoses         Metastatic malignant neoplasm, unspecified site (Multi)             Patient seen resting in bed with head of bed elevated, no s/s or c/o acute difficulties at this time. Patient still with significant pain noted, medications adjusted, percocet added, will monitor overnight and repeat labs in the AM.  Vital signs for last 24 hours Temp:  [35.6 °C (96.1 °F)-36.5 °C (97.7 °F)] 36.1 °C (97 °F)Heart Rate:  [96-98] 96Resp:  [18] 18BP: (114-148)/(55-67) 148/67  I/O this shift:In: 220.8 [I.V.:220.8]Out: - Patient still requiring frequent cardiac and SPO2 monitoring. Continue aggressive pulmonary hygiene and oral hygiene. Off loading as tolerated for skin integrity. Medications and labs reviewed-   Results  for orders placed or performed during the hospital encounter of 04/02/25 (from the past 24 hours)  POCT GLUCOSE  Result  Value  Ref Range     POCT Glucose  175 (H)  74 - 99 mg/dL  POCT GLUCOSE  Result  Value  Ref Range     POCT Glucose  167 (H)  74 - 99 mg/dL  POCT GLUCOSE  Result  Value  Ref Range     POCT Glucose  148 (H)  74 - 99 mg/dL  POCT GLUCOSE  Result  Value  Ref Range     POCT Glucose  140 (H)  74 - 99 mg/dL  CBC and Auto Differential  Result  Value  Ref Range     WBC  12.8 (H)  4.4 - 11.3 x10*3/uL     nRBC  0.0  0.0 - 0.0 /100 WBCs     RBC  3.96 (L)  4.00 - 5.20 x10*6/uL     Hemoglobin  10.8 (L)  12.0 - 16.0 g/dL     Hematocrit  36.5  36.0 - 46.0 %     MCV  92  80 - 100 fL     MCH  27.3  26.0 - 34.0 pg     MCHC  29.6 (L)  32.0 - 36.0 g/dL     RDW  20.3 (H)  11.5 - 14.5 %     Platelets  173  150 - 450 x10*3/uL     Neutrophils %  84.7  40.0 - 80.0 %     Immature Granulocytes %, Automated  0.5  0.0 - 0.9 %     Lymphocytes %  7.9  13.0 - 44.0 %     Monocytes %  6.7  2.0 - 10.0 %     Eosinophils %  0.1  0.0 - 6.0 %     Basophils %  0.1  0.0 - 2.0 %     Neutrophils Absolute  10.86 (H)  1.60 - 5.50 x10*3/uL     Immature Granulocytes Absolute, Automated  0.06  0.00 - 0.50 x10*3/uL     Lymphocytes Absolute  1.01  0.80 - 3.00 x10*3/uL     Monocytes Absolute  0.86 (H)  0.05 - 0.80 x10*3/uL     Eosinophils Absolute  0.01  0.00 - 0.40 x10*3/uL     Basophils Absolute  0.01  0.00 - 0.10 x10*3/uL  Comprehensive Metabolic Panel  Result  Value  Ref Range     Glucose  134 (H)  74 - 99 mg/dL     Sodium  131 (L)  136 - 145 mmol/L     Potassium  5.2  3.5 - 5.3 mmol/L     Chloride  102  98 - 107 mmol/L     Bicarbonate  20 (L)  21 - 32 mmol/L     Anion Gap  14  10 - 20 mmol/L     Urea Nitrogen  26 (H)  6 - 23 mg/dL     Creatinine  0.85  0.50 - 1.05 mg/dL     eGFR  66  >60 mL/min/1.73m*2     Calcium  7.7 (L)  8.6 - 10.3 mg/dL     Albumin  2.2 (L)  3.4 - 5.0 g/dL     Alkaline Phosphatase  155 (H)  33 - 136 U/L     Total  Protein  5.7 (L)  6.4 - 8.2 g/dL     AST  23  9 - 39 U/L     Bilirubin, Total  0.9  0.0 - 1.2 mg/dL     ALT  8  7 - 45 U/L  Morphology  Result  Value  Ref Range     RBC Morphology  See Below        Polychromasia  Mild        Ovalocytes  Few        Groton Cells  Few     POCT GLUCOSE  Result  Value  Ref Range     POCT Glucose  173 (H)  74 - 99 mg/dL     Patient recently received an antibiotic (last 12 hours)         None         Plan discussed with interdisciplinary team, will continue to monitor pain levels and monitor overnight. Will continue current and repeat labs in the AM.  Discharge planning discussed with patient and care team. Therapy evaluations ordered. Anticipate HHC/SNF at discharge. Patient aware and agreeable to current plan, continue plan as above.  I spent a total of 50 minutes on the date of the service which included preparing to see the patient, face-to-face patient care, completing clinical documentation, obtaining and/or reviewing separately obtained history, performing a medically appropriate examination, counseling and educating the patient/family/caregiver, ordering medications, tests, or procedures, communicating with other HCPs (not separately reported), independently interpreting results (not separately reported), communicating results to the patient/family/caregiver, and care coordination (not separately reported).             ObjectiveExpand by Default  Last Recorded Vitals  /67 (BP Location: Right leg, Patient Position: Lying)   Pulse 96   Temp 36.1 °C (97 °F) (Temporal)   Resp 18   Wt 81.6 kg (179 lb 14.3 oz)   SpO2 93%   Intake/Output last 3 Shifts:     Intake/Output Summary (Last 24 hours) at 4/5/2025 1559  Last data filed at 4/5/2025 1301  Gross per 24 hour  Intake  1036.67 ml  Output  200 ml  Net  836.67 ml        Admission Weight  Weight: 81.6 kg (180 lb) (04/02/25 2207)     Daily Weight  04/04/25 : 81.6 kg (179 lb 14.3 oz)     Image Results  Electrocardiogram, 12-lead PRN  ACS symptoms  Unknown rhythm, irregular rate  Probable lateral infarct, old     See ED provider note for full interpretation and clinical correlation  Confirmed by Rivka Manzano ([id number]) on 4/5/2025 10:05:09 AM        Physical Exam     Relevant Results                       Assessment/Plan     Assessment & Plan  Renal infarct (Multi)     Hyponatremia     Hyponatremia/renal infarct  Inpatient admission to Dr. Lugo  See imaging results above  DNRCCA  N.p.o.  Consult nephrology and appreciate input  Consult vascular surgery and appreciate input  Coag screen ordered  Continue heparin drip  Continue gentle IV fluids  Repeat labs  Monitor heparin assay  Morphine/Zofran in ED  Dilaudid IV as needed  Tigan IM as needed 2/2 prolonged Qtc     T2DM/breast cancer/colon cancer/anemia/HLD/HTN  #Chronic conditions  Insulin sliding scale for n.p.o.  Hypoglycemia protocol  Continue home MiraLAX and Colace  Continue home lisinopril daily  Continue home amlodipine daily  Continue home Estrace tablet     DVT Ppx  SCDs  Continue heparin drip  Bedrest/up to chair as tolerated  Patient fully evaluated on April 3.  Continue analgesics and await nephrology consultation.  Appreciate vascular surgery consultation.  Patient with significant metastatic cancer which comfort measures are in place.        MD Tio Hodges MD untitled image  Physician  Internal Medicine     Progress Notes   This note has been shared with the patient  Addendum     Date of Service: 4/3/2025  1:49 PM     Addendum     Expand All Collapse All  untitled image  Patient fully evaluated on 4/3. Patient resting in bed, breathing comfortably on 3L O2 via nasal cannula. Accompanied by daughter in the room. Evaluated today by nephrology, lisinopril discontinued d/t jr renal artery stenosis, flomax added. Hypertension will be treated with prn medications to metoprolol/apresoline IV. Continue heparin drip. Also evaluated by vascular surgery, no surgery  recommended at this time. Recommended consult to heme-onc, family declined patient being seen by heme-onc - not wishing to pursue aggressive chemotherapy treatment at this time. Labs reviewed. Mild hyponatremia noted, continue sodium chloride drip. Being given comfort measures for pain, currently receiving dilaudid, morphine.     Problem List Items Addressed This Visit                     Genitourinary and Reproductive     * (Principal) Renal infarct (Multi) - Primary     Other Visit Diagnoses         Metastatic malignant neoplasm, unspecified site (Multi)               Patient seen resting in bed with head of bed elevated, no s/s or c/o acute difficulties at this time.  Vital signs for last 24 hours Temp:  [36.5 °C (97.7 °F)-36.7 °C (98.1 °F)] 36.7 °C (98.1 °F)  Heart Rate:  [] 100  Resp:  [16-20] 18  BP: (125-175)/(60-74) 126/64    I/O this shift:  In: 551.8 [I.V.:551.8]  Out: -   Patient still requiring frequent cardiac and SPO2 monitoring. Continue aggressive pulmonary hygiene and oral hygiene. Off loading as tolerated for skin integrity. Medications and labs reviewed-               Results for orders placed or performed during the hospital encounter of 04/02/25 (from the past 24 hours)  CBC and Auto Differential  Result  Value  Ref Range     WBC  11.1  4.4 - 11.3 x10*3/uL     nRBC  0.0  0.0 - 0.0 /100 WBCs     RBC  4.36  4.00 - 5.20 x10*6/uL     Hemoglobin  11.5 (L)  12.0 - 16.0 g/dL     Hematocrit  38.8  36.0 - 46.0 %     MCV  89  80 - 100 fL     MCH  26.4  26.0 - 34.0 pg     MCHC  29.6 (L)  32.0 - 36.0 g/dL     RDW  19.9 (H)  11.5 - 14.5 %     Platelets  198  150 - 450 x10*3/uL     Neutrophils %  84.1  40.0 - 80.0 %     Immature Granulocytes %, Automated  0.6  0.0 - 0.9 %     Lymphocytes %  9.5  13.0 - 44.0 %     Monocytes %  5.6  2.0 - 10.0 %     Eosinophils %  0.0  0.0 - 6.0 %     Basophils %  0.2  0.0 - 2.0 %     Neutrophils Absolute  9.34 (H)  1.60 - 5.50 x10*3/uL     Immature Granulocytes Absolute,    Bicarbonate  21  21 - 32 mmol/L     Anion Gap  15  10 - 20 mmol/L     Urea Nitrogen  19  6 - 23 mg/dL     Creatinine  0.85  0.50 - 1.05 mg/dL     eGFR  66  >60 mL/min/1.73m*2     Calcium  8.0 (L)  8.6 - 10.3 mg/dL  Heparin Assay, UFH  Result  Value  Ref Range     Heparin Unfractionated  1.1 (HH)  See Comment Below for Therapeutic Ranges IU/mL  POCT GLUCOSE  Result  Value  Ref Range     POCT Glucose  123 (H)  74 - 99 mg/dL     Patient recently received an antibiotic (last 12 hours)         None              Plan discussed with interdisciplinary team, continue current and repeat labs in the AM.      Patient aware and agreeable to current plan, continue plan as above.      I spent a total of 50 minutes on the date of the service which included preparing to see the patient, face-to-face patient care, completing clinical documentation, obtaining and/or reviewing separately obtained history, performing a medically appropriate examination, counseling and educating the patient/family/caregiver, ordering medications, tests, or procedures, communicating with other HCPs (not separately reported), independently interpreting results (not separately reported), communicating results to the patient/family/caregiver, and care coordination (not separately reported). Melissa Tse is a 89 y.o. female on day 0 of admission presenting with Renal infarct (Multi).           Subjective     MD Tio Hodges MD untitled image  Physician  Internal Medicine     H&P   This note has been shared with the patient  Signed     Date of Service: 4/3/2025 12:43 PM     Signed     Expand All Collapse All  untitled image  History Of Present Illness  Melissa Tse is a 89 y.o. female with a past medical history significant for breast cancer and stage IV colon cancer presenting to emergency department from [facility] for evaluation of abdominal pain.  Patient also reports an episode of vomiting this morning and  states she has been unable to keep anything down today and complains of  a pressure in the mid to lower abdomen that radiates to her right flank.  Patient reports that her doctor will only give her Tylenol for pain and that is not working.  Patient states that her last bowel movement was 4/1/2025 without difficulty.  Patient denies chest pain or dizziness.  Patient denies recent illness, fever, or chills.  Patient denies shortness of breath.     In ED, lipase 7, glucose 131, sodium 131, chloride 96, calcium 8.4, albumin 2.9, alk phos 258, hemoglobin 11.5.  EKG completed showing normal sinus rhythm at a rate of 88 without ST elevation or depression with a prolonged QTc of 507 per ED physician review.  A CT of the abdomen and pelvis was completed showing hypo-enhancement of the left renal parenchyma compatible with extensive renal cortical infarcts as well as fluid in the endometrial canal concerning for cervical stenosis and worsening of a retroperitoneal metastatic lymphadenopathy as well as progression of hepatic  metastasis per radiology review.  Patient medicated with Zofran, morphine, LR x 1 L bolus in ED.  Blood pressure 149/62, heart rate 92, respirations 20, temperature 36.5 °C, SpO2 95% on room air.  Consult placed to vascular surgery and nephrology.  Patient started on a heparin drip.  Inpatient admission under the care of Dr. Lugo who will continue to follow.  I was asked to do H&P and place initial admission orders.     Prior medical records reviewed.     Past Medical History  Breast cancer, stage IV colon cancer, T2DM, anemia, hypertension, hyperlipidemia  Surgical History  Colonoscopy, tubal ligation, hip replacement, cataract surgery      Social History  Never smoker, no drug use, no alcohol use  Family History  Cataract, diabetes     Allergies  Penicillin, Penicillins, and Statins-hmg-coa reductase inhibitors     Review of Systems  A 10 point review of systems was completed and is negative except  "what is listed in HPI  Physical Exam  Constitutional:       Appearance: She is ill-appearing.   HENT:      Mouth/Throat:      Mouth: Mucous membranes are dry.      Pharynx: Oropharynx is clear.   Eyes:      Pupils: Pupils are equal, round, and reactive to light.   Cardiovascular:      Rate and Rhythm: Normal rate and regular rhythm.   Pulmonary:      Effort: Pulmonary effort is normal.      Breath sounds: Normal breath sounds.   Abdominal:      Palpations: Abdomen is soft.      Tenderness: There is abdominal tenderness in the periumbilical area and suprapubic area.      Comments: Right flank tenderness   Musculoskeletal:      Cervical back: Normal range of motion.   Skin:     General: Skin is warm and dry.   Neurological:      General: No focal deficit present.      Mental Status: She is alert.   Psychiatric:         Mood and Affect: Mood normal.         Behavior: Behavior normal.            Last Recorded Vitals  Blood pressure 126/64, pulse 100, temperature 36.7 °C (98.1 °F), resp. rate 18, height 1.626 m (5' 4\"), weight 81.6 kg (180 lb), SpO2 99%.     Relevant Results  CT abdomen pelvis w IV contrast     Result Date: 4/3/2025  Interpreted By:  Faraz Link, STUDY: CT ABDOMEN PELVIS W IV CONTRAST; ;  4/3/2025 12:30 am   INDICATION: Signs/Symptoms:stage IV colon cancer, now with R abd pain.     COMPARISON: 01/17/2025.   ACCESSION NUMBER(S): [id number]   ORDERING CLINICIAN: ELYSE KLERMAN   TECHNIQUE: Axial CT images of the abdomen and pelvis with coronal and sagittal reconstructed images performed after intravenous administration of 80 cc Omnipaque 350.   FINDINGS: LOWER CHEST: No acute abnormality of the lung bases. BONES: No acute osseous abnormality. Osteopenia. Right hip arthroplasty in standard alignment. Similar appearance of mild-to-moderate chronic compression of the T11 vertebral body. Stable grade 1 degenerative anterolisthesis at L4-L5 and moderate to severe multilevel degenerative changes of the " imaged spine. ABDOMINAL WALL: Within normal limits.   ABDOMEN:   LIVER: There is a large ill-defined region of hypoenhancement in the right hepatic lobe measuring up to 10.8 cm in transaxial diameter, up to at least 10.1 cm in CC diameter, suspicious for interval progression of the hepatic metastasis. There is a new hypoattenuating lesion inferolaterally in the inferior tip of the right hepatic lobe (series 601, images 57-63), measuring up to approximately 2.2 cm in transaxial diameter, suspicious for new metastatic lesion. Liver is enlarged, similar to before. BILE DUCTS: Normal caliber. GALLBLADDER: Cholelithiasis. No wall thickening or pericholecystic fluid. PANCREAS:Atrophic with pancreatic ductal dilation and irregularity and parenchymal calcifications in keeping with sequelae of chronic pancreatitis. SPLEEN: Within normal limits. ADRENALS: Within normal limits. KIDNEYS and URETERS: New extensive hypoenhancement/non enhancement of the left renal parenchyma compatible with extensive renal cortical infarcts. Right kidney and ureter appear normal.     VESSELS: Severe atherosclerosis, with critical narrowing or occlusion of the left renal artery. Severe narrowing of the right renal artery also noted. RETROPERITONEUM: Interval enlargement of celiac axis and portocaval nodes, suspicious for worsened metastatic disease.   PELVIS:   REPRODUCTIVE ORGANS: Endometrial fluid suggests cervical stenosis and is new from the prior exam. BLADDER: Within normal limits.   BOWEL: When allowing for differences in patient positioning in slice selection, there is no substantial change in the very large mass encompassing the mid-transverse colon and several adjacent loops of small bowel and adjacent omentum and mesocolon, compatible with patient's known advanced malignancy. No dilated bowel to suggest obstruction. Stomach appears grossly normal. No definite additional abnormal bowel wall thickening. Normal appendix. PERITONEUM: No  ascites or free air, no fluid collection.        New extensive hypoenhancement/non enhancement of the left renal parenchyma compatible with extensive renal cortical infarcts. Right kidney and ureter appear normal.   There is a large ill-defined region of hypoenhancement in the right hepatic lobe measuring up to 10.8 cm in transaxial diameter, up to at least 10.1 cm in CC diameter, suspicious for interval progression of the hepatic metastasis. There is a new hypoattenuating lesion inferolaterally in the inferior tip of the right hepatic lobe (series 601, images 57-63), measuring up to approximately 2.2 cm in transaxial diameter, suspicious for new metastatic lesion. Liver is enlarged, similar to before.   Interval worsening of retroperitoneal metastatic lymphadenopathy.   Cholelithiasis without convincing evidence of acute cholecystitis.   New fluid in the endometrial canal suggesting cervical stenosis.   Additional findings as discussed above.   MACRO: None   Signed by: Faraz Link 4/3/2025 12:52 AM Dictation workstation:   [id number]               Results for orders placed or performed during the hospital encounter of 04/02/25 (from the past 24 hours)  CBC and Auto Differential  Result  Value  Ref Range     WBC  11.1  4.4 - 11.3 x10*3/uL     nRBC  0.0  0.0 - 0.0 /100 WBCs     RBC  4.36  4.00 - 5.20 x10*6/uL     Hemoglobin  11.5 (L)  12.0 - 16.0 g/dL     Hematocrit  38.8  36.0 - 46.0 %     MCV  89  80 - 100 fL     MCH  26.4  26.0 - 34.0 pg     MCHC  29.6 (L)  32.0 - 36.0 g/dL     RDW  19.9 (H)  11.5 - 14.5 %     Platelets  198  150 - 450 x10*3/uL     Neutrophils %  84.1  40.0 - 80.0 %     Immature Granulocytes %, Automated  0.6  0.0 - 0.9 %     Lymphocytes %  9.5  13.0 - 44.0 %     Monocytes %  5.6  2.0 - 10.0 %     Eosinophils %  0.0  0.0 - 6.0 %     Basophils %  0.2  0.0 - 2.0 %     Neutrophils Absolute  9.34 (H)  1.60 - 5.50 x10*3/uL     Immature Granulocytes Absolute, Automated  0.07  0.00 - 0.50 x10*3/uL      Lymphocytes Absolute  1.06  0.80 - 3.00 x10*3/uL     Monocytes Absolute  0.62  0.05 - 0.80 x10*3/uL     Eosinophils Absolute  0.00  0.00 - 0.40 x10*3/uL     Basophils Absolute  0.02  0.00 - 0.10 x10*3/uL  Comprehensive metabolic panel  Result  Value  Ref Range     Glucose  131 (H)  74 - 99 mg/dL     Sodium  131 (L)  136 - 145 mmol/L     Potassium  4.6  3.5 - 5.3 mmol/L     Chloride  96 (L)  98 - 107 mmol/L     Bicarbonate  22  21 - 32 mmol/L     Anion Gap  18  10 - 20 mmol/L     Urea Nitrogen  21  6 - 23 mg/dL     Creatinine  0.89  0.50 - 1.05 mg/dL     eGFR  62  >60 mL/min/1.73m*2     Calcium  8.4 (L)  8.6 - 10.3 mg/dL     Albumin  2.9 (L)  3.4 - 5.0 g/dL     Alkaline Phosphatase  258 (H)  33 - 136 U/L     Total Protein  6.9  6.4 - 8.2 g/dL     AST  22  9 - 39 U/L     Bilirubin, Total  0.7  0.0 - 1.2 mg/dL     ALT  16  7 - 45 U/L  Lipase  Result  Value  Ref Range     Lipase  7 (L)  9 - 82 U/L  Lactate  Result  Value  Ref Range     Lactate  1.2  0.4 - 2.0 mmol/L  Magnesium  Result  Value  Ref Range     Magnesium  1.90  1.60 - 2.40 mg/dL  POCT GLUCOSE  Result  Value  Ref Range     POCT Glucose  129 (H)  74 - 99 mg/dL  POCT GLUCOSE  Result  Value  Ref Range     POCT Glucose  119 (H)  74 - 99 mg/dL  Protime-INR  Result  Value  Ref Range     Protime  17.4 (H)  9.8 - 12.4 seconds     INR  1.6 (H)  0.9 - 1.1  CBC  Result  Value  Ref Range     WBC  11.5 (H)  4.4 - 11.3 x10*3/uL     nRBC  0.0  0.0 - 0.0 /100 WBCs     RBC  4.08  4.00 - 5.20 x10*6/uL     Hemoglobin  10.9 (L)  12.0 - 16.0 g/dL     Hematocrit  36.7  36.0 - 46.0 %     MCV  90  80 - 100 fL     MCH  26.7  26.0 - 34.0 pg     MCHC  29.7 (L)  32.0 - 36.0 g/dL     RDW  19.9 (H)  11.5 - 14.5 %     Platelets  181  150 - 450 x10*3/uL  Basic Metabolic Panel  Result  Value  Ref Range     Glucose  129 (H)  74 - 99 mg/dL     Sodium  130 (L)  136 - 145 mmol/L     Potassium  4.2  3.5 - 5.3 mmol/L     Chloride  98  98 - 107 mmol/L     Bicarbonate  21  21 - 32 mmol/L     Anion  Gap  15  10 - 20 mmol/L     Urea Nitrogen  19  6 - 23 mg/dL     Creatinine  0.85  0.50 - 1.05 mg/dL     eGFR  66  >60 mL/min/1.73m*2     Calcium  8.0 (L)  8.6 - 10.3 mg/dL  Heparin Assay, UFH  Result  Value  Ref Range     Heparin Unfractionated  1.1 (HH)  See Comment Below for Therapeutic Ranges IU/mL  POCT GLUCOSE  Result  Value  Ref Range     POCT Glucose  123 (H)  74 - 99 mg/dL           Assessment/Plan  Melissa is an 89-year-old female patient presenting to emergency department from [facility] for evaluation of abdominal pain and vomiting.  Patient states that she developed an 8 out of 10 mid to lower abdominal pain radiating to her right flank.  Patient with a history of breast cancer and colon cancer.  CT imaging showing extensive left renal cortical infarcts as well as worsening of retroperitoneal metastatic lymphadenopathy and progression of hepatic metastasis per radiology review.  Consults placed to nephrology and vascular surgery, patient started on a heparin drip, medicated with morphine and admitted for further medical management.     Assessment & Planuntitled image  untitled imageRenal infarct (Multi)     Hyponatremia     Hyponatremia/renal infarct  Inpatient admission to Dr. Lugo  See imaging results above  DNRCCA  N.p.o.  Consult nephrology and appreciate input  Consult vascular surgery and appreciate input  Coag screen ordered  Continue heparin drip  Continue gentle IV fluids  Repeat labs  Monitor heparin assay  Morphine/Zofran in ED  Dilaudid IV as needed  Tigan IM as needed 2/2 prolonged Qtc     T2DM/breast cancer/colon cancer/anemia/HLD/HTN  #Chronic conditions  Insulin sliding scale for n.p.o.  Hypoglycemia protocol  Continue home MiraLAX and Colace  Continue home lisinopril daily  Continue home amlodipine daily  Continue home Estrace tablet     DVT Ppx  SCDs  Continue heparin drip  Bedrest/up to chair as tolerated  Patient fully evaluated on April 3.  Continue analgesics and await  nephrology consultation.  Appreciate vascular surgery consultation.  Patient with significant metastatic cancer which comfort measures are in place.        I spent 60 minutes in the professional and overall care of this patient.  Tio Lugo MD                       Objective  Last Recorded Vitals  /64   Pulse 100   Temp 36.7 °C (98.1 °F)   Resp 18   Wt 81.6 kg (180 lb)   SpO2 99%   Intake/Output last 3 Shifts:     Intake/Output Summary (Last 24 hours) at 4/3/2025 1349  Last data filed at 4/3/2025 1000        Gross per 24 hour  Intake  551.83 ml  Output  --  Net  551.83 ml        Admission Weight  Weight: 81.6 kg (180 lb) (04/02/25 2207)     Daily Weight  04/02/25 : 81.6 kg (180 lb)     Image Results  CT abdomen pelvis w IV contrast  Narrative: Interpreted By:  Faraz Link,   STUDY:  CT ABDOMEN PELVIS W IV CONTRAST; ;  4/3/2025 12:30 am      INDICATION:  Signs/Symptoms:stage IV colon cancer, now with R abd pain.          COMPARISON:  01/17/2025.      ACCESSION NUMBER(S):  [id number]      ORDERING CLINICIAN:  ELYSE KLERMAN      TECHNIQUE:  Axial CT images of the abdomen and pelvis with coronal and sagittal  reconstructed images performed after intravenous administration of 80  cc Omnipaque 350.      FINDINGS:  LOWER CHEST: No acute abnormality of the lung bases.  BONES: No acute osseous abnormality. Osteopenia. Right hip  arthroplasty in standard alignment. Similar appearance of  mild-to-moderate chronic compression of the T11 vertebral body.  Stable grade 1 degenerative anterolisthesis at L4-L5 and moderate to  severe multilevel degenerative changes of the imaged spine. ABDOMINAL  WALL: Within normal limits.      ABDOMEN:      LIVER: There is a large ill-defined region of hypoenhancement in the  right hepatic lobe measuring up to 10.8 cm in transaxial diameter, up  to at least 10.1 cm in CC diameter, suspicious for interval  progression of the hepatic metastasis. There is a new  hypoattenuating  lesion inferolaterally in the inferior tip of the right hepatic lobe  (series 601, images 57-63), measuring up to approximately 2.2 cm in  transaxial diameter, suspicious for new metastatic lesion. Liver is  enlarged, similar to before. BILE DUCTS: Normal caliber.  GALLBLADDER: Cholelithiasis. No wall thickening or pericholecystic  fluid. PANCREAS:Atrophic with pancreatic ductal dilation and  irregularity and parenchymal calcifications in keeping with sequelae  of chronic pancreatitis. SPLEEN: Within normal limits.  ADRENALS: Within normal limits.  KIDNEYS and URETERS: New extensive hypoenhancement/non enhancement of  the left renal parenchyma compatible with extensive renal cortical  infarcts. Right kidney and ureter appear normal.          VESSELS: Severe atherosclerosis, with critical narrowing or occlusion  of the left renal artery. Severe narrowing of the right renal artery  also noted. RETROPERITONEUM: Interval enlargement of celiac axis and  portocaval nodes, suspicious for worsened metastatic disease.      PELVIS:      REPRODUCTIVE ORGANS: Endometrial fluid suggests cervical stenosis and  is new from the prior exam. BLADDER: Within normal limits.      BOWEL: When allowing for differences in patient positioning in slice  selection, there is no substantial change in the very large mass  encompassing the mid-transverse colon and several adjacent loops of  small bowel and adjacent omentum and mesocolon, compatible with  patient's known advanced malignancy. No dilated bowel to suggest  obstruction. Stomach appears grossly normal. No definite additional  abnormal bowel wall thickening. Normal appendix. PERITONEUM: No  ascites or free air, no fluid collection.      Impression: New extensive hypoenhancement/non enhancement of the left renal  parenchyma compatible with extensive renal cortical infarcts. Right  kidney and ureter appear normal.      There is a large ill-defined region of  hypoenhancement in the right  hepatic lobe measuring up to 10.8 cm in transaxial diameter, up to at  least 10.1 cm in CC diameter, suspicious for interval progression of  the hepatic metastasis. There is a new hypoattenuating lesion  inferolaterally in the inferior tip of the right hepatic lobe (series  601, images 57-63), measuring up to approximately 2.2 cm in  transaxial diameter, suspicious for new metastatic lesion. Liver is  enlarged, similar to before.      Interval worsening of retroperitoneal metastatic lymphadenopathy.      Cholelithiasis without convincing evidence of acute cholecystitis.      New fluid in the endometrial canal suggesting cervical stenosis.      Additional findings as discussed above.      MACRO:  None      Signed by: Faraz Link 4/3/2025 12:52 AM  Dictation workstation:   [id number]        Physical Exam  Constitutional:       Appearance: She is ill-appearing.   HENT:      Mouth/Throat:      Mouth: Mucous membranes are dry.      Pharynx: Oropharynx is clear.   Eyes:      Pupils: Pupils are equal, round, and reactive to light.   Cardiovascular:      Rate and Rhythm: Normal rate and regular rhythm.   Pulmonary:      Effort: Pulmonary effort is normal.      Breath sounds: Normal breath sounds.   Abdominal:      Palpations: Abdomen is soft.      Tenderness: There is abdominal tenderness in the periumbilical area and suprapubic area.      Comments: Right flank tenderness   Musculoskeletal:      Cervical back: Normal range of motion.   Skin:     General: Skin is warm and dry.   Neurological:      General: No focal deficit present.      Mental Status: She is alert.   Psychiatric:         Mood and Affect: Mood normal.         Behavior: Behavior normal.         Relevant Results  ?         ?               Assessment/Plan                       Assessment & Planuntitled image  untitled imageRenal infarct (Multi)     Hyponatremia     Hyponatremia/renal infarct  Inpatient admission to Dr. Lugo  See  imaging results above  DNRCCA  N.p.o.  Consult nephrology and appreciate input  Consult vascular surgery and appreciate input  Coag screen ordered  Continue heparin drip  Continue gentle IV fluids  Repeat labs  Monitor heparin assay  Morphine/Zofran in ED  Dilaudid IV as needed  Tigan IM as needed 2/2 prolonged Qtc     T2DM/breast cancer/colon cancer/anemia/HLD/HTN  #Chronic conditions  Insulin sliding scale for n.p.o.  Hypoglycemia protocol  Continue home MiraLAX and Colace  Continue home lisinopril daily  Continue home amlodipine daily  Continue home Estrace tablet     DVT Ppx  SCDs  Continue heparin drip  Bedrest/up to chair as tolerated  Patient fully evaluated on April 3.  Continue analgesics and await nephrology consultation.  Appreciate vascular surgery consultation.  Patient with significant metastatic cancer which comfort measures are in place.      Patient fully evaluated on 4/4, resting in bed breathing comfortably on room air. Labs and medications reviewed. Estrase discontinued due to coagulation risk. Switched from heparin to Eliquis 2.5 BID for outpatient anticoagulation. Evaluated by vascular surgery, no surgery recommended at this time. Family declined heme-onc consult, not wishing to pursue aggressive chemotherapy treatment at this time. Continue comfort care for pain. Pyridium ordered for suprapubic pain. Per nursing staff, patient does not like Tigan, order switched to Zofran. Patient medically cleared for discharge to SNF today.                              Assessment/Plan                       Assessment & Plan  Renal infarct (Multi)     Hyponatremia     Hyponatremia/renal infarct  Inpatient admission to Dr. Lugo  See imaging results above  DNRCCA  N.p.o.  Consult nephrology and appreciate input  Consult vascular surgery and appreciate input  Coag screen ordered  Continue heparin drip  Continue gentle IV fluids  Repeat labs  Monitor heparin assay  Morphine/Zofran in ED  Dilaudid IV as  needed  Tigan IM as needed 2/2 prolonged Qtc     T2DM/breast cancer/colon cancer/anemia/HLD/HTN  #Chronic conditions  Insulin sliding scale for n.p.o.  Hypoglycemia protocol  Continue home MiraLAX and Colace  Continue home lisinopril daily  Continue home amlodipine daily  Continue home Estrace tablet     DVT Ppx  SCDs  Continue heparin drip  Bedrest/up to chair as tolerated     Patient fully evaluated  04/05  for    Problem List Items Addressed This Visit         * (Principal) Renal infarct (Multi) - Primary      Other Visit Diagnoses         Metastatic malignant neoplasm, unspecified site (Multi)                 Patient seen resting in bed with head of bed elevated, no s/s or c/o acute difficulties at this time. Patient still with significant pain noted, medications adjusted, percocet added, will monitor overnight and repeat labs in the AM.      Vital signs for last 24 hours Temp:  [35.6 °C (96.1 °F)-36.5 °C (97.7 °F)] 36.1 °C (97 °F)  Heart Rate:  [96-98] 96  Resp:  [18] 18  BP: (114-148)/(55-67) 148/67    I/O this shift:  In: 220.8 [I.V.:220.8]  Out: -   Patient still requiring frequent cardiac and SPO2 monitoring. Continue aggressive pulmonary hygiene and oral hygiene. Off loading as tolerated for skin integrity. Medications and labs reviewed-             Results for orders placed or performed during the hospital encounter of 04/02/25 (from the past 24 hours)   POCT GLUCOSE   Result Value Ref Range     POCT Glucose 175 (H) 74 - 99 mg/dL   POCT GLUCOSE   Result Value Ref Range     POCT Glucose 167 (H) 74 - 99 mg/dL   POCT GLUCOSE   Result Value Ref Range     POCT Glucose 148 (H) 74 - 99 mg/dL   POCT GLUCOSE   Result Value Ref Range     POCT Glucose 140 (H) 74 - 99 mg/dL   CBC and Auto Differential   Result Value Ref Range     WBC 12.8 (H) 4.4 - 11.3 x10*3/uL     nRBC 0.0 0.0 - 0.0 /100 WBCs     RBC 3.96 (L) 4.00 - 5.20 x10*6/uL     Hemoglobin 10.8 (L) 12.0 - 16.0 g/dL     Hematocrit 36.5 36.0 - 46.0 %     MCV 92  80 - 100 fL     MCH 27.3 26.0 - 34.0 pg     MCHC 29.6 (L) 32.0 - 36.0 g/dL     RDW 20.3 (H) 11.5 - 14.5 %     Platelets 173 150 - 450 x10*3/uL     Neutrophils % 84.7 40.0 - 80.0 %     Immature Granulocytes %, Automated 0.5 0.0 - 0.9 %     Lymphocytes % 7.9 13.0 - 44.0 %     Monocytes % 6.7 2.0 - 10.0 %     Eosinophils % 0.1 0.0 - 6.0 %     Basophils % 0.1 0.0 - 2.0 %     Neutrophils Absolute 10.86 (H) 1.60 - 5.50 x10*3/uL     Immature Granulocytes Absolute, Automated 0.06 0.00 - 0.50 x10*3/uL     Lymphocytes Absolute 1.01 0.80 - 3.00 x10*3/uL     Monocytes Absolute 0.86 (H) 0.05 - 0.80 x10*3/uL     Eosinophils Absolute 0.01 0.00 - 0.40 x10*3/uL     Basophils Absolute 0.01 0.00 - 0.10 x10*3/uL   Comprehensive Metabolic Panel   Result Value Ref Range     Glucose 134 (H) 74 - 99 mg/dL     Sodium 131 (L) 136 - 145 mmol/L     Potassium 5.2 3.5 - 5.3 mmol/L     Chloride 102 98 - 107 mmol/L     Bicarbonate 20 (L) 21 - 32 mmol/L     Anion Gap 14 10 - 20 mmol/L     Urea Nitrogen 26 (H) 6 - 23 mg/dL     Creatinine 0.85 0.50 - 1.05 mg/dL     eGFR 66 >60 mL/min/1.73m*2     Calcium 7.7 (L) 8.6 - 10.3 mg/dL     Albumin 2.2 (L) 3.4 - 5.0 g/dL     Alkaline Phosphatase 155 (H) 33 - 136 U/L     Total Protein 5.7 (L) 6.4 - 8.2 g/dL     AST 23 9 - 39 U/L     Bilirubin, Total 0.9 0.0 - 1.2 mg/dL     ALT 8 7 - 45 U/L   Morphology   Result Value Ref Range     RBC Morphology See Below       Polychromasia Mild       Ovalocytes Few       [place] Cells Few     POCT GLUCOSE   Result Value Ref Range     POCT Glucose 173 (H) 74 - 99 mg/dL      Patient recently received an antibiotic (last 12 hours)         None             Plan discussed with interdisciplinary team, will continue to monitor pain levels and monitor overnight. Will continue current and repeat labs in the AM.      Discharge planning discussed with patient and care team. Therapy evaluations ordered. Anticipate HHC/SNF at discharge. Patient aware and agreeable to current plan, continue  plan as above.      I spent a total of 50 minutes on the date of the service which included preparing to see the patient, face-to-face patient care, completing clinical documentation, obtaining and/or reviewing separately obtained history, performing a medically appropriate examination, counseling and educating the patient/family/caregiver, ordering medications, tests, or procedures, communicating with other HCPs (not separately reported), independently interpreting results (not separately reported), communicating results to the patient/family/caregiver, and care coordination (not separately reported).                      Revision History       Patient fully evaluated on 4/7. Evaluated by nephrology, cyproheptadine added for appetite. Cyproheptadine discontinued today to avoid oversedation as patient is already on mirtazapine for appetite. LFTs elevated today, continue to monitor. No leukocytosis. Patient's family feels her pain is well-controlled with current regimen. Continuing hematuria noted in wall suction canister with flank pain, cannot rule out UTI. Cefazolin IV ordered. Urology consult placed. Appreciate recs re: possible bladder irrigation. Per daughter, patient also having hallucinations of seeing and hearing people who are not there. Plan to keep at least another day for monitoring.                     Assessment/Plan        Assessment & Plan  Renal infarct (Multi)     Hyponatremia     Hyponatremia/renal infarct  Inpatient admission to Dr. Lugo  See imaging results above  DNRCCA  N.p.o.  Consult nephrology and appreciate input  Consult vascular surgery and appreciate input  Coag screen ordered  Continue heparin drip  Continue gentle IV fluids  Repeat labs  Monitor heparin assay  Morphine/Zofran in ED  Dilaudid IV as needed  Tigan IM as needed 2/2 prolonged Qtc     T2DM/breast cancer/colon cancer/anemia/HLD/HTN  #Chronic conditions  Insulin sliding scale for n.p.o.  Hypoglycemia protocol  Continue home  MiraLAX and Colace  Continue home lisinopril daily  Continue home amlodipine daily  Continue home Estrace tablet     DVT Ppx  SCDs  Continue heparin drip  Bedrest/up to chair as tolerated     Patient fully evaluated on 4/8. Underwent successful bladder irrigation yesterday, evacuated 200 ml old bloody urine per urology note. Urine orange from pyridium, but remains clear. Collins maintained overnight, will remove and trial voiding today. Recommending outpatient cystoscopy if family wishes to pursue. Continue cefazolin IV. LFTs still elevated, but improved from yesterday. Thrombocytopenia noted, continue to monitor. Hemoglobin dropped to 10.0 from 11.4 yesterday, likely result of hematuria. Pain continues to be controlled with current regimen. Plan to stay at least one more day to monitor urine and labs.                            Revision History       Patient fully evaluated 04/10 for   Assessment & Plan  Renal infarct (Multi)    Hyponatremia    Hyponatremia/renal infarct  Inpatient admission to Dr. Lugo  See imaging results above  DNRCCA  N.p.o.  Consult nephrology and appreciate input  Consult vascular surgery and appreciate input  Coag screen ordered  Continue heparin drip  Continue gentle IV fluids  Repeat labs  Monitor heparin assay  Morphine/Zofran in ED  Dilaudid IV as needed  Tigan IM as needed 2/2 prolonged Qtc    T2DM/breast cancer/colon cancer/anemia/HLD/HTN  #Chronic conditions  Insulin sliding scale for n.p.o.  Hypoglycemia protocol  Continue home MiraLAX and Colace  Continue home lisinopril daily  Continue home amlodipine daily  Continue home Estrace tablet    DVT Ppx  SCDs  Continue heparin drip  Bedrest/up to chair as tolerated      Patient with significant clinical improvement noted, patient medically cleared for discharge today. Patient seen resting in bed with head of bed elevated, no s/s or c/o acute difficulties at this time. Vital signs for last 24 hours:  Temp:  [35.5 °C (95.9 °F)-35.9 °C  (96.6 °F)] 35.8 °C (96.4 °F)  Heart Rate:  [83-93] 93  Resp:  [16-18] 18  BP: (114-137)/(61-62) 134/62 Medications and labs reviewed-   Results for orders placed or performed during the hospital encounter of 04/02/25 (from the past 24 hours)   POCT GLUCOSE   Result Value Ref Range    POCT Glucose 139 (H) 74 - 99 mg/dL   POCT GLUCOSE   Result Value Ref Range    POCT Glucose 120 (H) 74 - 99 mg/dL   POCT GLUCOSE   Result Value Ref Range    POCT Glucose 125 (H) 74 - 99 mg/dL   CBC and Auto Differential   Result Value Ref Range    WBC 8.4 4.4 - 11.3 x10*3/uL    nRBC 0.0 0.0 - 0.0 /100 WBCs    RBC 4.30 4.00 - 5.20 x10*6/uL    Hemoglobin 11.6 (L) 12.0 - 16.0 g/dL    Hematocrit 39.2 36.0 - 46.0 %    MCV 91 80 - 100 fL    MCH 27.0 26.0 - 34.0 pg    MCHC 29.6 (L) 32.0 - 36.0 g/dL    RDW 19.0 (H) 11.5 - 14.5 %    Platelets 84 (L) 150 - 450 x10*3/uL    Neutrophils % 74.1 40.0 - 80.0 %    Immature Granulocytes %, Automated 4.9 (H) 0.0 - 0.9 %    Lymphocytes % 12.1 13.0 - 44.0 %    Monocytes % 7.6 2.0 - 10.0 %    Eosinophils % 0.7 0.0 - 6.0 %    Basophils % 0.6 0.0 - 2.0 %    Neutrophils Absolute 6.23 (H) 1.60 - 5.50 x10*3/uL    Immature Granulocytes Absolute, Automated 0.41 0.00 - 0.50 x10*3/uL    Lymphocytes Absolute 1.02 0.80 - 3.00 x10*3/uL    Monocytes Absolute 0.64 0.05 - 0.80 x10*3/uL    Eosinophils Absolute 0.06 0.00 - 0.40 x10*3/uL    Basophils Absolute 0.05 0.00 - 0.10 x10*3/uL   Comprehensive Metabolic Panel   Result Value Ref Range    Glucose 119 (H) 74 - 99 mg/dL    Sodium 133 (L) 136 - 145 mmol/L    Potassium 4.0 3.5 - 5.3 mmol/L    Chloride 101 98 - 107 mmol/L    Bicarbonate 26 21 - 32 mmol/L    Anion Gap 10 10 - 20 mmol/L    Urea Nitrogen 22 6 - 23 mg/dL    Creatinine 0.80 0.50 - 1.05 mg/dL    eGFR 71 >60 mL/min/1.73m*2    Calcium 7.6 (L) 8.6 - 10.3 mg/dL    Albumin 2.1 (L) 3.4 - 5.0 g/dL    Alkaline Phosphatase 542 (H) 33 - 136 U/L    Total Protein 5.7 (L) 6.4 - 8.2 g/dL    AST 23 9 - 39 U/L    Bilirubin, Total  0.5 0.0 - 1.2 mg/dL    ALT 4 (L) 7 - 45 U/L   POCT GLUCOSE   Result Value Ref Range    POCT Glucose 148 (H) 74 - 99 mg/dL      Patient recently received an antibiotic (last 12 hours)       Date/Time Action Medication Dose    04/10/25 0850 New Bag    ceFAZolin (Ancef) 2 g in dextrose (iso)  mL 2 g           Susceptibility data from last 90 days.  Collected Specimen Info Organism Ampicillin Cefazolin Cefazolin (uncomplicated UTIs only) Ciprofloxacin Gentamicin Nitrofurantoin Piperacillin/Tazobactam Trimethoprim/Sulfamethoxazole   01/10/25 Urine from Clean Catch/Voided Escherichia coli  S  S  S  S  S  S  S  S        Continue aggressive pulmonary hygiene and oral hygiene. Off loading as tolerated for skin integrity.    Plan discussed with interdisciplinary team, pain controlled better with percocet, no acute distress noted at this time, denies pain at this time. No acute events overnight, patient denies chest pain, worsening SOB at this time. Ok to discharge, will continue current and repeat labs in the AM if patient still hospitalized. Patient aware and agreeable to current plan, continue plan as above.     I spent 30 minutes on the date of the service which included preparing to see the patient, face-to-face patient care, completing clinical documentation, obtaining and/or reviewing separately obtained history, performing a medically appropriate examination, counseling and educating the patient/family/caregiver, ordering medications, tests, or procedures, communicating with other HCPs (not separately reported), independently interpreting results (not separately reported), communicating results to the patient/family/caregiver, and care coordination (not separately reported    Pertinent Physical Exam At Time of Discharge  Physical Exam  Vitals and nursing note reviewed.       no acute events overnight, patient denies chest pain, worsening SOB at this time.  Outpatient Follow-Up  No future appointments.      Jo  Trip

## 2025-04-11 VITALS
BODY MASS INDEX: 30.71 KG/M2 | SYSTOLIC BLOOD PRESSURE: 117 MMHG | TEMPERATURE: 96.6 F | OXYGEN SATURATION: 96 % | DIASTOLIC BLOOD PRESSURE: 85 MMHG | WEIGHT: 179.9 LBS | RESPIRATION RATE: 18 BRPM | HEART RATE: 98 BPM | HEIGHT: 64 IN

## 2025-04-11 LAB
ACANTHOCYTES BLD QL SMEAR: ABNORMAL
ALBUMIN SERPL BCP-MCNC: 2.1 G/DL (ref 3.4–5)
ALP SERPL-CCNC: 465 U/L (ref 33–136)
ALT SERPL W P-5'-P-CCNC: <3 U/L (ref 7–45)
ANION GAP SERPL CALC-SCNC: 11 MMOL/L (ref 10–20)
AST SERPL W P-5'-P-CCNC: 19 U/L (ref 9–39)
BASOPHILS # BLD MANUAL: 0 X10*3/UL (ref 0–0.1)
BASOPHILS NFR BLD MANUAL: 0 %
BILIRUB SERPL-MCNC: 0.6 MG/DL (ref 0–1.2)
BITE CELLS BLD QL SMEAR: PRESENT
BUN SERPL-MCNC: 20 MG/DL (ref 6–23)
BURR CELLS BLD QL SMEAR: ABNORMAL
CALCIUM SERPL-MCNC: 7.5 MG/DL (ref 8.6–10.3)
CHLORIDE SERPL-SCNC: 103 MMOL/L (ref 98–107)
CO2 SERPL-SCNC: 26 MMOL/L (ref 21–32)
CREAT SERPL-MCNC: 0.73 MG/DL (ref 0.5–1.05)
DACRYOCYTES BLD QL SMEAR: ABNORMAL
EGFRCR SERPLBLD CKD-EPI 2021: 79 ML/MIN/1.73M*2
EOSINOPHIL # BLD MANUAL: 0 X10*3/UL (ref 0–0.4)
EOSINOPHIL NFR BLD MANUAL: 0 %
ERYTHROCYTE [DISTWIDTH] IN BLOOD BY AUTOMATED COUNT: 18.8 % (ref 11.5–14.5)
GLUCOSE BLD MANUAL STRIP-MCNC: 109 MG/DL (ref 74–99)
GLUCOSE BLD MANUAL STRIP-MCNC: 127 MG/DL (ref 74–99)
GLUCOSE BLD MANUAL STRIP-MCNC: 138 MG/DL (ref 74–99)
GLUCOSE SERPL-MCNC: 118 MG/DL (ref 74–99)
HCT VFR BLD AUTO: 39.6 % (ref 36–46)
HGB BLD-MCNC: 11.4 G/DL (ref 12–16)
IMM GRANULOCYTES # BLD AUTO: 0.49 X10*3/UL (ref 0–0.5)
IMM GRANULOCYTES NFR BLD AUTO: 5.4 % (ref 0–0.9)
LYMPHOCYTES # BLD MANUAL: 0.55 X10*3/UL (ref 0.8–3)
LYMPHOCYTES NFR BLD MANUAL: 6 %
MCH RBC QN AUTO: 26.6 PG (ref 26–34)
MCHC RBC AUTO-ENTMCNC: 28.8 G/DL (ref 32–36)
MCV RBC AUTO: 92 FL (ref 80–100)
METAMYELOCYTES # BLD MANUAL: 0.18 X10*3/UL
METAMYELOCYTES NFR BLD MANUAL: 2 %
MONOCYTES # BLD MANUAL: 0.27 X10*3/UL (ref 0.05–0.8)
MONOCYTES NFR BLD MANUAL: 3 %
MYELOCYTES # BLD MANUAL: 0.18 X10*3/UL
MYELOCYTES NFR BLD MANUAL: 2 %
NEUTROPHILS # BLD MANUAL: 7.83 X10*3/UL (ref 1.6–5.5)
NEUTS BAND # BLD MANUAL: 0.91 X10*3/UL (ref 0–0.5)
NEUTS BAND NFR BLD MANUAL: 10 %
NEUTS SEG # BLD MANUAL: 6.92 X10*3/UL (ref 1.6–5)
NEUTS SEG NFR BLD MANUAL: 76 %
NRBC BLD-RTO: 0 /100 WBCS (ref 0–0)
OVALOCYTES BLD QL SMEAR: ABNORMAL
PLATELET # BLD AUTO: 76 X10*3/UL (ref 150–450)
POLYCHROMASIA BLD QL SMEAR: ABNORMAL
POTASSIUM SERPL-SCNC: 4.2 MMOL/L (ref 3.5–5.3)
PROT SERPL-MCNC: 5.5 G/DL (ref 6.4–8.2)
RBC # BLD AUTO: 4.29 X10*6/UL (ref 4–5.2)
RBC MORPH BLD: ABNORMAL
SCHISTOCYTES BLD QL SMEAR: ABNORMAL
SODIUM SERPL-SCNC: 136 MMOL/L (ref 136–145)
TARGETS BLD QL SMEAR: ABNORMAL
TOTAL CELLS COUNTED BLD: 100
VARIANT LYMPHS # BLD MANUAL: 0.09 X10*3/UL (ref 0–0.3)
VARIANT LYMPHS NFR BLD: 1 %
WBC # BLD AUTO: 9.1 X10*3/UL (ref 4.4–11.3)

## 2025-04-11 PROCEDURE — 36415 COLL VENOUS BLD VENIPUNCTURE: CPT | Performed by: INTERNAL MEDICINE

## 2025-04-11 PROCEDURE — 2500000001 HC RX 250 WO HCPCS SELF ADMINISTERED DRUGS (ALT 637 FOR MEDICARE OP): Performed by: INTERNAL MEDICINE

## 2025-04-11 PROCEDURE — 82947 ASSAY GLUCOSE BLOOD QUANT: CPT

## 2025-04-11 PROCEDURE — 2500000002 HC RX 250 W HCPCS SELF ADMINISTERED DRUGS (ALT 637 FOR MEDICARE OP, ALT 636 FOR OP/ED): Performed by: INTERNAL MEDICINE

## 2025-04-11 PROCEDURE — 2500000004 HC RX 250 GENERAL PHARMACY W/ HCPCS (ALT 636 FOR OP/ED): Performed by: INTERNAL MEDICINE

## 2025-04-11 PROCEDURE — 85027 COMPLETE CBC AUTOMATED: CPT | Performed by: INTERNAL MEDICINE

## 2025-04-11 PROCEDURE — 85007 BL SMEAR W/DIFF WBC COUNT: CPT | Performed by: INTERNAL MEDICINE

## 2025-04-11 PROCEDURE — 80053 COMPREHEN METABOLIC PANEL: CPT | Performed by: INTERNAL MEDICINE

## 2025-04-11 RX ADMIN — TAMSULOSIN HYDROCHLORIDE 0.4 MG: 0.4 CAPSULE ORAL at 09:07

## 2025-04-11 RX ADMIN — CEFAZOLIN SODIUM 2 G: 2 INJECTION, SOLUTION INTRAVENOUS at 00:58

## 2025-04-11 RX ADMIN — TRIAMCINOLONE ACETONIDE: 1 OINTMENT TOPICAL at 09:07

## 2025-04-11 RX ADMIN — PHENAZOPYRIDINE 100 MG: 100 TABLET ORAL at 09:20

## 2025-04-11 RX ADMIN — DICLOFENAC SODIUM 4 G: 10 GEL TOPICAL at 12:46

## 2025-04-11 RX ADMIN — OXYCODONE HYDROCHLORIDE AND ACETAMINOPHEN 1 TABLET: 5; 325 TABLET ORAL at 09:07

## 2025-04-11 RX ADMIN — ONDANSETRON 4 MG: 2 INJECTION INTRAMUSCULAR; INTRAVENOUS at 09:15

## 2025-04-11 RX ADMIN — Medication 50 MG OF ELEMENTAL ZINC: at 09:20

## 2025-04-11 RX ADMIN — PHENAZOPYRIDINE 100 MG: 100 TABLET ORAL at 12:46

## 2025-04-11 RX ADMIN — APIXABAN 2.5 MG: 2.5 TABLET, FILM COATED ORAL at 09:07

## 2025-04-11 RX ADMIN — CEFAZOLIN SODIUM 2 G: 2 INJECTION, SOLUTION INTRAVENOUS at 09:07

## 2025-04-11 RX ADMIN — THIAMINE HCL TAB 100 MG 100 MG: 100 TAB at 09:07

## 2025-04-11 ASSESSMENT — PAIN DESCRIPTION - LOCATION: LOCATION: ABDOMEN

## 2025-04-11 ASSESSMENT — PAIN SCALES - GENERAL
PAINLEVEL_OUTOF10: 5 - MODERATE PAIN
PAINLEVEL_OUTOF10: 8

## 2025-04-11 ASSESSMENT — PAIN - FUNCTIONAL ASSESSMENT: PAIN_FUNCTIONAL_ASSESSMENT: 0-10

## 2025-04-11 NOTE — PROGRESS NOTES
Patient has written discharge order. Patient accepted back at [facility]. Transport requested and scheduled for 115pm. Nurse and granddaughter notified.

## 2025-04-11 NOTE — CARE PLAN
The patient's goals for the shift include      The clinical goals for the shift include comfort and safety      Problem: Pain - Adult  Goal: Verbalizes/displays adequate comfort level or baseline comfort level  Outcome: Progressing     Problem: Safety - Adult  Goal: Free from fall injury  Outcome: Progressing     Problem: Discharge Planning  Goal: Discharge to home or other facility with appropriate resources  Outcome: Progressing     Problem: Chronic Conditions and Co-morbidities  Goal: Patient's chronic conditions and co-morbidity symptoms are monitored and maintained or improved  Outcome: Progressing     Problem: Nutrition  Goal: Nutrient intake appropriate for maintaining nutritional needs  Outcome: Progressing     Problem: Fall/Injury  Goal: Not fall by end of shift  Outcome: Progressing  Goal: Be free from injury by end of the shift  Outcome: Progressing  Goal: Verbalize understanding of personal risk factors for fall in the hospital  Outcome: Progressing  Goal: Verbalize understanding of risk factor reduction measures to prevent injury from fall in the home  Outcome: Progressing  Goal: Use assistive devices by end of the shift  Outcome: Progressing  Goal: Pace activities to prevent fatigue by end of the shift  Outcome: Progressing     Problem: Skin  Goal: Decreased wound size/increased tissue granulation at next dressing change  Outcome: Progressing  Flowsheets (Taken 4/10/2025 2209)  Decreased wound size/increased tissue granulation at next dressing change: Promote sleep for wound healing  Goal: Participates in plan/prevention/treatment measures  Outcome: Progressing  Flowsheets (Taken 4/10/2025 2209)  Participates in plan/prevention/treatment measures: Elevate heels  Goal: Prevent/manage excess moisture  Outcome: Progressing  Flowsheets (Taken 4/10/2025 2209)  Prevent/manage excess moisture:   Follow provider orders for dressing changes   Moisturize dry skin   Cleanse incontinence/protect with barrier  cream  Goal: Prevent/minimize sheer/friction injuries  Outcome: Progressing  Flowsheets (Taken 4/10/2025 2209)  Prevent/minimize sheer/friction injuries:   Use pull sheet   HOB 30 degrees or less  Goal: Promote/optimize nutrition  Outcome: Progressing  Flowsheets (Taken 4/10/2025 2209)  Promote/optimize nutrition:   Consume > 50% meals/supplements   Monitor/record intake including meals   Offer water/supplements/favorite foods  Goal: Promote skin healing  Outcome: Progressing  Flowsheets (Taken 4/10/2025 2210)  Promote skin healing:   Assess skin/pad under line(s)/device(s)   Rotate device position/do not position patient on device   Turn/reposition every 2 hours/use positioning/transfer devices     Problem: Diabetes  Goal: Achieve decreasing blood glucose levels by end of shift  Outcome: Progressing  Goal: Increase stability of blood glucose readings by end of shift  Outcome: Progressing  Goal: Decrease in ketones present in urine by end of shift  Outcome: Progressing  Goal: Maintain electrolyte levels within acceptable range throughout shift  Outcome: Progressing  Goal: Maintain glucose levels >70mg/dl to <250mg/dl throughout shift  Outcome: Progressing  Goal: No changes in neurological exam by end of shift  Outcome: Progressing  Goal: Learn about and adhere to nutrition recommendations by end of shift  Outcome: Progressing  Goal: Vital signs within normal range for age by end of shift  Outcome: Progressing  Goal: Increase self care and/or family involovement by end of shift  Outcome: Progressing  Goal: Receive DSME education by end of shift  Outcome: Progressing     Problem: Pain  Goal: Takes deep breaths with improved pain control throughout the shift  Outcome: Progressing  Goal: Turns in bed with improved pain control throughout the shift  Outcome: Progressing  Goal: Walks with improved pain control throughout the shift  Outcome: Progressing  Goal: Performs ADL's with improved pain control throughout  shift  Outcome: Progressing  Goal: Participates in PT with improved pain control throughout the shift  Outcome: Progressing  Goal: Free from opioid side effects throughout the shift  Outcome: Progressing  Goal: Free from acute confusion related to pain meds throughout the shift  Outcome: Progressing

## 2025-04-11 NOTE — DISCHARGE SUMMARY
Discharge Diagnosis  Renal infarct (Multi)    Issues Requiring Follow-Up  Patient fully evaluated 04/09 for   Assessment & Plan  Renal infarct (Multi)    Hyponatremia    Hyponatremia/renal infarct  Inpatient admission to Dr. Lugo  See imaging results above  DNRCCA  N.p.o.  Consult nephrology and appreciate input  Consult vascular surgery and appreciate input  Coag screen ordered  Continue heparin drip  Continue gentle IV fluids  Repeat labs  Monitor heparin assay  Morphine/Zofran in ED  Dilaudid IV as needed  Tigan IM as needed 2/2 prolonged Qtc    T2DM/breast cancer/colon cancer/anemia/HLD/HTN  #Chronic conditions  Insulin sliding scale for n.p.o.  Hypoglycemia protocol  Continue home MiraLAX and Colace  Continue home lisinopril daily  Continue home amlodipine daily  Continue home Estrace tablet    DVT Ppx  SCDs  Continue heparin drip  Bedrest/up to chair as tolerated    Patient with significant clinical improvement noted, patient medically cleared for discharge today. Patient seen resting in bed with head of bed elevated, no s/s or c/o acute difficulties at this time. Vital signs for last 24 hours:  Temp:  [35.8 °C (96.4 °F)-36.7 °C (98.1 °F)] 35.9 °C (96.6 °F)  Heart Rate:  [84-98] 98  Resp:  [18] 18  BP: (117-138)/(62-85) 117/85 Medications and labs reviewed-   Results for orders placed or performed during the hospital encounter of 04/02/25 (from the past 24 hours)   POCT GLUCOSE   Result Value Ref Range    POCT Glucose 153 (H) 74 - 99 mg/dL   POCT GLUCOSE   Result Value Ref Range    POCT Glucose 166 (H) 74 - 99 mg/dL   POCT GLUCOSE   Result Value Ref Range    POCT Glucose 138 (H) 74 - 99 mg/dL   POCT GLUCOSE   Result Value Ref Range    POCT Glucose 109 (H) 74 - 99 mg/dL   CBC and Auto Differential   Result Value Ref Range    WBC 9.1 4.4 - 11.3 x10*3/uL    nRBC 0.0 0.0 - 0.0 /100 WBCs    RBC 4.29 4.00 - 5.20 x10*6/uL    Hemoglobin 11.4 (L) 12.0 - 16.0 g/dL    Hematocrit 39.6 36.0 - 46.0 %    MCV 92 80 - 100 fL     MCH 26.6 26.0 - 34.0 pg    MCHC 28.8 (L) 32.0 - 36.0 g/dL    RDW 18.8 (H) 11.5 - 14.5 %    Platelets 76 (L) 150 - 450 x10*3/uL    Immature Granulocytes %, Automated 5.4 (H) 0.0 - 0.9 %    Immature Granulocytes Absolute, Automated 0.49 0.00 - 0.50 x10*3/uL   Comprehensive Metabolic Panel   Result Value Ref Range    Glucose 118 (H) 74 - 99 mg/dL    Sodium 136 136 - 145 mmol/L    Potassium 4.2 3.5 - 5.3 mmol/L    Chloride 103 98 - 107 mmol/L    Bicarbonate 26 21 - 32 mmol/L    Anion Gap 11 10 - 20 mmol/L    Urea Nitrogen 20 6 - 23 mg/dL    Creatinine 0.73 0.50 - 1.05 mg/dL    eGFR 79 >60 mL/min/1.73m*2    Calcium 7.5 (L) 8.6 - 10.3 mg/dL    Albumin 2.1 (L) 3.4 - 5.0 g/dL    Alkaline Phosphatase 465 (H) 33 - 136 U/L    Total Protein 5.5 (L) 6.4 - 8.2 g/dL    AST 19 9 - 39 U/L    Bilirubin, Total 0.6 0.0 - 1.2 mg/dL    ALT <3 (L) 7 - 45 U/L   Manual Differential   Result Value Ref Range    Neutrophils %, Manual 76.0 40.0 - 80.0 %    Bands %, Manual 10.0 0.0 - 5.0 %    Lymphocytes %, Manual 6.0 13.0 - 44.0 %    Monocytes %, Manual 3.0 2.0 - 10.0 %    Eosinophils %, Manual 0.0 0.0 - 6.0 %    Basophils %, Manual 0.0 0.0 - 2.0 %    Atypical Lymphocytes %, Manual 1.0 0.0 - 2.0 %    Metamyelocytes %, Manual 2.0 0.0 - 0.0 %    Myelocytes %, Manual 2.0 0.0 - 0.0 %    Seg Neutrophils Absolute, Manual 6.92 (H) 1.60 - 5.00 x10*3/uL    Bands Absolute, Manual 0.91 (H) 0.00 - 0.50 x10*3/uL    Lymphocytes Absolute, Manual 0.55 (L) 0.80 - 3.00 x10*3/uL    Monocytes Absolute, Manual 0.27 0.05 - 0.80 x10*3/uL    Eosinophils Absolute, Manual 0.00 0.00 - 0.40 x10*3/uL    Basophils Absolute, Manual 0.00 0.00 - 0.10 x10*3/uL    Atypical Lymphs Absolute, Manual 0.09 0.00 - 0.30 x10*3/uL    Metamyelocytes Absolute, Manual 0.18 0.00 - 0.00 x10*3/uL    Myelocytes Absolute, Manual 0.18 0.00 - 0.00 x10*3/uL    Total Cells Counted 100     Neutrophils Absolute, Manual 7.83 (H) 1.60 - 5.50 x10*3/uL    RBC Morphology See Below     Polychromasia Mild      RBC Fragments Few     Target Cells Few     Ovalocytes Few     Teardrop Cells Few     Afton Cells Few     Acanthocytes Few     Bite Cells Present    POCT GLUCOSE   Result Value Ref Range    POCT Glucose 127 (H) 74 - 99 mg/dL      Patient recently received an antibiotic (last 12 hours)       Date/Time Action Medication Dose    04/09/25 0908 New Bag    ceFAZolin (Ancef) 2 g in dextrose (iso)  mL 2 g    04/09/25 0046 New Bag    ceFAZolin (Ancef) 2 g in dextrose (iso)  mL 2 g           No results found for the last 90 days.         Continue aggressive pulmonary hygiene and oral hygiene. Off loading as tolerated for skin integrity.  Plan discussed with interdisciplinary team, renal chemistries improved and stable, shoulder pain improved with voltaren cream- will continue, no further hematuria noted. Patient without acute events overnight, patient denies chest pain, worsening SOB at this time. Ok to discharge with return to [facility], will continue current and repeat labs in the AM if patient still hospitalized. Patient aware and agreeable to current plan, continue plan as above.     I spent 30 minutes on the date of the service which included preparing to see the patient, face-to-face patient care, completing clinical documentation, obtaining and/or reviewing separately obtained history, performing a medically appropriate examination, counseling and educating the patient/family/caregiver, ordering medications, tests, or procedures, communicating with other HCPs (not separately reported), independently interpreting results (not separately reported), communicating results to the patient/family/caregiver, and care coordination (not separately reported    Discharge Meds     Medication List      START taking these medications     apixaban 2.5 mg tablet; Commonly known as: Eliquis; Take 1 tablet (2.5   mg) by mouth every 12 hours.   ceFAZolin IVPB; Commonly known as: Ancef; Infuse 100 mL (2 g)  over 30   minutes into a venous catheter every 8 hours for 5 days.   cyclobenzaprine 5 mg tablet; Commonly known as: Flexeril; Take 1 tablet   (5 mg) by mouth 3 times a day as needed for muscle spasms.   diclofenac sodium 1 % gel; Commonly known as: Voltaren; Apply 4.5 inches   (4 g) topically 4 times a day.   oxyCODONE-acetaminophen 5-325 mg tablet; Commonly known as: Percocet;   Take 1 tablet by mouth every 6 hours if needed for severe pain (7 - 10).   phenazopyridine 95 mg tablet; Commonly known as: Urinary Pain Relief;   Take 1 tablet (95 mg) by mouth 3 times daily (morning, midday, late   afternoon).   tamsulosin 0.4 mg 24 hr capsule; Commonly known as: Flomax; Take 1   capsule (0.4 mg) by mouth once daily.   thiamine 100 mg tablet; Commonly known as: Vitamin B-1; Take 1 tablet   (100 mg) by mouth once daily.   triamcinolone 0.1 % ointment; Commonly known as: Kenalog; Apply   topically 2 times a day.   zinc sulfate 220 (50 Zn) MG capsule; Commonly known as: Zincate; Take 1   capsule (50 mg of elemental zinc) by mouth once daily.     CHANGE how you take these medications     insulin lispro 100 unit/mL injection; Inject 0-5 Units under the skin 3   times a day before meals. Take as directed per insulin instructions.   sennosides-docusate sodium 8.6-50 mg tablet; Commonly known as:   Johana-Colace; Take 1 tablet by mouth once daily at bedtime.     CONTINUE taking these medications     acetaminophen 325 mg tablet; Commonly known as: Tylenol; Take 2 tablets   (650 mg) by mouth every 4 hours if needed for mild pain (1 - 3).   Basaglar KwikPen U-100 Insulin 100 unit/mL (3 mL) pen; Generic drug:   insulin glargine   cetirizine 10 mg chewable tablet; Commonly known as: ZyrTEC   docusate sodium 100 mg capsule; Commonly known as: Colace   estradiol 1 mg tablet; Commonly known as: Estrace; Take 1 tablet (1 mg)   by mouth once daily.   ferrous sulfate 325 (65 Fe) mg EC tablet   lactobacillus acidophilus & bulgar 1 million  cell chewable tablet;   Commonly known as: Lactinex   lidocaine 5 % patch; Commonly known as: Lidoderm   mirtazapine 7.5 mg tablet; Commonly known as: Remeron   ondansetron 4 mg tablet; Commonly known as: Zofran   polyethylene glycol 17 gram packet; Commonly known as: Glycolax,   Miralax; Take 17 g by mouth once daily.   sodium chloride 1,000 mg tablet   trolamine salicylate 10 % cream; Commonly known as: Aspercreme     STOP taking these medications     amLODIPine 2.5 mg tablet; Commonly known as: Norvasc   enoxaparin 40 mg/0.4 mL syringe; Commonly known as: Lovenox   lisinopril 10 mg tablet   meloxicam 7.5 mg tablet; Commonly known as: Mobic   metoclopramide 5 mg tablet; Commonly known as: Reglan       Test Results Pending At Discharge  Pending Labs       No current pending labs.            Hospital Course         Tio Lugo MD   Physician  Internal Medicine     Progress Notes     Addendum     Date of Service: 4/8/2025 10:57 AM     Addendum       Expand All Collapse All    Melissa Tse is a 89 y.o. female on day 5 of admission presenting with Renal infarct (Multi).           Subjective  No significant events overnight. Patient seen and evaluated at bedside. Breathing on room air. States she feels her pain is well controlled today.               Objective  Last Recorded Vitals  /71 (BP Location: Right arm, Patient Position: Lying)   Pulse 93   Temp 36.1 °C (97 °F) (Temporal)   Resp 16   Wt 81.6 kg (179 lb 14.3 oz)   SpO2 93%   Intake/Output last 3 Shifts:     Intake/Output Summary (Last 24 hours) at 4/8/2025 1057  Last data filed at 4/8/2025 1023      Gross per 24 hour   Intake 280 ml   Output 3850 ml   Net -3570 ml         Admission Weight  Weight: 81.6 kg (180 lb) (04/02/25 2207)     Daily Weight  04/04/25 : 81.6 kg (179 lb 14.3 oz)     Image Results  Electrocardiogram, 12-lead PRN ACS symptoms  Unknown rhythm, irregular rate  Probable lateral infarct, old     See ED provider note for full  interpretation and clinical correlation  Confirmed by Rivka Manzano ([id number]) on 4/5/2025 10:05:09 AM        Physical Exam        Tio Lugo MD   Physician  Internal Medicine     Progress Notes     Signed     Date of Service: 4/7/2025  3:03 PM      Signed        Expand All Collapse Kraig Tse is a 89 y.o. female on day 4 of admission presenting with Renal infarct (Multi).           Subjective  No significant events overnight. Patient seen and evaluated at bedside.               Objective[]Expand by Default  Last Recorded Vitals  BP 92/55 (BP Location: Left arm, Patient Position: Lying)   Pulse (!) 112   Temp 35.5 °C (95.9 °F) (Temporal)   Resp 18   Wt 81.6 kg (179 lb 14.3 oz)   SpO2 92%   Intake/Output last 3 Shifts:     Intake/Output Summary (Last 24 hours) at 4/7/2025 1503  Last data filed at 4/7/2025 1257        Gross per 24 hour   Intake 300 ml   Output 500 ml   Net -200 ml         Admission Weight  Weight: 81.6 kg (180 lb) (04/02/25 2207)     Daily Weight  04/04/25 : 81.6 kg (179 lb 14.3 oz)     Image Results  Electrocardiogram, 12-lead PRN ACS symptoms  Unknown rhythm, irregular rate  Probable lateral infarct, old     See ED provider note for full interpretation and clinical correlation  Confirmed by Rivka Manzano ([id number]) on 4/5/2025 10:05:09 AM        Physical Exam     MD Tio Hodges MD    Physician  Internal Medicine     Progress Notes   This note has been shared with the patient  Signed     Date of Service: 4/5/2025  3:59 PM  Signed   Expand All Collapse Kraig Tse is a 89 y.o. female on day 2 of admission presenting with Renal infarct (Multi).           Subjective  Patient fully evaluated  04/05for    Problem List Items Addressed This Visit         * (Principal) Renal infarct (Multi) - Primary     Other Visit Diagnoses         Metastatic malignant neoplasm, unspecified site (Multi)             Patient seen resting in bed with head of bed elevated, no s/s  or c/o acute difficulties at this time. Patient still with significant pain noted, medications adjusted, percocet added, will monitor overnight and repeat labs in the AM.  Vital signs for last 24 hours Temp:  [35.6 °C (96.1 °F)-36.5 °C (97.7 °F)] 36.1 °C (97 °F)Heart Rate:  [96-98] 96Resp:  [18] 18BP: (114-148)/(55-67) 148/67  I/O this shift:In: 220.8 [I.V.:220.8]Out: - Patient still requiring frequent cardiac and SPO2 monitoring. Continue aggressive pulmonary hygiene and oral hygiene. Off loading as tolerated for skin integrity. Medications and labs reviewed-   Results for orders placed or performed during the hospital encounter of 04/02/25 (from the past 24 hours)  POCT GLUCOSE  Result  Value  Ref Range     POCT Glucose  175 (H)  74 - 99 mg/dL  POCT GLUCOSE  Result  Value  Ref Range     POCT Glucose  167 (H)  74 - 99 mg/dL  POCT GLUCOSE  Result  Value  Ref Range     POCT Glucose  148 (H)  74 - 99 mg/dL  POCT GLUCOSE  Result  Value  Ref Range     POCT Glucose  140 (H)  74 - 99 mg/dL  CBC and Auto Differential  Result  Value  Ref Range     WBC  12.8 (H)  4.4 - 11.3 x10*3/uL     nRBC  0.0  0.0 - 0.0 /100 WBCs     RBC  3.96 (L)  4.00 - 5.20 x10*6/uL     Hemoglobin  10.8 (L)  12.0 - 16.0 g/dL     Hematocrit  36.5  36.0 - 46.0 %     MCV  92  80 - 100 fL     MCH  27.3  26.0 - 34.0 pg     MCHC  29.6 (L)  32.0 - 36.0 g/dL     RDW  20.3 (H)  11.5 - 14.5 %     Platelets  173  150 - 450 x10*3/uL     Neutrophils %  84.7  40.0 - 80.0 %     Immature Granulocytes %, Automated  0.5  0.0 - 0.9 %     Lymphocytes %  7.9  13.0 - 44.0 %     Monocytes %  6.7  2.0 - 10.0 %     Eosinophils %  0.1  0.0 - 6.0 %     Basophils %  0.1  0.0 - 2.0 %     Neutrophils Absolute  10.86 (H)  1.60 - 5.50 x10*3/uL     Immature Granulocytes Absolute, Automated  0.06  0.00 - 0.50 x10*3/uL     Lymphocytes Absolute  1.01  0.80 - 3.00 x10*3/uL     Monocytes Absolute  0.86 (H)  0.05 - 0.80 x10*3/uL     Eosinophils Absolute  0.01  0.00 - 0.40 x10*3/uL      Basophils Absolute  0.01  0.00 - 0.10 x10*3/uL  Comprehensive Metabolic Panel  Result  Value  Ref Range     Glucose  134 (H)  74 - 99 mg/dL     Sodium  131 (L)  136 - 145 mmol/L     Potassium  5.2  3.5 - 5.3 mmol/L     Chloride  102  98 - 107 mmol/L     Bicarbonate  20 (L)  21 - 32 mmol/L     Anion Gap  14  10 - 20 mmol/L     Urea Nitrogen  26 (H)  6 - 23 mg/dL     Creatinine  0.85  0.50 - 1.05 mg/dL     eGFR  66  >60 mL/min/1.73m*2     Calcium  7.7 (L)  8.6 - 10.3 mg/dL     Albumin  2.2 (L)  3.4 - 5.0 g/dL     Alkaline Phosphatase  155 (H)  33 - 136 U/L     Total Protein  5.7 (L)  6.4 - 8.2 g/dL     AST  23  9 - 39 U/L     Bilirubin, Total  0.9  0.0 - 1.2 mg/dL     ALT  8  7 - 45 U/L  Morphology  Result  Value  Ref Range     RBC Morphology  See Below        Polychromasia  Mild        Ovalocytes  Few        Selam Cells  Few     POCT GLUCOSE  Result  Value  Ref Range     POCT Glucose  173 (H)  74 - 99 mg/dL     Patient recently received an antibiotic (last 12 hours)         None         Plan discussed with interdisciplinary team, will continue to monitor pain levels and monitor overnight. Will continue current and repeat labs in the AM.  Discharge planning discussed with patient and care team. Therapy evaluations ordered. Anticipate HHC/SNF at discharge. Patient aware and agreeable to current plan, continue plan as above.  I spent a total of 50 minutes on the date of the service which included preparing to see the patient, face-to-face patient care, completing clinical documentation, obtaining and/or reviewing separately obtained history, performing a medically appropriate examination, counseling and educating the patient/family/caregiver, ordering medications, tests, or procedures, communicating with other HCPs (not separately reported), independently interpreting results (not separately reported), communicating results to the patient/family/caregiver, and care coordination (not separately reported).              ObjectiveExpand by Default  Last Recorded Vitals  /67 (BP Location: Right leg, Patient Position: Lying)   Pulse 96   Temp 36.1 °C (97 °F) (Temporal)   Resp 18   Wt 81.6 kg (179 lb 14.3 oz)   SpO2 93%   Intake/Output last 3 Shifts:     Intake/Output Summary (Last 24 hours) at 4/5/2025 1559  Last data filed at 4/5/2025 1301  Gross per 24 hour  Intake  1036.67 ml  Output  200 ml  Net  836.67 ml        Admission Weight  Weight: 81.6 kg (180 lb) (04/02/25 2207)     Daily Weight  04/04/25 : 81.6 kg (179 lb 14.3 oz)     Image Results  Electrocardiogram, 12-lead PRN ACS symptoms  Unknown rhythm, irregular rate  Probable lateral infarct, old     See ED provider note for full interpretation and clinical correlation  Confirmed by Rivka Manzano ([id number]) on 4/5/2025 10:05:09 AM        Physical Exam     Relevant Results                       Assessment/Plan     Assessment & Plan  Renal infarct (Multi)     Hyponatremia     Hyponatremia/renal infarct  Inpatient admission to Dr. Lugo  See imaging results above  DNRCCA  N.p.o.  Consult nephrology and appreciate input  Consult vascular surgery and appreciate input  Coag screen ordered  Continue heparin drip  Continue gentle IV fluids  Repeat labs  Monitor heparin assay  Morphine/Zofran in ED  Dilaudid IV as needed  Tigan IM as needed 2/2 prolonged Qtc     T2DM/breast cancer/colon cancer/anemia/HLD/HTN  #Chronic conditions  Insulin sliding scale for n.p.o.  Hypoglycemia protocol  Continue home MiraLAX and Colace  Continue home lisinopril daily  Continue home amlodipine daily  Continue home Estrace tablet     DVT Ppx  SCDs  Continue heparin drip  Bedrest/up to chair as tolerated  Patient fully evaluated on April 3.  Continue analgesics and await nephrology consultation.  Appreciate vascular surgery consultation.  Patient with significant metastatic cancer which comfort measures are in place.        MD Tio Hodges MD untitled image  Physician  Internal  Medicine     Progress Notes   This note has been shared with the patient  Addendum     Date of Service: 4/3/2025  1:49 PM     Addendum     Expand All Collapse All  untitled image  Patient fully evaluated on 4/3. Patient resting in bed, breathing comfortably on 3L O2 via nasal cannula. Accompanied by daughter in the room. Evaluated today by nephrology, lisinopril discontinued d/t jr renal artery stenosis, flomax added. Hypertension will be treated with prn medications to metoprolol/apresoline IV. Continue heparin drip. Also evaluated by vascular surgery, no surgery recommended at this time. Recommended consult to heme-onc, family declined patient being seen by heme-onc - not wishing to pursue aggressive chemotherapy treatment at this time. Labs reviewed. Mild hyponatremia noted, continue sodium chloride drip. Being given comfort measures for pain, currently receiving dilaudid, morphine.     Problem List Items Addressed This Visit                     Genitourinary and Reproductive     * (Principal) Renal infarct (Multi) - Primary     Other Visit Diagnoses         Metastatic malignant neoplasm, unspecified site (Multi)               Patient seen resting in bed with head of bed elevated, no s/s or c/o acute difficulties at this time.  Vital signs for last 24 hours Temp:  [36.5 °C (97.7 °F)-36.7 °C (98.1 °F)] 36.7 °C (98.1 °F)  Heart Rate:  [] 100  Resp:  [16-20] 18  BP: (125-175)/(60-74) 126/64    I/O this shift:  In: 551.8 [I.V.:551.8]  Out: -   Patient still requiring frequent cardiac and SPO2 monitoring. Continue aggressive pulmonary hygiene and oral hygiene. Off loading as tolerated for skin integrity. Medications and labs reviewed-               Results for orders placed or performed during the hospital encounter of 04/02/25 (from the past 24 hours)  CBC and Auto Differential  Result  Value  Ref Range     WBC  11.1  4.4 - 11.3 x10*3/uL     nRBC  0.0  0.0 - 0.0 /100 WBCs     RBC  4.36  4.00 - 5.20 x10*6/uL      Hemoglobin  11.5 (L)  12.0 - 16.0 g/dL     Hematocrit  38.8  36.0 - 46.0 %     MCV  89  80 - 100 fL     MCH  26.4  26.0 - 34.0 pg     MCHC  29.6 (L)  32.0 - 36.0 g/dL     RDW  19.9 (H)  11.5 - 14.5 %     Platelets  198  150 - 450 x10*3/uL     Neutrophils %  84.1  40.0 - 80.0 %     Immature Granulocytes %, Automated  0.6  0.0 - 0.9 %     Lymphocytes %  9.5  13.0 - 44.0 %     Monocytes %  5.6  2.0 - 10.0 %     Eosinophils %  0.0  0.0 - 6.0 %     Basophils %  0.2  0.0 - 2.0 %     Neutrophils Absolute  9.34 (H)  1.60 - 5.50 x10*3/uL     Immature Granulocytes Absolute, Automated  0.07  0.00 - 0.50 x10*3/uL     Lymphocytes Absolute  1.06  0.80 - 3.00 x10*3/uL     Monocytes Absolute  0.62  0.05 - 0.80 x10*3/uL     Eosinophils Absolute  0.00  0.00 - 0.40 x10*3/uL     Basophils Absolute  0.02  0.00 - 0.10 x10*3/uL  Comprehensive metabolic panel  Result  Value  Ref Range     Glucose  131 (H)  74 - 99 mg/dL     Sodium  131 (L)  136 - 145 mmol/L     Potassium  4.6  3.5 - 5.3 mmol/L     Chloride  96 (L)  98 - 107 mmol/L     Bicarbonate  22  21 - 32 mmol/L     Anion Gap  18  10 - 20 mmol/L     Urea Nitrogen  21  6 - 23 mg/dL     Creatinine  0.89  0.50 - 1.05 mg/dL     eGFR  62  >60 mL/min/1.73m*2     Calcium  8.4 (L)  8.6 - 10.3 mg/dL     Albumin  2.9 (L)  3.4 - 5.0 g/dL     Alkaline Phosphatase  258 (H)  33 - 136 U/L     Total Protein  6.9  6.4 - 8.2 g/dL     AST  22  9 - 39 U/L     Bilirubin, Total  0.7  0.0 - 1.2 mg/dL     ALT  16  7 - 45 U/L  Lipase  Result  Value  Ref Range     Lipase  7 (L)  9 - 82 U/L  Lactate  Result  Value  Ref Range     Lactate  1.2  0.4 - 2.0 mmol/L  Magnesium  Result  Value  Ref Range     Magnesium  1.90  1.60 - 2.40 mg/dL  POCT GLUCOSE  Result  Value  Ref Range     POCT Glucose  129 (H)  74 - 99 mg/dL  POCT GLUCOSE  Result  Value  Ref Range     POCT Glucose  119 (H)  74 - 99 mg/dL  Protime-INR  Result  Value  Ref Range     Protime  17.4 (H)  9.8 - 12.4 seconds     INR  1.6 (H)  0.9 -  1.1  CBC  Result  Value  Ref Range     WBC  11.5 (H)  4.4 - 11.3 x10*3/uL     nRBC  0.0  0.0 - 0.0 /100 WBCs     RBC  4.08  4.00 - 5.20 x10*6/uL     Hemoglobin  10.9 (L)  12.0 - 16.0 g/dL     Hematocrit  36.7  36.0 - 46.0 %     MCV  90  80 - 100 fL     MCH  26.7  26.0 - 34.0 pg     MCHC  29.7 (L)  32.0 - 36.0 g/dL     RDW  19.9 (H)  11.5 - 14.5 %     Platelets  181  150 - 450 x10*3/uL  Basic Metabolic Panel  Result  Value  Ref Range     Glucose  129 (H)  74 - 99 mg/dL     Sodium  130 (L)  136 - 145 mmol/L     Potassium  4.2  3.5 - 5.3 mmol/L     Chloride  98  98 - 107 mmol/L     Bicarbonate  21  21 - 32 mmol/L     Anion Gap  15  10 - 20 mmol/L     Urea Nitrogen  19  6 - 23 mg/dL     Creatinine  0.85  0.50 - 1.05 mg/dL     eGFR  66  >60 mL/min/1.73m*2     Calcium  8.0 (L)  8.6 - 10.3 mg/dL  Heparin Assay, UFH  Result  Value  Ref Range     Heparin Unfractionated  1.1 (HH)  See Comment Below for Therapeutic Ranges IU/mL  POCT GLUCOSE  Result  Value  Ref Range     POCT Glucose  123 (H)  74 - 99 mg/dL     Patient recently received an antibiotic (last 12 hours)         None              Plan discussed with interdisciplinary team, continue current and repeat labs in the AM.      Patient aware and agreeable to current plan, continue plan as above.      I spent a total of 50 minutes on the date of the service which included preparing to see the patient, face-to-face patient care, completing clinical documentation, obtaining and/or reviewing separately obtained history, performing a medically appropriate examination, counseling and educating the patient/family/caregiver, ordering medications, tests, or procedures, communicating with other HCPs (not separately reported), independently interpreting results (not separately reported), communicating results to the patient/family/caregiver, and care coordination (not separately reported). Melissa Tse is a 89 y.o. female on day 0 of admission presenting with Renal infarct  (Multi).           Subjective     MD Tio Hodges MD untitled image  Physician  Internal Medicine     H&P   This note has been shared with the patient  Signed     Date of Service: 4/3/2025 12:43 PM     Signed     Expand All Collapse All  untitled image  History Of Present Illness  Melissa Tse is a 89 y.o. female with a past medical history significant for breast cancer and stage IV colon cancer presenting to emergency department from [facility] for evaluation of abdominal pain.  Patient also reports an episode of vomiting this morning and states she has been unable to keep anything down today and complains of  a pressure in the mid to lower abdomen that radiates to her right flank.  Patient reports that her doctor will only give her Tylenol for pain and that is not working.  Patient states that her last bowel movement was 4/1/2025 without difficulty.  Patient denies chest pain or dizziness.  Patient denies recent illness, fever, or chills.  Patient denies shortness of breath.     In ED, lipase 7, glucose 131, sodium 131, chloride 96, calcium 8.4, albumin 2.9, alk phos 258, hemoglobin 11.5.  EKG completed showing normal sinus rhythm at a rate of 88 without ST elevation or depression with a prolonged QTc of 507 per ED physician review.  A CT of the abdomen and pelvis was completed showing hypo-enhancement of the left renal parenchyma compatible with extensive renal cortical infarcts as well as fluid in the endometrial canal concerning for cervical stenosis and worsening of a retroperitoneal metastatic lymphadenopathy as well as progression of hepatic  metastasis per radiology review.  Patient medicated with Zofran, morphine, LR x 1 L bolus in ED.  Blood pressure 149/62, heart rate 92, respirations 20, temperature 36.5 °C, SpO2 95% on room air.  Consult placed to vascular surgery and nephrology.  Patient started on a heparin drip.  Inpatient admission under the care of   "Brittney who will continue to follow.  I was asked to do H&P and place initial admission orders.     Prior medical records reviewed.     Past Medical History  Breast cancer, stage IV colon cancer, T2DM, anemia, hypertension, hyperlipidemia  Surgical History  Colonoscopy, tubal ligation, hip replacement, cataract surgery      Social History  Never smoker, no drug use, no alcohol use  Family History  Cataract, diabetes     Allergies  Penicillin, Penicillins, and Statins-hmg-coa reductase inhibitors     Review of Systems  A 10 point review of systems was completed and is negative except what is listed in HPI  Physical Exam  Constitutional:       Appearance: She is ill-appearing.   HENT:      Mouth/Throat:      Mouth: Mucous membranes are dry.      Pharynx: Oropharynx is clear.   Eyes:      Pupils: Pupils are equal, round, and reactive to light.   Cardiovascular:      Rate and Rhythm: Normal rate and regular rhythm.   Pulmonary:      Effort: Pulmonary effort is normal.      Breath sounds: Normal breath sounds.   Abdominal:      Palpations: Abdomen is soft.      Tenderness: There is abdominal tenderness in the periumbilical area and suprapubic area.      Comments: Right flank tenderness   Musculoskeletal:      Cervical back: Normal range of motion.   Skin:     General: Skin is warm and dry.   Neurological:      General: No focal deficit present.      Mental Status: She is alert.   Psychiatric:         Mood and Affect: Mood normal.         Behavior: Behavior normal.            Last Recorded Vitals  Blood pressure 126/64, pulse 100, temperature 36.7 °C (98.1 °F), resp. rate 18, height 1.626 m (5' 4\"), weight 81.6 kg (180 lb), SpO2 99%.     Relevant Results  CT abdomen pelvis w IV contrast     Result Date: 4/3/2025  Interpreted By:  Faraz Link, STUDY: CT ABDOMEN PELVIS W IV CONTRAST; ;  4/3/2025 12:30 am   INDICATION: Signs/Symptoms:stage IV colon cancer, now with R abd pain.     COMPARISON: 01/17/2025.   ACCESSION " NUMBER(S): [id number]   ORDERING CLINICIAN: ELYSE KLERMAN   TECHNIQUE: Axial CT images of the abdomen and pelvis with coronal and sagittal reconstructed images performed after intravenous administration of 80 cc Omnipaque 350.   FINDINGS: LOWER CHEST: No acute abnormality of the lung bases. BONES: No acute osseous abnormality. Osteopenia. Right hip arthroplasty in standard alignment. Similar appearance of mild-to-moderate chronic compression of the T11 vertebral body. Stable grade 1 degenerative anterolisthesis at L4-L5 and moderate to severe multilevel degenerative changes of the imaged spine. ABDOMINAL WALL: Within normal limits.   ABDOMEN:   LIVER: There is a large ill-defined region of hypoenhancement in the right hepatic lobe measuring up to 10.8 cm in transaxial diameter, up to at least 10.1 cm in CC diameter, suspicious for interval progression of the hepatic metastasis. There is a new hypoattenuating lesion inferolaterally in the inferior tip of the right hepatic lobe (series 601, images 57-63), measuring up to approximately 2.2 cm in transaxial diameter, suspicious for new metastatic lesion. Liver is enlarged, similar to before. BILE DUCTS: Normal caliber. GALLBLADDER: Cholelithiasis. No wall thickening or pericholecystic fluid. PANCREAS:Atrophic with pancreatic ductal dilation and irregularity and parenchymal calcifications in keeping with sequelae of chronic pancreatitis. SPLEEN: Within normal limits. ADRENALS: Within normal limits. KIDNEYS and URETERS: New extensive hypoenhancement/non enhancement of the left renal parenchyma compatible with extensive renal cortical infarcts. Right kidney and ureter appear normal.     VESSELS: Severe atherosclerosis, with critical narrowing or occlusion of the left renal artery. Severe narrowing of the right renal artery also noted. RETROPERITONEUM: Interval enlargement of celiac axis and portocaval nodes, suspicious for worsened metastatic disease.   PELVIS:    REPRODUCTIVE ORGANS: Endometrial fluid suggests cervical stenosis and is new from the prior exam. BLADDER: Within normal limits.   BOWEL: When allowing for differences in patient positioning in slice selection, there is no substantial change in the very large mass encompassing the mid-transverse colon and several adjacent loops of small bowel and adjacent omentum and mesocolon, compatible with patient's known advanced malignancy. No dilated bowel to suggest obstruction. Stomach appears grossly normal. No definite additional abnormal bowel wall thickening. Normal appendix. PERITONEUM: No ascites or free air, no fluid collection.        New extensive hypoenhancement/non enhancement of the left renal parenchyma compatible with extensive renal cortical infarcts. Right kidney and ureter appear normal.   There is a large ill-defined region of hypoenhancement in the right hepatic lobe measuring up to 10.8 cm in transaxial diameter, up to at least 10.1 cm in CC diameter, suspicious for interval progression of the hepatic metastasis. There is a new hypoattenuating lesion inferolaterally in the inferior tip of the right hepatic lobe (series 601, images 57-63), measuring up to approximately 2.2 cm in transaxial diameter, suspicious for new metastatic lesion. Liver is enlarged, similar to before.   Interval worsening of retroperitoneal metastatic lymphadenopathy.   Cholelithiasis without convincing evidence of acute cholecystitis.   New fluid in the endometrial canal suggesting cervical stenosis.   Additional findings as discussed above.   MACRO: None   Signed by: Faraz Link 4/3/2025 12:52 AM Dictation workstation:   [id number]               Results for orders placed or performed during the hospital encounter of 04/02/25 (from the past 24 hours)  CBC and Auto Differential  Result  Value  Ref Range     WBC  11.1  4.4 - 11.3 x10*3/uL     nRBC  0.0  0.0 - 0.0 /100 WBCs     RBC  4.36  4.00 - 5.20 x10*6/uL     Hemoglobin  11.5  (L)  12.0 - 16.0 g/dL     Hematocrit  38.8  36.0 - 46.0 %     MCV  89  80 - 100 fL     MCH  26.4  26.0 - 34.0 pg     MCHC  29.6 (L)  32.0 - 36.0 g/dL     RDW  19.9 (H)  11.5 - 14.5 %     Platelets  198  150 - 450 x10*3/uL     Neutrophils %  84.1  40.0 - 80.0 %     Immature Granulocytes %, Automated  0.6  0.0 - 0.9 %     Lymphocytes %  9.5  13.0 - 44.0 %     Monocytes %  5.6  2.0 - 10.0 %     Eosinophils %  0.0  0.0 - 6.0 %     Basophils %  0.2  0.0 - 2.0 %     Neutrophils Absolute  9.34 (H)  1.60 - 5.50 x10*3/uL     Immature Granulocytes Absolute, Automated  0.07  0.00 - 0.50 x10*3/uL     Lymphocytes Absolute  1.06  0.80 - 3.00 x10*3/uL     Monocytes Absolute  0.62  0.05 - 0.80 x10*3/uL     Eosinophils Absolute  0.00  0.00 - 0.40 x10*3/uL     Basophils Absolute  0.02  0.00 - 0.10 x10*3/uL  Comprehensive metabolic panel  Result  Value  Ref Range     Glucose  131 (H)  74 - 99 mg/dL     Sodium  131 (L)  136 - 145 mmol/L     Potassium  4.6  3.5 - 5.3 mmol/L     Chloride  96 (L)  98 - 107 mmol/L     Bicarbonate  22  21 - 32 mmol/L     Anion Gap  18  10 - 20 mmol/L     Urea Nitrogen  21  6 - 23 mg/dL     Creatinine  0.89  0.50 - 1.05 mg/dL     eGFR  62  >60 mL/min/1.73m*2     Calcium  8.4 (L)  8.6 - 10.3 mg/dL     Albumin  2.9 (L)  3.4 - 5.0 g/dL     Alkaline Phosphatase  258 (H)  33 - 136 U/L     Total Protein  6.9  6.4 - 8.2 g/dL     AST  22  9 - 39 U/L     Bilirubin, Total  0.7  0.0 - 1.2 mg/dL     ALT  16  7 - 45 U/L  Lipase  Result  Value  Ref Range     Lipase  7 (L)  9 - 82 U/L  Lactate  Result  Value  Ref Range     Lactate  1.2  0.4 - 2.0 mmol/L  Magnesium  Result  Value  Ref Range     Magnesium  1.90  1.60 - 2.40 mg/dL  POCT GLUCOSE  Result  Value  Ref Range     POCT Glucose  129 (H)  74 - 99 mg/dL  POCT GLUCOSE  Result  Value  Ref Range     POCT Glucose  119 (H)  74 - 99 mg/dL  Protime-INR  Result  Value  Ref Range     Protime  17.4 (H)  9.8 - 12.4 seconds     INR  1.6 (H)  0.9 - 1.1  CBC  Result  Value  Ref  Range     WBC  11.5 (H)  4.4 - 11.3 x10*3/uL     nRBC  0.0  0.0 - 0.0 /100 WBCs     RBC  4.08  4.00 - 5.20 x10*6/uL     Hemoglobin  10.9 (L)  12.0 - 16.0 g/dL     Hematocrit  36.7  36.0 - 46.0 %     MCV  90  80 - 100 fL     MCH  26.7  26.0 - 34.0 pg     MCHC  29.7 (L)  32.0 - 36.0 g/dL     RDW  19.9 (H)  11.5 - 14.5 %     Platelets  181  150 - 450 x10*3/uL  Basic Metabolic Panel  Result  Value  Ref Range     Glucose  129 (H)  74 - 99 mg/dL     Sodium  130 (L)  136 - 145 mmol/L     Potassium  4.2  3.5 - 5.3 mmol/L     Chloride  98  98 - 107 mmol/L     Bicarbonate  21  21 - 32 mmol/L     Anion Gap  15  10 - 20 mmol/L     Urea Nitrogen  19  6 - 23 mg/dL     Creatinine  0.85  0.50 - 1.05 mg/dL     eGFR  66  >60 mL/min/1.73m*2     Calcium  8.0 (L)  8.6 - 10.3 mg/dL  Heparin Assay, UFH  Result  Value  Ref Range     Heparin Unfractionated  1.1 (HH)  See Comment Below for Therapeutic Ranges IU/mL  POCT GLUCOSE  Result  Value  Ref Range     POCT Glucose  123 (H)  74 - 99 mg/dL           Assessment/Plan  Melissa is an 89-year-old female patient presenting to emergency department from [facility] for evaluation of abdominal pain and vomiting.  Patient states that she developed an 8 out of 10 mid to lower abdominal pain radiating to her right flank.  Patient with a history of breast cancer and colon cancer.  CT imaging showing extensive left renal cortical infarcts as well as worsening of retroperitoneal metastatic lymphadenopathy and progression of hepatic metastasis per radiology review.  Consults placed to nephrology and vascular surgery, patient started on a heparin drip, medicated with morphine and admitted for further medical management.     Assessment & Planuntitled image  untitled imageRenal infarct (Multi)     Hyponatremia     Hyponatremia/renal infarct  Inpatient admission to Dr. Lugo  See imaging results above  DNRCCA  N.p.o.  Consult nephrology and appreciate input  Consult vascular surgery and  appreciate input  Coag screen ordered  Continue heparin drip  Continue gentle IV fluids  Repeat labs  Monitor heparin assay  Morphine/Zofran in ED  Dilaudid IV as needed  Tigan IM as needed 2/2 prolonged Qtc     T2DM/breast cancer/colon cancer/anemia/HLD/HTN  #Chronic conditions  Insulin sliding scale for n.p.o.  Hypoglycemia protocol  Continue home MiraLAX and Colace  Continue home lisinopril daily  Continue home amlodipine daily  Continue home Estrace tablet     DVT Ppx  SCDs  Continue heparin drip  Bedrest/up to chair as tolerated  Patient fully evaluated on April 3.  Continue analgesics and await nephrology consultation.  Appreciate vascular surgery consultation.  Patient with significant metastatic cancer which comfort measures are in place.        I spent 60 minutes in the professional and overall care of this patient.  Tio Lugo MD                       Objective  Last Recorded Vitals  /64   Pulse 100   Temp 36.7 °C (98.1 °F)   Resp 18   Wt 81.6 kg (180 lb)   SpO2 99%   Intake/Output last 3 Shifts:     Intake/Output Summary (Last 24 hours) at 4/3/2025 1349  Last data filed at 4/3/2025 1000        Gross per 24 hour  Intake  551.83 ml  Output  --  Net  551.83 ml        Admission Weight  Weight: 81.6 kg (180 lb) (04/02/25 2207)     Daily Weight  04/02/25 : 81.6 kg (180 lb)     Image Results  CT abdomen pelvis w IV contrast  Narrative: Interpreted By:  Faraz Link,   STUDY:  CT ABDOMEN PELVIS W IV CONTRAST; ;  4/3/2025 12:30 am      INDICATION:  Signs/Symptoms:stage IV colon cancer, now with R abd pain.          COMPARISON:  01/17/2025.      ACCESSION NUMBER(S):  [id number]      ORDERING CLINICIAN:  ELYSE KLERMAN      TECHNIQUE:  Axial CT images of the abdomen and pelvis with coronal and sagittal  reconstructed images performed after intravenous administration of 80  cc Omnipaque 350.      FINDINGS:  LOWER CHEST: No acute abnormality of the lung bases.  BONES: No acute osseous abnormality.  Osteopenia. Right hip  arthroplasty in standard alignment. Similar appearance of  mild-to-moderate chronic compression of the T11 vertebral body.  Stable grade 1 degenerative anterolisthesis at L4-L5 and moderate to  severe multilevel degenerative changes of the imaged spine. ABDOMINAL  WALL: Within normal limits.      ABDOMEN:      LIVER: There is a large ill-defined region of hypoenhancement in the  right hepatic lobe measuring up to 10.8 cm in transaxial diameter, up  to at least 10.1 cm in CC diameter, suspicious for interval  progression of the hepatic metastasis. There is a new hypoattenuating  lesion inferolaterally in the inferior tip of the right hepatic lobe  (series 601, images 57-63), measuring up to approximately 2.2 cm in  transaxial diameter, suspicious for new metastatic lesion. Liver is  enlarged, similar to before. BILE DUCTS: Normal caliber.  GALLBLADDER: Cholelithiasis. No wall thickening or pericholecystic  fluid. PANCREAS:Atrophic with pancreatic ductal dilation and  irregularity and parenchymal calcifications in keeping with sequelae  of chronic pancreatitis. SPLEEN: Within normal limits.  ADRENALS: Within normal limits.  KIDNEYS and URETERS: New extensive hypoenhancement/non enhancement of  the left renal parenchyma compatible with extensive renal cortical  infarcts. Right kidney and ureter appear normal.          VESSELS: Severe atherosclerosis, with critical narrowing or occlusion  of the left renal artery. Severe narrowing of the right renal artery  also noted. RETROPERITONEUM: Interval enlargement of celiac axis and  portocaval nodes, suspicious for worsened metastatic disease.      PELVIS:      REPRODUCTIVE ORGANS: Endometrial fluid suggests cervical stenosis and  is new from the prior exam. BLADDER: Within normal limits.      BOWEL: When allowing for differences in patient positioning in slice  selection, there is no substantial change in the very large mass  encompassing the  mid-transverse colon and several adjacent loops of  small bowel and adjacent omentum and mesocolon, compatible with  patient's known advanced malignancy. No dilated bowel to suggest  obstruction. Stomach appears grossly normal. No definite additional  abnormal bowel wall thickening. Normal appendix. PERITONEUM: No  ascites or free air, no fluid collection.      Impression: New extensive hypoenhancement/non enhancement of the left renal  parenchyma compatible with extensive renal cortical infarcts. Right  kidney and ureter appear normal.      There is a large ill-defined region of hypoenhancement in the right  hepatic lobe measuring up to 10.8 cm in transaxial diameter, up to at  least 10.1 cm in CC diameter, suspicious for interval progression of  the hepatic metastasis. There is a new hypoattenuating lesion  inferolaterally in the inferior tip of the right hepatic lobe (series  601, images 57-63), measuring up to approximately 2.2 cm in  transaxial diameter, suspicious for new metastatic lesion. Liver is  enlarged, similar to before.      Interval worsening of retroperitoneal metastatic lymphadenopathy.      Cholelithiasis without convincing evidence of acute cholecystitis.      New fluid in the endometrial canal suggesting cervical stenosis.      Additional findings as discussed above.      MACRO:  None      Signed by: Faraz Link 4/3/2025 12:52 AM  Dictation workstation:   [id number]        Physical Exam  Constitutional:       Appearance: She is ill-appearing.   HENT:      Mouth/Throat:      Mouth: Mucous membranes are dry.      Pharynx: Oropharynx is clear.   Eyes:      Pupils: Pupils are equal, round, and reactive to light.   Cardiovascular:      Rate and Rhythm: Normal rate and regular rhythm.   Pulmonary:      Effort: Pulmonary effort is normal.      Breath sounds: Normal breath sounds.   Abdominal:      Palpations: Abdomen is soft.      Tenderness: There is abdominal tenderness in the periumbilical area and  suprapubic area.      Comments: Right flank tenderness   Musculoskeletal:      Cervical back: Normal range of motion.   Skin:     General: Skin is warm and dry.   Neurological:      General: No focal deficit present.      Mental Status: She is alert.   Psychiatric:         Mood and Affect: Mood normal.         Behavior: Behavior normal.         Relevant Results  ?         ?               Assessment/Plan                       Assessment & Planuntitled image  untitled imageRenal infarct (Multi)     Hyponatremia     Hyponatremia/renal infarct  Inpatient admission to Dr. Lugo  See imaging results above  DNRCCA  N.p.o.  Consult nephrology and appreciate input  Consult vascular surgery and appreciate input  Coag screen ordered  Continue heparin drip  Continue gentle IV fluids  Repeat labs  Monitor heparin assay  Morphine/Zofran in ED  Dilaudid IV as needed  Tigan IM as needed 2/2 prolonged Qtc     T2DM/breast cancer/colon cancer/anemia/HLD/HTN  #Chronic conditions  Insulin sliding scale for n.p.o.  Hypoglycemia protocol  Continue home MiraLAX and Colace  Continue home lisinopril daily  Continue home amlodipine daily  Continue home Estrace tablet     DVT Ppx  SCDs  Continue heparin drip  Bedrest/up to chair as tolerated  Patient fully evaluated on April 3.  Continue analgesics and await nephrology consultation.  Appreciate vascular surgery consultation.  Patient with significant metastatic cancer which comfort measures are in place.      Patient fully evaluated on 4/4, resting in bed breathing comfortably on room air. Labs and medications reviewed. Estrase discontinued due to coagulation risk. Switched from heparin to Eliquis 2.5 BID for outpatient anticoagulation. Evaluated by vascular surgery, no surgery recommended at this time. Family declined heme-onc consult, not wishing to pursue aggressive chemotherapy treatment at this time. Continue comfort care for pain. Pyridium ordered for suprapubic pain. Per nursing staff,  patient does not like Tigan, order switched to Zofran. Patient medically cleared for discharge to SNF today.              Tio Lugo MD   Physician  Internal Medicine     Progress Notes     Addendum     Date of Service: 4/7/2025  3:03 PM      Addendum        Expand All Collapse Kraig Tse is a 89 y.o. female on day 4 of admission presenting with Renal infarct (Multi).           Subjective  No significant events overnight. Patient seen and evaluated at bedside.               Objective  Last Recorded Vitals  BP 92/55 (BP Location: Left arm, Patient Position: Lying)   Pulse (!) 112   Temp 35.5 °C (95.9 °F) (Temporal)   Resp 18   Wt 81.6 kg (179 lb 14.3 oz)   SpO2 92%   Intake/Output last 3 Shifts:     Intake/Output Summary (Last 24 hours) at 4/7/2025 1503  Last data filed at 4/7/2025 1257        Gross per 24 hour   Intake 300 ml   Output 500 ml   Net -200 ml         Admission Weight  Weight: 81.6 kg (180 lb) (04/02/25 2207)     Daily Weight  04/04/25 : 81.6 kg (179 lb 14.3 oz)     Image Results  Electrocardiogram, 12-lead PRN ACS symptoms  Unknown rhythm, irregular rate  Probable lateral infarct, old     See ED provider note for full interpretation and clinical correlation  Confirmed by Rivka Manzano ([id number]) on 4/5/2025 10:05:09 AM        Physical Exam     MD Tio Hodges MD    Physician  Internal Medicine     Progress Notes   This note has been shared with the patient  Signed     Date of Service: 4/5/2025  3:59 PM  Signed   Expand All Collapse Kraig Tse is a 89 y.o. female on day 2 of admission presenting with Renal infarct (Multi).           Subjective  Patient fully evaluated  04/05for    Problem List Items Addressed This Visit         * (Principal) Renal infarct (Multi) - Primary     Other Visit Diagnoses         Metastatic malignant neoplasm, unspecified site (Multi)             Patient seen resting in bed with head of bed elevated, no s/s or c/o acute  difficulties at this time. Patient still with significant pain noted, medications adjusted, percocet added, will monitor overnight and repeat labs in the AM.  Vital signs for last 24 hours Temp:  [35.6 °C (96.1 °F)-36.5 °C (97.7 °F)] 36.1 °C (97 °F)Heart Rate:  [96-98] 96Resp:  [18] 18BP: (114-148)/(55-67) 148/67  I/O this shift:In: 220.8 [I.V.:220.8]Out: - Patient still requiring frequent cardiac and SPO2 monitoring. Continue aggressive pulmonary hygiene and oral hygiene. Off loading as tolerated for skin integrity. Medications and labs reviewed-   Results for orders placed or performed during the hospital encounter of 04/02/25 (from the past 24 hours)  POCT GLUCOSE  Result  Value  Ref Range     POCT Glucose  175 (H)  74 - 99 mg/dL  POCT GLUCOSE  Result  Value  Ref Range     POCT Glucose  167 (H)  74 - 99 mg/dL  POCT GLUCOSE  Result  Value  Ref Range     POCT Glucose  148 (H)  74 - 99 mg/dL  POCT GLUCOSE  Result  Value  Ref Range     POCT Glucose  140 (H)  74 - 99 mg/dL  CBC and Auto Differential  Result  Value  Ref Range     WBC  12.8 (H)  4.4 - 11.3 x10*3/uL     nRBC  0.0  0.0 - 0.0 /100 WBCs     RBC  3.96 (L)  4.00 - 5.20 x10*6/uL     Hemoglobin  10.8 (L)  12.0 - 16.0 g/dL     Hematocrit  36.5  36.0 - 46.0 %     MCV  92  80 - 100 fL     MCH  27.3  26.0 - 34.0 pg     MCHC  29.6 (L)  32.0 - 36.0 g/dL     RDW  20.3 (H)  11.5 - 14.5 %     Platelets  173  150 - 450 x10*3/uL     Neutrophils %  84.7  40.0 - 80.0 %     Immature Granulocytes %, Automated  0.5  0.0 - 0.9 %     Lymphocytes %  7.9  13.0 - 44.0 %     Monocytes %  6.7  2.0 - 10.0 %     Eosinophils %  0.1  0.0 - 6.0 %     Basophils %  0.1  0.0 - 2.0 %     Neutrophils Absolute  10.86 (H)  1.60 - 5.50 x10*3/uL     Immature Granulocytes Absolute, Automated  0.06  0.00 - 0.50 x10*3/uL     Lymphocytes Absolute  1.01  0.80 - 3.00 x10*3/uL     Monocytes Absolute  0.86 (H)  0.05 - 0.80 x10*3/uL     Eosinophils Absolute  0.01  0.00 - 0.40 x10*3/uL     Basophils  Absolute  0.01  0.00 - 0.10 x10*3/uL  Comprehensive Metabolic Panel  Result  Value  Ref Range     Glucose  134 (H)  74 - 99 mg/dL     Sodium  131 (L)  136 - 145 mmol/L     Potassium  5.2  3.5 - 5.3 mmol/L     Chloride  102  98 - 107 mmol/L     Bicarbonate  20 (L)  21 - 32 mmol/L     Anion Gap  14  10 - 20 mmol/L     Urea Nitrogen  26 (H)  6 - 23 mg/dL     Creatinine  0.85  0.50 - 1.05 mg/dL     eGFR  66  >60 mL/min/1.73m*2     Calcium  7.7 (L)  8.6 - 10.3 mg/dL     Albumin  2.2 (L)  3.4 - 5.0 g/dL     Alkaline Phosphatase  155 (H)  33 - 136 U/L     Total Protein  5.7 (L)  6.4 - 8.2 g/dL     AST  23  9 - 39 U/L     Bilirubin, Total  0.9  0.0 - 1.2 mg/dL     ALT  8  7 - 45 U/L  Morphology  Result  Value  Ref Range     RBC Morphology  See Below        Polychromasia  Mild        Ovalocytes  Few        Vienna Cells  Few     POCT GLUCOSE  Result  Value  Ref Range     POCT Glucose  173 (H)  74 - 99 mg/dL     Patient recently received an antibiotic (last 12 hours)         None         Plan discussed with interdisciplinary team, will continue to monitor pain levels and monitor overnight. Will continue current and repeat labs in the AM.  Discharge planning discussed with patient and care team. Therapy evaluations ordered. Anticipate HHC/SNF at discharge. Patient aware and agreeable to current plan, continue plan as above.  I spent a total of 50 minutes on the date of the service which included preparing to see the patient, face-to-face patient care, completing clinical documentation, obtaining and/or reviewing separately obtained history, performing a medically appropriate examination, counseling and educating the patient/family/caregiver, ordering medications, tests, or procedures, communicating with other HCPs (not separately reported), independently interpreting results (not separately reported), communicating results to the patient/family/caregiver, and care coordination (not separately reported).              ObjectiveExpand by Default  Last Recorded Vitals  /67 (BP Location: Right leg, Patient Position: Lying)   Pulse 96   Temp 36.1 °C (97 °F) (Temporal)   Resp 18   Wt 81.6 kg (179 lb 14.3 oz)   SpO2 93%   Intake/Output last 3 Shifts:     Intake/Output Summary (Last 24 hours) at 4/5/2025 1559  Last data filed at 4/5/2025 1301  Gross per 24 hour  Intake  1036.67 ml  Output  200 ml  Net  836.67 ml        Admission Weight  Weight: 81.6 kg (180 lb) (04/02/25 2207)     Daily Weight  04/04/25 : 81.6 kg (179 lb 14.3 oz)     Image Results  Electrocardiogram, 12-lead PRN ACS symptoms  Unknown rhythm, irregular rate  Probable lateral infarct, old     See ED provider note for full interpretation and clinical correlation  Confirmed by Rivka Manzano ([id number]) on 4/5/2025 10:05:09 AM        Physical Exam     Relevant Results                       Assessment/Plan     Assessment & Plan  Renal infarct (Multi)     Hyponatremia     Hyponatremia/renal infarct  Inpatient admission to Dr. Lugo  See imaging results above  DNRCCA  N.p.o.  Consult nephrology and appreciate input  Consult vascular surgery and appreciate input  Coag screen ordered  Continue heparin drip  Continue gentle IV fluids  Repeat labs  Monitor heparin assay  Morphine/Zofran in ED  Dilaudid IV as needed  Tigan IM as needed 2/2 prolonged Qtc     T2DM/breast cancer/colon cancer/anemia/HLD/HTN  #Chronic conditions  Insulin sliding scale for n.p.o.  Hypoglycemia protocol  Continue home MiraLAX and Colace  Continue home lisinopril daily  Continue home amlodipine daily  Continue home Estrace tablet     DVT Ppx  SCDs  Continue heparin drip  Bedrest/up to chair as tolerated  Patient fully evaluated on April 3.  Continue analgesics and await nephrology consultation.  Appreciate vascular surgery consultation.  Patient with significant metastatic cancer which comfort measures are in place.        MD Tio Hodges MD untitled image  Physician  Internal  (Multi).           Subjective     MD Tio Hodges MD untitled image  Physician  Internal Medicine     H&P   This note has been shared with the patient  Signed     Date of Service: 4/3/2025 12:43 PM     Signed     Expand All Collapse All  untitled image  History Of Present Illness  Melissa Tse is a 89 y.o. female with a past medical history significant for breast cancer and stage IV colon cancer presenting to emergency department from [facility] for evaluation of abdominal pain.  Patient also reports an episode of vomiting this morning and states she has been unable to keep anything down today and complains of  a pressure in the mid to lower abdomen that radiates to her right flank.  Patient reports that her doctor will only give her Tylenol for pain and that is not working.  Patient states that her last bowel movement was 4/1/2025 without difficulty.  Patient denies chest pain or dizziness.  Patient denies recent illness, fever, or chills.  Patient denies shortness of breath.     In ED, lipase 7, glucose 131, sodium 131, chloride 96, calcium 8.4, albumin 2.9, alk phos 258, hemoglobin 11.5.  EKG completed showing normal sinus rhythm at a rate of 88 without ST elevation or depression with a prolonged QTc of 507 per ED physician review.  A CT of the abdomen and pelvis was completed showing hypo-enhancement of the left renal parenchyma compatible with extensive renal cortical infarcts as well as fluid in the endometrial canal concerning for cervical stenosis and worsening of a retroperitoneal metastatic lymphadenopathy as well as progression of hepatic  metastasis per radiology review.  Patient medicated with Zofran, morphine, LR x 1 L bolus in ED.  Blood pressure 149/62, heart rate 92, respirations 20, temperature 36.5 °C, SpO2 95% on room air.  Consult placed to vascular surgery and nephrology.  Patient started on a heparin drip.  Inpatient admission under the care of   NUMBER(S): [id number]   ORDERING CLINICIAN: ELYSE KLERMAN   TECHNIQUE: Axial CT images of the abdomen and pelvis with coronal and sagittal reconstructed images performed after intravenous administration of 80 cc Omnipaque 350.   FINDINGS: LOWER CHEST: No acute abnormality of the lung bases. BONES: No acute osseous abnormality. Osteopenia. Right hip arthroplasty in standard alignment. Similar appearance of mild-to-moderate chronic compression of the T11 vertebral body. Stable grade 1 degenerative anterolisthesis at L4-L5 and moderate to severe multilevel degenerative changes of the imaged spine. ABDOMINAL WALL: Within normal limits.   ABDOMEN:   LIVER: There is a large ill-defined region of hypoenhancement in the right hepatic lobe measuring up to 10.8 cm in transaxial diameter, up to at least 10.1 cm in CC diameter, suspicious for interval progression of the hepatic metastasis. There is a new hypoattenuating lesion inferolaterally in the inferior tip of the right hepatic lobe (series 601, images 57-63), measuring up to approximately 2.2 cm in transaxial diameter, suspicious for new metastatic lesion. Liver is enlarged, similar to before. BILE DUCTS: Normal caliber. GALLBLADDER: Cholelithiasis. No wall thickening or pericholecystic fluid. PANCREAS:Atrophic with pancreatic ductal dilation and irregularity and parenchymal calcifications in keeping with sequelae of chronic pancreatitis. SPLEEN: Within normal limits. ADRENALS: Within normal limits. KIDNEYS and URETERS: New extensive hypoenhancement/non enhancement of the left renal parenchyma compatible with extensive renal cortical infarcts. Right kidney and ureter appear normal.     VESSELS: Severe atherosclerosis, with critical narrowing or occlusion of the left renal artery. Severe narrowing of the right renal artery also noted. RETROPERITONEUM: Interval enlargement of celiac axis and portocaval nodes, suspicious for worsened metastatic disease.   PELVIS:    REPRODUCTIVE ORGANS: Endometrial fluid suggests cervical stenosis and is new from the prior exam. BLADDER: Within normal limits.   BOWEL: When allowing for differences in patient positioning in slice selection, there is no substantial change in the very large mass encompassing the mid-transverse colon and several adjacent loops of small bowel and adjacent omentum and mesocolon, compatible with patient's known advanced malignancy. No dilated bowel to suggest obstruction. Stomach appears grossly normal. No definite additional abnormal bowel wall thickening. Normal appendix. PERITONEUM: No ascites or free air, no fluid collection.        New extensive hypoenhancement/non enhancement of the left renal parenchyma compatible with extensive renal cortical infarcts. Right kidney and ureter appear normal.   There is a large ill-defined region of hypoenhancement in the right hepatic lobe measuring up to 10.8 cm in transaxial diameter, up to at least 10.1 cm in CC diameter, suspicious for interval progression of the hepatic metastasis. There is a new hypoattenuating lesion inferolaterally in the inferior tip of the right hepatic lobe (series 601, images 57-63), measuring up to approximately 2.2 cm in transaxial diameter, suspicious for new metastatic lesion. Liver is enlarged, similar to before.   Interval worsening of retroperitoneal metastatic lymphadenopathy.   Cholelithiasis without convincing evidence of acute cholecystitis.   New fluid in the endometrial canal suggesting cervical stenosis.   Additional findings as discussed above.   MACRO: None   Signed by: Faraz Link 4/3/2025 12:52 AM Dictation workstation:   [id number]               Results for orders placed or performed during the hospital encounter of 04/02/25 (from the past 24 hours)  CBC and Auto Differential  Result  Value  Ref Range     WBC  11.1  4.4 - 11.3 x10*3/uL     nRBC  0.0  0.0 - 0.0 /100 WBCs     RBC  4.36  4.00 - 5.20 x10*6/uL     Hemoglobin  11.5  Range     WBC  11.5 (H)  4.4 - 11.3 x10*3/uL     nRBC  0.0  0.0 - 0.0 /100 WBCs     RBC  4.08  4.00 - 5.20 x10*6/uL     Hemoglobin  10.9 (L)  12.0 - 16.0 g/dL     Hematocrit  36.7  36.0 - 46.0 %     MCV  90  80 - 100 fL     MCH  26.7  26.0 - 34.0 pg     MCHC  29.7 (L)  32.0 - 36.0 g/dL     RDW  19.9 (H)  11.5 - 14.5 %     Platelets  181  150 - 450 x10*3/uL  Basic Metabolic Panel  Result  Value  Ref Range     Glucose  129 (H)  74 - 99 mg/dL     Sodium  130 (L)  136 - 145 mmol/L     Potassium  4.2  3.5 - 5.3 mmol/L     Chloride  98  98 - 107 mmol/L     Bicarbonate  21  21 - 32 mmol/L     Anion Gap  15  10 - 20 mmol/L     Urea Nitrogen  19  6 - 23 mg/dL     Creatinine  0.85  0.50 - 1.05 mg/dL     eGFR  66  >60 mL/min/1.73m*2     Calcium  8.0 (L)  8.6 - 10.3 mg/dL  Heparin Assay, UFH  Result  Value  Ref Range     Heparin Unfractionated  1.1 (HH)  See Comment Below for Therapeutic Ranges IU/mL  POCT GLUCOSE  Result  Value  Ref Range     POCT Glucose  123 (H)  74 - 99 mg/dL           Assessment/Plan  Melissa is an 89-year-old female patient presenting to emergency department from [facility] for evaluation of abdominal pain and vomiting.  Patient states that she developed an 8 out of 10 mid to lower abdominal pain radiating to her right flank.  Patient with a history of breast cancer and colon cancer.  CT imaging showing extensive left renal cortical infarcts as well as worsening of retroperitoneal metastatic lymphadenopathy and progression of hepatic metastasis per radiology review.  Consults placed to nephrology and vascular surgery, patient started on a heparin drip, medicated with morphine and admitted for further medical management.     Assessment & Planuntitled image  untitled imageRenal infarct (Multi)     Hyponatremia     Hyponatremia/renal infarct  Inpatient admission to Dr. Lugo  See imaging results above  DNRCCA  N.p.o.  Consult nephrology and appreciate input  Consult vascular surgery and  appreciate input  Coag screen ordered  Continue heparin drip  Continue gentle IV fluids  Repeat labs  Monitor heparin assay  Morphine/Zofran in ED  Dilaudid IV as needed  Tigan IM as needed 2/2 prolonged Qtc     T2DM/breast cancer/colon cancer/anemia/HLD/HTN  #Chronic conditions  Insulin sliding scale for n.p.o.  Hypoglycemia protocol  Continue home MiraLAX and Colace  Continue home lisinopril daily  Continue home amlodipine daily  Continue home Estrace tablet     DVT Ppx  SCDs  Continue heparin drip  Bedrest/up to chair as tolerated  Patient fully evaluated on April 3.  Continue analgesics and await nephrology consultation.  Appreciate vascular surgery consultation.  Patient with significant metastatic cancer which comfort measures are in place.        I spent 60 minutes in the professional and overall care of this patient.  Tio Lugo MD                       Objective  Last Recorded Vitals  /64   Pulse 100   Temp 36.7 °C (98.1 °F)   Resp 18   Wt 81.6 kg (180 lb)   SpO2 99%   Intake/Output last 3 Shifts:     Intake/Output Summary (Last 24 hours) at 4/3/2025 1349  Last data filed at 4/3/2025 1000        Gross per 24 hour  Intake  551.83 ml  Output  --  Net  551.83 ml        Admission Weight  Weight: 81.6 kg (180 lb) (04/02/25 2207)     Daily Weight  04/02/25 : 81.6 kg (180 lb)     Image Results  CT abdomen pelvis w IV contrast  Narrative: Interpreted By:  Faraz Link,   STUDY:  CT ABDOMEN PELVIS W IV CONTRAST; ;  4/3/2025 12:30 am      INDICATION:  Signs/Symptoms:stage IV colon cancer, now with R abd pain.          COMPARISON:  01/17/2025.      ACCESSION NUMBER(S):  [id number]      ORDERING CLINICIAN:  ELYSE KLERMAN      TECHNIQUE:  Axial CT images of the abdomen and pelvis with coronal and sagittal  reconstructed images performed after intravenous administration of 80  cc Omnipaque 350.      FINDINGS:  LOWER CHEST: No acute abnormality of the lung bases.  BONES: No acute osseous abnormality.  mid-transverse colon and several adjacent loops of  small bowel and adjacent omentum and mesocolon, compatible with  patient's known advanced malignancy. No dilated bowel to suggest  obstruction. Stomach appears grossly normal. No definite additional  abnormal bowel wall thickening. Normal appendix. PERITONEUM: No  ascites or free air, no fluid collection.      Impression: New extensive hypoenhancement/non enhancement of the left renal  parenchyma compatible with extensive renal cortical infarcts. Right  kidney and ureter appear normal.      There is a large ill-defined region of hypoenhancement in the right  hepatic lobe measuring up to 10.8 cm in transaxial diameter, up to at  least 10.1 cm in CC diameter, suspicious for interval progression of  the hepatic metastasis. There is a new hypoattenuating lesion  inferolaterally in the inferior tip of the right hepatic lobe (series  601, images 57-63), measuring up to approximately 2.2 cm in  transaxial diameter, suspicious for new metastatic lesion. Liver is  enlarged, similar to before.      Interval worsening of retroperitoneal metastatic lymphadenopathy.      Cholelithiasis without convincing evidence of acute cholecystitis.      New fluid in the endometrial canal suggesting cervical stenosis.      Additional findings as discussed above.      MACRO:  None      Signed by: Faraz Link 4/3/2025 12:52 AM  Dictation workstation:   [id number]        Physical Exam  Constitutional:       Appearance: She is ill-appearing.   HENT:      Mouth/Throat:      Mouth: Mucous membranes are dry.      Pharynx: Oropharynx is clear.   Eyes:      Pupils: Pupils are equal, round, and reactive to light.   Cardiovascular:      Rate and Rhythm: Normal rate and regular rhythm.   Pulmonary:      Effort: Pulmonary effort is normal.      Breath sounds: Normal breath sounds.   Abdominal:      Palpations: Abdomen is soft.      Tenderness: There is abdominal tenderness in the periumbilical area and  patient does not like Tigan, order switched to Zofran. Patient medically cleared for discharge to SNF today.                              Assessment/Plan                       Assessment & Plan  Renal infarct (Multi)     Hyponatremia     Hyponatremia/renal infarct  Inpatient admission to Dr. Lugo  See imaging results above  DNRCCA  N.p.o.  Consult nephrology and appreciate input  Consult vascular surgery and appreciate input  Coag screen ordered  Continue heparin drip  Continue gentle IV fluids  Repeat labs  Monitor heparin assay  Morphine/Zofran in ED  Dilaudid IV as needed  Tigan IM as needed 2/2 prolonged Qtc     T2DM/breast cancer/colon cancer/anemia/HLD/HTN  #Chronic conditions  Insulin sliding scale for n.p.o.  Hypoglycemia protocol  Continue home MiraLAX and Colace  Continue home lisinopril daily  Continue home amlodipine daily  Continue home Estrace tablet     DVT Ppx  SCDs  Continue heparin drip  Bedrest/up to chair as tolerated     Patient fully evaluated  04/05  for    Problem List Items Addressed This Visit         * (Principal) Renal infarct (Multi) - Primary      Other Visit Diagnoses         Metastatic malignant neoplasm, unspecified site (Multi)                 Patient seen resting in bed with head of bed elevated, no s/s or c/o acute difficulties at this time. Patient still with significant pain noted, medications adjusted, percocet added, will monitor overnight and repeat labs in the AM.      Vital signs for last 24 hours Temp:  [35.6 °C (96.1 °F)-36.5 °C (97.7 °F)] 36.1 °C (97 °F)  Heart Rate:  [96-98] 96  Resp:  [18] 18  BP: (114-148)/(55-67) 148/67    I/O this shift:  In: 220.8 [I.V.:220.8]  Out: -   Patient still requiring frequent cardiac and SPO2 monitoring. Continue aggressive pulmonary hygiene and oral hygiene. Off loading as tolerated for skin integrity. Medications and labs reviewed-             Results for orders placed or performed during the hospital encounter of 04/02/25 (from the  past 24 hours)   POCT GLUCOSE   Result Value Ref Range     POCT Glucose 175 (H) 74 - 99 mg/dL   POCT GLUCOSE   Result Value Ref Range     POCT Glucose 167 (H) 74 - 99 mg/dL   POCT GLUCOSE   Result Value Ref Range     POCT Glucose 148 (H) 74 - 99 mg/dL   POCT GLUCOSE   Result Value Ref Range     POCT Glucose 140 (H) 74 - 99 mg/dL   CBC and Auto Differential   Result Value Ref Range     WBC 12.8 (H) 4.4 - 11.3 x10*3/uL     nRBC 0.0 0.0 - 0.0 /100 WBCs     RBC 3.96 (L) 4.00 - 5.20 x10*6/uL     Hemoglobin 10.8 (L) 12.0 - 16.0 g/dL     Hematocrit 36.5 36.0 - 46.0 %     MCV 92 80 - 100 fL     MCH 27.3 26.0 - 34.0 pg     MCHC 29.6 (L) 32.0 - 36.0 g/dL     RDW 20.3 (H) 11.5 - 14.5 %     Platelets 173 150 - 450 x10*3/uL     Neutrophils % 84.7 40.0 - 80.0 %     Immature Granulocytes %, Automated 0.5 0.0 - 0.9 %     Lymphocytes % 7.9 13.0 - 44.0 %     Monocytes % 6.7 2.0 - 10.0 %     Eosinophils % 0.1 0.0 - 6.0 %     Basophils % 0.1 0.0 - 2.0 %     Neutrophils Absolute 10.86 (H) 1.60 - 5.50 x10*3/uL     Immature Granulocytes Absolute, Automated 0.06 0.00 - 0.50 x10*3/uL     Lymphocytes Absolute 1.01 0.80 - 3.00 x10*3/uL     Monocytes Absolute 0.86 (H) 0.05 - 0.80 x10*3/uL     Eosinophils Absolute 0.01 0.00 - 0.40 x10*3/uL     Basophils Absolute 0.01 0.00 - 0.10 x10*3/uL   Comprehensive Metabolic Panel   Result Value Ref Range     Glucose 134 (H) 74 - 99 mg/dL     Sodium 131 (L) 136 - 145 mmol/L     Potassium 5.2 3.5 - 5.3 mmol/L     Chloride 102 98 - 107 mmol/L     Bicarbonate 20 (L) 21 - 32 mmol/L     Anion Gap 14 10 - 20 mmol/L     Urea Nitrogen 26 (H) 6 - 23 mg/dL     Creatinine 0.85 0.50 - 1.05 mg/dL     eGFR 66 >60 mL/min/1.73m*2     Calcium 7.7 (L) 8.6 - 10.3 mg/dL     Albumin 2.2 (L) 3.4 - 5.0 g/dL     Alkaline Phosphatase 155 (H) 33 - 136 U/L     Total Protein 5.7 (L) 6.4 - 8.2 g/dL     AST 23 9 - 39 U/L     Bilirubin, Total 0.9 0.0 - 1.2 mg/dL     ALT 8 7 - 45 U/L   Morphology   Result Value Ref Range     RBC  Morphology See Below       Polychromasia Mild       Ovalocytes Few       Selam Cells Few     POCT GLUCOSE   Result Value Ref Range     POCT Glucose 173 (H) 74 - 99 mg/dL      Patient recently received an antibiotic (last 12 hours)         None             Plan discussed with interdisciplinary team, will continue to monitor pain levels and monitor overnight. Will continue current and repeat labs in the AM.      Discharge planning discussed with patient and care team. Therapy evaluations ordered. Anticipate HHC/SNF at discharge. Patient aware and agreeable to current plan, continue plan as above.      I spent a total of 50 minutes on the date of the service which included preparing to see the patient, face-to-face patient care, completing clinical documentation, obtaining and/or reviewing separately obtained history, performing a medically appropriate examination, counseling and educating the patient/family/caregiver, ordering medications, tests, or procedures, communicating with other HCPs (not separately reported), independently interpreting results (not separately reported), communicating results to the patient/family/caregiver, and care coordination (not separately reported).                      Revision History       Patient fully evaluated on 4/7. Evaluated by nephrology, cyproheptadine added for appetite. Cyproheptadine discontinued today to avoid oversedation as patient is already on mirtazapine for appetite. LFTs elevated today, continue to monitor. No leukocytosis. Patient's family feels her pain is well-controlled with current regimen. Continuing hematuria noted in wall suction canister with flank pain, cannot rule out UTI. Cefazolin IV ordered. Urology consult placed. Appreciate recs re: possible bladder irrigation. Per daughter, patient also having hallucinations of seeing and hearing people who are not there. Plan to keep at least another day for monitoring.                     Assessment/Plan         Assessment & Plan  Renal infarct (Multi)     Hyponatremia     Hyponatremia/renal infarct  Inpatient admission to Dr. Lugo  See imaging results above  DNRCCA  N.p.o.  Consult nephrology and appreciate input  Consult vascular surgery and appreciate input  Coag screen ordered  Continue heparin drip  Continue gentle IV fluids  Repeat labs  Monitor heparin assay  Morphine/Zofran in ED  Dilaudid IV as needed  Tigan IM as needed 2/2 prolonged Qtc     T2DM/breast cancer/colon cancer/anemia/HLD/HTN  #Chronic conditions  Insulin sliding scale for n.p.o.  Hypoglycemia protocol  Continue home MiraLAX and Colace  Continue home lisinopril daily  Continue home amlodipine daily  Continue home Estrace tablet     DVT Ppx  SCDs  Continue heparin drip  Bedrest/up to chair as tolerated     Patient fully evaluated on 4/8. Underwent successful bladder irrigation yesterday, evacuated 200 ml old bloody urine per urology note. Urine orange from pyridium, but remains clear. Collins maintained overnight, will remove and trial voiding today. Recommending outpatient cystoscopy if family wishes to pursue. Continue cefazolin IV. LFTs still elevated, but improved from yesterday. Thrombocytopenia noted, continue to monitor. Hemoglobin dropped to 10.0 from 11.4 yesterday, likely result of hematuria. Pain continues to be controlled with current regimen. Plan to stay at least one more day to monitor urine and labs.                            Revision History       Patient fully evaluated 04/10 for   Assessment & Plan  Renal infarct (Multi)    Hyponatremia    Hyponatremia/renal infarct  Inpatient admission to Dr. Lugo  See imaging results above  DNRCCA  N.p.o.  Consult nephrology and appreciate input  Consult vascular surgery and appreciate input  Coag screen ordered  Continue heparin drip  Continue gentle IV fluids  Repeat labs  Monitor heparin assay  Morphine/Zofran in ED  Dilaudid IV as needed  Tigan IM as needed 2/2 prolonged Qtc    T2DM/breast  cancer/colon cancer/anemia/HLD/HTN  #Chronic conditions  Insulin sliding scale for n.p.o.  Hypoglycemia protocol  Continue home MiraLAX and Colace  Continue home lisinopril daily  Continue home amlodipine daily  Continue home Estrace tablet    DVT Ppx  SCDs  Continue heparin drip  Bedrest/up to chair as tolerated      Patient with significant clinical improvement noted, patient medically cleared for discharge today. Patient seen resting in bed with head of bed elevated, no s/s or c/o acute difficulties at this time. Vital signs for last 24 hours:  Temp:  [35.8 °C (96.4 °F)-36.7 °C (98.1 °F)] 35.9 °C (96.6 °F)  Heart Rate:  [84-98] 98  Resp:  [18] 18  BP: (117-138)/(62-85) 117/85 Medications and labs reviewed-   Results for orders placed or performed during the hospital encounter of 04/02/25 (from the past 24 hours)   POCT GLUCOSE   Result Value Ref Range    POCT Glucose 153 (H) 74 - 99 mg/dL   POCT GLUCOSE   Result Value Ref Range    POCT Glucose 166 (H) 74 - 99 mg/dL   POCT GLUCOSE   Result Value Ref Range    POCT Glucose 138 (H) 74 - 99 mg/dL   POCT GLUCOSE   Result Value Ref Range    POCT Glucose 109 (H) 74 - 99 mg/dL   CBC and Auto Differential   Result Value Ref Range    WBC 9.1 4.4 - 11.3 x10*3/uL    nRBC 0.0 0.0 - 0.0 /100 WBCs    RBC 4.29 4.00 - 5.20 x10*6/uL    Hemoglobin 11.4 (L) 12.0 - 16.0 g/dL    Hematocrit 39.6 36.0 - 46.0 %    MCV 92 80 - 100 fL    MCH 26.6 26.0 - 34.0 pg    MCHC 28.8 (L) 32.0 - 36.0 g/dL    RDW 18.8 (H) 11.5 - 14.5 %    Platelets 76 (L) 150 - 450 x10*3/uL    Immature Granulocytes %, Automated 5.4 (H) 0.0 - 0.9 %    Immature Granulocytes Absolute, Automated 0.49 0.00 - 0.50 x10*3/uL   Comprehensive Metabolic Panel   Result Value Ref Range    Glucose 118 (H) 74 - 99 mg/dL    Sodium 136 136 - 145 mmol/L    Potassium 4.2 3.5 - 5.3 mmol/L    Chloride 103 98 - 107 mmol/L    Bicarbonate 26 21 - 32 mmol/L    Anion Gap 11 10 - 20 mmol/L    Urea Nitrogen 20 6 - 23 mg/dL    Creatinine 0.73 0.50  - 1.05 mg/dL    eGFR 79 >60 mL/min/1.73m*2    Calcium 7.5 (L) 8.6 - 10.3 mg/dL    Albumin 2.1 (L) 3.4 - 5.0 g/dL    Alkaline Phosphatase 465 (H) 33 - 136 U/L    Total Protein 5.5 (L) 6.4 - 8.2 g/dL    AST 19 9 - 39 U/L    Bilirubin, Total 0.6 0.0 - 1.2 mg/dL    ALT <3 (L) 7 - 45 U/L   Manual Differential   Result Value Ref Range    Neutrophils %, Manual 76.0 40.0 - 80.0 %    Bands %, Manual 10.0 0.0 - 5.0 %    Lymphocytes %, Manual 6.0 13.0 - 44.0 %    Monocytes %, Manual 3.0 2.0 - 10.0 %    Eosinophils %, Manual 0.0 0.0 - 6.0 %    Basophils %, Manual 0.0 0.0 - 2.0 %    Atypical Lymphocytes %, Manual 1.0 0.0 - 2.0 %    Metamyelocytes %, Manual 2.0 0.0 - 0.0 %    Myelocytes %, Manual 2.0 0.0 - 0.0 %    Seg Neutrophils Absolute, Manual 6.92 (H) 1.60 - 5.00 x10*3/uL    Bands Absolute, Manual 0.91 (H) 0.00 - 0.50 x10*3/uL    Lymphocytes Absolute, Manual 0.55 (L) 0.80 - 3.00 x10*3/uL    Monocytes Absolute, Manual 0.27 0.05 - 0.80 x10*3/uL    Eosinophils Absolute, Manual 0.00 0.00 - 0.40 x10*3/uL    Basophils Absolute, Manual 0.00 0.00 - 0.10 x10*3/uL    Atypical Lymphs Absolute, Manual 0.09 0.00 - 0.30 x10*3/uL    Metamyelocytes Absolute, Manual 0.18 0.00 - 0.00 x10*3/uL    Myelocytes Absolute, Manual 0.18 0.00 - 0.00 x10*3/uL    Total Cells Counted 100     Neutrophils Absolute, Manual 7.83 (H) 1.60 - 5.50 x10*3/uL    RBC Morphology See Below     Polychromasia Mild     RBC Fragments Few     Target Cells Few     Ovalocytes Few     Teardrop Cells Few     [place] Cells Few     Acanthocytes Few     Bite Cells Present    POCT GLUCOSE   Result Value Ref Range    POCT Glucose 127 (H) 74 - 99 mg/dL      Patient recently received an antibiotic (last 12 hours)       Date/Time Action Medication Dose    04/10/25 0850 New Bag    ceFAZolin (Ancef) 2 g in dextrose (iso)  mL 2 g           No results found for the last 90 days.         Continue aggressive pulmonary hygiene and oral hygiene. Off loading as tolerated for skin  integrity.    Plan discussed with interdisciplinary team, pain controlled better with percocet, no acute distress noted at this time, denies pain at this time. No acute events overnight, patient denies chest pain, worsening SOB at this time. Ok to discharge, will continue current and repeat labs in the AM if patient still hospitalized. Patient aware and agreeable to current plan, continue plan as above.     I spent 30 minutes on the date of the service which included preparing to see the patient, face-to-face patient care, completing clinical documentation, obtaining and/or reviewing separately obtained history, performing a medically appropriate examination, counseling and educating the patient/family/caregiver, ordering medications, tests, or procedures, communicating with other HCPs (not separately reported), independently interpreting results (not separately reported), communicating results to the patient/family/caregiver, and care coordination (not separately reported    Patient fully evaluated  04/11  for   Assessment & Plan  Renal infarct (Multi)  Hyponatremia  Hyponatremia/renal infarct  Inpatient admission to Dr. Lugo  See imaging results above  DNRCCA  N.p.o.  Consult nephrology and appreciate input  Consult vascular surgery and appreciate input  Coag screen ordered  Continue heparin drip  Continue gentle IV fluids  Repeat labs  Monitor heparin assay  Morphine/Zofran in ED  Dilaudid IV as needed  Tigan IM as needed 2/2 prolonged Qtc  T2DM/breast cancer/colon cancer/anemia/HLD/HTN  #Chronic conditions  Insulin sliding scale for n.p.o.  Hypoglycemia protocol  Continue home MiraLAX and Colace  Continue home lisinopril daily  Continue home amlodipine daily  Continue home Estrace tablet  DVT Ppx  SCDs  Continue heparin drip  Bedrest/up to chair as tolerated    Patient stable at time of exam , no new complaints noted. Patient accepted to return to [facility] with transport scheduled for 1:15  pm today. Patient with significant clinical improvement noted, patient medically cleared for discharge today. Patient seen resting in bed with head of bed elevated, no s/s or c/o acute difficulties at this time. Vital signs for last 24 hours:  Temp:  [35.8 °C (96.4 °F)-36.7 °C (98.1 °F)] 35.9 °C (96.6 °F)  Heart Rate:  [84-98] 98  Resp:  [18] 18  BP: (117-138)/(62-85) 117/85 Medications and labs reviewed-   Results for orders placed or performed during the hospital encounter of 04/02/25 (from the past 24 hours)   POCT GLUCOSE   Result Value Ref Range    POCT Glucose 153 (H) 74 - 99 mg/dL   POCT GLUCOSE   Result Value Ref Range    POCT Glucose 166 (H) 74 - 99 mg/dL   POCT GLUCOSE   Result Value Ref Range    POCT Glucose 138 (H) 74 - 99 mg/dL   POCT GLUCOSE   Result Value Ref Range    POCT Glucose 109 (H) 74 - 99 mg/dL   CBC and Auto Differential   Result Value Ref Range    WBC 9.1 4.4 - 11.3 x10*3/uL    nRBC 0.0 0.0 - 0.0 /100 WBCs    RBC 4.29 4.00 - 5.20 x10*6/uL    Hemoglobin 11.4 (L) 12.0 - 16.0 g/dL    Hematocrit 39.6 36.0 - 46.0 %    MCV 92 80 - 100 fL    MCH 26.6 26.0 - 34.0 pg    MCHC 28.8 (L) 32.0 - 36.0 g/dL    RDW 18.8 (H) 11.5 - 14.5 %    Platelets 76 (L) 150 - 450 x10*3/uL    Immature Granulocytes %, Automated 5.4 (H) 0.0 - 0.9 %    Immature Granulocytes Absolute, Automated 0.49 0.00 - 0.50 x10*3/uL   Comprehensive Metabolic Panel   Result Value Ref Range    Glucose 118 (H) 74 - 99 mg/dL    Sodium 136 136 - 145 mmol/L    Potassium 4.2 3.5 - 5.3 mmol/L    Chloride 103 98 - 107 mmol/L    Bicarbonate 26 21 - 32 mmol/L    Anion Gap 11 10 - 20 mmol/L    Urea Nitrogen 20 6 - 23 mg/dL    Creatinine 0.73 0.50 - 1.05 mg/dL    eGFR 79 >60 mL/min/1.73m*2    Calcium 7.5 (L) 8.6 - 10.3 mg/dL    Albumin 2.1 (L) 3.4 - 5.0 g/dL    Alkaline Phosphatase 465 (H) 33 - 136 U/L    Total Protein 5.5 (L) 6.4 - 8.2 g/dL    AST 19 9 - 39 U/L    Bilirubin, Total 0.6 0.0 - 1.2 mg/dL    ALT <3 (L) 7 - 45 U/L   Manual Differential    Result Value Ref Range    Neutrophils %, Manual 76.0 40.0 - 80.0 %    Bands %, Manual 10.0 0.0 - 5.0 %    Lymphocytes %, Manual 6.0 13.0 - 44.0 %    Monocytes %, Manual 3.0 2.0 - 10.0 %    Eosinophils %, Manual 0.0 0.0 - 6.0 %    Basophils %, Manual 0.0 0.0 - 2.0 %    Atypical Lymphocytes %, Manual 1.0 0.0 - 2.0 %    Metamyelocytes %, Manual 2.0 0.0 - 0.0 %    Myelocytes %, Manual 2.0 0.0 - 0.0 %    Seg Neutrophils Absolute, Manual 6.92 (H) 1.60 - 5.00 x10*3/uL    Bands Absolute, Manual 0.91 (H) 0.00 - 0.50 x10*3/uL    Lymphocytes Absolute, Manual 0.55 (L) 0.80 - 3.00 x10*3/uL    Monocytes Absolute, Manual 0.27 0.05 - 0.80 x10*3/uL    Eosinophils Absolute, Manual 0.00 0.00 - 0.40 x10*3/uL    Basophils Absolute, Manual 0.00 0.00 - 0.10 x10*3/uL    Atypical Lymphs Absolute, Manual 0.09 0.00 - 0.30 x10*3/uL    Metamyelocytes Absolute, Manual 0.18 0.00 - 0.00 x10*3/uL    Myelocytes Absolute, Manual 0.18 0.00 - 0.00 x10*3/uL    Total Cells Counted 100     Neutrophils Absolute, Manual 7.83 (H) 1.60 - 5.50 x10*3/uL    RBC Morphology See Below     Polychromasia Mild     RBC Fragments Few     Target Cells Few     Ovalocytes Few     Teardrop Cells Few     [place] Cells Few     Acanthocytes Few     Bite Cells Present    POCT GLUCOSE   Result Value Ref Range    POCT Glucose 127 (H) 74 - 99 mg/dL      Patient recently received an antibiotic (last 12 hours)       Date/Time Action Medication Dose    04/11/25 0907 New Bag    ceFAZolin (Ancef) 2 g in dextrose (iso)  mL 2 g    04/11/25 0058 New Bag    ceFAZolin (Ancef) 2 g in dextrose (iso)  mL 2 g           No results found for the last 90 days.       Continue aggressive pulmonary hygiene and oral hygiene. Off loading as tolerated for skin integrity.  Plan discussed with interdisciplinary team, no acute events overnight, patient denies chest pain, worsening SOB at this time. Ok to discharge, will continue current and repeat labs in the AM if patient still hospitalized.  Patient aware and agreeable to current plan, continue plan as above.     I spent 30 minutes on the date of the service which included preparing to see the patient, face-to-face patient care, completing clinical documentation, obtaining and/or reviewing separately obtained history, performing a medically appropriate examination, counseling and educating the patient/family/caregiver, ordering medications, tests, or procedures, communicating with other HCPs (not separately reported), independently interpreting results (not separately reported), communicating results to the patient/family/caregiver, and care coordination (not separately reported  Pertinent Physical Exam At Time of Discharge  Physical Exam  Vitals and nursing note reviewed.       no acute events overnight, patient denies chest pain, worsening SOB at this time.  Outpatient Follow-Up  No future appointments.      Jo Dominique

## 2025-04-11 NOTE — PROGRESS NOTES
"Nutrition Follow Up Assessment:     Nutrition History:  Energy Intake: Poor < 50 %  Pain affecting nutrition status: N/A  Food and Nutrient History: Pt sitting in bed at time of visit yesterday.  Daughter in room visiting.  Pt continues to eat poorly, no appetite. Likes the soups, but getting hospital food confused with her nursing facilty food.  Pt intermittently will drink Ensure and notes that the magic cup is too frozen/hard. Pt also notes that she has lost 12lbs in 1 month (6%). Last  4/10.  Secure message sent to RN and Dr. Lugo regarding possibly palliative consult for GOC discussions.  If aggressive nutrition interventions wanted, may need to consider feeding tube, however pt has metastastic disease and with advanced age would not recommend feeding tube as this will not improve her quality of life.  Per Zofia ANGLIN via secure chat \"I spoke to the patient who did mention she did want more comfort care, but I am not sure if discussed with daughter because I have not seen her come in. but it looks like she has a D/C to go back and was signed off by  so I am not sure\".       Anthropometrics:  Height: 162.6 cm (5' 4.02\")   Weight: 81.6 kg (179 lb 14.3 oz)   BMI (Calculated): 30.86  IBW/kg (Dietitian Calculated): 54.5 kg  Percent of IBW: 150 %   Weight History:     Weight Change %:  Weight History / % Weight Change: pt reports a 12lb weight loss x1 month (6%)  Significant Weight Loss: Yes  Interpretation of Weight Loss: >5% in 1 month    Nutrition Focused Physical Exam Findings:    Subcutaneous Fat Loss:   Defer Subcutaneous Fat Loss Assessment: Defer all  Defer All Reason: not a good time  Muscle Wasting:  Defer Muscle Wasting Assessment: Defer all  Defer All Reason: not a good time  Edema:  Edema: none  Physical Findings:  Skin: Negative  Mouth Findings: Chewing difficulty    Nutrition Significant Labs:  BMP Trend:   Results from last 7 days   Lab Units 04/11/25  0613 04/10/25  0619 04/09/25  0621 " 04/08/25  0552   GLUCOSE mg/dL 118* 119* 134* 102*   CALCIUM mg/dL 7.5* 7.6* 7.1* 7.2*   SODIUM mmol/L 136 133* 133* 135*   POTASSIUM mmol/L 4.2 4.0 3.7 3.9   CO2 mmol/L 26 26 27 25   CHLORIDE mmol/L 103 101 101 104   BUN mg/dL 20 22 29* 30*   CREATININE mg/dL 0.73 0.80 0.83 0.84    , BG POCT trend:   Results from last 7 days   Lab Units 04/11/25  0605 04/11/25  0001 04/10/25  1956 04/10/25  1720 04/10/25  1125   POCT GLUCOSE mg/dL 109* 138* 166* 153* 148*        Nutrition Specific Medications:  Reviewed     I/O:   Last BM Date: 04/10/25; Stool Appearance: Loose (04/06/25 0000)    Dietary Orders (From admission, onward)       Start     Ordered    04/08/25 1046  Adult diet Regular; Thin 0  Diet effective now        Question Answer Comment   Diet type Regular    Fluid consistency Thin 0        04/08/25 1046    04/07/25 1212  Oral nutritional supplements  Until discontinued        Comments: Chocolate   Question Answer Comment   Deliver with Breakfast    Deliver with Lunch    Deliver with Dinner    Select supplement: Ensure Plus High Protein        04/07/25 1211    04/03/25 1205  Oral nutritional supplements  Until discontinued        Question Answer Comment   Deliver with All meals    Select supplement: Magic Cup        04/03/25 1205    04/03/25 0833  May Participate in Room Service  ( ROOM SERVICE MAY PARTICIPATE)  Once        Question:  .  Answer:  Yes    04/03/25 0832                     Estimated Needs:      Method for Estimating Needs: 1525-1745kcals (28-32kcals/kg IBW)     Method for Estimating 24 Hour Protein Needs: 65-82g (1.2-1.5g/kg IBW )     Method for Estimating 24 Hour Fluid Needs: 1 mL/kcal or as per MD  Patient on Order Fluid Restriction: No        Nutrition Diagnosis   Malnutrition Diagnosis  Patient has Malnutrition Diagnosis: Yes  Diagnosis Status: New  Malnutrition Diagnosis: Severe malnutrition related to acute disease or injury  Related to: acute on chronic illness (metastatic disease(  As  Evidenced by: >5% weight loss x1 month per pt/family reports, PO intakes likely meeting <50% of EEN for >=5 days    Nutrition Diagnosis  Patient has Nutrition Diagnosis: Yes  Diagnosis Status (1): Discontinued  Nutrition Diagnosis 1: Inadequate protein energy intake  Related to (1): acute on chronic illness  As Evidenced by (1): NPO  Additional Assessment Information (1): diet advanced however pt with poor PO intakes reported       Nutrition Interventions/Recommendations   Nutrition prescription for oral nutrition    Nutrition Recommendations:  Individualized Nutrition Prescription Provided for : Regular diet as ordered with  Magic cup TID and add Ensure plus HP TID    Nutrition Interventions/Goals:   Meals and Snacks: General healthful diet  Goal: consume >50% of meals-not met/ongoing  Medical Food Supplement: Commercial beverage medical food supplement therapy, Commercial food medical food supplement therapy  Goal: consume >75% of Ensure plus HP TID (for an additional 350 kcals, 20 gm protein each)-partially met/ongoing  Coordination of Care with Providers: Nursing, Provider (Zofia RN, Dr. Lugo)  Additional Interventions: consume >75% of Magic cup TID (for an additional 290 kcals, 9 gm protein each)-partially met/ongoing      Education Documentation  No documentation found.     N/A     Nutrition Monitoring and Evaluation   Food/Nutrient Related History Monitoring  Monitoring and Evaluation Plan: Estimated Energy Intake, Fluid intake, Intake / amount of food  Estimated Energy Intake: Energy intake 50 -75% of estimated energy needs  Fluid Intake: Estimated fluid intake  Intake / Amount of food: Consumes at least 75% or more of meals/snacks/supplements, Meets > 75% estimated energy needs  Additional Plans: Monitor for GOC discussions    Anthropometric Measurements  Monitoring and Evaluation Plan: Body weight  Body Weight: Body weight - Maintain stable weight    Biochemical Data, Medical Tests and  Procedures  Monitoring and Evaluation Plan: Electrolyte/renal panel, Glucose/endocrine profile  Electrolyte and Renal Panel: Electrolytes within normal limits, Sodium, Other (Comment)  Glucose/Endocrine Profile: Glucose within normal limits ( mg/dL)    Physical Exam Findings  Monitoring and Evaluation Plan: Mouth  Criteria: formed BM at least every 2-3 days  Skin Finding: Promote intact skin - Promote skin integrity  Mouth Finding: Chewing difficulty  Criteria: tolerate LRD    Goal Status: Goal(s) not achieved    Time Spent (min): 30 minutes

## 2025-04-14 ENCOUNTER — LAB REQUISITION (OUTPATIENT)
Dept: LAB | Facility: HOSPITAL | Age: 89
End: 2025-04-14
Payer: MEDICARE

## 2025-04-14 DIAGNOSIS — Z79.899 OTHER LONG TERM (CURRENT) DRUG THERAPY: ICD-10-CM

## 2025-04-14 LAB
ANION GAP SERPL CALC-SCNC: 15 MMOL/L (ref 10–20)
BUN SERPL-MCNC: 30 MG/DL (ref 6–23)
CALCIUM SERPL-MCNC: 7.5 MG/DL (ref 8.6–10.3)
CHLORIDE SERPL-SCNC: 102 MMOL/L (ref 98–107)
CO2 SERPL-SCNC: 23 MMOL/L (ref 21–32)
CREAT SERPL-MCNC: 0.85 MG/DL (ref 0.5–1.05)
EGFRCR SERPLBLD CKD-EPI 2021: 66 ML/MIN/1.73M*2
GLUCOSE SERPL-MCNC: 67 MG/DL (ref 74–99)
POTASSIUM SERPL-SCNC: 5 MMOL/L (ref 3.5–5.3)
SODIUM SERPL-SCNC: 135 MMOL/L (ref 136–145)

## 2025-04-14 PROCEDURE — 82374 ASSAY BLOOD CARBON DIOXIDE: CPT | Mod: OUT | Performed by: INTERNAL MEDICINE
